# Patient Record
Sex: MALE | Race: WHITE | HISPANIC OR LATINO | Employment: OTHER | URBAN - METROPOLITAN AREA
[De-identification: names, ages, dates, MRNs, and addresses within clinical notes are randomized per-mention and may not be internally consistent; named-entity substitution may affect disease eponyms.]

---

## 2017-01-04 ENCOUNTER — ALLSCRIPTS OFFICE VISIT (OUTPATIENT)
Dept: OTHER | Facility: OTHER | Age: 37
End: 2017-01-04

## 2017-01-19 ENCOUNTER — ALLSCRIPTS OFFICE VISIT (OUTPATIENT)
Dept: OTHER | Facility: OTHER | Age: 37
End: 2017-01-19

## 2017-01-25 ENCOUNTER — ALLSCRIPTS OFFICE VISIT (OUTPATIENT)
Dept: OTHER | Facility: OTHER | Age: 37
End: 2017-01-25

## 2017-01-26 ENCOUNTER — ALLSCRIPTS OFFICE VISIT (OUTPATIENT)
Dept: OTHER | Facility: OTHER | Age: 37
End: 2017-01-26

## 2017-01-30 ENCOUNTER — GENERIC CONVERSION - ENCOUNTER (OUTPATIENT)
Dept: OTHER | Facility: OTHER | Age: 37
End: 2017-01-30

## 2017-02-03 ENCOUNTER — GENERIC CONVERSION - ENCOUNTER (OUTPATIENT)
Dept: OTHER | Facility: OTHER | Age: 37
End: 2017-02-03

## 2017-02-03 ENCOUNTER — ALLSCRIPTS OFFICE VISIT (OUTPATIENT)
Dept: PSYCHOLOGY | Facility: CLINIC | Age: 37
End: 2017-02-03
Payer: COMMERCIAL

## 2017-02-03 PROCEDURE — 90791 PSYCH DIAGNOSTIC EVALUATION: CPT | Performed by: PSYCHIATRY & NEUROLOGY

## 2017-02-03 PROCEDURE — G0177 OPPS/PHP; TRAIN & EDUC SERV: HCPCS | Performed by: PSYCHIATRY & NEUROLOGY

## 2017-02-03 PROCEDURE — G0410 GRP PSYCH PARTIAL HOSP 45-50: HCPCS | Performed by: PSYCHIATRY & NEUROLOGY

## 2017-02-06 ENCOUNTER — GENERIC CONVERSION - ENCOUNTER (OUTPATIENT)
Dept: OTHER | Facility: OTHER | Age: 37
End: 2017-02-06

## 2017-02-07 ENCOUNTER — GENERIC CONVERSION - ENCOUNTER (OUTPATIENT)
Dept: OTHER | Facility: OTHER | Age: 37
End: 2017-02-07

## 2017-02-07 ENCOUNTER — APPOINTMENT (OUTPATIENT)
Dept: PSYCHOLOGY | Facility: CLINIC | Age: 37
End: 2017-02-07
Payer: COMMERCIAL

## 2017-02-07 PROCEDURE — G0177 OPPS/PHP; TRAIN & EDUC SERV: HCPCS | Performed by: PSYCHIATRY & NEUROLOGY

## 2017-02-07 PROCEDURE — G0410 GRP PSYCH PARTIAL HOSP 45-50: HCPCS | Performed by: PSYCHIATRY & NEUROLOGY

## 2017-02-07 PROCEDURE — G0176 OPPS/PHP;ACTIVITY THERAPY: HCPCS | Performed by: PSYCHIATRY & NEUROLOGY

## 2017-02-08 ENCOUNTER — GENERIC CONVERSION - ENCOUNTER (OUTPATIENT)
Dept: OTHER | Facility: OTHER | Age: 37
End: 2017-02-08

## 2017-02-08 ENCOUNTER — APPOINTMENT (OUTPATIENT)
Dept: PSYCHOLOGY | Facility: CLINIC | Age: 37
End: 2017-02-08
Payer: COMMERCIAL

## 2017-02-08 PROCEDURE — G0410 GRP PSYCH PARTIAL HOSP 45-50: HCPCS | Performed by: PSYCHIATRY & NEUROLOGY

## 2017-02-08 PROCEDURE — G0176 OPPS/PHP;ACTIVITY THERAPY: HCPCS | Performed by: PSYCHIATRY & NEUROLOGY

## 2017-02-08 PROCEDURE — G0177 OPPS/PHP; TRAIN & EDUC SERV: HCPCS | Performed by: PSYCHIATRY & NEUROLOGY

## 2017-02-09 ENCOUNTER — GENERIC CONVERSION - ENCOUNTER (OUTPATIENT)
Dept: OTHER | Facility: OTHER | Age: 37
End: 2017-02-09

## 2017-02-10 ENCOUNTER — GENERIC CONVERSION - ENCOUNTER (OUTPATIENT)
Dept: OTHER | Facility: OTHER | Age: 37
End: 2017-02-10

## 2017-02-10 ENCOUNTER — APPOINTMENT (OUTPATIENT)
Dept: PSYCHOLOGY | Facility: CLINIC | Age: 37
End: 2017-02-10
Payer: COMMERCIAL

## 2017-02-10 PROCEDURE — G0176 OPPS/PHP;ACTIVITY THERAPY: HCPCS | Performed by: PSYCHIATRY & NEUROLOGY

## 2017-02-10 PROCEDURE — G0410 GRP PSYCH PARTIAL HOSP 45-50: HCPCS | Performed by: PSYCHIATRY & NEUROLOGY

## 2017-02-10 PROCEDURE — G0177 OPPS/PHP; TRAIN & EDUC SERV: HCPCS | Performed by: PSYCHIATRY & NEUROLOGY

## 2017-02-13 ENCOUNTER — GENERIC CONVERSION - ENCOUNTER (OUTPATIENT)
Dept: OTHER | Facility: OTHER | Age: 37
End: 2017-02-13

## 2017-02-13 ENCOUNTER — APPOINTMENT (OUTPATIENT)
Dept: PSYCHOLOGY | Facility: CLINIC | Age: 37
End: 2017-02-13
Payer: COMMERCIAL

## 2017-02-13 PROCEDURE — G0410 GRP PSYCH PARTIAL HOSP 45-50: HCPCS | Performed by: PSYCHIATRY & NEUROLOGY

## 2017-02-13 PROCEDURE — G0177 OPPS/PHP; TRAIN & EDUC SERV: HCPCS | Performed by: PSYCHIATRY & NEUROLOGY

## 2017-02-14 ENCOUNTER — APPOINTMENT (OUTPATIENT)
Dept: PSYCHOLOGY | Facility: CLINIC | Age: 37
End: 2017-02-14
Payer: COMMERCIAL

## 2017-02-14 ENCOUNTER — GENERIC CONVERSION - ENCOUNTER (OUTPATIENT)
Dept: OTHER | Facility: OTHER | Age: 37
End: 2017-02-14

## 2017-02-14 PROCEDURE — G0177 OPPS/PHP; TRAIN & EDUC SERV: HCPCS | Performed by: PSYCHIATRY & NEUROLOGY

## 2017-02-14 PROCEDURE — G0410 GRP PSYCH PARTIAL HOSP 45-50: HCPCS | Performed by: PSYCHIATRY & NEUROLOGY

## 2017-02-14 PROCEDURE — G0176 OPPS/PHP;ACTIVITY THERAPY: HCPCS | Performed by: PSYCHIATRY & NEUROLOGY

## 2017-02-15 ENCOUNTER — GENERIC CONVERSION - ENCOUNTER (OUTPATIENT)
Dept: OTHER | Facility: OTHER | Age: 37
End: 2017-02-15

## 2017-02-15 ENCOUNTER — APPOINTMENT (OUTPATIENT)
Dept: PSYCHOLOGY | Facility: CLINIC | Age: 37
End: 2017-02-15
Payer: COMMERCIAL

## 2017-02-15 PROCEDURE — G0176 OPPS/PHP;ACTIVITY THERAPY: HCPCS | Performed by: PSYCHIATRY & NEUROLOGY

## 2017-02-15 PROCEDURE — G0410 GRP PSYCH PARTIAL HOSP 45-50: HCPCS | Performed by: PSYCHIATRY & NEUROLOGY

## 2017-02-15 PROCEDURE — G0177 OPPS/PHP; TRAIN & EDUC SERV: HCPCS | Performed by: PSYCHIATRY & NEUROLOGY

## 2017-02-16 ENCOUNTER — GENERIC CONVERSION - ENCOUNTER (OUTPATIENT)
Dept: OTHER | Facility: OTHER | Age: 37
End: 2017-02-16

## 2017-02-16 ENCOUNTER — APPOINTMENT (OUTPATIENT)
Dept: PSYCHOLOGY | Facility: CLINIC | Age: 37
End: 2017-02-16
Payer: COMMERCIAL

## 2017-02-16 PROCEDURE — G0176 OPPS/PHP;ACTIVITY THERAPY: HCPCS | Performed by: PSYCHIATRY & NEUROLOGY

## 2017-02-16 PROCEDURE — G0410 GRP PSYCH PARTIAL HOSP 45-50: HCPCS | Performed by: PSYCHIATRY & NEUROLOGY

## 2017-02-16 PROCEDURE — G0177 OPPS/PHP; TRAIN & EDUC SERV: HCPCS | Performed by: PSYCHIATRY & NEUROLOGY

## 2017-02-27 ENCOUNTER — ALLSCRIPTS OFFICE VISIT (OUTPATIENT)
Dept: OTHER | Facility: OTHER | Age: 37
End: 2017-02-27

## 2017-03-07 ENCOUNTER — GENERIC CONVERSION - ENCOUNTER (OUTPATIENT)
Dept: OTHER | Facility: OTHER | Age: 37
End: 2017-03-07

## 2017-04-05 ENCOUNTER — ALLSCRIPTS OFFICE VISIT (OUTPATIENT)
Dept: OTHER | Facility: OTHER | Age: 37
End: 2017-04-05

## 2017-04-19 ENCOUNTER — ALLSCRIPTS OFFICE VISIT (OUTPATIENT)
Dept: OTHER | Facility: OTHER | Age: 37
End: 2017-04-19

## 2017-06-12 ENCOUNTER — ALLSCRIPTS OFFICE VISIT (OUTPATIENT)
Dept: OTHER | Facility: OTHER | Age: 37
End: 2017-06-12

## 2017-07-12 ENCOUNTER — ALLSCRIPTS OFFICE VISIT (OUTPATIENT)
Dept: OTHER | Facility: OTHER | Age: 37
End: 2017-07-12

## 2017-08-15 ENCOUNTER — ALLSCRIPTS OFFICE VISIT (OUTPATIENT)
Dept: OTHER | Facility: OTHER | Age: 37
End: 2017-08-15

## 2017-08-15 DIAGNOSIS — F40.01 AGORAPHOBIA WITH PANIC DISORDER: ICD-10-CM

## 2017-08-15 DIAGNOSIS — F43.10 POST-TRAUMATIC STRESS DISORDER: ICD-10-CM

## 2017-08-15 DIAGNOSIS — F31.63 BIPOLAR DISORDER, CURRENT EPISODE MIXED, SEVERE, WITHOUT PSYCHOTIC FEATURES (HCC): ICD-10-CM

## 2017-09-27 ENCOUNTER — ALLSCRIPTS OFFICE VISIT (OUTPATIENT)
Dept: OTHER | Facility: OTHER | Age: 37
End: 2017-09-27

## 2018-01-09 NOTE — PSYCH
Psych Med Mgmt    Appearance: was calm and cooperative, adequate hygiene and grooming and good eye contact  Observed mood: mood appropriate  Observed mood: affect appropriate  Speech: a normal rate and fluent  Thought processes: coherent/organized  Hallucinations: no hallucinations present  Thought Content: no delusions  Abnormal Thoughts: The patient has no suicidal thoughts and no homicidal thoughts  Orientation: The patient is oriented to person, place and time, oriented to person, oriented to place and oriented to time  Recent and Remote Memory: short term memory intact and long term memory intact  Attention Span And Concentration: concentration impaired  Insight: Limited insight  Judgment: His judgment was limited  Muscle Strength And Tone  Muscle strength and tone were normal    The patient is experiencing no localized pain  Goals addressed in session: medication management       Treatment Recommendations: D/C Depakote Start Tegretol  mg bid     Risks, Benefits And Possible Side Effects Of Medications: Risks, benefits, and possible side effects of medications explained to patient and patient verbalizes understanding  He reports normal appetite, normal energy level, no weight change and normal number of sleep hours  Mood is less depressed but c/o severe dizziness from Depakote and stated he will like to switch medications  He agrees to try Carbamazepine 200 mg bid f/u in 4 to 6 weeks  He is anxious about losing his SSI benefits and he is appealing the decision,  Patient is not currently stable to work full time  Patient was recently d/c from [employer] and he was started on Paxil 20 mg       Vitals  Signs   Recorded: 19BZO4009 10:06AM   Height: 5 ft 4 in  Weight: 180 lb   BMI Calculated: 30 90  BSA Calculated: 1 87    Assessment    1  Anxiety (300 00) (F41 9)   2   Bipolar I disorder, most recent episode mixed, severe without psychotic features   (296 63) (F31 63)   3  Intermittent explosive disorder (312 34) (F63 81)   4  Panic disorder with agoraphobia (300 21) (F40 01)   5  Post traumatic stress disorder (309 81) (F43 10)    Plan    1  ClonazePAM 1 MG Oral Tablet (KlonoPIN); TAKE 1 TABLET 3 TIMES DAILY AS   NEEDED    2  CarBAMazepine  MG Oral Tablet Extended Release 12 Hour; TAKE 1   TABLET TWICE DAILY   3  PARoxetine HCl - 20 MG Oral Tablet (Paxil); TAKE 1 TABLET DAILY    4  Divalproex Sodium  MG Oral Tablet Extended Release 24 Hour (Depakote   ER)   5  OLANZapine 7 5 MG Oral Tablet; TAKE 1 TABLET Once At Bedtime    6  TraZODone HCl - 100 MG Oral Tablet; TAKE 1 TABLET AT BEDTIME    Review of Systems    Constitutional: as noted in HPI  Substance Abuse Hx    Substance Abuse History: Denies  Active Problems    1  Anxiety (300 00) (F41 9)   2  Bipolar I disorder, most recent episode mixed, severe without psychotic features   (296 63) (F31 63)   3  Intermittent explosive disorder (312 34) (F63 81)   4  Panic disorder with agoraphobia (300 21) (F40 01)   5  Post traumatic stress disorder (309 81) (F43 10)    Past Medical History    1  History of alcohol abuse (305 03) (Z87 898)   2  History of Crohn's disease (V12 70) (Z87 19)    The active problems and past medical history were reviewed and updated today  Allergies    1  Penicillins   2  Remicade SOLR    Current Meds   1  ClonazePAM 1 MG Oral Tablet; TAKE 1 TABLET 3 TIMES DAILY AS NEEDED; Therapy: 09WAK8687 to (Evaluate:09Oct2016); Last Rx:15Kvb9666 Ordered   2  Divalproex Sodium  MG Oral Tablet Extended Release 24 Hour; TAKE 1 TABLET   AT BEDTIME; Therapy: 23XPN4477 to (Evaluate:17Nov2016)  Requested for: 13TKZ2043; Last   Rx:23Ahf7247 Ordered   3  OLANZapine 5 MG Oral Tablet; TAKE 1 TABLET AT BEDTIME; Therapy: 49JMP1738 to (Evaluate:17Nov2016)  Requested for: 84LBS9310; Last   Rx:09Bhw9648 Ordered    The medication list was reviewed and updated today         Family Psych History  Mother    1  No pertinent family history  Father    2  No pertinent family history    The family history was reviewed and updated today  Social History    · Current Some Day Smoker (305 1)   · Home   · Occupational  The social history was reviewed and updated today  He moved out of his brother's house and now is staying with his parents  He also stopped working      End of Encounter Meds    1  ClonazePAM 1 MG Oral Tablet (KlonoPIN); TAKE 1 TABLET 3 TIMES DAILY AS NEEDED; Therapy: 57YKT1945 to (Evaluate:23Jan2017); Last Rx:73Xzz9554 Ordered    2  CarBAMazepine  MG Oral Tablet Extended Release 12 Hour; TAKE 1 TABLET   TWICE DAILY; Therapy: 70HCH9507 to (PLQLMTHV:25HBT1549)  Requested for: 25Oct2016; Last   Rx:25Oct2016 Ordered   3  PARoxetine HCl - 20 MG Oral Tablet (Paxil); TAKE 1 TABLET DAILY; Therapy: 07AUV1045 to (Evaluate:23Jan2017); Last Rx:44Qnc9847 Ordered    4  OLANZapine 7 5 MG Oral Tablet; TAKE 1 TABLET Once At Bedtime; Therapy: 72MWY0825 to (Evaluate:23Jan2017)  Requested for: 25Oct2016; Last   Rx:96Nok3122 Ordered    5  TraZODone HCl - 100 MG Oral Tablet; TAKE 1 TABLET AT BEDTIME; Therapy: 67IGJ3794 to (HIUWLAEN:37YOW4170)  Requested for: 40ZYR6616; Last   UR:99HNZ0655 Ordered    Future Appointments    Date/Time Provider Specialty Site   11/10/2016 11:00 AM Carlene Kaba [facility]   11/23/2016 09:00 AM Carlene Kaba [facility]   12/01/2016 10:20 AM OLGA Hardy   Psychiatry [place] [id number]     Signatures   Electronically signed by : OLGA Mccrary ; Oct 25 2016 10:08AM EST                       (Author)

## 2018-01-09 NOTE — PSYCH
History of Present Illness  [facility] Clinical Progress Note [facility]:   Specialized Services Documentation - Therapist must complete separate progress note for each specific clinical activity in which the client participated during the day  ([phone]) Group Psychotherapy: (9:30-10:30) Chandni Mckeon participated in psychotherapy group focused on making healthy changes as well as mental health stigma  Chandni Mckeon identified his car being inspected tdoay as a stressor, and stated that he constantly worries about everything, even if he knows they are not worth worrying about  Several peers related to him regarding worry  He actively participated in group discussion about making healthy changes, such as communicating differently with supports or incorporating exercising and diet changes into one's daily routine  He provided much feedback and support to peers on these topics  He also discussed difficulties of mental health stigma and how it is something he faces every day, whether with friends or his family members, particularly his sister  Some moderate progress noted toward goals  Continue psychotherapy group to encourage Chandni Mckeon to explore stressors and coping  Treatment Plan Problem(s): 1 1, 1 2  Macy Woodall MSW, LSW     (775) Group Psychotherapy: [id number] Chandni Mckeon participated in wellness group focused on drawing effective personal boundaries in key areas such as your time, emotions, energy and personal values  Chadnni Mckeon shared that he "probably has bad boundaries" when it comes to just stopping over friends or family's homes when he wants or needs to talk  Chandni Mckeon shared this after hearing a peer's experience with others doing this to her and he stated that he never saw it as unhealthy because "I am always there for others when they need me " Group gave encouragement to Chandni Mckeon to see certain situations from other's point of view and he was able to acknowledge how this behavior can violate other's boundaries   Chandni Mckeon stated that he does not have problems setting boundaries with others  Wilson Corrigan made good progress toward goals  Continue group to educate on what a healthy boundary looks like and how it works to support personal wellness as well as ways to create healthier personal boundaries  Treatment Plan Problem(s): 1 1,1 2,1 4  Don Garcia, RN     (404) Group Psychotherapy: ([id number]) Wilson Corrigan participated in wellness group focused on identifying and coping with positive experiences of life through the use of worksheet  He described how he realized and took courage to seek treatment for his mental illness  Also verbalized by 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 changed his attitude to be kind, selfless, sacrifice and he became more thankful towards his parents and family  His determination is to set goals and work through to achieve the goals  Peers offered him lot of praise and encouraged to stay motivated  Significant progress toward goals noted  Continue wellness group to educate James Swanson about his diagnosis and coping strategies to deal with depression  Mona Mojica RN  Treatment Plan Problem(s): 1 1,1 2,1 4      (070) Education Therapy Goals set - meditate before bed    Treatment Plan Problem(s): 1 1, 1 2  Education Therapy Time - 0900 - 0930 Previous goal was met  Readiness to Learning:  He is receptive to learning  There are  no barriers to learning  Learning Assessment Time - 1330 - 1400   Education completed on  illness and wellness tools  The teaching method was  verbal and written  Shared area of learning: Yes  Laura Vallejo MSW, LSW         Case Management Note:   12:15-12:2- This CM met with Wilson Corrigan to discuss weekend and progress  He stated that he is doing ok, but is anxious about several things, such as his car, Social Security claim, getting a job and his ex-girlfriend  Discussed his worries, what he can control and what he cannot, etc  He stated that he is trying to put coping skills into place, and just talking about them has been helping   He stated that he feels he is getting a lot from the program  He wants to call [facility] regarding financing options for finishing his degree, and asked if he could do so with this CM present  Advised that would be possible, and planned for tomorrow at 11:45  TREATMENT SESSION NUMBER: 5   Current suicide risk is low  Medications not changed/added/denied  Conception Nan ARREOLAW, HILDAW      Active Problems    1  Anxiety (300 00) (F41 9)   2  Bipolar I disorder, most recent episode mixed, severe without psychotic features   (296 63) (F31 63)   3  RADHA (generalized anxiety disorder) (300 02) (F41 1)   4  Intermittent explosive disorder (312 34) (F63 81)   5  Panic disorder with agoraphobia (300 21) (F40 01)   6  Panic disorder without agoraphobia (300 01) (F41 0)   7  Post traumatic stress disorder (309 81) (F43 10)    Past Medical History    1  Denied: History of Head trauma   2  History of alcohol abuse (305 03) (Z87 898)   3  History of Crohn's disease (V12 70) (Z87 19)   4  Denied: History of Seizure    Allergies    1  Penicillins   2  Remicade SOLR    Current Meds   1  ClonazePAM 1 MG Oral Tablet; TAKE 1 TABLET 3 TIMES DAILY AS NEEDED; Therapy: 55IVQ6556 to (Evaluate:25Apr2017); Last Rx:68Eel9839 Ordered   2  FLUoxetine HCl - 20 MG Oral Capsule; TAKE 1 CAPSULE Once In The Morning; Therapy: 87CNM6792 to (Evaluate:26Apr2017)  Requested for: 23RFA1229; Last   Rx:26Jan2017 Ordered   3  HydrOXYzine HCl - 25 MG Oral Tablet; TAKE 1 TABLET 3 TIMES DAILY AS NEEDED; Therapy: 21GDP4581 to (Evaluate:21Szr5512)  Requested for: 12NFO2054; Last   Rx:26Jan2017 Ordered   4  OLANZapine 20 MG Oral Tablet; TAKE 1 TABLET AT BEDTIME; Therapy: 50YCI3480 to (Evaluate:30May2017)  Requested for: 26Jan2017; Last   Rx:70Hsx39 Ordered    Family Psych History  Mother    1  No pertinent family history  Father    2  Family history of alcohol abuse (V61 41) (Z81 1)   3   Family history of paranoid schizophrenia (V17 0) (Z81 8)    Social History    · Current Some Day Smoker (305 1)   · Disabled   · Education Level: Some college   · Home   · Occasional caffeine consumption   · Occupational   · Single    Future Appointments    Date/Time Provider Specialty Site   02/27/2017 11:00 AM Alison Ellis, [facility] Psychiatry [facility]   03[facility]07[facility]2017 10[facility]   03/20/2017 11:00 [facility]   04/05/2017 11:00 AM [facility] [place]   04/19/2017 11:00 AM Alison Ellis, [facility] Psychiatry [facility]   02/14/2017 11:30 AM OLGA Tyson  Psychiatry [facility]   02/15/2017 12:15 PM Daniel Lea M D  Psychiatry [facility]   02/16/2017 11:15 AM OLGA Tyson  Psychiatry [facility]   02/17/2017 12:15 PM Daniel Lea M D   Psychiatry [facility]   03/02/2017 02:15 PM Jack Barrios, MS, APRN, PMHCNS_BS  [facility]   03/16/2017 02:15 PM Doylene Rounds, MS, APRN, PMHCNS_BS  [facility]   04/06/2017 02:15 PM Doylene Rounds, MS, APRN, PMHCNS_BS  [facility]   04/20/2017 02:15 PM Doylene Rounds, MS, APRN, PMHCNS_BS  [facility]   04/20/2017 02:15 PM Doylene Rounds, MS, APRN, [facility]     Signatures   Electronically signed by : ELSY Oropeza; Feb 13 2017  1:02PM EST                       (Author)    Electronically signed by : Marshall Welch RN; Feb 13 2017  1:26PM EST                       (Author)    Electronically signed by : ELSY Oropeza; Feb 13 2017  2:12PM EST                       (Author)    Electronically signed by : Mariama Barone RN; Feb 13 2017  2:36PM EST                       (Author)

## 2018-01-09 NOTE — PSYCH
History of Present Illness  [facility] Clinical Progress Note [facility]:   Specialized Services Documentation - Therapist must complete separate progress note for each specific clinical activity in which the client participated during the day  ([phone]) Group Psychotherapy: (9:30-10:30) Jerad Turner participated in psychotherapy group focused on coping with lack of structure over the weekend, as well as poor motivation and isolation  Jerad Turner identified seeing his brother for the first time in three years this weekend as a stressor  He was an active participant in group discussion about isolating from family and friends, and states that it is one of his biggest problems right now  He also talked about his plans for the weekend and how he plans to cope with stressors  Good beginning progress noted toward goals  Continue psychotherapy group to encourage Jerad Turner to explore stressors and coping  Treatment Plan Problem(s): 1 1, 1 2  Huyen Singh MSW, LSW     (418) Group Psychotherapy: 0822-9020 Jerad Turner participated in weekly wellness group to discuss what particular area of wellness that has been worked on up to today in Program and choice of area to continue working on for recovery as targeted in treatment plan, by choice or in WRAP  Jerad Turner shared that he has been isolating and will focus on exercising to isolate less and feel better physically  Jerad Turner stated that he will do this by going to the gym he has a membership at beginning this evening with his brother  Jerad Turner stated that he has not been to the gym in over one month  Peers encouraged Jerad Turner to make a goal and break into objectives on how he will achieve it such as scheduling time to go to the gym on his calendar and marking off when he actually does go  Jerad Turner made good initial progress toward goals  Continue to offer group for Jerad Turner to focus on setting and executing goals as he identifies and prioritizes them                           toward goal  Continue to offer weekly wellness as an strategy to offer opportunity to focus on goals and identify areas of goal achievement and need  Treatment Plan Problem(s): 1 1, 1 2  Aicha Asif RN       (672) Education Therapy Goals set - spend time with brothers    Treatment Plan Problem(s): 1 4, 1 1  Education Therapy Time - 0900 - 0930, Time first treatment day   Readiness to Learning:  He is receptive to learning  There are  no barriers to learning  Learning Assessment Time - 1330 - 1400   Education completed on  illness, medication and wellness tools  The teaching method was  verbal  Shared area of learning: Yes  BALDEMAR Carter     (594) [facility] [id number] Judith Wyatt excused due to case management evaluation  BALDEMAR Carter     ( ) Other 14:25-spoke with Otto Do at [facility]  She will email precert form and once completed, this CM should fax back to them for review  BONNY Villegas     Case Management Note:   10:45-11:20 This CM met with Judith Wyatt for initial intake  He was cooperative with intake process and shared openly  Signed ROIs for PCP, emergency contact and outpatient psych providers  Program basics explained, and on-call/crisis numbers provided  Denies SI, HI or psychosis currently  TREATMENT SESSION NUMBER: 1   Current suicide risk is low  Medications not changed/added/denied  Darek FOURNIER, BONNY      Active Problems     1  Anxiety (300 00) (F41 9)   2  Intermittent explosive disorder (312 34) (F63 81)   3  Panic disorder with agoraphobia (300 21) (F40 01)    Bipolar I disorder, most recent episode mixed, severe without psychotic features (296 63) (F31 63)       Post traumatic stress disorder (309 81) (F43 10)       RADHA (generalized anxiety disorder) (300 02) (F41 1)       Panic disorder without agoraphobia (300 01) (F41 0)          Past Medical History    1  History of alcohol abuse (305 03) (Z87 898)   2  History of Crohn's disease (V12 70) (Z87 19)    Allergies    1  Penicillins   2   Remicade SOLR    Current Meds 1  ClonazePAM 1 MG Oral Tablet; TAKE 1 TABLET 3 TIMES DAILY AS NEEDED; Therapy: 43EEN5832 to (Evaluate:25Apr2017); Last Rx:50Ikj8327 Ordered   2  FLUoxetine HCl - 20 MG Oral Capsule; TAKE 1 CAPSULE Once In The Morning; Therapy: 49SMC6556 to (Evaluate:26Apr2017)  Requested for: 78KXS5292; Last   Rx:26Jan2017 Ordered   3  HydrOXYzine HCl - 25 MG Oral Tablet; TAKE 1 TABLET 3 TIMES DAILY AS NEEDED; Therapy: 59AVY4268 to (Evaluate:02Eht6359)  Requested for: 09XNT1141; Last   Rx:26Jan2017 Ordered   4  OLANZapine 20 MG Oral Tablet; TAKE 1 TABLET AT BEDTIME; Therapy: 83UFJ5641 to (Evaluate:30May2017)  Requested for: 26Jan2017; Last   Rx:26Jan2017 Ordered    Family Psych History   Family history of No pertinent family history             Social History    · Current Some Day Smoker (305 1)   · Home   · Occupational    Future Appointments    Date/Time Provider Specialty Site   02/06/2017 10:00 AM Daniel Kaba LCSW Psychiatry [facility]   02/27/2017 11:00 AM Daniel Kaba LCSW Psychiatry [facility]   03/07/2017 10:00 AM Daniel Kaba LCSW Psychiatry [facility]   03/20/2017 11:00 AM Daniel Kaba LCSW Psychiatry [facility]   04/05/2017 11:00 AM Daniel Kaba LCSW Psychiatry [facility]   04/19/2017 11:00 AM Daniel Kaba LCSW Psychiatry [facility]   02/06/2017 10:00 AM OLGA Recinos  Psychiatry [facility]   02/07/2017 10:00 AM OLGA Recinos  Psychiatry [facility]   02/08/2017 10:00 AM OLGA Recinos  Psychiatry [facility]   02/09/2017 10:00 AM OLGA Recinos  Psychiatry [facility]   02/10/2017 10:00 AM OLGA Recinos   Psychiatry [facility]   03/02/2017 02:15 PM Olaf Barrios, MS, APRN, PMHCNS_BS  [facility] 2017 02:15 PM Sandie Barrios, MS, APRN, PMHCNS_BS  [facility]   2017 02:15 PM Severa Saxon, MS, APRN, PMHCNS_BS  [facility]   2017 02:15 PM Severa Saxon, MS, APRN, PMHCNS_BS  [facility]   2017 02:15 PM Severa Saxon, MS, APRN, [facility]     Signatures   Electronically signed by : ELSY Jc; Feb  3 2017 11:52AM EST                       (Author)    Electronically signed by : BALDEMAR Long; Feb  3 2017  2:18PM EST                       (Author)    Electronically signed by : BALDEMAR Long; Feb  3 2017  2:24PM EST                       (Author)    Electronically signed by : ELSY Jc; Feb  3 2017  2:28PM EST                       (Author)    Electronically signed by : Malena Goyal RN; Feb  3 2017  3:54PM EST                       (Author)

## 2018-01-09 NOTE — PSYCH
1  Bipolar I disorder, most recent episode mixed, severe without psychotic features   (296 63) (F31 63)   2  Anxiety (300 00) (F41 9)   3  Intermittent explosive disorder (312 34) (F63 81)   4  Panic disorder with agoraphobia (300 21) (F40 01)   5  Post traumatic stress disorder (309 81) (F43 10)      Date of Initial Treatment Plan: 12/3/16  Date of Current Treatment Plan: 11/23/16  Treatment Plan 2  Strengths/Personal Resources for Self Care: caring, up front with stuff  Area of Needs: I got off track  Long Term Goals:   I want to get back on track  Target Date: 3/17              Short Term Objectives:   Goal 1:   I will take my meds  I will follow up with    I will attend therapy regularly again  I will attend the bipolar group  I will volunteer again  I will look into the DBS[employer] group  I will go back to [facility]  I will complete the partial program    Target Date: 12/16              GOAL 1: Modality: Individual 2 x per month   GOAL 1: Modality: Group 2 x per month                          The first scheduled review date is 3/17  The expected length of service is 6 mons  Patient Signature: _________________________________ Date/Time: ______________        1  Anxiety (300 00) (F41 9)   2  Bipolar I disorder, most recent episode mixed, severe without psychotic features   (296 63) (F31 63)   3  Intermittent explosive disorder (312 34) (F63 81)   4  Panic disorder with agoraphobia (300 21) (F40 01)   5   Post traumatic stress disorder (309 81) (F43 10)     Electronically signed by : Ata Alvarado LCSW; Nov 23 2016  9:49AM EST                       (Author)

## 2018-01-10 NOTE — PSYCH
Psych Med Mgmt    Appearance: was calm and cooperative, adequate hygiene and grooming and good eye contact  Observed mood: mood appropriate  Observed mood: affect appropriate  Speech: a normal rate  Thought processes: coherent/organized  Hallucinations: no hallucinations present  Thought Content: no delusions  Abnormal Thoughts: The patient has no suicidal thoughts and no homicidal thoughts  Orientation: The patient is oriented to person, place and time, oriented to person, oriented to place and oriented to time  Recent and Remote Memory: short term memory intact and long term memory intact  Attention Span And Concentration: concentration impaired  Insight: Limited insight  Judgment: His judgment was limited  Muscle Strength And Tone  Muscle strength and tone were normal    The patient is experiencing no localized pain  Goals addressed in session: Medication Management     Treatment Recommendations: Increase Lithium to previous dose  Risks, Benefits And Possible Side Effects Of Medications: Risks, benefits, and possible side effects of medications explained to patient and patient verbalizes understanding  He reports normal appetite, normal energy level, no weight change and normal number of sleep hours  Patient stated that since the last change in Lithium dose he has been irritable, he is angry more often, paranoid, defensive, mood lability and poor sleep  C/o feeling fatigued and feels he has poor memory and concentration  He agrees to go back to 900 mg Lithium and re take a blood level  Also will increase Zyprexa 7 5 mg qhs  He also agrees that if he is not feeling better in the next two weeks it would be best to be hospitalized  Assessment    1  Bipolar I disorder, most recent episode mixed, severe without psychotic features   (296 63) (F31 63)   2  Anxiety (300 00) (F41 9)   3  Panic disorder with agoraphobia (300 21) (F40 01)   4   Post traumatic stress disorder (309 81) (F43 10)   5  Intermittent explosive disorder (312 34) (F63 81)    Plan    1  ClonazePAM 1 MG Oral Tablet (KlonoPIN); TAKE 1 TABLET 3 TIMES DAILY AS   NEEDED    2  PARoxetine HCl - 20 MG Oral Tablet    3  Lithium Carbonate 300 MG Oral Capsule; TAKE 3 CAPSULE Once At Bedtime   4  OLANZapine 7 5 MG Oral Tablet; TAKE 1 TABLET Once At Bedtime    5  (1) LITHIUM; Status:Active; Requested for:58Wgn4036;     Review of Systems    Constitutional: as noted in HPI  Substance Abuse Hx    Substance Abuse History: Denies  Active Problems    1  Anxiety (300 00) (F41 9)   2  Bipolar I disorder, most recent episode mixed, severe without psychotic features   (296 63) (F31 63)   3  Intermittent explosive disorder (312 34) (F63 81)   4  Panic disorder with agoraphobia (300 21) (F40 01)   5  Post traumatic stress disorder (309 81) (F43 10)    Past Medical History    1  History of alcohol abuse (305 03) (Z87 898)   2  History of Crohn's disease (V12 70) (Z87 19)    The active problems and past medical history were reviewed and updated today  Allergies    1  Penicillins   2  Remicade SOLR    Current Meds   1  ClonazePAM 1 MG Oral Tablet; TAKE 1 TABLET 3 TIMES DAILY AS NEEDED; Therapy: 04TZY6408 to (Evaluate:17Jun2016); Last Rx:[id number] Ordered   2  Lithium Carbonate 300 MG Oral Capsule; 2 caps po q morning; Therapy: 69ZAE4160 to (Evaluate:17Jun2016)  Requested for: 83OQJ0202; Last   Rx:65Qyq2513 Ordered   3  OLANZapine 2 5 MG Oral Tablet; TAKE 1 TABLET DAILY; Therapy: 45JLU3504 to (Evaluate:18May2016)  Requested for: 69WQN6414; Last   Rx:61Erg2957 Ordered   4  PARoxetine HCl - 20 MG Oral Tablet; Take 1 tablet daily; Therapy: 04QCW7425 to (Evaluate:18May2016)  Requested for: 04BIN9655; Last   Rx:87Qxv9581 Ordered    The medication list was reviewed and updated today  Family Psych History  Mother    1  No pertinent family history  Father    2   No pertinent family history    The family history was reviewed and updated today  Social History    · Current Some Day Smoker (305 1)   · Home   · Occupational  The social history was reviewed and updated today  He stated he has been dating for 6 weeks and he has been having problems with his GF all because he has been irritable and manic after decreasing dose of Lithium and Zyprexa  End of Encounter Meds    1  ClonazePAM 1 MG Oral Tablet (KlonoPIN); TAKE 1 TABLET 3 TIMES DAILY AS NEEDED; Therapy: 49HYX1563 to (Evaluate:09Oct2016); Last Rx:75Qvu9716 Ordered    2  Lithium Carbonate 300 MG Oral Capsule; TAKE 3 CAPSULE Once At Bedtime; Therapy: 94HMK5769 to (Evaluate:09Oct2016)  Requested for: 32EDP5445; Last   Rx:24Etx0512 Ordered   3  OLANZapine 7 5 MG Oral Tablet; TAKE 1 TABLET Once At Bedtime;    Therapy: 66TIA5172 to ([place])  Requested for: 39ADG7820; Last   Rx:[id number] Ordered    Future Appointments    Date/Time Provider Specialty Site   08/16/2016 10:00 AM Anuj Jeronimo, [facility] Psychiatry [facility]   08/30/2016 02:00 PM Anuj Jeronimo [facility] Psychiatry [facility]   09/16/2016 11:00 AM Anuj Jeronimo, [facility] Psychiatry [facility]   09/30/2016 11:00 AM Anuj Green, [facility]iatry [facility]   10/14/2016 11:00 AM Anuj Jeronimo, [facility] Psychiatry [facility]   10/28/2016 11:00 AM Anuj Jeronimo, [facility] Psychiatry [facility]     Signatures   Electronically signed by : OLGA Denton ; Jul 11 2016  1:49PM EST                       (Author)
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
## 2018-01-10 NOTE — PSYCH
Progress Note  Psychotherapy Provided [facility]: Individual Psychotherapy 45 minutes provided today  Goals addressed in session:   goals 1  He completed the [employer] program  His ex-girlfriend is no longer his   He is working on hire a new   He is dating a "new girl " "He took the Atarax and it made him very dizzy and he did not sleep,  He is not taking that again " Developed tx plan  A: Slow progress on goal 1  P; "To not take the Atarax again "      Assessment    1  Bipolar I disorder, most recent episode mixed, severe without psychotic features   (296 63) (F31 63)   2  Anxiety (300 00) (F41 9)   3  Intermittent explosive disorder (312 34) (F63 81)   4  Panic disorder without agoraphobia (300 01) (F41 0)   5   Post traumatic stress disorder (309 81) (F43 10)    Signatures   Electronically signed by : Beata Salas LCSW; Apr 5 2017  3:32PM EST                       (Author)

## 2018-01-10 NOTE — PSYCH
History of Present Illness  [facility] Clinical Progress Note  [facility]:   Specialized Services Documentation - Therapist must complete separate progress note for each specific clinical activity in which the client participated during the day  Case Management Note: Individual Case Management visit not provided today  Giancarlo Mendes was excused from program today due to inclement weather  Current suicide risk is low  Medications not changed/added/denied  Joycemeka De Pazon MSW, LSW      Active Problems    1  Anxiety (300 00) (F41 9)   2  Bipolar I disorder, most recent episode mixed, severe without psychotic features   (296 63) (F31 63)   3  RADHA (generalized anxiety disorder) (300 02) (F41 1)   4  Intermittent explosive disorder (312 34) (F63 81)   5  Panic disorder with agoraphobia (300 21) (F40 01)   6  Panic disorder without agoraphobia (300 01) (F41 0)   7  Post traumatic stress disorder (309 81) (F43 10)    Past Medical History    1  Denied: History of Head trauma   2  History of alcohol abuse (305 03) (Z87 898)   3  History of Crohn's disease (V12 70) (Z87 19)   4  Denied: History of Seizure    Allergies    1  Penicillins   2  Remicade SOLR    Current Meds   1  ClonazePAM 1 MG Oral Tablet (KlonoPIN); TAKE 1 TABLET 3 TIMES DAILY AS NEEDED; Therapy: 48FGW2850 to (Evaluate:25Apr2017); Last Rx:33Ndb0915 Ordered   2  FLUoxetine HCl - 20 MG Oral Capsule; TAKE 1 CAPSULE Once In The Morning; Therapy: 55HIY9215 to (Evaluate:26Apr2017)  Requested for: 27OKY3011; Last   Rx:26Jan2017 Ordered   3  HydrOXYzine HCl - 25 MG Oral Tablet; TAKE 1 TABLET 3 TIMES DAILY AS NEEDED; Therapy: 67CCI7997 to (Evaluate:96Zjc3607)  Requested for: 12COG8577; Last   Rx:26Jan2017 Ordered   4  OLANZapine 20 MG Oral Tablet; TAKE 1 TABLET AT BEDTIME; Therapy: 81TMF5620 to (Evaluate:66Lls4837)  Requested for: 26Jan2017; Last   Whitney Apo Ordered    Family Psych History  Mother    1  No pertinent family history  Father    2   Family history of alcohol abuse (V61 41) (Z81 1)   3  Family history of paranoid schizophrenia (V17 0) (Z81 8)    Social History    · Current Some Day Smoker (305 1)   · Disabled   · Education Level: Some college   · Home   · Occasional caffeine consumption   · Occupational   · Single    Future Appointments    Date/Time Provider Specialty Site   02/27/2017 11:00 AM Toludin Jack, [facility] Psychiatry [facility]   03/07/2017 10:00 AM Toludin Jack, [facility] Psychiatry [facility]   03/20/2017 11:00 AM Tod [place], [facility] Psychiatry [facility]   04/05/2017 11:00 AM Toludin Jack, [facility] Psychiatry [facility]   04/19/2017 11:00 AM Toludin Jack, [facility] Psychiatry [facility]   02/09/2017 10:00 AM OLGA Schumacher  Psychiatry [facility]   02/10/2017 10:00 AM OLGA Schumacher   Psychiatry [facility]   03/02/2017 02:15 PM Anoop Barrios, MS, APRN, PMHCNS_BS  [facility]   03/16/2017 02:15 PM Alessandra Rivera, MS, APRN, PMHCNS_BS  [facility]   04/06/2017 02:15 PM Alessandra Rivera, MS, APRN, PMHCNS_BS  [facility]   04/20/2017 02:15 PM Alessandra Rivera, MS, APRN, PMHCNS_BS  [facility]   04/20/2017 02:15 PM Alessandra Rivera, MS, APRN, PMHCNS_BS  [facility]     Signatures   Electronically signed by : ELSY Granado; Feb 9 2017  9:40AM EST                       (Author)
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
## 2018-01-10 NOTE — PSYCH
History of Present Illness  [facility] Clinical Progress Note [facility]:   Specialized Services Documentation - Therapist must complete separate progress note for each specific clinical activity in which the client participated during the day  ([phone]) Group Psychotherapy: (9:30-10:30) Domitila Domínguez participated in psychotherapy group focused on dealing with work stressors and prioritizing self-care  Domitila Domínguez identified today being his last day in program as a stressor  He actively participated in group discussion, talking about how he has felt financial stress due to student loans and having to quit his job because he got sick  He related to peer struggling with significant financial problems and provided validation and support to him  Domitila Domínguez discussed prioritizing self-care as a way to maintain emotional wellness  Moderate progress noted toward goals today  Discharge at the end of program day today  Treatment Plan Problem(s): 1 1, 1 2  Hayley Georges MSW, LSW     (849) Group Psychotherapy: (10:45-11:45) Domitila Domínguez attended group  The daily activity was watching and discussing parts of the [employer] workshop (2001)  Domitila Domínguez was attentive and did engage in the group discussion  Moderate progress noted towards goals  Continue group to encourage Domitila Domínguez to explore stressors and coping skills  Matt Sagastume, MSW Candidate)  Treatment Plan Problem(s): 1 1, 1 2        (442) Education Therapy Goals set - Domitila Domínguez shared feeling "better" and shared gratitude and encouragement with peers    Treatment Plan Problem(s): 1 0    Education Therapy Time - 0900 - 0930 Previous goal was met  Readiness to Learning:  He is receptive to learning  There are  no barriers to learning  Learning Assessment Time - 1330 - 1400   Education completed on  illness, medication, aftercare and wellness tools  The teaching method was  verbal, written, audio/visual and demonstration  Shared area of learning: Yes   BALDEMAR Acevedo     (316) Allied Therapy [id number] Jerad Turner actively shared in [facility] group focused on relaxation techniques and concept of mindfulness  He was observed to be engaged in therapist led relaxation exercises  He shared use of exercises outside of program and discussed feedback  Group discussed specific ways to practice refocusing thoughts to the present and offered encouragement to use these things regularly to manage stressors consistently  Positive effort noted toward tx goal  Discharge at the end of treatment day  Treatment Plan Problem(s): 1 1  Stevenson Capellan, MT-BC     ( ) Other 15:41-phone call to [facility] 56  to provide discharge information  Spoke with Ekta Johnson to advise of d/c today  Huyen FOURNIER, BONNY     Case Management Note:   12:20-12:29 This CM met with Jerad Turner to review discharge instructions, relapse prevention plan and med reconciliation list  He signed and received copies of all forms  He noted that he feels more motivated to pursue his degree, he is reaching out to supports more and feels that his mood has improved as signs of improvement  He denies SI, HI or psychosis today  TREATMENT SESSION NUMBER: 8   Current suicide risk is low  Medications not changed/added/denied  Huyen FOURNIER, BONNY   [facility] Follow-up Physician's Orders:   2/16/2017  8:17 AM Discharge Today   MD Malena Machado, RN      Active Problems    1  Anxiety (300 00) (F41 9)   2  Bipolar I disorder, most recent episode mixed, severe without psychotic features   (296 63) (F31 63)   3  RADHA (generalized anxiety disorder) (300 02) (F41 1)   4  Intermittent explosive disorder (312 34) (F63 81)   5  Panic disorder with agoraphobia (300 21) (F40 01)   6  Panic disorder without agoraphobia (300 01) (F41 0)   7  Post traumatic stress disorder (309 81) (F43 10)    Past Medical History    1  Denied: History of Head trauma   2  History of alcohol abuse (305 03) (Z87 898)   3  History of Crohn's disease (V12 70) (Z87 19)   4   Denied: History of Seizure    Allergies    1  Penicillins   2  Remicade SOLR    Current Meds   1  ClonazePAM 1 MG Oral Tablet; TAKE 1 TABLET 3 TIMES DAILY AS NEEDED; Therapy: 29VDZ9433 to (Evaluate:25Apr2017); Last Rx:79Wjn5922 Ordered   2  FLUoxetine HCl - 20 MG Oral Capsule; TAKE 1 CAPSULE Once In The Morning; Therapy: 40YAP8214 to (Evaluate:26Apr2017)  Requested for: 14QXR7566; Last   Rx:26Jan2017 Ordered   3  HydrOXYzine HCl - 25 MG Oral Tablet; TAKE 1 TABLET 3 TIMES DAILY AS NEEDED; Therapy: 65ZCI1446 to (Evaluate:30May2017)  Requested for: 22VUU7515; Last   Rx:26Jan2017 Ordered   4  OLANZapine 20 MG Oral Tablet; TAKE 1 TABLET AT BEDTIME; Therapy: 27GLO1643 to (Evaluate:30May2017)  Requested for: 26Jan2017; Last   Floyd Do Ordered    Family Psych History  Mother    1  No pertinent family history  Father    2  Family history of alcohol abuse (V61 41) (Z81 1)   3  Family history of paranoid schizophrenia (V17 0) (Z81 8)    Social History    · Current Some Day Smoker (305 1)   · Disabled   · Education Level: Some college   · Home   · Occasional caffeine consumption   · Occupational   · Single    Future Appointments    Date/Time Provider Specialty Site   02/27/2017 11:00 AM Laine Clement[facility] Psychiatry [facility]   03/07/2017 10:00 AM [facility] Psychiatry [facility]   03/20/2017 11:00 AM [facility] Psychiatry [facility]   04/05/2017 11:00 AM [facility] Psychiatry [facility]   04/19/2017 11:00 AM [facility] Psychiatry [facility]   02/16/2017 11:15 AM OLGA Reece  Psychiatry [facility] PARTIAL HOSPITALIZATION   03/22/2017 10:20 AM OLGA Everett   Psychiatry [address] PSYCHIATRIC [facility]OC   03/02/2017 02:15 PM Trinidad Barrios Speaker, MS, APRN, PMHCNS_BS  [facility]   03/16/2017 02:15 PM Mary Alice Matute, MS, APRN, PMHCNS_BS   [facility] THERAPISTS   04/06/2017 02:15 PM TatuAsad, MS, APRN, PMHCNS_BS  [facility] PSYCH [facility] TH[facility]AP[facility]   04/20/2017 02:15 PM Tessa Guillen, MS, APRN, PMHCNS_BS  [facility] PSYCH [facility]   04/20/2017 02:15 PM TatuAsad, MS, APRN, PMHCNS_BS  [facility] TH[facility]ISTS     Signatures   Electronically signed by : OLGA Garcia ; Feb 16 2017  8:19AM EST                       (Author)    Electronically signed by : GEMA Fine; Feb 16 2017 12:29PM EST                       (Author)    Electronically signed by : Claudetta Clarke, MSWLSW; Feb 16 2017  2:15PM EST                       (Author)    Electronically signed by : BALDEMAR Huff; Feb 16 2017  3:00PM EST                       (Author)    Electronically signed by : Claudetta Clarke, MSWLSW; Feb 16 2017  3:42PM EST                       (Author)    Electronically signed by : Denny Geiger RN; Mar  7 2017  4:14PM EST                       (Author)

## 2018-01-11 NOTE — PSYCH
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  He actively participated in group discussion about the symptoms of depression, especially sleep disturbances and low motivation, and noted that he actually slept well last night but yet had a very difficult time getting up and motivating himself to come to program today  He also discussed ways to utilize positive affirmations to retrain thoughts and to cope in healthier ways  Good progress noted toward goals  Continue psychotherapy group to encourage Jeovany Hoff to explore stressors and coping  Treatment Plan Problem(s): 1 1, 1 2  Joann James MSW, LSW     (244) Group Psychotherapy: [id number] Jeovany Hoff participated in using healthy assertiveness skills to take care of oneself, including the use of deciding on and enacting healthy boundaries for everyday wellness, in work and in personal life  Jeovany Hoff shared that he sets a personal boundary not to share private information about his "love life" with his male friends like many of his friends do  He stated that he does not approve of sharing this type of personal information with others and that he thinks it is disrespectful to his girlfriend, and other women, when men do this  Peers shared in discussion of healthy and unhealthy boundaries and information sharing both in social and work environments  Comeli Hoff made good progress toward goal  Continue to offer group to offer both education and practice in developing wellness strategies that are neither passive nor aggressive but assertive for self-care and recovery      Treatment Plan Problem(s): 1 1,1 2,1 4  Aicha Asif, RN       (399) Education Therapy Goals set - ask  about moving his shelf    Treatment Plan Problem(s): 1 1, 1 2  Education Therapy Time - 0900 - 0930 Previous goal was met  Readiness to Learning:  He is receptive to learning  There are  no barriers to learning  Learning Assessment Time - 1330 - 1400   Education completed on  illness and wellness tools  The teaching method was  verbal  Shared area of learning: Yes  Darek Simons MSW, LSW     (209) George King 82 actively shared in [place] group focused on boundary setting  He engaged in improvisation and discussion exploring value of and ways to set healthy limits with self and others  He participated within the context of group, but did not share any personal connection  Group discussed reality of feeling guilty at times, yet the anxiety and chaos left if one chooses not to set limits  Inconsistent work toward goal noted  Continue AT to encourage skill development and use  Treatment Plan Problem(s): 1 1  EUGENIA CarterBC       Case Management Note:   11:50-12:05 This CM met with Judith Wyatt while he called [employer] regarding financial aid options for finishing final semester  He was told to reapply for admission online, and then financial aid department can help him figure out how to finance remainder of his education  Judith Wyatt expressed hopefulness and encouragement knowing that he made the first step toward finishing his degree and that he will have options for his future  He also discussed frustration with Social Security claim and having to go for psychological evaluation, as well as his  telling him that it could take years to get a final decision  Discussed ways to manage anxiety over these issues and keeping expectations realistic  This CM advised Judith Wyatt that his last covered day is Thursday, and that discharge will be discussed more tomorrow     TREATMENT SESSION NUMBER: 6   Current suicide risk is low  Medications not changed/added/denied  Versa Rocío MSW, LSW      Active Problems    1  Anxiety (300 00) (F41 9)   2  Bipolar I disorder, most recent episode mixed, severe without psychotic features   (296 63) (F31 63)   3  RADHA (generalized anxiety disorder) (300 02) (F41 1)   4  Intermittent explosive disorder (312 34) (F63 81)   5  Panic disorder with agoraphobia (300 21) (F40 01)   6  Panic disorder without agoraphobia (300 01) (F41 0)   7  Post traumatic stress disorder (309 81) (F43 10)    Past Medical History    1  Denied: History of Head trauma   2  History of alcohol abuse (305 03) (Z87 898)   3  History of Crohn's disease (V12 70) (Z87 19)   4  Denied: History of Seizure    Allergies    1  Penicillins   2  Remicade SOLR    Current Meds   1  ClonazePAM 1 MG Oral Tablet; TAKE 1 TABLET 3 TIMES DAILY AS NEEDED; Therapy: 09VCH3848 to (Evaluate:25Apr2017); Last Rx:06Kso2945 Ordered   2  FLUoxetine HCl - 20 MG Oral Capsule; TAKE 1 CAPSULE Once In The Morning; Therapy: 39ACV8451 to (Evaluate:26Apr2017)  Requested for: 08EFZ9096; Last   Rx:26Jan2017 Ordered   3  HydrOXYzine HCl - 25 MG Oral Tablet; TAKE 1 TABLET 3 TIMES DAILY AS NEEDED; Therapy: 14HDA2553 to (Evaluate:80Unp3457)  Requested for: 57ADC1076; Last   Rx:26Jan2017 Ordered   4  OLANZapine 20 MG Oral Tablet; TAKE 1 TABLET AT BEDTIME; Therapy: 01AMH7344 to (Evaluate:28Sne7387)  Requested for: 26Jan2017; Last   Rx:57Whm20 Ordered    Family Psych History  Mother    1  No pertinent family history  Father    2  Family history of alcohol abuse (V61 41) (Z81 1)   3   Family history of paranoid schizophrenia (V17 0) (Z81 8)    Social History    · Current Some Day Smoker (305 1)   · Disabled   · Education Level: Some college   · Home   · Occasional caffeine consumption   · Occupational   · Single    Future Appointments    Date/Time Provider Specialty Site   02/27/2017 11:00 AM Lisa Gonzalez [facility] Psychiatry [facility] 03/07/2017 10:00 AM Normansada Rodriguez, [facility] Psychiatry [facility]   03/20/2017 11:00 AM Norman Michael, [facility] Psychiatry [facility]   04/05/2017 11:00 AM Norman Michael, [facility] Psychiatry [facility]   04/19/2017 11:00 AM Norman Michael, [facility] Psychiatry [facility]   02/15/2017 12:15 PM OLGA Enriquez  Psychiatry [facility] PARTIAL HOSPITALIZATION   02/16/2017 11:15 AM OLGA Enriquez  Psychiatry [facility] PARTIAL HOSPITALIZATION   02/17/2017 12:15 PM Yamileth Lea M D   Psychiatry [facility] PARTIAL HOSPITALIZATION   03/02/2017 02:15 PM Jacqueline Barrios, MS, APRN, PMHCNS_BS  [facility]   03/16/2017 02:15 PM Avila Hoit, MS, APRN, PMHCNS_BS  [facility]   04/06/2017 02:15 PM Avila Hoit, MS, APRN, PMHCNS_BS  [facility]   04/20/2017 02:15 PM Avila Hoit, MS, APRN, PMHCNS_BS  [facility]   04/20/2017 02:15 PM Avila Hoit, MS, APRN, PMHCNS_BS  [facility] [address] THERAPISTS     Signatures   Electronically signed by : ELSY Lyons; Feb 14 2017 12:42PM EST                       (Author)    Electronically signed by : ELSY Lyons; Feb 14 2017  2:20PM EST                       (Author)    Electronically signed by : BALDEMAR Black; Feb 14 2017  2:24PM EST                       (Author)    Electronically signed by : Leobardo Calvert RN; Feb 14 2017  3:16PM EST                       (Author)

## 2018-01-11 NOTE — PSYCH
Message   Recorded as Task   Date: 11/07/2016 04:59 PM, Created By: Sukhdev Claudio   Task Name: Care Coordination   Assigned To: Angelika Lopez   Regarding Patient: Shahzad Ash, Status: Active   Comment:    JakMonet - 07 Nov 2016 4:59 PM     TASK CREATED  I spoke with pt  He relates no side effects and doing well with med changes by Ms Fara PA-C  He didn't feel he needed the appointment scheduled for tomor with you  He wanted to keep his apt he had scheduled for 11/17/16  Thanks   Message Free Text Note Form: Task noted in relation to frequently scheduled pending appointments with Dr Symone Monk  Active Problems    1  Anxiety (300 00) (F41 9)   2  Bipolar I disorder, most recent episode mixed, severe without psychotic features   (296 63) (F31 63)   3  Intermittent explosive disorder (312 34) (F63 81)   4  Panic disorder with agoraphobia (300 21) (F40 01)   5  Post traumatic stress disorder (309 81) (F43 10)    Current Meds   1  ClonazePAM 1 MG Oral Tablet (KlonoPIN); TAKE 1 TABLET 3 TIMES DAILY AS NEEDED; Therapy: 85JWN9721 to (Evaluate:23Jan2017); Last Rx:25Oct2016 Ordered   2  Latuda 40 MG Oral Tablet; 1/2 tab po qd with a meal x 1 week, then 1 full tab po   qd with meal;   Therapy: 05QNJ6425 to (Evaluate:98Now1113)  Requested for: 92QNN3246; Last   Rx:02Nov2016 Ordered   3  PARoxetine HCl - 20 MG Oral Tablet (Paxil); TAKE 1 TABLET DAILY; Therapy: 06DFB1709 to (OJVYBTUT:06ZMP8953)  Requested for: 25Oct2016; Last   Rx:25Oct2016 Ordered   4  TraZODone HCl - 100 MG Oral Tablet; TAKE 1 TABLET AT BEDTIME; Therapy: 57SXG7400 to ()  Requested for: 25Oct2016; Last   Rx:25Oct2016 Ordered    Allergies    1  Penicillins   2   Remicade SOLR    Signatures   Electronically signed by : Allie Jasso RN; Nov 7 2016  5:02PM EST                       (Author)

## 2018-01-11 NOTE — PSYCH
Psych Med Mgmt    Appearance: was calm and cooperative, adequate hygiene and grooming and good eye contact  Observed mood: depressed and anxious  Observed mood: affect was constricted  Speech: a normal rate and fluent  Thought processes: coherent/organized  Hallucinations: no hallucinations present  Thought Content: no delusions  Abnormal Thoughts: The patient has no suicidal thoughts and no homicidal thoughts  Orientation: The patient is oriented to person, place and time, oriented to person, oriented to place and oriented to time  Recent and Remote Memory: short term memory intact and long term memory intact  Attention Span And Concentration: concentration impaired  Insight: Limited insight  Judgment: His judgment was limited  Muscle Strength And Tone  Muscle strength and tone were normal    The patient is experiencing no localized pain  Goals addressed in session: Medication Management       Treatment Recommendations: Continue current treatment  Risks, Benefits And Possible Side Effects Of Medications: Risks, benefits, and possible side effects of medications explained to patient and patient verbalizes understanding  He reports normal appetite, normal energy level, no weight change and normal number of sleep hours  c/o mood swings and raising thoughts that trigger anxiety and this changes in mood cycle within hours throughout a single day  Vitals  Signs [Data Includes: Current Encounter]   Recorded: [id number] 11:37AM   Height: 5 ft 4 in  Weight: 179 lb   BMI Calculated: 30 73  BSA Calculated: 1 87    Assessment    1  Bipolar I disorder, most recent episode mixed, severe without psychotic features   (296 63) (F31 63)   2  Panic disorder with agoraphobia (300 21) (F40 01)   3  Post traumatic stress disorder (309 81) (F43 10)    Plan    1  ClonazePAM 1 MG Oral Tablet (KlonoPIN); TAKE 1 TABLET 3 TIMES DAILY AS   NEEDED    2  PARoxetine HCl - 20 MG Oral Tablet;  Take 1 tablet daily    3  Benztropine Mesylate 1 MG Oral Tablet   4  From  Lithium Carbonate 300 MG Oral Capsule Take 1 capsule po in the   morning and 2 capsules po at bedtme To Lithium Carbonate 300 MG Oral Capsule TAKE   1 CAPSULE TWICE DAILY   5  OLANZapine 2 5 MG Oral Tablet; TAKE 1 TABLET DAILY    Review of Systems    Constitutional: No fever, no chills, feels well, no tiredness, no recent weight gain or loss  Cardiovascular: no complaints of slow or fast heart rate, no chest pain, no palpitations  Respiratory: no complaints of shortness of breath, no wheezing, no dyspnea on exertion  Gastrointestinal: no complaints of abdominal pain, no constipation, no nausea, no diarrhea, no vomiting  Genitourinary: no complaints of dysuria, no incontinence, no pelvic pain, no urinary frequency  Musculoskeletal: no complaints of arthralgia, no myalgias, no limb pain, no joint stiffness  Integumentary: no complaints of skin rash, no itching, no dry skin  Neurological: no complaints of headache, no confusion, no numbness, no dizziness  Substance Abuse Hx    Substance Abuse History: Denies  Active Problems    1  Anxiety (300 00) (F41 9)   2  Bipolar I disorder, most recent episode mixed, severe without psychotic features   (296 63) (F31 63)   3  Panic disorder with agoraphobia (300 21) (F40 01)   4  Post traumatic stress disorder (309 81) (F43 10)    Past Medical History    1  History of Crohn's disease (V12 70) (Z87 19)    The active problems and past medical history were reviewed and updated today  Allergies    1  Penicillins   2  Remicade SOLR    Current Meds   1  Benztropine Mesylate 1 MG Oral Tablet; TAKE 1 TABLET TWICE DAILY; Therapy: 03OPC2726 to (Evaluate:16Mar2016)  Requested for: 91UDP2820; Last   Rx:94Brx9571 Ordered   2  ClonazePAM 1 MG Oral Tablet; TAKE 1 TABLET 3 TIMES DAILY AS NEEDED; Therapy: 96AYY9321 to (Evaluate:05Mar2016); Last Rx:73Sov1916 Ordered   3   Lithium Carbonate 300 MG Oral Capsule; Take 1 capsule po in the morning and 2   capsules po at bedtme; Therapy: 44OXU8662 to (Evaluate:03Zjl6747)  Requested for: 39USR3300; Last   Rx:95Wfb1101 Ordered   4  OLANZapine 2 5 MG Oral Tablet; TAKE 1 TABLET DAILY; Therapy: 02DEN8173 to (Evaluate:63Jhb7947)  Requested for: 24NKD9874; Last   Rx:82Fpw1751 Ordered   5  PARoxetine HCl - 20 MG Oral Tablet; Take 1 tablet daily; Therapy: 41MLB9806 to (Evaluate:27Yxd0736)  Requested for: 45JEA5016; Last   Rx:61Fjk8056 Ordered    The medication list was reviewed and updated today  Family Psych History    1  No pertinent family history    2  No pertinent family history    The family history was reviewed and updated today  Social History    · Current Some Day Smoker (305 1)  The social history was reviewed and updated today  The social history was reviewed and is unchanged  End of Encounter Meds    1  ClonazePAM 1 MG Oral Tablet (KlonoPIN); TAKE 1 TABLET 3 TIMES DAILY AS NEEDED; Therapy: 05YZX4938 to (Evaluate:19Mar2016); Last Rx:48Ken7696 Ordered    2  PARoxetine HCl - 20 MG Oral Tablet; Take 1 tablet daily; Therapy: 22SHP7429 to (Evaluate:18May2016)  Requested for: 35GGS5494; Last   Rx:26Nvp7616 Ordered    3  Lithium Carbonate 300 MG Oral Capsule; TAKE 1 CAPSULE TWICE DAILY; Therapy: 12UCQ1360 to (Evaluate:18Apr2016)  Requested for: 89PUY0754; Last   Rx:88Npw0097 Ordered   4  OLANZapine 2 5 MG Oral Tablet; TAKE 1 TABLET DAILY;    Therapy: 14XBB5209 to (Evaluate:18May2016)  Requested for: 76UGY9122; Last   Rx:86Clr5974 Ordered    Future Appointments    Date/Time Provider Specialty Site   02/22/2016 11:00 AM Mirella Bravo [facility] Psychiatry [facility]   03/08/2016 01:00 PM Mirella Bravo [facility] Psychiatry [facility]   03/22/2016 01:00 PM Mirella Bravo [facility] Psychiatry [facility]   04/05/2016 01:00 PM Mirella Bravo, [facility] Psychiatry [facility] 04/19/2016 01:00 PM Hari Hernandes LCSW Psychiatry [facility]     Signatures   Electronically signed by : OLGA Sullivan ; Feb 18 2016 11:44AM EST                       (Author)

## 2018-01-11 NOTE — PSYCH
Progress Note  Psychotherapy Provided  [facility]: Individual Psychotherapy 45 minutes provided today  Goals addressed in session:   goal 2  D: MSW met with pt for session  His moods "have been all over the place " He is currently talking to 4 women daily, he talks to his brothers every day plus friends  He can talk to up to a total of 15 people in a day  He called his ex-girlfriend to "get something off of his chest/to make amends " It did not go well  He expected him to say what he had to say, her say ok and hang up  It did not happen that way  Reviewed with pt his tx plan goal and that is a lot of people to talk to in a day  "Pt is sensitive and takes on their problems " He many be getting a part time job  A: No progress on goal 2  Ct appears to be creating chaos by talking to so many females, calling his ex, and talking to a many people in a day  P: To decrease the number of "girls" and people that he talks to in a day  Follow up and focus on job  Pain Scale and Suicide Risk [facility]: Current Pain Assessment: no pain   Current suicide risk is low   Behavioral Health Treatment Plan [facility]: Diagnosis and Treatment Plan explained to patient, patient relates understanding diagnosis and is agreeable to Treatment Plan  Assessment    1  Bipolar I disorder, most recent episode mixed, severe without psychotic features   (296 63) (F31 63)   2  Post traumatic stress disorder (309 81) (F43 10)   3  Anxiety (300 00) (F41 9)   4   Panic disorder with agoraphobia (300 21) (F40 01)    Signatures   Electronically signed by : Linwood Do LCSW; Feb 9 2016 12:20PM EST                       (Author)

## 2018-01-11 NOTE — PSYCH
Provider Comments  Provider Comments:   LM regarding no show        Signatures   Electronically signed by : Rosa Moreland LCSW; Mar  7 2017 10:18AM EST                       (Author)

## 2018-01-11 NOTE — PSYCH
Psych Med Mgmt    Appearance: was calm and cooperative, adequate hygiene and grooming and good eye contact  Observed mood: anxious  Observed mood:  labile  Speech: a normal rate and fluent  Thought processes: coherent/organized  Hallucinations: no hallucinations present  Thought Content: no delusions  Abnormal Thoughts: The patient has no suicidal thoughts and no homicidal thoughts  Orientation: The patient is oriented to person, place and time, oriented to person, oriented to place and oriented to time  Recent and Remote Memory: short term memory intact and long term memory intact  Attention Span And Concentration: concentration intact  Insight: Limited insight  Judgment: His judgment was limited  Muscle Strength And Tone  Muscle strength and tone were normal    The patient is experiencing no localized pain  Goals addressed in session: Medication Management       Treatment Recommendations: Patient went to [facility] [place] [address] for 9 days and medications were adjusted  Risks, Benefits And Possible Side Effects Of Medications: Risks, benefits, and possible side effects of medications explained to patient and patient verbalizes understanding  He reports normal appetite, normal energy level, no weight change and normal number of sleep hours  Patient stated that he was admitted to [facility] for 9 days for mood swings, irritability and anxiety  He was taken off [address] and he was r/s on Zyprexa and increased to 20 mg and continued on Clonazepam as needed and Atarax for anxiety  Patient has not been able to work part time and has had at least 2 admissions to [facility] in the past 3 months  Assessment    1  Post traumatic stress disorder (309 81) (F43 10)   2  Panic disorder with agoraphobia (300 21) (F40 01)   3  Intermittent explosive disorder (312 34) (F63 81)   4  Bipolar I disorder, most recent episode mixed, severe without psychotic features   (296 63) (F31 63)   5   Anxiety (300 00) (F41 9)    Plan    1  HydrOXYzine HCl - 25 MG Oral Tablet; TAKE 1 TABLET 3 TIMES DAILY AS NEEDED   2  ClonazePAM 1 MG Oral Tablet (KlonoPIN); TAKE 1 TABLET 3 TIMES DAILY AS   NEEDED; Do Not Fill Before: [id number]    3  OLANZapine 20 MG Oral Tablet; TAKE 1 TABLET AT BEDTIME    Review of Systems    Constitutional: as noted in HPI  Substance Abuse Hx    Substance Abuse History: Denies  Active Problems    1  Anxiety (300 00) (F41 9)   2  Bipolar I disorder, most recent episode mixed, severe without psychotic features   (296 63) (F31 63)   3  Intermittent explosive disorder (312 34) (F63 81)   4  Panic disorder with agoraphobia (300 21) (F40 01)   5  Post traumatic stress disorder (309 81) (F43 10)    Past Medical History    1  History of alcohol abuse (305 03) (Z87 898)   2  History of Crohn's disease (V12 70) (Z87 19)    The active problems and past medical history were reviewed and updated today  Allergies    1  Penicillins   2  Remicade SOLR    Current Meds   1  ClonazePAM 1 MG Oral Tablet; TAKE 1 TABLET 3 TIMES DAILY AS NEEDED; Therapy: 05PUD4622 to (Evaluate:23Jan2017); Last Rx:25Oct2016 Ordered    The medication list was reviewed and updated today  Family Psych History  Mother    1  No pertinent family history  Father    2  No pertinent family history    The family history was reviewed and updated today  Social History    · Current Some Day Smoker (305 1)   · Home   · Occupational  The social history was reviewed and updated today  appealing SSI      End of Encounter Meds    1  ClonazePAM 1 MG Oral Tablet (KlonoPIN); TAKE 1 TABLET 3 TIMES DAILY AS NEEDED; Therapy: 41EST0202 to (Evaluate:25Apr2017); Last Rx:[id number] Ordered   2  HydrOXYzine HCl - 25 MG Oral Tablet; TAKE 1 TABLET 3 TIMES DAILY AS NEEDED; Therapy: 53FDZ4562 to (Evaluate:01Mar2017); Last Rx:[id number] Ordered    3  OLANZapine 20 MG Oral Tablet; TAKE 1 TABLET AT BEDTIME;    Therapy: 37MYH7807 to (Evaluate:01Mar2017)  Requested for: 84DDU4544; Last   Rx:[id number]; Status: ACTIVE - Transmit to [facility] Verification Ordered    Future Appointments    Date/Time Provider Specialty Site   02/06/2017 10:00 AM [facility]   02/27/2017 11:00 AM Masood Power, [facility] Psychiatry [facility]   03/07/2017 10:00 AM Masood Power, [facility] Psychiatry [facility]   03/20/2017 11:00 AM Masood Power, [facility] Psychiatry [facility]   12/15/2016 02:15 PM Mahnaz Britt MS, APRN, [facility]  [facility]     Signatures   Electronically signed by : OLGA Smith ; Dec  1 2016 11:07AM EST                       (Author)

## 2018-01-11 NOTE — PSYCH
Treatment Plan Tracking    #1 Treatment Plan not completed within required time limits due to: Client presented with emotional/behavioral issues that required clinical intervention            Signatures   Electronically signed by : Raj Adkins LCSW; Apr 5 2016  2:04PM EST                       (Author)

## 2018-01-11 NOTE — PSYCH
Progress Note  Psychotherapy Provided [facility]: Individual Psychotherapy 45 minutes provided today  Goals addressed in session:   goals 1  D; MSW met with PT for session  He discussed his relationship or lack thereof with his sister whom also lives in the same house he lives in with their parents  She would not give him the password for the Internet she pays for even with him offering to pay for 1/2 of the Internet  He is going to have to get his own router " He has not heard back yet from social security and knows it could be awhile  Work is going good  "He is saving his money now " A: Slow progress with goal  P: To obtain his own router  Pain Scale and Suicide Risk [facility]: Current Pain Assessment: no pain   Current suicide risk is low   Behavioral Health Treatment Plan [facility]: Diagnosis and Treatment Plan explained to patient, patient relates understanding diagnosis and is agreeable to Treatment Plan  Assessment    1  Bipolar I disorder, most recent episode mixed, severe without psychotic features   (296 63) (F31 63)   2  RADHA (generalized anxiety disorder) (300 02) (F41 1)   3  Intermittent explosive disorder (312 34) (F63 81)   4  Post traumatic stress disorder (309 81) (F43 10)   5   Panic disorder without agoraphobia (300 01) (F41 0)    Signatures   Electronically signed by : Laurel Rossi LCSW; Jun 12 2017  2:11PM EST                       (Author)

## 2018-01-12 NOTE — MISCELLANEOUS
Message   Recorded as Task   Date: 05/09/2016 02:51 PM, Created By: Racquel Jones   Task Name: Med Renewal Request   Assigned To: Lizzy Escobar   Regarding Patient: Adi Diaz, Status: Active   CommentSudie Barley - 09 May 2016 2:51 PM     TASK CREATED  Caller: Self; Renew Medication; [phone] (Home)  refill   Olanzapine  2 5 mg   [facility] [address]  next appt 05/18/16   Reedley Boning left msg requesting renewal of Olanzapine 2 5mg tab  Rx log shows he should have enough and I confirmed this with [facility] pharmacist Alisa [address]  I left msg on Pt's home phone # of record informing him the Rx is being filled and his f/u appt is 5/18/2016        Signatures   Electronically signed by : SERGIO Scanlon; May  9 2016  4:04PM EST                       (Author)

## 2018-01-12 NOTE — PSYCH
Progress Note  Psychotherapy Provided [facility]: Individual Psychotherapy 45 minutes provided today  Goals addressed in session:   goals 2,3  D: MSW met with pt for session  He has a girlfriend of 1 and 1-2 mons  "She is different  She is a  and was in a severe car accident which left her with very poor vision, a TBI and seizures " Discussed how she is different and how she has "changed him for the better " She helped him deal with his scare  He took his shirt off and went swimming  A: Mod progress on goals 2,3  P: Continue to go swimming shirtless  Pain Scale and Suicide Risk [facility]: Current Pain Assessment: no pain   Current suicide risk is low   Behavioral Health Treatment Plan [facility]: Diagnosis and Treatment Plan explained to patient, patient relates understanding diagnosis and is agreeable to Treatment Plan  Assessment    1  Bipolar I disorder, most recent episode mixed, severe without psychotic features   (296 63) (F31 63)   2  Anxiety (300 00) (F41 9)   3  Intermittent explosive disorder (312 34) (F63 81)   4  Panic disorder with agoraphobia (300 21) (F40 01)   5   Post traumatic stress disorder (309 81) (F43 10)    Signatures   Electronically signed by : Makenzie Jay LCSW; Jun 30 2016 11:20AM EST                       (Author)
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
## 2018-01-12 NOTE — MISCELLANEOUS
Message  Return to work or school:   Shannon Medina is under my professional care  He was seen in my office on 08/19/2016       Patient receives treatment for Bipolar I disorder most recent manic episode          Signatures   Electronically signed by : OLGA Medina ; Sep  7 2016  2:36PM EST                       (Author)
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
## 2018-01-13 NOTE — PSYCH
Progress Note  Psychotherapy Provided [facility]: Individual Psychotherapy 45 minutes provided today  Goals addressed in session:   goals 1  D: MSW met with PT for session  He completed the [facility] program  He was denied by [facility] for SS  He is appealing their decision  Financially he is OK due to he opted to continue to receive a monthly check and his parents due not charge him to live at home  He met a "girl " It is going good  they are taking it show  He continues to volunteer  He is going to [employer]  Reviewed [place] plan goals  A: Mod progress on goals  PT appears lethargic  P: For him to talk to the DR about his meds  Pain Scale and Suicide Risk [facility]: Current Pain Assessment: no pain   Current suicide risk is low   Behavioral Health Treatment Plan [employer]: Diagnosis and Treatment Plan explained to patient, patient relates understanding diagnosis and is agreeable to Treatment Plan  Assessment    1  Bipolar I disorder, most recent episode mixed, severe without psychotic features   (296 63) (F31 63)   2  RADHA (generalized anxiety disorder) (300 02) (F41 1)   3  Panic disorder without agoraphobia (300 01) (F41 0)   4   Post traumatic stress disorder (309 81) (F43 10)    Signatures   Electronically signed by : Teagan Tirado LCSW; Feb 27 2017 12:08PM EST                       (Author)

## 2018-01-13 NOTE — PSYCH
Progress Note  Psychotherapy Provided [facility]: Individual Psychotherapy 45 minutes provided today  Goals addressed in session:   goal 2  D: MSW met with pt for session  "Mother's day was a disaster " Discussed the dysfunction in his family  "He has tried talking to both of his brother but both are stubborn " His roommate and him spoke to his friend in [place] [address] who is drinking  "He denied it " Discussed helping others, helping himself, and how far he has come and how  A: Mod progress on goal 2  P; To cont to work on self  Pain Scale and Suicide Risk [facility]: Current Pain Assessment: no pain   Current suicide risk is low   Behavioral Health Treatment Plan Maggie Marte: Diagnosis and Treatment Plan explained to patient, patient relates understanding diagnosis and is agreeable to Treatment Plan  Assessment    1  Anxiety (300 00) (F41 9)   2  Bipolar I disorder, most recent episode mixed, severe without psychotic features   (296 63) (F31 63)   3  Intermittent explosive disorder (312 34) (F63 81)   4  Panic disorder with agoraphobia (300 21) (F40 01)   5   Post traumatic stress disorder (309 81) (F43 10)    Signatures   Electronically signed by : Rosio Avila LCSW; May  9 2016  4:18PM EST                       (Author)

## 2018-01-13 NOTE — PSYCH
Treatment Plan Tracking    #1 Treatment Plan not completed within required time limits due to: Client presented with emotional/behavioral issues that required clinical intervention            Signatures   Electronically signed by : Shae Guardado LCSW; Aug 30 2016  3:11PM EST                       (Author)
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
## 2018-01-14 VITALS
HEART RATE: 60 BPM | HEIGHT: 64 IN | WEIGHT: 193 LBS | TEMPERATURE: 98.2 F | DIASTOLIC BLOOD PRESSURE: 94 MMHG | RESPIRATION RATE: 20 BRPM | SYSTOLIC BLOOD PRESSURE: 146 MMHG | BODY MASS INDEX: 32.95 KG/M2

## 2018-01-14 NOTE — PSYCH
History of Present Illness  [facility] Clinical Progress Note [facility]:   Specialized Services Documentation - Therapist must complete separate progress note for each specific clinical activity in which the client participated during the day  Case Management Note: Individual Case Management visit not provided today  Lima Miller called out of program today due to car problems and needing to take car to   Current suicide risk is low  Medications not changed/added/denied  [employer] MSW, LSW      Active Problems    1  Anxiety (300 00) (F41 9)   2  Bipolar I disorder, most recent episode mixed, severe without psychotic features   (296 63) (F31 63)   3  RADHA (generalized anxiety disorder) (300 02) (F41 1)   4  Intermittent explosive disorder (312 34) (F63 81)   5  Panic disorder with agoraphobia (300 21) (F40 01)   6  Panic disorder without agoraphobia (300 01) (F41 0)   7  Post traumatic stress disorder (309 81) (F43 10)    Past Medical History    1  Denied: History of Head trauma   2  History of alcohol abuse (305 03) (Z87 898)   3  History of Crohn's disease (V12 70) (Z87 19)   4  Denied: History of Seizure    Allergies    1  Penicillins   2  Remicade SOLR    Current Meds   1  ClonazePAM 1 MG Oral Tablet; TAKE 1 TABLET 3 TIMES DAILY AS NEEDED; Therapy: 70KRZ6434 to (Evaluate:25Apr2017); Last Rx:49Peo0254 Ordered   2  FLUoxetine HCl - 20 MG Oral Capsule; TAKE 1 CAPSULE Once In The Morning; Therapy: 00RFW4403 to (Evaluate:26Apr2017)  Requested for: 84WRY1505; Last   Rx:26Jan2017 Ordered   3  HydrOXYzine HCl - 25 MG Oral Tablet; TAKE 1 TABLET 3 TIMES DAILY AS NEEDED; Therapy: 78XRH0152 to (Evaluate:30May2017)  Requested for: 79PJP7748; Last   Rx:26Jan2017 Ordered   4  OLANZapine 20 MG Oral Tablet; TAKE 1 TABLET AT BEDTIME; Therapy: 30OKM7612 to (Evaluate:30May2017)  Requested for: 26Jan2017; Last   Yvan Martinez Ordered    Family Psych History  Mother    1   No pertinent family history  Father    2  Family history of alcohol abuse (V61 41) (Z81 1)   3  Family history of paranoid schizophrenia (V17 0) (Z81 8)    Social History    · Current Some Day Smoker (305 1)   · Disabled   · Education Level: Some college   · Home   · Occasional caffeine consumption   · Occupational   · Single    Future Appointments    Date/Time Provider Specialty Site   02/27/2017 11:00 AM [place] Plaster, LCSW Psychiatry [facility]   03/07/2017 10:00 AM [place] Plaster, LCSW Psychiatry [facility]   03/20/2017 11:00 AM Raúl Plaster, LCSW Psychiatry [facility]   04/05/2017 11:00 AM [place] Plaster, LCSW Psychiatry [facility]   04/19/2017 11:00 AM Raúl Plaster, LCSW Psychiatry [facility]   02/07/2017 10:00 AM OLGA Resendez  [facility]   02/08/2017 10:00 AM OLGA Resendez  [facility]   02/09/2017 10:00 AM OLGA Resendez  [facility]   02/10/2017 10:00 AM OLGA Resendez   [facility]   03/02/2017 02:15 PM Cherry Barrios, MS, APRN, PMHCNS_BS  [facility]   03/16/2017 02:15 PM Yinka Kim, MS, APRN, PMHCNS_BS  [facility]   04/06/2017 02:15 PM Yinka Tierneyo, MS, APRN, PMHCNS_BS  [facility]   04/20/2017 02:15 PM Yinka Tierneyo, MS, APRN, PMHCNS_BS  [facility]   04/20/2017 02:15 PM Yinka Tierneyo, MS, APRN, PMHCNS_BS  [facility]     Signatures   Electronically signed by : ELSY Valente; Feb 6 2017  1:24PM EST                       (Author)
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
## 2018-01-15 NOTE — PSYCH
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, volunteering  Discussed his relationship problems and two goals he has this summer, which are to visit his friend in [place] and to see one of his favorite /inspirational speakers speak in person in [address]  He has struggled with anxiety since last seen  "He had a panic attack in the mall due to the crowds and had to leave " A Mild progress on goal  Pt appears to still have some anxiety  P: To continue with goal and apply coping skills discussed in session regarding girlfriend  Pain Scale and Suicide Risk [facility]: Current Pain Assessment: no pain   Current suicide risk is low   Behavioral Health Treatment Plan [address] [place] [address] [address]: Diagnosis and Treatment Plan explained to patient, patient relates understanding diagnosis and is agreeable to Treatment Plan  Assessment    1  Bipolar I disorder, most recent episode mixed, severe without psychotic features   (296 63) (F31 63)   2  RADHA (generalized anxiety disorder) (300 02) (F41 1)   3  Intermittent explosive disorder (312 34) (F63 81)   4  Post traumatic stress disorder (309 81) (F43 10)   5   Panic disorder with agoraphobia (300 21) (F40 01)    Signatures   Electronically signed by : Maia Paz LCSW; Dec 21 2016  4:12PM EST                       (Author)

## 2018-01-15 NOTE — PSYCH
Progress Note  Psychotherapy Provided [facility]: Individual Psychotherapy 45 minutes provided today  Goals addressed in session:   Goals 1  D: MSW met with pt for session  MSW received a message that pt wanted to attend [facility]  We had discussed a partial in the past  The office called and offered him an appt today  Discussed symptoms  A: Pt appears to meet criteria for [facility]  No progress on goals  P: Pt has an appt for Dr Elizabeth Whiteside tomorrow  MSW will make the referral to [facility]  Pain Scale and Suicide Risk [facility]: Current Pain Assessment: no pain   Current suicide risk is low   Behavioral Health Treatment Plan [facility]: Diagnosis and Treatment Plan explained to patient, patient relates understanding diagnosis and is agreeable to Treatment Plan  Assessment    1  Bipolar I disorder, most recent episode mixed, severe without psychotic features   (296 63) (F31 63)   2  RADHA (generalized anxiety disorder) (300 02) (F41 1)   3  Intermittent explosive disorder (312 34) (F63 81)   4  Panic disorder without agoraphobia (300 01) (F41 0)   5   Post traumatic stress disorder (309 81) (F43 10)    Signatures   Electronically signed by : Teagan Tirado LCSW; Jan 25 2017  7:06PM EST                       (Author)

## 2018-01-15 NOTE — PSYCH
Psych Med Mgmt    Appearance: was calm and cooperative, adequate hygiene and grooming and good eye contact  Observed mood: depressed, irritable and anxious  Observed mood:  labile  Speech: a normal rate and fluent  Thought processes: coherent/organized  Hallucinations: no hallucinations present  Thought Content: no delusions  Abnormal Thoughts: The patient has no suicidal thoughts and no homicidal thoughts  Orientation: The patient is oriented to person, place and time, oriented to person, oriented to place and oriented to time  Recent and Remote Memory: short term memory intact and long term memory intact  Attention Span And Concentration: concentration impaired  Insight: Limited insight  Judgment: His judgment was limited  Muscle Strength And Tone  Muscle strength and tone were normal    The patient is experiencing no localized pain  Goals addressed in session: Medication Management       Treatment Recommendations: Medication Management  Risks, Benefits And Possible Side Effects Of Medications: Risks, benefits, and possible side effects of medications explained to patient and patient verbalizes understanding  He reports normal appetite, normal energy level, no weight change and decrease in number of sleep hours   Patient c/o irritability and poor sleep, frequent mood things  Assessment    1  Bipolar I disorder, most recent episode mixed, severe without psychotic features   (296 63) (F31 63)   2  Intermittent explosive disorder (312 34) (F63 81)   3  Post traumatic stress disorder (309 81) (F43 10)   4  Anxiety (300 00) (F41 9)   5  Encounter for preventive health examination (V70 0) (Z00 00)   6  Panic disorder with agoraphobia (300 21) (F40 01)    Plan    1  ClonazePAM 1 MG Oral Tablet (KlonoPIN); TAKE 1 TABLET 3 TIMES DAILY AS   NEEDED    2  Divalproex Sodium  MG Oral Tablet Extended Release 24 Hour (Depakote   ER); TAKE 1 TABLET Bedtime    3   Lithium Carbonate 300 MG Oral Capsule   4  Divalproex Sodium  MG Oral Tablet Extended Release 24 Hour (Depakote   ER); TAKE 1 TABLET AT BEDTIME   5  OLANZapine 5 MG Oral Tablet; TAKE 1 TABLET AT BEDTIME    Review of Systems    Constitutional: No fever, no chills, feels well, no tiredness, no recent weight gain or loss  Cardiovascular: no complaints of slow or fast heart rate, no chest pain, no palpitations  Respiratory: no complaints of shortness of breath, no wheezing, no dyspnea on exertion  Gastrointestinal: no complaints of abdominal pain, no constipation, no nausea, no diarrhea, no vomiting  Genitourinary: no complaints of dysuria, no incontinence, no pelvic pain, no urinary frequency  Musculoskeletal: no complaints of arthralgia, no myalgias, no limb pain, no joint stiffness  Integumentary: no complaints of skin rash, no itching, no dry skin  Neurological: no complaints of headache, no confusion, no numbness, no dizziness  Active Problems    1  Anxiety (300 00) (F41 9)   2  Bipolar I disorder, most recent episode mixed, severe without psychotic features   (296 63) (F31 63)   3  Intermittent explosive disorder (312 34) (F63 81)   4  Panic disorder with agoraphobia (300 21) (F40 01)   5  Post traumatic stress disorder (309 81) (F43 10)    Past Medical History    1  History of alcohol abuse (305 03) (Z87 898)   2  History of Crohn's disease (V12 70) (Z87 19)    The active problems and past medical history were reviewed and updated today  Allergies    1  Penicillins   2  Remicade SOLR    Current Meds   1  ClonazePAM 1 MG Oral Tablet; TAKE 1 TABLET 3 TIMES DAILY AS NEEDED; Therapy: 46BCF8606 to (Evaluate:09Oct2016); Last Rx:[id number] Ordered   2  Lithium Carbonate 300 MG Oral Capsule; TAKE 3 CAPSULE Once At Bedtime; Therapy: 44PES5671 to (Evaluate:09Oct2016)  Requested for: 14LWQ7921; Last   Rx:[id number] Ordered   3  OLANZapine 5 MG Oral Tablet; TAKE 1 TABLET AT BEDTIME;    Therapy: 34BKW3992 to (Evaluate:26Sep2016)  Requested for: 77Amp0464; Last   Rx:[id number] Ordered    The medication list was reviewed and updated today  Family Psych History  Mother    1  No pertinent family history  Father    2  No pertinent family history    The family history was reviewed and updated today  Social History    · Current Some Day Smoker (305 1)   · Home   · Occupational  The social history was reviewed and updated today  The social history was reviewed and is unchanged  End of Encounter Meds    1  ClonazePAM 1 MG Oral Tablet (KlonoPIN); TAKE 1 TABLET 3 TIMES DAILY AS NEEDED; Therapy: 85XGV7588 to (Evaluate:09Oct2016); Last Rx:[id number] Ordered    2  Divalproex Sodium  MG Oral Tablet Extended Release 24 Hour (Depakote ER);   TAKE 1 TABLET Bedtime; Therapy: 33KQG2255 to (Evaluate:17Nov2016); Last Rx:[id number] Ordered    3  Divalproex Sodium  MG Oral Tablet Extended Release 24 Hour (Depakote ER);   TAKE 1 TABLET AT BEDTIME; Therapy: 31CGX4374 to (Evaluate:17Nov2016)  Requested for: 57Pza7299; Last   Rx:[id number]; Status: ACTIVE - Transmit to Pharmacy - Awaiting Verification Ordered   4  OLANZapine 5 MG Oral Tablet; TAKE 1 TABLET AT BEDTIME; Therapy: 85STE9735 to (Evaluate:17Nov2016)  Requested for: 34Qzx6589; Last   Rx:[id number] Ordered    Future Appointments    Date/Time Provider Specialty Site   08/30/2016 02:00 PM Allie Solid [facility]   09/16/2016 11:00 AM Amenaie Solid, [facility]   09/30/2016 11:00 [facility]   10/14/2016 11:00 AM Amenaie Solid, [facility]   10/28/2016 11:00 AM Amenaie Solid, [facility]   09/22/2016 09:20 AM OLGA Patino   [facility] 81     Signatures   Electronically signed by : OLGA Orosco ; Aug 19 2016  3:33PM EST                       (Author)

## 2018-01-15 NOTE — PSYCH
Progress Note  Psychotherapy Provided [facility]: Individual Psychotherapy 45 minutes provided today  Goals addressed in session:   goal 2    D: MSW met with pt for session  Discussed last session  "He thought about what MSW said " He took some time for self reflection and said off of social media, internet, calling friends everyday, etc  "He realized he was focusing too much on everyone else which took time from focusing on himself " Discussed an incident where before last week the ct would have escalated the situation and it possible would not have ended well  Instead he choose to not escalate the situation and did a good deed  He also quit smoking  Ct discussed a situation at [place] [address] where a friend of his smells like alcohol and has the past 5 months  Ct questioned "Why does he come to the meetings smelling of alcohol and doesn't say anything about drinking?" MSW questioned, Why does he come to the meetings smelling of alcohol and no one says anything to him about his drinking?" He thanked MSW for seeing him and helping him  A: Ct appears grateful and made good progress since last session  Mod progress on goal  P: Continue his hard work  Pain Scale and Suicide Risk [facility]: Current Pain Assessment: no pain   Current suicide risk is low   Behavioral Health Treatment Plan [facility]: Diagnosis and Treatment Plan explained to patient, patient relates understanding diagnosis and is agreeable to Treatment Plan  Assessment    1  Bipolar I disorder, most recent episode mixed, severe without psychotic features   (296 63) (F31 63)   2  Panic disorder with agoraphobia (300 21) (F40 01)   3  Post traumatic stress disorder (309 81) (F43 10)   4   Anxiety (300 00) (F41 9)    Signatures   Electronically signed by : Antwan Reveles LCSW; Apr 19 2016  2:23PM EST                       (Author)

## 2018-01-15 NOTE — PSYCH
Psych Med Mgmt    Appearance: was calm and cooperative, adequate hygiene and grooming and good eye contact  Observed mood: anxious  Observed mood: affect was constricted  Speech: a normal rate and fluent  Thought processes: coherent/organized  Hallucinations: no hallucinations present  Thought Content: no delusions  Abnormal Thoughts: The patient has no suicidal thoughts and no homicidal thoughts  Orientation: The patient is oriented to person, place and time, oriented to person, oriented to place and oriented to time  Recent and Remote Memory: short term memory intact and long term memory intact  Attention Span And Concentration: concentration intact  Insight: Limited insight  Judgment: His judgment was limited  Muscle Strength And Tone  Muscle strength and tone were normal         Goals addressed in session: Medication Management       Treatment Recommendations: Continue Carbamazepine  Decrease Olanzapine to 10 mg qhs   Increase Clonazepam to 1 mg tid prn  Risks, Benefits And Possible Side Effects Of Medications: Risks, benefits, and possible side effects of medications explained to patient and patient verbalizes understanding  The patient has been filling controlled prescriptions on time as prescribed to [facility] program       He reports normal appetite, normal energy level, no weight change and normal number of sleep hours  Patient stated that couple weeks ago he started to feel manic, irritable and was having conflicts with his family and decided to go to the ED  He was referred to [facility] and medications were adjusted  Fluoxetine was D/C due to pete and added Carbamazepine  mg at bedtime  Patient reported feeling better after medication changes and denies medication side effects  c/o daytime sedation and will reduce Olanzapine to 10 mg and increasing Clonazepam to 1 mg tid prn due to anxiety  Assessment    1   Bipolar I disorder, most recent episode mixed, severe without psychotic features   (296 63) (F31 63)   2  RADHA (generalized anxiety disorder) (300 02) (F41 1)   3  Intermittent explosive disorder (312 34) (F63 81)   4  Panic disorder without agoraphobia (300 01) (F41 0)   5  Post traumatic stress disorder (309 81) (F43 10)    Plan    1  ClonazePAM 1 MG Oral Tablet (KlonoPIN); TAKE 1 TABLET 3 TIMES DAILY AS   NEEDED    2  OLANZapine 10 MG Oral Tablet; TAKE 1 TABLET AT BEDTIME    3  CarBAMazepine  MG Oral Tablet Extended Release 12 Hour (TEGretol-XR);   take 1 tablet daily at bedtime    Review of Systems    Constitutional: No fever, no chills, feels well, no tiredness, no recent weight gain or loss  Cardiovascular: no complaints of slow or fast heart rate, no chest pain, no palpitations  Respiratory: no complaints of shortness of breath, no wheezing, no dyspnea on exertion  Gastrointestinal: no complaints of abdominal pain, no constipation, no nausea, no diarrhea, no vomiting  Genitourinary: no complaints of dysuria, no incontinence, no pelvic pain, no urinary frequency  Integumentary: no complaints of skin rash, no itching, no dry skin  Neurological: no complaints of headache, no confusion, no numbness, no dizziness  Substance Abuse Hx    Substance Abuse History: Denies  Active Problems    1  Anxiety (300 00) (F41 9)   2  Bipolar I disorder, most recent episode mixed, severe without psychotic features   (296 63) (F31 63)   3  RADHA (generalized anxiety disorder) (300 02) (F41 1)   4  Intermittent explosive disorder (312 34) (F63 81)   5  Panic disorder with agoraphobia (300 21) (F40 01)   6  Panic disorder without agoraphobia (300 01) (F41 0)   7  Post traumatic stress disorder (309 81) (F43 10)    Past Medical History    1  Denied: History of Head trauma   2  History of alcohol abuse (305 03) (Z87 898)   3  History of Crohn's disease (V12 70) (Z87 19)   4   Denied: History of Seizure    The active problems and past medical history were reviewed and updated today  Allergies    1  Penicillins   2  Remicade SOLR    Current Meds   1  ClonazePAM 1 MG Oral Tablet; TAKE 1 TABLET 3 TIMES DAILY AS NEEDED; Therapy: 45CKK1045 to (Evaluate:20Aug2017); Last Rx:[id number] Ordered   2  OLANZapine 20 MG Oral Tablet; TAKE 1 TABLET AT BEDTIME; Therapy: 75FHO6164 to (Evaluate:30May2017)  Requested for: 50RSN1265; Last   Rx:[id number] Ordered    The medication list was reviewed and updated today  Family Psych History  Mother    1  No pertinent family history  Father    2  Family history of alcohol abuse (V61 41) (Z81 1)   3  Family history of paranoid schizophrenia (V17 0) (Z81 8)    The family history was reviewed and updated today  Social History    · Current Some Day Smoker (305 1)   · Disabled   · Education Level: Some college   · Home   · Occasional caffeine consumption   · Occupational   · Single  The social history was reviewed and updated today  He stated he lost his job due to frequent hospitalizations and PHP treatments      End of Encounter Meds    1  ClonazePAM 1 MG Oral Tablet (KlonoPIN); TAKE 1 TABLET 3 TIMES DAILY AS NEEDED; Therapy: 20DJD8395 to (Evaluate:13Nov2017); Last Rx:[id number] Ordered    2  OLANZapine 10 MG Oral Tablet; TAKE 1 TABLET AT BEDTIME; Therapy: 91GFF1199 to (Evaluate:13Nov2017)  Requested for: 68Vey3961; Last   Rx:[id number] Ordered    3  CarBAMazepine  MG Oral Tablet Extended Release 12 Hour (TEGretol-XR); take 1   tablet daily at bedtime;    Therapy: 65QTQ5729 to (Evaluate:13Nov2017)  Requested for: 23Jnf3634; Last   Rx:[id number] Ordered    Future Appointments    Date/Time Provider Specialty Site   09[facility]2017 [facility]     Signatures   Electronically signed by : OLGA Merchant ; Aug 15 2017  2:38PM EST                       (Author)

## 2018-01-16 NOTE — PSYCH
Progress Note  Psychotherapy Provided [facility]: Individual Psychotherapy 45 minutes provided today  Goals addressed in session:   goals 1,2  D: MSW met with pt for session  He began working part time a month ago  "It is the perfect job " He work cleaning offices in the evening  "There is no one around  Grace Zaidi He feels better with working " He took his gages out of his ears  "This made his parents happy " Discussed "girls " He meets the many women he talks to on dating web sites  "He likes talking to women " Discussed how it is unhealthy to "need" to talk to many women  When MSW suggested he go a month without going on the web site he began to bargain  A: Mild progress on goals  Pt appears to have an unhealthy need to speak with women  P: To think about not going on the web site for a week  Pain Scale and Suicide Risk [facility]: Current Pain Assessment: no pain   Current suicide risk is low   Behavioral Health Treatment Plan [address] [place] [address] [address]: Diagnosis and Treatment Plan explained to patient, patient relates understanding diagnosis and is agreeable to Treatment Plan  Assessment    1  Anxiety (300 00) (F41 9)   2  Bipolar I disorder, most recent episode mixed, severe without psychotic features   (296 63) (F31 63)   3  Panic disorder with agoraphobia (300 21) (F40 01)   4   Post traumatic stress disorder (309 81) (F43 10)    Signatures   Electronically signed by : Jodie Wise LCSW; Mar 22 2016  2:10PM EST                       (Author)

## 2018-01-16 NOTE — PSYCH
Progress Note  Psychotherapy Provided [facility]: Individual Psychotherapy 45 minutes provided today  Goals addressed in session:   goals 1,2  D: MSW met with ct for session  His brother passed away suddenly 2 weeks ago  He was 52 yrs old with a history of diabetes and drug addiction  He lived in [place] for years and had moved in the states 5 months ago  "His brother had not been feeling well, which he has of not feeling well " CT had stayed overnight at his house and left in the morning when his brother was sleeping  Later that day another brother of him went to his brother's house and he found him dead  "The  found a baggie with drugs? inside it " They are awaiting the autopsy report  Discussed CT's feelings and his family  A: No progress on goal 1  Mod progress on goal 2  P: To apply coping skills discussed in session  Pain Scale and Suicide Risk [facility]: Current Pain Assessment: no pain   Current suicide risk is low   Behavioral Health Treatment Plan [address] [place] [address] [address]: Diagnosis and Treatment Plan explained to patient, patient relates understanding diagnosis and is agreeable to Treatment Plan  Assessment    1  Panic disorder without agoraphobia (300 01) (F41 0)   2  RADHA (generalized anxiety disorder) (300 02) (F41 1)   3   Bipolar I disorder, most recent episode mixed, severe without psychotic features   (296 63) (F31 63)    Signatures   Electronically signed by : Mica Jones LCSW; Jul 12 2017  2:12PM EST                       (Author)

## 2018-01-16 NOTE — PSYCH
Progress Note  Psychotherapy Provided [facility]: Individual Psychotherapy 45 minutes provided today  Goals addressed in session:   D: MSW met with pt for session  "He was manic " Discussed his pete and his change in meds  "He feels better with the Depakote " Last Mon he had another disagreement with his roommate and moved out on the spot  He is now staying with his brother  His plan is to "not be there for too long " For the past month and a half has been arguing with his girlfriend  He had broken up with her but this morning he spoke to her and decided to "give her a second chance " A: Ct's mood appears to be stabilizing out  P: To continue to work with his Dr Alex Fields and Suicide Risk [facility]: Current Pain Assessment: no pain   Current suicide risk is low   Behavioral Health Treatment Plan [facility]: Diagnosis and Treatment Plan explained to patient, patient relates understanding diagnosis and is agreeable to Treatment Plan  Assessment    1  Bipolar I disorder, most recent episode mixed, severe without psychotic features   (296 63) (F31 63)   2  Post traumatic stress disorder (309 81) (F43 10)   3  Panic disorder with agoraphobia (300 21) (F40 01)   4  Intermittent explosive disorder (312 34) (F63 81)   5   Anxiety (300 00) (F41 9)    Signatures   Electronically signed by : Fred Guerra LCSW; Aug 30 2016  4:18PM EST                       (Author)

## 2018-01-16 NOTE — PSYCH
History of Present Illness  [facility] Clinical Progress Note [facility]:   Specialized Services Documentation - Therapist must complete separate progress note for each specific clinical activity in which the client participated during the day  (212) Group Psychotherapy: (8539-3049)  Rickie Munson was an active participant in the psychotherapy group focused on shifting the negative thoughts, promoting the positive, and how to improve communication in personal relationships  He was open about personal struggles, and also offered advice to his peers  Rickie Munson was able to relate the struggles of others to his own, and seemed to gather insight for change  There was moderate progress made towards his goal    Latanya [facility]  Treatment Plan Problem(s): 1 1      (640) Group Psychotherapy: 3716-1819 Rickie Munson participated in wellness group focused on the relationship between sleep disturbances, mood disturbances and also immunity  Rickie Munson shared that he has had sleep disturbances and also abuses sleeping by oversleeping when he is feeling depressed  Rickie Munson received feedback from peers on ways to curtain using "staying in bed as a dysfunctional coping mechanism " Rickie Munson made mild progress toward goal  Continue group to provide individual education on how to improve mood through development of healthier sleeping habits  Treatment Plan Problem(s): 1 1,1 2  Wilmer Rocah RN       (399) Education Therapy Goals set - breathing exercises    Treatment Plan Problem(s): 1 1  Education Therapy Time - 0900 - 0930 Previous goal was met  Readiness to Learning:  He is receptive to learning  There are  no barriers to learning  Learning Assessment Time - 1330 - 1400   Education completed on  illness and wellness tools  The teaching method was  verbal  Shared area of learning: Yes  BALDEMAR Juares     (667) Allied Therapy 0596-6070 Rickie Munson actively shared in [facility] group focused on relaxation techniques and anxiety reduction   He was observed to be engaged in therapist led relaxation exercises  He reported benefit from the progressive muscle relaxation and was encouraged to explore how he could implement at home  Group discussed specific items that could help self soothe if in an âanxiety crisisâ with encouragement to use these things regularly to manage stressors consistently  Some effort noted toward tx goal  Continue AT to encourage development of wellness skills and consistent practice  Treatment Plan Problem(s): 1 1  Trevor Barnett [facility]       Case Management Note:   12:05-12:07 This CM checked in with Pranav Seen briefly to see if he had any concerns to discuss today  He stated he did not, and he is doing ok  He did note that if tomorrow is snowing, he will not come to program because his car does not drive well in the snow  TREATMENT SESSION NUMBER: 3   Current suicide risk is low  Medications not changed/added/denied  Beni Valdes MSW, LSW      Active Problems    1  Anxiety (300 00) (F41 9)   2  Bipolar I disorder, most recent episode mixed, severe without psychotic features   (296 63) (F31 63)   3  RADHA (generalized anxiety disorder) (300 02) (F41 1)   4  Intermittent explosive disorder (312 34) (F63 81)   5  Panic disorder with agoraphobia (300 21) (F40 01)   6  Panic disorder without agoraphobia (300 01) (F41 0)   7  Post traumatic stress disorder (309 81) (F43 10)    Past Medical History    1  Denied: History of Head trauma   2  History of alcohol abuse (305 03) (Z87 898)   3  History of Crohn's disease (V12 70) (Z87 19)   4  Denied: History of Seizure    Allergies    1  Penicillins   2  Remicade SOLR    Current Meds   1  ClonazePAM 1 MG Oral Tablet; TAKE 1 TABLET 3 TIMES DAILY AS NEEDED; Therapy: 46EHA6262 to (Evaluate:61Gez1912); Last Rx:66Uit6553 Ordered   2  FLUoxetine HCl - 20 MG Oral Capsule; TAKE 1 CAPSULE Once In The Morning;    Therapy: 27LBC9955 to [phone])  Requested for: 19MVJ8635; Last   ZQ:08QFB5620 Ordered   3  HydrOXYzine HCl - 25 MG Oral Tablet; TAKE 1 TABLET 3 TIMES DAILY AS NEEDED; Therapy: 94ILZ7495 to (Evaluate:70Ekh3067)  Requested for: 47CIT6959; Last   Rx:26Jan2017 Ordered   4  OLANZapine 20 MG Oral Tablet; TAKE 1 TABLET AT BEDTIME; Therapy: 06WGE5700 to (Evaluate:24Uus4623)  Requested for: 26Jan2017; Last   Alycia Vijay Ordered    Family Psych History  Mother    1  No pertinent family history  Father    2  Family history of alcohol abuse (V61 41) (Z81 1)   3  Family history of paranoid schizophrenia (V17 0) (Z81 8)    Social History    · Current Some Day Smoker (305 1)   · Disabled   · Education Level: Some college   · Home   · Occasional caffeine consumption   · Occupational   · Single    Future Appointments    Date/Time Provider Specialty Site   02/27/2017 11:00 AM Deion Sera, [facility] Psychiatry [facility]   03/07/2017 10:00 AM Deion Sera, [facility] Psychiatry [facility]   03/20/2017 11:00 AM Deion Sera, [facility] Psychiatry [facility]   04/05/2017 11:00 AM Bunny Sera, [facility] Psychiatry [facility]   04/19/2017 11:00 AM Deion Sera, [facility] Psychiatry [facility]   02/09/2017 10:00 AM OLGA Araujo  Psychiatry [facility]   02/10/2017 10:00 AM OLGA Araujo   Psychiatry [facility]   03/02/2017 02:15 PM Tam Barrios MS, APRN, PMHCNS_BS  [facility]   03/16/2017 02:15 PM Silke Sanders MS, APRN, PMHCNS_BS  [facility]   04/06/2017 02:15 PM Silke Sanders MS, APRN, PMHCNS_BS  [facility]   04/20/2017 02:15 PM Silke Sanders MS, APRN, PMHCNS_BS  [facility]   04/20/2017 02:15 PM Silke Sanders, MS, APRN, PMHCNS_BS  [facility]     Signatures   Electronically signed by : Vasyl Clement LCSW; Feb 8 2017 12:41PM EST (Author)    Electronically signed by : Claudetta Clarke, MSWLSW; Feb 8 2017 12:47PM EST                       (Author)    Electronically signed by : BALDEMAR Huff; Feb 8 2017  3:14PM EST                       (Author)    Electronically signed by : Denny Geiger RN; Feb 8 2017  4:28PM EST                       (Author)

## 2018-01-16 NOTE — PSYCH
Treatment Plan Tracking    #1 Treatment Plan not completed within required time limits due to: Client presented with emotional/behavioral issues that required clinical intervention            Signatures   Electronically signed by : Lindsey Hines LCSW; May  9 2016  4:19PM EST                       (Author)

## 2018-01-16 NOTE — PSYCH
Progress Note  Psychotherapy Provided  [facility]: Individual Psychotherapy 30 minutes provided today  Goals addressed in session:   Goal #1  D: Pt was one of 2 pts who arrived for Bipolar Wellness Group, so each pt had 30" of Psychotherapy today  Topics explored were: Introduction; Confidentiality; Pt's descriptions of their History of Bipolar Symptoms; Current stressors and symptoms; Verbalization of their Positive Coping Skills; Medications which are helpful; Psychoeducation, Cognitive-reframing,and Therapeutic Support provided by this Therapist; and the final segment of today's session was comprised of Deep-Breathing/ Relaxation/ Guided Imagery/ Mindfulness, accompanied by [employer] via CD  Pt participated actively in this first Bipolar Wellness Group session  He explained that he has had mostly Manic Bipolar Symptoms since childhood,and he has had past angry rages, past impulsive behaviors, and then he would get suicidal ideation with his Bipolar depression  Pt had no evidence of any current SI /HI Mariela La / VH, today  He stated he has had MANY different medication trials, but his current regimen (Zyprexa and other meds), has been helpful in keeping him "more steady " Pt shared that he is in the midst of a Social Security Disability eligibility case, so he does not work any job right now---but he hopes, after his case is adjudicated, 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, (his past trade), to earn a few extra dollars and to help increase his self-esteem--->pt likes to keep productive and busy, he says  Pt said his past Bipolar symptoms have made it difficult for him to hold a steady job, as he has had poor motivation and energy, poor sleep, poor stamina, poor focus, in the past, and he has low frustration-tolerance with people  He described Anger-management techniques he uses, as he is living at home with his parent(s)   Pt appeared more Relaxed/ less anxious at the end of today's Deep-Breathing/ Relaxation/ Guided-Imagery segment of today's session,and he thanked this Therapist for having the Group (even though some regular members of group were out sick today, or had Jobs or School ) Pt also stated that he has battled Crohn's disease and Pain, in the past, but he denied any current Pain  A: Bipolar Disorder, mixed, severe, F31 63; Generalized Anxiety Disorder, F41 10,and hx of Panic disorder with agoraphobia, F40 01  Pt appeared to benefit from session  P: Continue Treatment Plan, Meds, Individual Psychotherapy,and Group Psychotherapy  Pain Scale and Suicide Risk [facility]: Current Pain Assessment: no pain   On a scale of 0 to 10, the patient rates current pain at 0   Current suicide risk is low   Behavioral Health Treatment Plan [facility] Ask: Diagnosis and Treatment Plan explained to patient, patient relates understanding diagnosis and is agreeable to Treatment Plan  Assessment    1  Bipolar I disorder, most recent episode mixed, severe without psychotic features   (296 63) (F31 63)   2  Panic disorder with agoraphobia (300 21) (F40 01)   3   RADHA (generalized anxiety disorder) (300 02) (F41 1)    Signatures   Electronically signed by : Dimas Harvey MSAPRNPMHCNS-BC; Dec 15 2016  4:28PM EST                       (Author)

## 2018-01-17 NOTE — PSYCH
Provider Comments  Provider Comments:   MSW phoned pt   "His number is not taking incoming calls"      Signatures   Electronically signed by : Laurel Rossi LCSW; Sep 27 2017 11:17AM EST                       (Author)

## 2018-01-17 NOTE — PSYCH
History of Present Illness  [facility] Clinical Progress Note [facility]:   Specialized Services Documentation - Therapist must complete separate progress note for each specific clinical activity in which the client participated during the day  (881) Group Psychotherapy: 0124-1932 Rickie Munson participated in weekly wellness group to discuss what particular area of wellness that has been worked on up to today in Program and choice of area to continue working on for recovery as targeted in treatment plan, by choice or in WRAP  Rickie Munson shared that he is going to work on emotional goals as he finds he becomes anxious, "overanxious" by daily events that happen "and I overreact to them " Rickie Munson gave several examples of becoming highly anxious and triggering himself with imaginary situations that he knows are not true but that he "builds up in my mind " Peers offered support to Rickie Munson for actively sharing and he accepted this feedback well  Rickie Munson made progress toward goal  Continue to offer weekly wellness as an strategy to offer opportunity to focus on goals and identify areas of goal achievement and need  Treatment Plan Problem(s): 1 1,1 2  Wilmer Roach RN     (224) Group Psychotherapy: (10:45-11:45) Rickie Munson participated in psychotherapy group focused on managing worry, dealing with labels/mental health stigma, and self-care  Rickie Munson identified worrying about things he cannot control a lot as a stressor  He was an active participant in group discussion, relating to peers on worry and prioritizing self-care  He discussed having difficulty stopping worrisome thoughts, and peers provided some discussion and feedback about ways to rethink situations to reduce worry  He also expressed concern about how much to share with friends, family and someone he is dating about his mental illness  Peers also provided him with good feedback about appropriate boundaries and sharing his difficulties with those that can be supportive   Moderate progress noted toward goals  Continue psychotherapy group to encourage Mia Connors to explore stressors and coping  Treatment Plan Problem(s): 1 1, 1 2  HILDA ClarkW       (141) Education Therapy Goals set - help clean and prep for bathroom remodel    Treatment Plan Problem(s): 1 1, 1 2  Education Therapy Time - 0900 - 0930 Previous goal was met  Readiness to Learning:  He is receptive to learning  There are  no barriers to learning  Learning Assessment Time - 1330 - 1400   Education completed on  illness and wellness tools  The teaching method was  verbal  Shared area of learning: Yes  HILDA ClarkW     [phone] [facility] [address] actively shared in [facility] group exploring emotions  Engaged in task sharing triggers and responses to given emotions  He identified both productive and unproductive choices with several âupsettingâ emotions  The risks of inactivity over the weekend because âI don't feel like itâ discussed  Group reinforced productive weekend choices and supports  Some slow progress noted toward goal  Continue AT to explore healthy emotional regulation and role in practicing wellness tools  Treatment Plan Problem(s): 1 1  BALDEMAR Britton       Case Management Note:   12:10-12:20 This CM met with Shareerashaun Connors to discuss weekend plans, progress in program  Mia Connors stated that he has plans for each day of the weekend, including meeting a good friend tonight to watch a movie, spending time with other friends on Saturday and cleaning on Sunday to keep busy  Mia Connors stated that he feels that the program has been very helpful for him and he feels very supported, which he appreciates  He noted that he is concerned about his Social Security benefits because they told him that they feel he is fit to work since his Crohns Disease is in remission right now  However, he feels he is not mentally able to work due to his PTSD symptoms and panic attacks when he is out around other people   He has a  that is working on the appeal with him, but he worries about having to appear in court and his income being cut off  Discussed ways to try and reduce anxiety about this topic  Doc Ata also advised that he plans to call the financial aid office at [facility] this afternoon to discuss options for financing his last semester needed to finish his bachelor's degree  TREATMENT SESSION NUMBER: 4   Current suicide risk is low  Medications not changed/added/denied  Kerry  MSW, LSW      Active Problems    1  Anxiety (300 00) (F41 9)   2  Bipolar I disorder, most recent episode mixed, severe without psychotic features   (296 63) (F31 63)   3  RADHA (generalized anxiety disorder) (300 02) (F41 1)   4  Intermittent explosive disorder (312 34) (F63 81)   5  Panic disorder with agoraphobia (300 21) (F40 01)   6  Panic disorder without agoraphobia (300 01) (F41 0)   7  Post traumatic stress disorder (309 81) (F43 10)    Past Medical History    1  Denied: History of Head trauma   2  History of alcohol abuse (305 03) (Z87 898)   3  History of Crohn's disease (V12 70) (Z87 19)   4  Denied: History of Seizure    Allergies    1  Penicillins   2  Remicade SOLR    Current Meds   1  ClonazePAM 1 MG Oral Tablet; TAKE 1 TABLET 3 TIMES DAILY AS NEEDED; Therapy: 91XWH9971 to (Evaluate:25Apr2017); Last Rx:89Dxq7312 Ordered   2  FLUoxetine HCl - 20 MG Oral Capsule; TAKE 1 CAPSULE Once In The Morning; Therapy: 96ROC5039 to (Evaluate:26Apr2017)  Requested for: 37IGP9548; Last   Rx:26Jan2017 Ordered   3  HydrOXYzine HCl - 25 MG Oral Tablet; TAKE 1 TABLET 3 TIMES DAILY AS NEEDED; Therapy: 87LQN3469 to (Evaluate:58Bot1237)  Requested for: 42TYZ4167; Last   Rx:26Jan2017 Ordered   4  OLANZapine 20 MG Oral Tablet; TAKE 1 TABLET AT BEDTIME; Therapy: 25XPJ8768 to (Evaluate:30May2017)  Requested for: 26Jan2017; Last   Marjorie Barajas Ordered    Family Psych History  Mother    1  No pertinent family history  Father    2   Family history of alcohol abuse (V61 41) (Z81 1)   3  Family history of paranoid schizophrenia (V17 0) (Z81 8)    Social History    · Current Some Day Smoker (305 1)   · Disabled   · Education Level: Some college   · Home   · Occasional caffeine consumption   · Occupational   · Single    Future Appointments    Date/Time Provider Specialty Site   02/27/2017 11:00 AM Bunny Sera, [facility] Psychiatry [facility]   03/07/2017 10:00 AM Bunny Sera, [facility] Psychiat[facility]   03/20/2017 11:00 AM Bunny Sera, [facility] Psychiat[facility]   04/05/2017 11:00 AM Bunny Sera, [facility] Psychiat[facility]   04/19/2017 11:00 AM Bunny Sera, [facility] Psychiat[facility]   02/13/2017 12:15 PM OLGA Araujo  Psychiatry [facility] HOSPITALIZATION   02/14/2017 11:30 AM OLGA Araujo  Psychiatry [facility] HOS[facility]ITAL[facility]   02/15/2017 12:15 PM Tj Lea M D  Psychiatry [facility] HOSPITALIZ[facility]   02/16/2017 11:15 AM OLGA Araujo  Psychiatry [facility] HOS[facility]ITAL[facility]   02/17/2017 12:15 PM Tj Lea M D   Psychiatry [facility] HOSPITAL[facility]   03/02/2017 02:15 PM Tam Barrios MS, APRN, PMHCNS_BS  [facility]   03/16/2017 02:15 PM Silke Sanders MS, APRN, PMHCNS_BS  [facility]   04/06/2017 02:15 PM Silke Sanders MS, APRN, PMHCNS_BS  [facility]   04/20/2017 02:15 PM Silke Sanders MS, APRN, PMHCNS_BS  [facility]   04/20/2017 02:15 PM Silke Sanders MS, APRN, [facility]     Signatures   Electronically signed by : ELSY Martinez; Feb 10 2017 12:54PM EST                       (Author)    Electronically signed by : ELSY Martinez; Feb 10 2017  2:06PM EST                       (Author)    Electronically signed by : BALDEMAR Raymond; Feb 10 2017 2:26PM EST                       (Author)    Electronically signed by : Winston Naranjo RN; Feb 10 2017  3:05PM EST                       (Author)

## 2018-01-17 NOTE — PSYCH
1  Bipolar I disorder, most recent episode mixed, severe without psychotic features   (296 63) (F31 63)   2  Anxiety (300 00) (F41 9)   3  Intermittent explosive disorder (312 34) (F63 81)   4  Panic disorder without agoraphobia (300 01) (F41 0)   5  Post traumatic stress disorder (309 81) (F43 10)      Date of Initial Treatment Plan: 12/3/15  Date of Current Treatment Plan: 4/5/17  Treatment Plan 4  Strengths/Personal Resources for Self Care: caring, up front with stuff  Area of Needs: I was denied for social security  I have been having PTSD symptoms  Long Term Goals:   I want to be awarded social security  Target Date: 8/18      I want to manage the PTSD  Target Date: 8/18         Short Term Objectives:   Goal 1:   I will obtain the paperwork from my old   I will obtain a new   I will follow with  and do the things he asks me to do  I will attend the bipolar support group  Target Date: 5/17      Goal 2:   I will apply coping skills that I learned in [facility]  I will think about the positives when I look at my scars  I will play the tape the whole way through when I have a flashback  I will touch my stomach/scars when I have a flashback to see that I am ok now  Target Date: 6/17          GOAL 1: Modality: Individual 2 x per month   GOAL 1: Modality: Group 2 x per month        GOAL 2: Modality: Individual 2 x per month                    The first scheduled review date is 8/17  The expected length of service is 6 months  Patient Signature: _________________________________ Date/Time: ______________        1  Anxiety (300 00) (F41 9)   2  Bipolar I disorder, most recent episode mixed, severe without psychotic features   (296 63) (F31 63)   3  RADHA (generalized anxiety disorder) (300 02) (F41 1)   4  Intermittent explosive disorder (312 34) (F63 81)   5  Panic disorder with agoraphobia (300 21) (F40 01)   6   Panic disorder without agoraphobia (300 01) (F41 0)   7   Post traumatic stress disorder (309 81) (F43 10)     Electronically signed by : Beata Salas LCSW; Apr 5 2017 11:59AM EST                       (Author)

## 2018-01-17 NOTE — PSYCH
Progress Note  Psychotherapy Provided [facility]: Individual Psychotherapy 45 minutes provided today  Goals addressed in session:   D: MSW met with pt for session  Last seen almost 3 months ago  Since May 09, 2016 have only seen him for two sessions prior today  The end of Sept he went inpt psych due to his was manic  Since then he has been in and out of [facility]'s partial program  He is currently in their program  Since he went to inpt they have been trying to stabilize him with his meds  He moved again 1 month ago and is now living with his parents  "It is going good " Since 5/09 he stopped attending op appts regularly, [employer], attending [employer], volunteering and working  Developed tx plan  A: Pt appeared to have little insight in regards to he stopped doing all the things that was helplng him to be stable so he became manic  Assessment    1  Bipolar I disorder, most recent episode mixed, severe without psychotic features   (296 63) (F31 63)   2  Anxiety (300 00) (F41 9)   3  Intermittent explosive disorder (312 34) (F63 81)   4  Panic disorder with agoraphobia (300 21) (F40 01)   5   Post traumatic stress disorder (309 81) (F43 10)    Signatures   Electronically signed by : Opal Cisse LCSW; Nov 23 2016 10:08AM EST                       (Author)

## 2018-01-17 NOTE — PSYCH
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, Mon - Fri  His dad works there everyday so he will see his dad every day  His girlfriend of 2 month brOKe up with him  His average relationship is about 3 months and he does not know why  He is friends with some of his ex's  He was depressed for 2 days but prayed and now is doing OK  Everything else is "OK " A: Mod progress on goals 1,2  P: To talk to his friends who are his ex's and talk to them regarding his relationship issue  Pain Scale and Suicide Risk  [facility]: Current Pain Assessment: no pain   Current suicide risk is low   Behavioral Health Treatment Plan [facility]: Diagnosis and Treatment Plan explained to patient, patient relates understanding diagnosis and is agreeable to Treatment Plan  Assessment    1  Bipolar I disorder, most recent episode mixed, severe without psychotic features   (296 63) (F31 63)   2  Panic disorder with agoraphobia (300 21) (F40 01)   3  Post traumatic stress disorder (309 81) (F43 10)   4   Anxiety (300 00) (F41 9)    Signatures   Electronically signed by : Sebastien Alvarez LCSW; Jan 14 2016  2:11PM EST                       (Author)    Electronically signed by : Sebastien Alvarez LCSW; Jan 14 2016  2:15PM EST                       (Author)

## 2018-01-17 NOTE — PSYCH
Psych Med Mgmt    Appearance: was calm and cooperative, adequate hygiene and grooming and good eye contact  Observed mood: mood appropriate  Observed mood: affect appropriate  Speech: a normal rate and fluent  Thought processes: coherent/organized  Hallucinations: no hallucinations present  Thought Content: no delusions  Abnormal Thoughts: The patient has no suicidal thoughts and no homicidal thoughts  Orientation: The patient is oriented to person, place and time, oriented to person, oriented to place and oriented to time  Recent and Remote Memory: short term memory intact and long term memory intact  Attention Span And Concentration: concentration impaired  Insight: Limited insight  Judgment: His judgment was limited  Muscle Strength And Tone  Muscle strength and tone were normal    The patient is experiencing no localized pain  Goals addressed in session: Medication Management     Treatment Recommendations: Continue current medications  Risks, Benefits And Possible Side Effects Of Medications: Risks, benefits, and possible side effects of medications explained to patient and patient verbalizes understanding  He reports normal appetite, normal energy level, no weight change and normal number of sleep hours  Mood has improved  he reported less irritability and decreased mood swings   He stated the medication adjustment was helpful, but he feels sedated the next morning  Vitals  Signs   Recorded: 77Mgm7083 11:19AM   Height: 5 ft 4 in  Weight: 179 lb   BMI Calculated: 30 73  BSA Calculated: 1 87    Assessment    1  Bipolar I disorder, most recent episode mixed, severe without psychotic features   (296 63) (F31 63)   2  Intermittent explosive disorder (312 34) (F63 81)   3  Anxiety (300 00) (F41 9)   4  Post traumatic stress disorder (309 81) (F43 10)   5  Panic disorder with agoraphobia (300 21) (F40 01)    Plan    1   OLANZapine 5 MG Oral Tablet; TAKE 1 TABLET AT BEDTIME    Review of Systems    Constitutional: as noted in HPI  Active Problems    1  Anxiety (300 00) (F41 9)   2  Bipolar I disorder, most recent episode mixed, severe without psychotic features   (296 63) (F31 63)   3  Intermittent explosive disorder (312 34) (F63 81)   4  Panic disorder with agoraphobia (300 21) (F40 01)   5  Post traumatic stress disorder (309 81) (F43 10)    Past Medical History    1  History of alcohol abuse (305 03) (Z87 898)   2  History of Crohn's disease (V12 70) (Z87 19)    The active problems and past medical history were reviewed and updated today  Allergies    1  Penicillins   2  Remicade SOLR    Current Meds   1  ClonazePAM 1 MG Oral Tablet; TAKE 1 TABLET 3 TIMES DAILY AS NEEDED; Therapy: 73FHK8349 to (Evaluate:09Oct2016); Last Rx:89Fdb2233 Ordered   2  Lithium Carbonate 300 MG Oral Capsule; TAKE 3 CAPSULE Once At Bedtime; Therapy: 60OVN7534 to (Evaluate:09Oct2016)  Requested for: 78EDZ3583; Last   Rx:54Cnf0943 Ordered   3  OLANZapine 7 5 MG Oral Tablet; TAKE 1 TABLET Once At Bedtime; Therapy: 82LOV7507 to (Evaluate:09Oct2016)  Requested for: 55RPF8039; Last   Rx:[id number] Ordered    The medication list was reviewed and updated today  Family Psych History  Mother    1  No pertinent family history  Father    2  No pertinent family history    The family history was reviewed and updated today  Social History    · Current Some Day Smoker (305 1)   · Home   · Occupational  The social history was reviewed and updated today  The social history was reviewed and is unchanged  End of Encounter Meds    1  ClonazePAM 1 MG Oral Tablet (KlonoPIN); TAKE 1 TABLET 3 TIMES DAILY AS NEEDED; Therapy: 78JJU2238 to (Evaluate:09Oct2016); Last Rx:60Bjx2550 Ordered    2  Lithium Carbonate 300 MG Oral Capsule; TAKE 3 CAPSULE Once At Bedtime; Therapy: 50NRU9123 to (Evaluate:09Oct2016)  Requested for: 81UTA5475; Last   Rx:27Eji9501 Ordered   3   OLANZapine 5 MG Oral Tablet; TAKE 1 TABLET AT BEDTIME;    Therapy: 53XJS1759 to (Evaluate:59Ujg7316)  Requested for: 39Xnn9483; Last   Rx:[id number] Ordered    Future Appointments    Date/Time Provider Specialty Site   08/16/2016 10:00 AM Fuad De Anda LCSW Psychiatry [facility]   08/30/2016 02:00 PM Fuad De Anda [place] Psychiatry [facility]   09/16/2016 11:00 [facility]   09/30/2016 11:00 AM Fuad De Anda [facility] Psychiatry [facility]   10/14/2016 11:00 AM Fuad De Anda [facility] Psychiatry [facility]   10/28/2016 11:00 AM Fuad De Anda [facility] Psychiatry [facility]     Signatures   Electronically signed by : OLGA Wallace ; Jul 28 2016 11:23AM EST                       (Author)

## 2018-01-17 NOTE — PSYCH
Progress Note  Psychotherapy Provided [facility]: Individual Psychotherapy 45 minutes provided today  Goals addressed in session:   goals 2  D; MSW met with pt for session  Discussed the holiday and his siblings  Things at home are going good  He continues to work part time  He likes his co-worker and bosses  His relationship is going good  Discussed though issues he is having regarding intercourse and his over thinking  A: His over thinking appears to be a symptom of his really his liking her and fear of the relationship ending  Mod progress on goals  P: To talk to his girlfriend  Pain Scale and Suicide Risk [facility]: Current Pain Assessment: no pain   Current suicide risk is low   Behavioral Health Treatment Plan [facility]: Diagnosis and Treatment Plan explained to patient, patient relates understanding diagnosis and is agreeable to Treatment Plan  Assessment    1  Bipolar I disorder, most recent episode mixed, severe without psychotic features   (296 63) (F31 63)   2  RADHA (generalized anxiety disorder) (300 02) (F41 1)   3  Intermittent explosive disorder (312 34) (F63 81)   4  Post traumatic stress disorder (309 81) (F43 10)   5   Panic disorder with agoraphobia (300 21) (F40 01)    Signatures   Electronically signed by : Thomas Lacy LCSW; Apr 19 2017 12:09PM EST                       (Author)

## 2018-01-17 NOTE — PSYCH
Progress Note  Psychotherapy Provided [facility]: Individual Psychotherapy 45 minutes provided today  Goals addressed in session:   goals 1  D: MSW met with pt for session  "He is doing ok " He house sat last week for a friend  Discussed his ex girlfriend and what does he want in a girlfriend  He continues to live at his parent's house  "He likes it because it is not chaotic " He is having difficulty with spending money  Discussed coping skills  He is drinking soda everyday  Discussed how unhealthy soda is for people/him  A Mild progress on goal  P: To apply coping skills  Pain Scale and Suicide Risk [facility]: Current Pain Assessment: no pain   Current suicide risk is low   Behavioral Health Treatment Plan [facility]: Diagnosis and Treatment Plan explained to patient, patient relates understanding diagnosis and is agreeable to Treatment Plan  Assessment    1  Bipolar I disorder, most recent episode mixed, severe without psychotic features   (296 63) (F31 63)   2  RADHA (generalized anxiety disorder) (300 02) (F41 1)   3  Intermittent explosive disorder (312 34) (F63 81)   4  Panic disorder with agoraphobia (300 21) (F40 01)   5   Post traumatic stress disorder (309 81) (F43 10)    Signatures   Electronically signed by : Robina Gorman LCSW; Jan 4 2017  4:08PM EST                       (Author)

## 2018-01-17 NOTE — PSYCH
History of Present Illness  [facility] Clinical Progress Note [facility]:   Specialized Services Documentation - Therapist must complete separate progress note for each specific clinical activity in which the client participated during the day  ([phone]) Group Psychotherapy: (9:30-10:30) Judith Wyatt participated in psychotherapy group focused on emotional balance, supports and mental health stigma  Judith Wyatt identified driving into program today as a stressor, as traffic was very heavy and he thought he would be late  He actively participated in group discussion on supports and finding emotional balance, and shared that he has found a lot of comfort and support moving back home with his parents  He provided support to peers that talked about struggling to find the right medications and feeling frustrated waiting for meds to work  He seemed to listen attentively as peers encouraged each other to find purpose and positives in life experiences  Good progress noted toward goals  Continue psychotherapy group to encourage Judith Wyatt to explore stressors and coping  Treatment Plan Problem(s): 1 1, 1 2, 1 4  Darek Simons MSW, LSW     (167) Group Psychotherapy: [id number] Judith Wyatt participated in wellness group focused on learning and practicing positive affirmations to boost individual self-esteem  Judith Wyatt shared that he is learning to positively affirm himself by considering things he is grateful for and feeling that this reassures him that he will "be OK " Judith Wyatt discussed learning and practicing self-affirmations as something that feels "weird and unfamiliar" at the beginning but with practice becomes more natural and helpful as it becomes "more automatic" taking the place of automatic negative thoughts  Judith Wyatt made good progress toward goal  Continue group to educate, and provide practice with peers, to learn how to practice using positive affirmations as a daily strategy to build self-esteem  Treatment Plan Problem(s): 1 1,1 2   Aicha Asif RN [phone]) Education Therapy Goals set - straighten room and do meditation    Treatment Plan Problem(s): 1 1  Education Therapy Time - 0900 - 0930 Previous goal was met  Readiness to Learning:  He is receptive to learning  There are  no barriers to learning  Learning Assessment Time - 1330 - 1400   Education completed on  illness, aftercare and wellness tools  The teaching method was  verbal  Shared area of learning: Yes  BALDEMAR Abbott     (218) Allied Therapy [id number] Wilson Michaelguille actively shared in [facility] group focused on relaxation and self- care  Group explored variety of therapist led relaxation strategies and concept of self -care versus selfishness  Wilson Corrigan was able to identify focusing on others as a barrier to self -care and ways to increase self -care  Group reinforced the necessity of self -care in wellness versus seeing this as a luxury  Some effort voiced toward goal  Continue AT to engage in the development and practice of wellness tools  Treatment Plan Problem(s): 1 1,1 5  BALDEMAR Abbott       Case Management Note:   12:20-12:28 This CM met with Wilson Corrigan to review treatment plan update and to provide relapse prevention plan for tomorrow's discharge  Wilson Corrigan stated that he feels prepared for discharge and that he benefitted from the program a lot  He will follow up with Bisi Meyers and Dr Wen Jones outpatient  TREATMENT SESSION NUMBER: 7   Current suicide risk is low  Medications not changed/added/denied  Laura Vallejo MSW, LSW      Active Problems    1  Anxiety (300 00) (F41 9)   2  Bipolar I disorder, most recent episode mixed, severe without psychotic features   (296 63) (F31 63)   3  RADHA (generalized anxiety disorder) (300 02) (F41 1)   4  Intermittent explosive disorder (312 34) (F63 81)   5  Panic disorder with agoraphobia (300 21) (F40 01)   6  Panic disorder without agoraphobia (300 01) (F41 0)   7  Post traumatic stress disorder (309 81) (F43 10)    Past Medical History    1   Denied: History of Head trauma   2  History of alcohol abuse (305 03) (Z87 898)   3  History of Crohn's disease (V12 70) (Z87 19)   4  Denied: History of Seizure    Allergies    1  Penicillins   2  Remicade SOLR    Current Meds   1  ClonazePAM 1 MG Oral Tablet; TAKE 1 TABLET 3 TIMES DAILY AS NEEDED; Therapy: 42TLZ2964 to (Evaluate:25Apr2017); Last Rx:42Ehb6695 Ordered   2  FLUoxetine HCl - 20 MG Oral Capsule; TAKE 1 CAPSULE Once In The Morning; Therapy: 25GBG1826 to (Evaluate:26Apr2017)  Requested for: 03FFW1103; Last   Rx:26Jan2017 Ordered   3  HydrOXYzine HCl - 25 MG Oral Tablet; TAKE 1 TABLET 3 TIMES DAILY AS NEEDED; Therapy: 77YNB4530 to (Evaluate:30May2017)  Requested for: 92SFU6795; Last   Rx:26Jan2017 Ordered   4  OLANZapine 20 MG Oral Tablet; TAKE 1 TABLET AT BEDTIME; Therapy: 90EOZ2326 to (Evaluate:30May2017)  Requested for: 26Jan2017; Last   Rx:93Kdb08 Ordered    Family Psych History  Mother    1  No pertinent family history  Father    2  Family history of alcohol abuse (V61 41) (Z81 1)   3  Family history of paranoid schizophrenia (V17 0) (Z81 8)    Social History    · Current Some Day Smoker (305 1)   · Disabled   · Education Level: Some college   · Home   · Occasional caffeine consumption   · Occupational   · Single    Future Appointments    Date/Time Provider Specialty Site   02/27/2017 11:00 AM Yesenia Cedillo LCSW Psychiatry [facility]   03/07/2017 10:00 AM [facility]   03/20/2017 11:00 AM Yesenia Cedillo [facility] Psychiatry [facility]   04/05/2017 11:00 AM Yesenia Cedillo [facility] Psychiatry [facility]   04/19/2017 11:00 AM Yesenia Cedillo [facility] Psychiatry [facility]   02/16/2017 11:15 AM OLGA Lamb  [facility]ITALIZ[facility]   02/17/2017 12:15 PM Herminia Lea M D   Psychiatry [facility]   03/02/2017 02:15 PM Tatu, Lisette Ramirez, Anselmo Rick 87, APRN, PMHCNS_BS  [facility]   03/16/2017 02:15 PM Renpoppy s, MS, APRN, PMHCNS_BS  [facility]   04/06/2017 02:15 PM Renpoppy Saenzs, MS, APRN, PMHCNS_BS  [facility]   04/20/2017 02:15 PM Renpoppy Saenzs, MS, APRN, PMHCNS_BS  [facility]   04/20/2017 02:15 PM Leidy Winter, MS, APRN, [facility]     Signatures   Electronically signed by : Madonna Nageotte, MSWLSW; Feb 15 2017  1:20PM EST                       (Author)    Electronically signed by : BALDEMAR Kidd; Feb 15 2017  2:48PM EST                       (Author)    Electronically signed by : Chino Whitman RN; Feb 15 2017  4:01PM EST                       (Author)

## 2018-01-18 NOTE — PSYCH
History of Present Illness  [facility] Clinical Progress Note [facility]:   Specialized Services Documentation - Therapist must complete separate progress note for each specific clinical activity in which the client participated during the day  ([phone]) Group Psychotherapy: (9:30-10:30) Murray Muniz participated in psychotherapy group focused on self-care and creating a regular schedule to help maintain wellness  Murray Muniz identified feeling very overwhelmed as a stressor, and noted that he is very happy to be back at program today as he knows he will feel better afterward  He was mostly quiet during group discussion but did relate to a peer regarding family members not connecting with each other unless they are face to face, which is hurtful to him  Minimal progress noted toward goals  Continue psychotherapy group to encourage Murray Muniz to explore stressors and coping  Treatment Plan Problem(s): 1 1, 1 2  Baljeet Clifton MSW, LSW     (514) Group Psychotherapy: 9818-1659 Murray Muniz participated in [address] [facility] group focused on identifying how healthy routines can contribute to recovery from mental illness and how unhealthy routines can be changed or eliminated for better health  Murray Muniz shared that he has many hobbies and interests that he does not pursue when he is very depressed and also that he does not socialize with family or friends when he is very depressed  Murray Muniz discussed developing a routine to include at least one activity he enjoys daily  Murray Muniz identified that he enjoys painting with acrylics and will schedule to do this once weekly as it is a stress reliever for him  Murray Muniz made moderate progress toward goals  Continue group to educate on how following healthy routines for medication, self-care, diet and activity can benefit recovery  Treatment Plan Problem(s): 1 1,1 2  Marah Spangler RN       (661) Education Therapy Goals set - mail package    Treatment Plan Problem(s): 1 1  Education Therapy Time - 0900 - 0930 Previous goal was met  Readiness to Learning:  He is receptive to learning  There are  no barriers to learning  Learning Assessment Time - 1330 - 1400   Education completed on  illness and wellness tools  The teaching method was  verbal  Shared area of learning: Yes  BALDEMAR Hollis     (793) Allied Therapy 0584-1137 Jorge Luis Bentley actively shared in [facility] group focused on motivation  He engaged in [place] discussion and tasks exploring definition of, challenges to and ways to improve motivation  Group explored CBT and BA techniques to counteract limiting beliefs and set self up for success  He shared how the discussion on choice positively impacted him and that he tends to get âstuckâ with saying âI can't before he even triesâ  Beginning effort toward goal noted  Continue AT to explore wellness tools and encourage active practice  Treatment Plan Problem(s): 1 1,1 2  BALDEMAR Hollis       Case Management Note:   12:20-12:30-This CM met with Jorge Luis Bentley to review treatment plan, to which he was agreeable and signed  Copy provided  He had concerns about ex girlfriend and wanting to cut off communication with her  Discussed ways to set boundaries and utilizing group for support  TREATMENT SESSION NUMBER: 2   Current suicide risk is low  Medications not changed/added/denied  Sandeep Vásquez MSW, LSW      Active Problems    1  Anxiety (300 00) (F41 9)   2  Bipolar I disorder, most recent episode mixed, severe without psychotic features   (296 63) (F31 63)   3  RADHA (generalized anxiety disorder) (300 02) (F41 1)   4  Intermittent explosive disorder (312 34) (F63 81)   5  Panic disorder with agoraphobia (300 21) (F40 01)   6  Panic disorder without agoraphobia (300 01) (F41 0)   7  Post traumatic stress disorder (309 81) (F43 10)    Past Medical History    1  Denied: History of Head trauma   2  History of alcohol abuse (305 03) (Z87 898)   3  History of Crohn's disease (V12 70) (Z87 19)   4  Denied: History of Seizure    Allergies    1  Penicillins   2  Remicade SOLR    Current Meds   1  ClonazePAM 1 MG Oral Tablet; TAKE 1 TABLET 3 TIMES DAILY AS NEEDED; Therapy: 50KXA3098 to (Evaluate:25Apr2017); Last Rx:69Xfo2478 Ordered   2  FLUoxetine HCl - 20 MG Oral Capsule; TAKE 1 CAPSULE Once In The Morning; Therapy: 03YJA5170 to (Evaluate:26Apr2017)  Requested for: 86XFW4331; Last   Rx:26Jan2017 Ordered   3  HydrOXYzine HCl - 25 MG Oral Tablet; TAKE 1 TABLET 3 TIMES DAILY AS NEEDED; Therapy: 62XRY6567 to (Evaluate:30May2017)  Requested for: 37AQA5755; Last   Rx:26Jan2017 Ordered   4  OLANZapine 20 MG Oral Tablet; TAKE 1 TABLET AT BEDTIME; Therapy: 02DZE3856 to (Evaluate:30May2017)  Requested for: 26Jan2017; Last   Rx:51Zgo54 Ordered    Family Psych History  Mother    1  No pertinent family history  Father    2  Family history of alcohol abuse (V61 41) (Z81 1)   3  Family history of paranoid schizophrenia (V17 0) (Z81 8)    Social History    · Current Some Day Smoker (305 1)   · Disabled   · Education Level: Some college   · Home   · Occasional caffeine consumption   · Occupational   · Single    Future Appointments    Date/Time Provider Specialty Site   02/27/2017 11:00 AM Jesika Joseph [facility] Psychiatry [facility]   03/07/2017 10:00 AM Jesika Joseph [facility] Psychiatry [facility]   03/20/2017 11:00 AM Jesika Joseph [facility] Psychiatry [facility]   04/05/2017 11:00 AM Jesika Joseph [facility] Psychiatry [facility]   04/19/2017 11:00 AM Jesika Joseph [facility] Psychiatry [facility]   02/08/2017 10:00 AM OLGA Agarwal  Psychiatry [facility] HOSPITALIZATION   02/09/2017 10:00 AM OLGA Agarwal  Psychiatry [facility] HOSPITALIZATION   02/10/2017 10:00 AM OLGA Agarwal   Psychiatry [facility]IAL HOSPITALIZATION   03/02/2017 02:15 PM Isamar Barrios, MS, APRN, PMHCNS_BS  [facility]   03/16/2017 02:15 PM Silke Porterul, MS, APRN, PMHCNS_BS  [facility]   04/06/2017 02:15 PM Silke Sanders, MS, APRN, PMHCNS_BS  [facility]   04/20/2017 02:15 PM Silke Sanders, MS, APRN, PMHCNS_BS  [facility]   04/20/2017 02:15 PM Silke Sanders, MS, APRN, PMHCNS_BS  [facility]     Signatures   Electronically signed by : BALDEMAR Raymond; Feb 7 2017  2:39PM EST                       (Author)    Electronically signed by : Dmitriy Coleman RN; Feb 7 2017  4:25PM EST                       (Author)    Electronically signed by : ELSY Martinez; Feb 8 2017  8:34AM EST                       (Author)

## 2018-01-18 NOTE — PSYCH
Treatment Plan Tracking    #1 Treatment Plan not completed within required time limits due to: Other: Not seen for almost 2 mons, reviewed past 2 mons            Signatures   Electronically signed by : Rosio Avila LCSW; Jun 30 2016 11:08AM EST                       (Author)

## 2018-01-18 NOTE — PSYCH
Innovations Treatment Plan    Innovations Treatment Plan   AREAS OF NEEDS: Depression and anxiety as evidenced by agitation, panic attacks, oversleeping, decline in functioning and completing ADLs, appetite disturbances, isolation, feelings of hopelessness and helplessness, irritability and extreme mood swings  Date Initiated: 2/3/2017     LONG TERM GOAL: 1 0 I will identify three signs that my mood has stabilized and that I feel more interested in participating in my previously enjoyable activities  Date Initiated: 2/3/2017   Target Date: 3/3/2017   Completion Date: 2/16/2017     Date Initiated: 2/3/2017   Revision Date: 2/14/2017   1 1 I will identify thoughts or triggers that increase my symptoms of anxiety and depression, and will identify at least two new coping skills that help to reduce these feelings, practicing at least one daily  Target Date: 2/14/2017; 2/23/2017   Completion Date: 2/16/2017   Date Initiated: 2/3/2017   Revision Date: 2/14/2017   1 2 I will begin to look into options for financing the completion of my bachelors degree, in order to help me feel more motivated and hopeful for the future  Target Date: 2/14/2017   Completion Date: 2/14/2017   Date Initiated: 2/3/2017   Revision Date: 2/14/2017   1 3 I will take my medication as prescribed, and will raise any questions or concerns should any arise  Target Date: 2/14/2017; 2/23/2017   Completion Date: 2/16/2017   Date Initiated: 2/3/2017   Revision Date: 2/14/2017   1 4 I will identify two ways that my supports can help with my recovery, and will reach out to my supports or program staff should I need help     Target Date: 2/14/2017; 2/23/2017   Completion Date: 2/16/2017          7 DAY REVISION: Continue objectives 1 1, 1 3 and 1 4, as they are still relevant and achievable, but as yet unmet , 1 5 I will work on my WRAP for 10-15 minutes each day to identify ways that I can continue to support my wellness upon discharge from program  Date Initiated: 2017    Target Date: 2017   Completion Date: 2017   Date Initiated: 2017    Target Date: 2017   Completion Date: 2017             PSYCHIATRY: Medication management and education  Continue medication management and education   Date Initiated: 2/3/2017   Revision Date: 2017   The person(s) responsible for carrying out the plan is Christina Deng MD    NURSIN 1,1 2,1 3,1 4 This RN will provide daily Wellness group, five times weekly, to educate Domitila Domínguez on S/S of his diagnoses and medications used in treatment  1 1,1 2,1 3,1 4 This RN will continue to provide daily wellness group to educate Domitila Domínguez on his diagnoses and his medications used in treatment  Date Initiated: 2/3/17     Revision Date: 17     The person(s) responsible for carrying out the plan is Bryson Moses RN    PSYCHOLOGY: 1 1, 1 2, 1 4 Provide psychotherapy group 5 times per week to allow opportunity for Domitila Domínguez to explore stressors and ways of coping  1 1, 1 4, 1 5 Continue to provide psychotherapy group each program day to encourage Domitila Domínguez to further explore stressors and healthier ways of coping  Date Initiated: 2/3/2017   Revision Date: 2017   The person(s) responsible for carrying out the plan is IRLANDA Lee W  ALLIED THERAPY: 1 1,1 2 Engage Marco Antonio in AT group 5 times daily to encourage development and use of wellness tools to decrease symptoms and promote recovery through meaningful activity  1 1,1 2,1 5 Continue to encourage Domitila Domínguez to participate in AT group daily to practice wellness tools within program and transfer to home sharing successes and barriers through healthy task involvement  Date Initiated: 2/3/17   Revision Date: 17   The person(s) responsible for carrying out the plan is BALDEMAR Acevedo           CASE MANAGEMENT: 1 0 This  will meet with Domitila Domínguez 3-4 times weekly to assess treatment progress, discharge planning, connection to community supports and UR as indicated  1 0 Continue to meet with Willow Gaviria 3-4 times weekly to monitor progress in treatment, confirm discharge plans, connections to community supports and UR as indicated  Date Initiated: 2/3/2017   Revision Date: 2/14/2017   The person(s) responsible for carrying out the plan is IRLANDA Walsh  DISCHARGE CRITERIA: Identify three signs of improvement and complete relapse prevention plan  DISCHARGE PLAN: Return to Dr Camelia De Los Santos and Rahul Guzman for outpatient treatment  Consider support groups such as [facility] or [facility]  Estimated Length of Stay: 10 treatment days   Strengths: motivated to improve, strong family supports   Limitations: chronicity of illness   Diagnosis and Treatment Plan explained to patient, patient relates understanding diagnosis and is agreeable to Treatment Plan           CLIENT COMMENTS / Please share your thoughts, feelings, need and/or experiences regarding your treatment plan: _____________________________________________________________________________________________________________________________________________________________________________________________________________________________________________________________________________________________________________________ Date/Time: ______________         Patient Signature: _________________________________ Date/Time: ______________          Signatures   Electronically signed by : OLGA Elam ; Feb  3 2017  9:57AM EST                       (Author)    Electronically signed by : Becky Saalzar RN; Feb  3 2017 10:12AM EST                       (Author)    Electronically signed by : ELSY Walsh; Feb  3 2017 12:51PM EST                       (Author)    Electronically signed by : BALDEMAR Hodges; Feb  3 2017  2:17PM EST                       (Author)    Electronically signed by : OLGA Elam ; Feb 14 2017  8:55AM EST                       (Author) Electronically signed by : BALDEMAR Hodges; Feb 14 2017 10:02AM EST                       (Author)    Electronically signed by : ELSY Walsh; Feb 14 2017  1:27PM EST                       (Author)    Electronically signed by : Becky Salazar RN; Feb 14 2017  5:02PM EST                       (Author)    Electronically signed by : ELSY Walsh; Feb 16 2017  3:29PM EST                       (Author)
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
## 2018-01-18 NOTE — PSYCH
Psych Med Mgmt    Appearance: was calm and cooperative, adequate hygiene and grooming and good eye contact  Observed mood: depressed and anxious  Observed mood: affect was constricted  Speech: a normal rate and fluent  Thought processes: coherent/organized  Hallucinations: no hallucinations present  Thought Content: no delusions  Abnormal Thoughts: The patient has no suicidal thoughts and no homicidal thoughts  Orientation: The patient is oriented to person, place and time, oriented to person, oriented to place and oriented to time  Recent and Remote Memory: short term memory intact and long term memory intact  Attention Span And Concentration: concentration impaired  Insight: Limited insight  Judgment: His judgment was limited  Muscle Strength And Tone  Muscle strength and tone were normal       Goals addressed in session: Medication Management     Treatment Recommendations: Add Fluoxetine 20 mg   Continue 1:1 with Rosa Isela HORTON  Risks, Benefits And Possible Side Effects Of Medications: Risks, benefits, and possible side effects of medications explained to patient and patient verbalizes understanding  He reports normal appetite, decreased energy, no weight change and normal number of sleep hours  Mood is depressed, c/o difficulties with concentration, fatigue,sadness, isolation, anhedonia  No medication side effects  No recent health changes  He had used Paxil in the past  Will add Prozac since he already takes onlazapine  Patient stated Emaline Dubin suggested PHP, and he is waiting for appointment to start  Assessment    1  Anxiety (300 00) (F41 9)   2  Bipolar I disorder, most recent episode mixed, severe without psychotic features   (296 63) (F31 63)   3  RADHA (generalized anxiety disorder) (300 02) (F41 1)   4  Intermittent explosive disorder (312 34) (F63 81)   5  Panic disorder with agoraphobia (300 21) (F40 01)   6  Post traumatic stress disorder (309 81) (F43 10)    Plan    1  HydrOXYzine HCl - 25 MG Oral Tablet; TAKE 1 TABLET 3 TIMES DAILY AS   NEEDED   2  ClonazePAM 1 MG Oral Tablet (KlonoPIN); TAKE 1 TABLET 3 TIMES DAILY AS   NEEDED; Do Not Fill Before: [id number]    3  OLANZapine 20 MG Oral Tablet; TAKE 1 TABLET AT BEDTIME    4  FLUoxetine HCl - 20 MG Oral Capsule; TAKE 1 CAPSULE Once In The Morning    Review of Systems    Constitutional: as noted in HPI  Active Problems    1  Anxiety (300 00) (F41 9)   2  Bipolar I disorder, most recent episode mixed, severe without psychotic features   (296 63) (F31 63)   3  RADHA (generalized anxiety disorder) (300 02) (F41 1)   4  Intermittent explosive disorder (312 34) (F63 81)   5  Panic disorder with agoraphobia (300 21) (F40 01)   6  Panic disorder without agoraphobia (300 01) (F41 0)   7  Post traumatic stress disorder (309 81) (F43 10)    Past Medical History    1  History of alcohol abuse (305 03) (Z87 898)   2  History of Crohn's disease (V12 70) (Z87 19)    The active problems and past medical history were reviewed and updated today  Allergies    1  Penicillins   2  Remicade SOLR    Current Meds   1  ClonazePAM 1 MG Oral Tablet; TAKE 1 TABLET 3 TIMES DAILY AS NEEDED; Therapy: 24HXP7186 to (Evaluate:25Apr2017); Last Rx:[id number] Ordered   2  HydrOXYzine HCl - 25 MG Oral Tablet; TAKE 1 TABLET 3 TIMES DAILY AS NEEDED; Therapy: 23VVE1534 to (Evaluate:01Mar2017)  Requested for: 73LMO6335; Last   Rx:41Kta1518 Ordered   3  OLANZapine 20 MG Oral Tablet; TAKE 1 TABLET AT BEDTIME; Therapy: 13HWS9558 to (Evaluate:01Mar2017)  Requested for: 80WGM7139; Last   Rx:[id number] Ordered    The medication list was reviewed and updated today  Family Psych History  Mother    1  No pertinent family history  Father    2  No pertinent family history    The family history was reviewed and updated today  Social History    · Current Some Day Smoker (305 1)   · Home   · Occupational  The social history was reviewed and updated today        End of Encounter Meds    1  ClonazePAM 1 MG Oral Tablet (KlonoPIN); TAKE 1 TABLET 3 TIMES DAILY AS NEEDED; Therapy: 92SXB0128 to (Evaluate:25Apr2017); Last Rx:87Srs9571 Ordered   2  HydrOXYzine HCl - 25 MG Oral Tablet; TAKE 1 TABLET 3 TIMES DAILY AS NEEDED; Therapy: 53SRO3365 to (Evaluate:16Qzx9830)  Requested for: 49XWI6066; Last   Rx:26Jan2017; Status: ACTIVE - Transmit to Pharmacy - Awaiting Verification Ordered    3  OLANZapine 20 MG Oral Tablet; TAKE 1 TABLET AT BEDTIME; Therapy: 83LFS6509 to (Evaluate:73Hvi1116)  Requested for: 80ANQ1858; Last   Rx:26Jan2017 Ordered    4  FLUoxetine HCl - 20 MG Oral Capsule; TAKE 1 CAPSULE Once In The Morning; Therapy: 24KMH2300 to (Evaluate:26Apr2017);  Last PB:03PXG3987 Ordered    Future Appointments    Date/Time Provider Specialty Site   02/06/2017 10:00 AM Radha Young [facility] [place] [place] [facility]   02/27/2017 11:00 AM Radha Young [facility] Psychiatry [facility]   03/07/2017 10:00 AM Radha Young [facility] [place] [place] [facility]   03/20/2017 11:00 AM Radha Young [facility] [place] [place] [facility]   04/05/2017 11:00 AM Radha Young [facility]   04/19/2017 11:00 AM Radha Young [facility] [place] [place] [facility]   02/02/2017 02:15 PM Piedad Pizano MS, APRN, PMHCNS_BS  [facility]   03/02/2017 02:15 PM Piedad Pizano MS, APRN, PMHCNS_BS  [facility]   03/16/2017 02:15 PM Piedad Pizano MS, APRN, PMHCNS_BS  [facility]   04/06/2017 02:15 PM Piedad Pizano MS, APRN, PMHCNS_BS  [facility]   04/20/2017 02:15 PM Piedad Pizano, MS, ALEXANDER, PMHCNS_BS  [facility]   04/20/2017 02:15 PM Piedad Pizano, MS, ALEXANDER, PMHCNS_BS  [facility]     Signatures   Electronically signed by : OLGA Dia ; Jan 26 2017 10:34AM EST (Author)    Electronically signed by : OLGA Shay ; Jan 26 2017 10:35AM EST                       (Author)

## 2018-01-19 ENCOUNTER — ALLSCRIPTS OFFICE VISIT (OUTPATIENT)
Dept: OTHER | Facility: OTHER | Age: 38
End: 2018-01-19

## 2018-01-19 DIAGNOSIS — Z79.899 OTHER LONG TERM (CURRENT) DRUG THERAPY: ICD-10-CM

## 2018-01-23 NOTE — PSYCH
Psych Med Mgmt    Appearance: was calm and cooperative, adequate hygiene and grooming and good eye contact  Observed mood: irritable and anxious  Observed mood: affect was constricted  Speech: a normal rate and fluent  Thought processes: coherent/organized  Hallucinations: no hallucinations present  Thought Content: no delusions  Abnormal Thoughts: The patient has no suicidal thoughts and no homicidal thoughts  Orientation: The patient is oriented to person, place and time, oriented to person, oriented to place and oriented to time  Recent and Remote Memory: short term memory intact and long term memory intact  Attention Span And Concentration: concentration intact  Insight: Limited insight  Judgment: His judgment was limited  Muscle Strength And Tone  Muscle strength and tone were normal    The patient is experiencing no localized pain  Goals addressed in session: Medication Management       Treatment Recommendations: Review current medications  Risks, Benefits And Possible Side Effects Of Medications: Risks, benefits, and possible side effects of medications explained to patient and patient verbalizes understanding  The patient has been filling controlled prescriptions on time as prescribed to [facility] program       He reports normal appetite, normal energy level, no weight change and decrease in number of sleep hours   Patient stated he has been feeling irritable and he noticed that he has explosive anger that sometimes is triggered by memories from past   He sometimes he gets intrusive thoughts sometimes when angry and he violent ideas crossed his mind but so far he has been able to manage this  He stated that he last saw his counselor last August  his last appointment got reschedule and he will be seeing his counselor next month     He stated he identified that he has problems with authority, he becomes very anxious around crowds and in places like the mall  He feels it is very disabling because often he doesn't get to do the things that he needs to do  He is willing to work in counseling to better manage his anger  Assessment    1  Bipolar I disorder, most recent episode mixed, severe without psychotic features   (296 63) (F31 63)   2  Anxiety (300 00) (F41 9)   3  Encounter for preventive health examination (V70 0) (Z00 00)   4  RADHA (generalized anxiety disorder) (300 02) (F41 1)   5  Intermittent explosive disorder (312 34) (F63 81)   6  Post traumatic stress disorder (309 81) (F43 10)   7  Panic disorder with agoraphobia (300 21) (F40 01)    Plan    1  ClonazePAM 1 MG Oral Tablet (KlonoPIN); TAKE 1 TABLET 3 TIMES DAILY AS   NEEDED    2  OLANZapine 10 MG Oral Tablet; TAKE 1 TABLET AT BEDTIME    3  CarBAMazepine  MG Oral Tablet Extended Release 12 Hour   (TEGretol-XR); take 1 tablet daily at bedtime    4  (1) CBC/PLT/DIFF; Status:Active; Requested TJB:26ZOT7823;    5  (1) COMPREHENSIVE METABOLIC PANEL; Status:Active; Requested OCN:94PLM1786;    6  (1) LIPID PANEL, FASTING; Status:Active; Requested RBR:76JBY4694;    7  (1) T4, FREE; Status:Active; Requested SJV:72DCT9944;    8  (1) TEGRETOL(CARBAMAZEPINE); Status:Active; Requested HYX:29YOX2381;    9  (1) TSH; Status:Active; Requested OAK:98HNU0195; Review of Systems    Constitutional: No fever, no chills, feels well, no tiredness, no recent weight gain or loss  Cardiovascular: no complaints of slow or fast heart rate, no chest pain, no palpitations  Respiratory: no complaints of shortness of breath, no wheezing, no dyspnea on exertion  Gastrointestinal: no complaints of abdominal pain, no constipation, no nausea, no diarrhea, no vomiting  Genitourinary: no complaints of dysuria, no incontinence, no pelvic pain, no urinary frequency  Musculoskeletal: no complaints of arthralgia, no myalgias, no limb pain, no joint stiffness     Integumentary: no complaints of skin rash, no itching, no dry skin  Neurological: no complaints of headache, no confusion, no numbness, no dizziness  Substance Abuse Hx    Substance Abuse History: Denies  Active Problems    1  Anxiety (300 00) (F41 9)   2  Bipolar I disorder, most recent episode mixed, severe without psychotic features   (296 63) (F31 63)   3  RADHA (generalized anxiety disorder) (300 02) (F41 1)   4  Intermittent explosive disorder (312 34) (F63 81)   5  Panic disorder with agoraphobia (300 21) (F40 01)   6  Post traumatic stress disorder (309 81) (F43 10)    Past Medical History    1  Denied: History of Head trauma   2  History of alcohol abuse (305 03) (Z87 898)   3  History of Crohn's disease (V12 70) (Z87 19)   4  History of Panic disorder without agoraphobia (300 01) (F41 0)   5  Denied: History of Seizure    The active problems and past medical history were reviewed and updated today  Allergies    1  Penicillins   2  Remicade SOLR    Current Meds   1  CarBAMazepine  MG Oral Tablet Extended Release 12 Hour; take 1 tablet daily at   bedtime; Therapy: 75SSV2434 to (Evaluate:88Ciq7705)  Requested for: 42Bit2891; Last   Rx:65Zux6784 Ordered   2  ClonazePAM 1 MG Oral Tablet; TAKE 1 TABLET 3 TIMES DAILY AS NEEDED; Therapy: 49JCZ3149 to (Evaluate:19Mar2018); Last Rx:[id number] Ordered   3  OLANZapine 10 MG Oral Tablet; TAKE 1 TABLET AT BEDTIME; Therapy: 47PFV1647 to (Evaluate:82Kob4441)  Requested for: 59EXS3509; Last   Rx:93Xfc7276 Ordered    The medication list was reviewed and updated today  Family Psych History  Mother    1  No pertinent family history  Father    2  Family history of alcohol abuse (V61 41) (Z81 1)   3  Family history of paranoid schizophrenia (V17 0) (Z81 8)    The family history was reviewed and updated today         Social History    · Current Some Day Smoker (305 1)   · Disabled   · Education Level: Some college   · Home   · Occasional caffeine consumption   · Occupational   · Single  The social history was reviewed and updated today  The social history was reviewed and is unchanged  End of Encounter Meds    1  ClonazePAM 1 MG Oral Tablet (KlonoPIN); TAKE 1 TABLET 3 TIMES DAILY AS NEEDED; Therapy: 86IHA3782 to (Evaluate:19Mar2018); Last Rx:70Hua3917 Ordered    2  OLANZapine 10 MG Oral Tablet; TAKE 1 TABLET AT BEDTIME; Therapy: 15EVR7578 to (Susan Enriquez)  Requested for: 05JHM8953; Last   Rx:19Jan2018 Ordered    3  CarBAMazepine  MG Oral Tablet Extended Release 12 Hour (TEGretol-XR); take 1   tablet daily at bedtime; Therapy: 90LHN2736 to (Evaluate:19Apr2018)  Requested for: 97KPZ8283;  Last   Rx:19Jan2018 Ordered    Future Appointments    Date/Time Provider Specialty Site   02/20/2018 11:00 AM Bernell Felty, [facility] Psychiatry [facility]     Signatures   Electronically signed by : OLGA Shay ; Jan 19 2018 10:52AM EST                       (Author)

## 2018-02-07 ENCOUNTER — HOSPITAL ENCOUNTER (EMERGENCY)
Facility: HOSPITAL | Age: 38
Discharge: HOME | End: 2018-02-07
Attending: EMERGENCY MEDICINE
Payer: MEDICARE

## 2018-02-07 ENCOUNTER — APPOINTMENT (EMERGENCY)
Dept: RADIOLOGY | Facility: HOSPITAL | Age: 38
End: 2018-02-07
Payer: MEDICARE

## 2018-02-07 VITALS
BODY MASS INDEX: 30.73 KG/M2 | TEMPERATURE: 99 F | HEIGHT: 64 IN | RESPIRATION RATE: 18 BRPM | SYSTOLIC BLOOD PRESSURE: 166 MMHG | HEART RATE: 100 BPM | DIASTOLIC BLOOD PRESSURE: 90 MMHG | OXYGEN SATURATION: 97 % | WEIGHT: 180 LBS

## 2018-02-07 DIAGNOSIS — J06.9 UPPER RESPIRATORY INFECTION: Primary | ICD-10-CM

## 2018-02-07 PROCEDURE — 71046 X-RAY EXAM CHEST 2 VIEWS: CPT

## 2018-02-07 PROCEDURE — 99283 EMERGENCY DEPT VISIT LOW MDM: CPT

## 2018-02-07 NOTE — ED PROVIDER NOTES
History  Chief Complaint   Patient presents with    Cough     pt with cough and URI symptoms for 24hr  37 2 day hx productive cough  Otherwise well  No ha  Does have congestion  Non smoker  No wheezing on exam     Imp: uri, cxr r/o pna  Cough   Cough characteristics:  Productive  Sputum characteristics:  Nondescript  Severity:  Mild  Onset quality:  Sudden  Timing:  Constant  Progression:  Waxing and waning  Chronicity:  New  Context: upper respiratory infection    Context: not animal exposure, not exposure to allergens and not fumes    Relieved by:  Nothing  Worsened by:  Nothing  Ineffective treatments:  Decongestant and cough suppressants  Associated symptoms: sinus congestion    Associated symptoms: no chest pain, no chills, no diaphoresis, no ear fullness, no ear pain, no eye discharge, no fever, no headaches, no myalgias, no rash, no rhinorrhea, no shortness of breath, no sore throat, no weight loss and no wheezing        Prior to Admission Medications   Prescriptions Last Dose Informant Patient Reported? Taking? OLANZapine (ZyPREXA) 10 mg tablet   Yes Yes   Sig: Take by mouth   carBAMazepine (TEGretol XR) 400 mg 12 hr tablet   Yes Yes   Sig: Take by mouth   clonazePAM (KlonoPIN) 1 mg tablet   Yes Yes   Sig: Take by mouth      Facility-Administered Medications: None       Past Medical History:   Diagnosis Date    Crohn's colitis ([facility] [address] )     Psychiatric disorder        History reviewed  No pertinent surgical history  History reviewed  No pertinent family history  I have reviewed and agree with the history as documented  Social History   Substance Use Topics    Smoking status: Unknown If Ever Smoked    Smokeless tobacco: Not on file    Alcohol use Not on file        Review of Systems   Constitutional: Negative for chills, diaphoresis, fever and weight loss  HENT: Negative for ear pain, rhinorrhea and sore throat  Eyes: Negative for discharge  Respiratory: Positive for cough  Negative for shortness of breath and wheezing  Cardiovascular: Negative for chest pain  Musculoskeletal: Negative for myalgias  Skin: Negative for rash  Neurological: Negative for headaches  Physical Exam  ED Triage Vitals [02/07/18 0722]   Temperature Pulse Respirations Blood Pressure SpO2   99 °F (37 2 °C) 100 18 166/90 97 %      Temp Source Heart Rate Source Patient Position - Orthostatic VS BP Location FiO2 (%)   Oral -- -- -- --      Pain Score       5           Orthostatic Vital Signs  Vitals:    02/07/18 0722   BP: 166/90   Pulse: 100       Physical Exam   Constitutional: He is oriented to person, place, and time  He appears well-developed  HENT:   Head: Normocephalic and atraumatic  Right Ear: External ear normal    Left Ear: External ear normal    Mouth/Throat: Oropharynx is clear and moist    Eyes: Conjunctivae and EOM are normal  Pupils are equal, round, and reactive to light  Neck: Normal range of motion  Neck supple  No JVD present  No thyromegaly present  Cardiovascular: Normal rate, regular rhythm and normal heart sounds  Exam reveals no gallop and no friction rub  No murmur heard  Pulmonary/Chest: Effort normal and breath sounds normal  No respiratory distress  He has no wheezes  He has no rales  Abdominal: Soft  Bowel sounds are normal  He exhibits no distension  There is no rebound and no guarding  Musculoskeletal: Normal range of motion  He exhibits no edema  Lymphadenopathy:     He has no cervical adenopathy  Neurological: He is alert and oriented to person, place, and time  No cranial nerve deficit  Skin: Skin is warm  Psychiatric: He has a normal mood and affect  His behavior is normal    Nursing note and vitals reviewed  ED Medications  Medications - No data to display    Diagnostic Studies  Results Reviewed     None                 XR chest 2 views   Final Result by ESHA Maria MD ([id number])   IMPRESSION :      No active pulmonary disease   [ ]      Workstation performed: [id number]               Procedures  Procedures      Phone Consults  ED Phone Contact    ED Course  ED Course                                MDM  Number of Diagnoses or Management Options  Upper respiratory infection: new and requires workup     Amount and/or Complexity of Data Reviewed  Tests in the radiology section of CPT®: reviewed and ordered  Independent visualization of images, tracings, or specimens: yes      CritCare Time    Disposition  Final diagnoses:   None     ED Disposition     None      Follow-up Information    None       Patient's Medications   Discharge Prescriptions    No medications on file     No discharge procedures on file  ED Provider  Attending physically available and evaluated Mackenzie Greene I managed the patient along with the ED Attending      Electronically Signed by         Yan Terrazas DO  02/07/18 7949

## 2018-02-07 NOTE — ED ATTENDING ATTESTATION
Emmy Baez MD, saw and evaluated the patient  I have discussed the patient with the resident/non-physician practitioner and agree with the resident's/non-physician practitioner's findings, Plan of Care, and MDM as documented in the resident's/non-physician practitioner's note, except where noted  All available labs and Radiology studies were reviewed  At this point I agree with the current assessment done in the Emergency Department    I have conducted an independent evaluation of this patient a history and physical is as follows:  Patient presents with cough congestion runny nose and sore throat he has no fever chills no body aches no headache no chest pain no abdominal pain and no vomiting or diarrhea  Exam:  The patient is in no acute distress HEENT nasal congestion pharyngeal erythema without exudates uvula midline neck supple lungs few expiratory wheezing no rales heart regular no murmurs abdomen soft nontender extremities nontender  Impression:  Upper respiratory infection with mild bronchospasm  Plan:  Symptomatic treatment beta 2 agonists chest x-ray    Critical Care Time  CritCare Time    Procedures

## 2018-02-07 NOTE — DISCHARGE INSTRUCTIONS
Acute Bronchitis   WHAT YOU NEED TO KNOW:   Acute bronchitis is swelling and irritation in the air passages of your lungs  This irritation may cause you to cough or have other breathing problems  Acute bronchitis often starts because of another illness, such as a cold or the flu  The illness spreads from your nose and throat to your windpipe and airways  Bronchitis is often called a chest cold  Acute bronchitis lasts about 3 to 6 weeks and is usually not a serious illness  Your cough can last for several weeks  DISCHARGE INSTRUCTIONS:   Return to the emergency department if:   · You cough up blood  · Your lips or fingernails turn blue  · You feel like you are not getting enough air when you breathe  Contact your healthcare provider if:   · You have a fever  · Your breathing problems do not go away or get worse  · Your cough does not get better within 4 weeks  · You have questions or concerns about your condition or care  Self-care:   · Get more rest   Rest helps your body to heal  Slowly start to do more each day  Rest when you feel it is needed  · Avoid irritants in the air  Avoid chemicals, fumes, and dust  Wear a face mask if you must work around dust or fumes  Stay inside on days when air pollution levels are high  If you have allergies, stay inside when pollen counts are high  Do not use aerosol products, such as spray-on deodorant, bug spray, and hair spray  · Do not smoke or be around others who smoke  Nicotine and other chemicals in cigarettes and cigars damages the cilia that move mucus out of your lungs  Ask your healthcare provider for information if you currently smoke and need help to quit  E-cigarettes or smokeless tobacco still contain nicotine  Talk to your healthcare provider before you use these products  · Drink liquids as directed  Liquids help keep your air passages moist and help you cough up mucus   You may need to drink more liquids when you have acute bronchitis  Ask how much liquid to drink each day and which liquids are best for you  · Use a humidifier or vaporizer  Use a cool mist humidifier or a vaporizer to increase air moisture in your home  This may make it easier for you to breathe and help decrease your cough  Decrease risk for acute bronchitis:   · Get the vaccinations you need  Ask your healthcare provider if you should get vaccinated against the flu or pneumonia  · Prevent the spread of germs  You can decrease your risk of acute bronchitis and other illnesses by doing the following:     [facility] [place] [facility] your hands often with soap and water  Carry germ-killing hand lotion or gel with you  You can use the lotion or gel to clean your hands when soap and water are not available  ¨ Do not touch your eyes, nose, or mouth unless you have washed your hands first     ¨ Always cover your mouth when you cough to prevent the spread of germs  It is best to cough into a tissue or your shirt sleeve instead of into your hand  Ask those around you cover their mouths when they cough  ¨ Try to avoid people who have a cold or the flu  If you are sick, stay away from others as much as possible  Medicines: Your healthcare provider may  give you any of the following:  · Ibuprofen or acetaminophen  are medicines that help lower your fever  They are available without a doctor's order  Ask your healthcare provider which medicine is right for you  Ask how much to take and how often to take it  Follow directions  These medicines can cause stomach bleeding if not taken correctly  Ibuprofen can cause kidney damage  Do not take ibuprofen if you have kidney disease, an ulcer, or allergies to aspirin  Acetaminophen can cause liver damage  Do not take more than 4,000 milligrams in 24 hours  · Decongestants  help loosen mucus in your lungs and make it easier to cough up  This can help you breathe easier  · Cough suppressants  decrease your urge to cough   If your cough produces mucus, do not take a cough suppressant unless your healthcare provider tells you to  Your healthcare provider may suggest that you take a cough suppressant at night so you can rest     · Inhalers  may be given  Your healthcare provider may give you one or more inhalers to help you breathe easier and cough less  An inhaler gives your medicine to open your airways  Ask your healthcare provider to show you how to use your inhaler correctly  · Take your medicine as directed  Contact your healthcare provider if you think your medicine is not helping or if you have side effects  Tell him of her if you are allergic to any medicine  Keep a list of the medicines, vitamins, and herbs you take  Include the amounts, and when and why you take them  Bring the list or the pill bottles to follow-up visits  Carry your medicine list with you in case of an emergency  Follow up with your healthcare provider as directed:  Write down questions you have so you will remember to ask them during your follow-up visits  © 2017 [address] Information is for End User's use only and may not be sold, redistributed or otherwise used for commercial purposes  All illustrations and images included in CareNotes® are the copyrighted property of [employer]  or Arsalan Jacobsen  The above information is an  only  It is not intended as medical advice for individual conditions or treatments  Talk to your doctor, nurse or pharmacist before following any medical regimen to see if it is safe and effective for you

## 2018-02-20 ENCOUNTER — OFFICE VISIT (OUTPATIENT)
Dept: BEHAVIORAL/MENTAL HEALTH CLINIC | Facility: CLINIC | Age: 38
End: 2018-02-20
Payer: MEDICARE

## 2018-02-20 DIAGNOSIS — F31.63 BIPOLAR I DISORDER, MOST RECENT EPISODE MIXED, SEVERE WITHOUT PSYCHOTIC FEATURES (HCC): Primary | ICD-10-CM

## 2018-02-20 DIAGNOSIS — F63.81 INTERMITTENT EXPLOSIVE DISORDER: ICD-10-CM

## 2018-02-20 DIAGNOSIS — F40.01 PANIC DISORDER WITH AGORAPHOBIA: ICD-10-CM

## 2018-02-20 DIAGNOSIS — F43.10 POST TRAUMATIC STRESS DISORDER: ICD-10-CM

## 2018-02-20 DIAGNOSIS — F41.9 ANXIETY: ICD-10-CM

## 2018-02-20 PROCEDURE — 90834 PSYTX W PT 45 MINUTES: CPT | Performed by: SOCIAL WORKER

## 2018-02-20 NOTE — PSYCH
Psychotherapy Provided: Individual Psychotherapy 45 minutes     Length of time in session: 45 minutes, follow up in 2 week    Goals addressed in session: N/A    Pain:      none    0    Current suicide risk : Low     D:  MSW met with Yaritza Callahan for session  He has not been seen for a few months  Reviewed history  Two themes came out of session today to address on [address] plan anger issues and anxiety/panic attacks  He is going to start keeping a journal and we will review it next session  He continues to wait for his hearing for social security and he continues to reside at his parents house  He had been sick with a virus  He took over the counter meds and stopped taking the rest of his meds because of fear with mixing them  Discussed do not do this  If he is unsure of what to take regarding his meds then, speak to a pharmacist   Discussed a relationship he where she wanted to return to using drugs   "he called it off " A:  He appears to be putting his recovery first   P:  To develop his tx plan next session  Behavioral Health Treatment Plan [facility]: Diagnosis and Treatment Plan explained to Forrest Horton relates understanding diagnosis and is agreeable to Treatment Plan   Yes

## 2018-03-07 NOTE — PSYCH
History of Present Illness    Presenting Problems: Stressors: PATIENT HAS DEPRESSION AND HAVING HARD TIME DOING DAILY ROUTINE THINGS PATIENT HAS BEEN ISOLATION FEELS HOPELESS AND WISHES HE COULD BE SOME WHERE ELES   Referral Source: Danilo Zaidi  He is not employed  He is not a smoker  Symptoms: suicidal ideation, plan: NO PLAN, no self abusive behaviors, no homicidal thoughts, no history of violence toward others, depressed mood, anxiety, no psychosis, no medication noncompliance, sleep disturbances, change in appetite, agitation, hypomania and MOOD SWINGS POOR CONCENTRATION AND CONFUSUION  Provisional Diagnosis: Axis I: BIPOLAR I RADHA PTSD PANIC DISORDER  Substance Abuse: No substance abuse , alcohol, THC, STOPPED 2012  Psychiatric Treatment History: 11/02/2015 WITH Danilo Zaidi, Current Psychiatrist: DR Jasson Claire and Therapist: Danilo Zaidi   The patient does not require ambulatory assistance  Legal Issues: The patient does not have legal issues  Action: Record received and Laboratory work received  ACCEPTED  Appointment Date: 02/03/2017        Signatures   Electronically signed by : Kassie Simms, ; Jan 30 2017 10:19AM EST                       (Author)

## 2018-03-07 NOTE — PSYCH
Message  Patient No Show Letter - Behavioral Health:     Date: 10/04/2016     Dear Janes Hidalgo,     We missed seeing you for a scheduled appointment at [facility] on 10-3-16 at 1:20pm with Dr Debby Salazar  Our goal is to offer the best possible care to our patients, so we are concerned when you are unable to keep a scheduled appointment  Please call us at [phone] so that we can reschedule the appointment for a date and time that will work for you  We understand that circumstances may arise which make it impossible for you to keep a scheduled appointment  Should this happen in the future, please call us as soon as you know the appointment will be missed  The earlier you let us know, the more likely we can offer your scheduled appointment time to another patient  We hope to hear from you soon       Sincerely,   Dr Flavia Butler   Electronically signed by : OLGA Dennis ; Oct  4 2016  9:30AM EST                       (Author)

## 2018-03-07 NOTE — PSYCH
Message  Patient No Show Letter - Behavioral Health:     Date: 10/10/2016     Dear Harman Mahmood,     We missed seeing you for a scheduled appointment at [facility] on 10-6-16 at 4pm with Jacquelyn Foote  Our goal is to offer the best possible care to our patients, so we are concerned when you are unable to keep a scheduled appointment  Please call us at [phone] so that we can reschedule the appointment for a date and time that will work for you  We understand that circumstances may arise which make it impossible for you to keep a scheduled appointment  Should this happen in the future, please call us as soon as you know the appointment will be missed  The earlier you let us know, the more likely we can offer your scheduled appointment time to another patient  We hope to hear from you soon       Sincerely,   Jacquelyn Foote      Signatures   Electronically signed by : Conner Parra LCSW; Oct 10 2016  2:37PM EST                       (Author)

## 2018-04-15 DIAGNOSIS — F31.9 BIPOLAR 1 DISORDER (HCC): Primary | ICD-10-CM

## 2018-04-16 RX ORDER — OLANZAPINE 10 MG/1
TABLET ORAL
Qty: 30 TABLET | Refills: 2 | Status: SHIPPED | OUTPATIENT
Start: 2018-04-16 | End: 2018-04-30 | Stop reason: SDUPTHER

## 2018-04-30 ENCOUNTER — OFFICE VISIT (OUTPATIENT)
Dept: PSYCHIATRY | Facility: CLINIC | Age: 38
End: 2018-04-30
Payer: MEDICARE

## 2018-04-30 DIAGNOSIS — F43.10 POSTTRAUMATIC STRESS DISORDER: ICD-10-CM

## 2018-04-30 DIAGNOSIS — F40.01 PANIC DISORDER WITH AGORAPHOBIA: ICD-10-CM

## 2018-04-30 DIAGNOSIS — F63.81 INTERMITTENT EXPLOSIVE DISORDER: ICD-10-CM

## 2018-04-30 DIAGNOSIS — F31.64 BIPOLAR DISORDER, CURR EPISODE MIXED, SEVERE, WITH PSYCHOTIC FEATURES (HCC): Primary | ICD-10-CM

## 2018-04-30 DIAGNOSIS — F41.1 GAD (GENERALIZED ANXIETY DISORDER): ICD-10-CM

## 2018-04-30 DIAGNOSIS — F31.9 BIPOLAR 1 DISORDER (HCC): ICD-10-CM

## 2018-04-30 PROBLEM — F41.0 PANIC DISORDER WITHOUT AGORAPHOBIA: Status: ACTIVE | Noted: 2017-01-19

## 2018-04-30 PROBLEM — F43.21 COMPLICATED GRIEF: Status: ACTIVE | Noted: 2017-07-18

## 2018-04-30 PROCEDURE — 99213 OFFICE O/P EST LOW 20 MIN: CPT | Performed by: PSYCHIATRY & NEUROLOGY

## 2018-04-30 RX ORDER — CLONAZEPAM 1 MG/1
1 TABLET ORAL 3 TIMES DAILY PRN
Qty: 90 TABLET | Refills: 2 | Status: SHIPPED | OUTPATIENT
Start: 2018-04-30 | End: 2018-04-30 | Stop reason: ALTCHOICE

## 2018-04-30 RX ORDER — CLONAZEPAM 1 MG/1
1 TABLET ORAL 3 TIMES DAILY PRN
COMMUNITY
Start: 2013-05-02 | End: 2018-04-30 | Stop reason: SDUPTHER

## 2018-04-30 RX ORDER — CARBAMAZEPINE 400 MG/1
1 TABLET, EXTENDED RELEASE ORAL 2 TIMES DAILY
COMMUNITY
Start: 2017-08-15 | End: 2018-04-30 | Stop reason: SDUPTHER

## 2018-04-30 RX ORDER — CLONAZEPAM 1 MG/1
TABLET ORAL
Qty: 60 TABLET | Refills: 0 | Status: CANCELLED | OUTPATIENT
Start: 2018-04-30

## 2018-04-30 RX ORDER — CARBAMAZEPINE 400 MG/1
TABLET, EXTENDED RELEASE ORAL
Refills: 0 | Status: CANCELLED | OUTPATIENT
Start: 2018-04-30

## 2018-04-30 RX ORDER — OLANZAPINE 10 MG/1
10 TABLET ORAL
Qty: 30 TABLET | Refills: 2 | Status: SHIPPED | OUTPATIENT
Start: 2018-04-30 | End: 2018-08-01 | Stop reason: SDUPTHER

## 2018-04-30 RX ORDER — ALPRAZOLAM 1 MG/1
1 TABLET ORAL 3 TIMES DAILY PRN
Qty: 90 TABLET | Refills: 2 | Status: SHIPPED | OUTPATIENT
Start: 2018-04-30 | End: 2018-07-24 | Stop reason: ALTCHOICE

## 2018-04-30 RX ORDER — CARBAMAZEPINE 400 MG/1
400 TABLET, EXTENDED RELEASE ORAL 2 TIMES DAILY
Qty: 60 TABLET | Refills: 2 | Status: SHIPPED | OUTPATIENT
Start: 2018-04-30 | End: 2018-05-23 | Stop reason: SDUPTHER

## 2018-04-30 NOTE — PSYCH
Subjective: Medication Management      Patient ID: Clara Mayo is a 40 y o  male  HPI ROS Appetite Changes and Sleep: normal appetite, decreased energy, no weight change and normal number of sleep hours   Patient stated stated he had panic panic attack while out at a restaurant and twice at a [employer] and his fear of going out has become overwhelming that he will not leave the house to go get groceries or go out to eat  He stated that he can tell that he is going to get a panic attack because he starts breathing fast and feels chest pressure and sweats and he can feels the anxiety building up  Review Of Systems:     Mood Anxiety, Depression and Emotional Lability   Behavior Impulsive Behavior   Thought Content Disturbing Thoughts, Feelings and Unreasonalbe or Irrational Fears   General Relationship Problems, Emotional Problems, Sleep Disturbances and Decreased Functioning   Personality Normal   Other Psych Symptoms Normal   Constitutional Negative   ENT Negative   Cardiovascular Negative   Respiratory Negative   Gastrointestinal Negative   Genitourinary Negative   Musculoskeletal Negative   Integumentary Negative   Neurological Negative   Endocrine Normal    Other Symptoms Normal              Laboratory Results: No results found for this or any previous visit  Substance Abuse History:  History   Drug use: Unknown       Family Psychiatric History: No family history on file  The following portions of the patient's history were reviewed and updated as appropriate: allergies, current medications, past family history, past medical history, past social history, past surgical history and problem list     Social History     Social History    Marital status: Single     Spouse name: N/A    Number of children: N/A    Years of education: N/A     Occupational History    Not on file       Social History Main Topics    Smoking status: Unknown If Ever Smoked    Smokeless tobacco: Not on file    Alcohol use Not on file    Drug use: Unknown    Sexual activity: Not on file     Other Topics Concern    Not on file     Social History Narrative    No narrative on file     Social History     Social History Narrative    No narrative on file       Objective:       Mental status:  Appearance calm and cooperative , adequate hygiene and grooming and good eye contact    Mood depressed and anxious   Affect affect was constricted   Speech a normal rate   Thought Processes coherent/organized and normal thought processes   Hallucinations no hallucinations present    Thought Content no delusions   Abnormal Thoughts no suicidal thoughts  and no homicidal thoughts    Orientation  oriented to person and place and time   Remote Memory short term memory intact and long term memory intact   Attention Span concentration impaired   Intellect Appears to be of Average Intelligence   Insight Limited insight   Judgement judgment was limited   Muscle Strength Muscle strength and tone were normal and Normal gait    Language no difficulty naming common objects, no difficulty repeating a phrase  and no difficulty writing a sentence    Fund of Knowledge displays adequate knowledge of current events, adequate fund of knowledge regarding past history and adequate fund of knowledge regarding vocabulary    Pain none   Pain Scale 0       Assessment/Plan:       Diagnoses and all orders for this visit:    Bipolar disorder, curr episode mixed, severe, with psychotic features ([facility] [address] )    Bipolar 1 disorder (HCC)  -     carBAMazepine (TEGretol XR) 400 mg 12 hr tablet; Take 1 tablet (400 mg total) by mouth 2 (two) times a day  -     clonazePAM (KlonoPIN) 1 mg tablet; Take 1 tablet (1 mg total) by mouth 3 (three) times a day as needed for anxiety  -     OLANZapine (ZyPREXA) 10 mg tablet;  Take 1 tablet (10 mg total) by mouth daily at bedtime    Posttraumatic stress disorder    Intermittent explosive disorder    Panic disorder with agoraphobia    RADHA (generalized anxiety disorder)    Other orders  -        Treatment Recommendations- Risks Benefits      Immediate Medical/Psychiatric/Psychotherapy Treatments and Any Precautions: continue current medications    Risks, Benefits And Possible Side Effects Of Medications:  {PSYCH RISK, BENEFITS AND POSSIBLE SIDE EFFECTS (Optional):54664    Controlled Medication Discussion: Discussed with patient Black Box warning on concurrent use of benzodiazepines and opioid medications including sedation, respiratory depression, coma and death  Patient understands the risk of treatment with benzodiazepines in addition to opioids and wants to continue taking those medications  , Discussed with patient the risks of sedation, respiratory depression, impairment of ability to drive and potential for abuse and addiction related to treatment with benzodiazepine medications  The patient understands risk of treatment with benzodiazepine medications, agrees to not drive if feels impaired and agrees to take medications as prescribed   and The patient has been filling controlled prescriptions on time as prescribed to [facility]  program

## 2018-05-09 ENCOUNTER — DOCUMENTATION (OUTPATIENT)
Dept: PSYCHIATRY | Facility: CLINIC | Age: 38
End: 2018-05-09

## 2018-05-09 NOTE — PROGRESS NOTES
Nu Pro came to the office late morning wanting to talk to someone about a new medication he had started  He related switching to alprazolam at last visit with Dr Melony Priest since it worked faster and he had been having panic attacks  Nu Pro said it's not effective and gave the following information:  At times he feels dizzy, brief episodes of blurred vision when he's trying to focus, having vivid dreams and wakes up feeling anxious  He said he hadn't taken his meds last night, he felt so out of sorts  I told him he should take the other meds as prescribed and I would ask Dr Melony Priest to review information  He wanted the thank Dr Melony Priest for trying a different medication, but he's asking if he can go back to taking clonazepam as he was before  He does have clonazepam at home      Best number to call - #[phone]

## 2018-05-09 NOTE — PROGRESS NOTES
I spoke with Shu Butler and reviewed approval of Dr Conner Sy to return to taking clonazepam  He said he is glad to go back to clonazepam and will not take alprazolam

## 2018-05-23 DIAGNOSIS — F31.9 BIPOLAR 1 DISORDER (HCC): ICD-10-CM

## 2018-05-23 RX ORDER — CARBAMAZEPINE 400 MG/1
TABLET, EXTENDED RELEASE ORAL
Qty: 30 TABLET | Refills: 2 | Status: SHIPPED | OUTPATIENT
Start: 2018-05-23 | End: 2018-08-01 | Stop reason: SDUPTHER

## 2018-05-31 ENCOUNTER — OFFICE VISIT (OUTPATIENT)
Dept: BEHAVIORAL/MENTAL HEALTH CLINIC | Facility: CLINIC | Age: 38
End: 2018-05-31
Payer: MEDICARE

## 2018-05-31 DIAGNOSIS — F63.81 INTERMITTENT EXPLOSIVE DISORDER: ICD-10-CM

## 2018-05-31 DIAGNOSIS — F43.10 POSTTRAUMATIC STRESS DISORDER: ICD-10-CM

## 2018-05-31 DIAGNOSIS — F40.01 PANIC DISORDER WITH AGORAPHOBIA: ICD-10-CM

## 2018-05-31 DIAGNOSIS — F31.64 BIPOLAR DISORDER, CURR EPISODE MIXED, SEVERE, WITH PSYCHOTIC FEATURES (HCC): Primary | ICD-10-CM

## 2018-05-31 PROCEDURE — 90834 PSYTX W PT 45 MINUTES: CPT | Performed by: SOCIAL WORKER

## 2018-05-31 NOTE — PSYCH
Psychotherapy Provided: Individual Psychotherapy 45 minutes     Length of time in session: 45 minutes, follow up in 2 week    Goals addressed in session: N/A    Pain:      none    0    Current suicide risk : Low     D:  MSW met with Cam Brown for session  Reviewed history  He found a new  to represent him for his appeal for SS but his old /his ex girlfriend will not "sign off of the rights" therefore no  will take his case  He has/is leaving her messages  Discussed his options  He moved out of his parents house a month ago and back into his friend's house  His plans are to get his own place once he gets disability  He has been having panic attacks when he goes out so he has been staying home  Developed tx plan  A:  He appeared to look healthier than he has in the past few sessions  P:  To begin addressing new tx plan goals  Behavioral Health Treatment Plan [facility]: Diagnosis and Treatment Plan explained to Valentin Martin relates understanding diagnosis and is agreeable to Treatment Plan   Yes

## 2018-05-31 NOTE — PSYCH
Sandy Mane  1980       Date of Initial Treatment Plan:   Date of Current Treatment Plan: 05/31/18    Treatment Plan Number     Strengths/Personal Resources for Self Care:     Diagnosis:   No diagnosis found  Area of Needs:  I stopped going to places due to fear of having a panic attack  Long Term Goal 1: I want to be able to get out and to go to places  Target Date:  9/18  Completion Date:NA          Short Term Objectives for Goal 1: I will take my meds  I will go with someone out to a place  I will go places that are less anxiety provoking, like going to the park for a walk  I will go into a small public like gas station on hours that it would not be busy  I will apply coping skills, such as deep breathing techs, mediation, putting cold water on your face, listening to music  GOAL 1: Modality: Individual 2x per month   Completion Date NA        Behavioral Health Treatment Plan [facility]: Diagnosis and Treatment Plan explained to David Cam relates understanding diagnosis and is agreeable to Treatment Plan         Client Comments : Please share your thoughts, feelings, need and/or experiences regarding your treatment plan:       __________________________________________________________________    __________________________________________________________________    __________________________________________________________________    __________________________________________________________________    _______________________________________                Patient signature, Date Time: __________________________________________             Physician cosigner signature, Date, Time: ________________________________

## 2018-06-12 ENCOUNTER — OFFICE VISIT (OUTPATIENT)
Dept: BEHAVIORAL/MENTAL HEALTH CLINIC | Facility: CLINIC | Age: 38
End: 2018-06-12
Payer: MEDICARE

## 2018-06-12 DIAGNOSIS — F40.01 PANIC DISORDER WITH AGORAPHOBIA: ICD-10-CM

## 2018-06-12 DIAGNOSIS — F31.64 BIPOLAR DISORDER, CURR EPISODE MIXED, SEVERE, WITH PSYCHOTIC FEATURES (HCC): Primary | ICD-10-CM

## 2018-06-12 DIAGNOSIS — F41.9 ANXIETY: ICD-10-CM

## 2018-06-12 DIAGNOSIS — F43.10 POSTTRAUMATIC STRESS DISORDER: ICD-10-CM

## 2018-06-12 DIAGNOSIS — F63.81 INTERMITTENT EXPLOSIVE DISORDER: ICD-10-CM

## 2018-06-12 PROCEDURE — 90834 PSYTX W PT 45 MINUTES: CPT | Performed by: SOCIAL WORKER

## 2018-06-12 NOTE — PSYCH
Psychotherapy Provided: Individual Psychotherapy 45 minutes     Length of time in session: 45 minutes, follow up in 2 week    Goals addressed in session: N/A    Pain:      none    0    Current suicide risk : Low     D:  MSW met with Joel Merritt for session  He continues to have issues with her ex-girlfriend   She will not at his new 's or his request to respond to his   He is getting frustrated  Discussed his anger  Described how he has been feeling "manic "  It does not appear he is manic  What he described was anxious  He has not been getting out and going places  Discussed groups at [place] [id number]  He wants to attend the bipolar support group and the anger management group  A:  No progress on goals  P:  To continue to address tx plan goals  Behavioral Health Treatment Plan [facility]: Diagnosis and Treatment Plan explained to Nick Browne relates understanding diagnosis and is agreeable to Treatment Plan   Yes

## 2018-06-21 ENCOUNTER — OFFICE VISIT (OUTPATIENT)
Dept: BEHAVIORAL/MENTAL HEALTH CLINIC | Facility: CLINIC | Age: 38
End: 2018-06-21
Payer: MEDICARE

## 2018-06-21 DIAGNOSIS — F40.01 PANIC DISORDER WITH AGORAPHOBIA: ICD-10-CM

## 2018-06-21 DIAGNOSIS — F43.10 POSTTRAUMATIC STRESS DISORDER: ICD-10-CM

## 2018-06-21 DIAGNOSIS — F31.64 BIPOLAR DISORDER, CURR EPISODE MIXED, SEVERE, WITH PSYCHOTIC FEATURES (HCC): Primary | ICD-10-CM

## 2018-06-21 DIAGNOSIS — F41.1 GAD (GENERALIZED ANXIETY DISORDER): ICD-10-CM

## 2018-06-21 DIAGNOSIS — F41.9 ANXIETY: ICD-10-CM

## 2018-06-21 PROCEDURE — 90853 GROUP PSYCHOTHERAPY: CPT | Performed by: REGISTERED NURSE

## 2018-06-21 NOTE — Clinical Note
Thank you for referral, Lion Banda Zhang- Pt explained his paranoid ideation,and his panic and agoraphobia more---he may discuss the possibility of a prn anti-psychotic or hydroxyzine or other med with you  He says he had adverse effects on past Xanax  He did not have a panic attack during Bipolar Group, however  -MT

## 2018-06-21 NOTE — PSYCH
Bipolar Wellness Group    D: Pt  Grace Victor) was one of 3 pts  who participated in today's Bipolar Wellness Group  Topics explored today included: Introductions; Confidentiality; Each Group member's sharing of information re: their current stressors and their current Mood patterns; Tiona Sharing and Support among Peers; Some Psychoeducation and Support provided by this Therapist regarding their Medications and Desired Effects; Positive Coping Strategies and Support persons in their lives,which are helping Group Members to [place]; and the final segment of today's Group was comprised of Calm Deep-Breathing/ Relaxation/ Guided Imagery accompanied by mantras/music via CD  [place] participated actively in [address]  He described that he has been in more of a depressed Mood lately, instead of hypomania or pete  He also described severe Panic Attacks he experiences--such as when he recently decided to try to go to a "[employer]" store, it was "not crowded at all,", but he suddenly felt very hot, he had a "tight chest", palpitations,and he felt the need to "just get out of there!", so he left abruptly,without even picking out the music CD that he had wanted  He describes isolating himself, more and more---he is afraid that a panic attack will occur again in public  He had to talk himself into coming here today--->Peers gave him Welcome and Support,to continue coming to this Group the 1st and 3rd Thursday of each month, 2 pm  Peers gave him Validation, as each of them also has struggled with Panic Disorder  One peer gave him a suggestion re: trying small steps first,such as sitting outside on his stoop or porch each day,and deep-breathing outside/ taking- in fresh air and nature---> [place] says he will try this,instead of staying indoors all the time  He says he does talk to his roommate, an older gentleman,  and [place] also talks to his friend "Santiago Ty," and to his brothers,for Support   Psychoeducation was given to [place] re: the eventual efficacy of his meds, and that perhaps he could talk to his psychiatrist about a prn dose of an Anxiolytic or an Anti-Psychotic med  (He states that past Xanax gave him intense-anxiety side effects, so he avoids that,  and that he is already on a standing dose of Klonopin at bedtime ) He denies current Drug or alcohol abuse  He will do the slow Deep-Breathing, plus distraction-techniques , and positive self-talk, " I will get through this," to help get rid of a panic attack if it occurs  He also is going to try sitting on his stoop outside, in the sunshine,a few minutes a day, just breathing and taking in nature,as a"baby step" to getting out of his house each day  He had no SI/HI/AH/VH during Group  He does experience paranoid ideation and ideas of reference, intermittently, in public, (from his descriptions of other symptoms he has  ) He has financial stress as he waits for [address]  [facility] coverage---he has a 's Hearing in October  Georgette Ormond Georgette Ormond This is on his mind,also  Parth Contreras appeared more relaxed at the end of today's Group  He thanked Peers and Therapist for welcoming him in this Group  A: Bipolar Disorder, recently depressed, F31 32; Panic Disorder with Agoraphobia, F41 01; Hx of PTSD, F43 12;and Generalized Anxiety Disorder, F41 10;  Parth Contreras appears to benefit from Group  P:  Continue Treatment Plan, Meds, Individual Psychotherapy with [facility] Bipolar Psychotherapy Group

## 2018-06-21 NOTE — PATIENT INSTRUCTIONS
Panic Disorder   WHAT YOU NEED TO KNOW:   What is panic disorder? Panic disorder is a type of anxiety disorder that causes you to have sudden panic attacks  The panic attacks may occur anywhere at any time, even while you are asleep  You may begin to worry when the panic attacks will happen again  Your behavior may change, and you may not want to go out with family and friends  What is a panic attack? A panic attack is a period of strong fear or discomfort  You may feel as though something very bad is going to happen but do not know what it is  A panic attack occurs suddenly and usually lasts about 10 minutes  It may occur at any time and without warning  You may also have the following symptoms during a panic attack:  · Fast or pounding heartbeat    · Sweating, trembling, or shaking    · Shortness of breath or trouble breathing    · Feeling of choking or having a lump in your throat    · Chest pain or discomfort    · Nausea or abdominal pain    · Feeling dizzy, unsteady, lightheaded, or faint    · Feeling that you or the things around you are not real, or you are outside of your body    · Fear of losing control, going crazy, or dying    · Numbness or a tingling feeling    · Chills or hot flushes  What causes or increases my risk of panic disorder? No one knows for sure what causes panic disorder  You may have an increased risk of panic disorder if other family members have it  The following may also increase your risk:  · Stressful events such as severe illness or injury, death of a loved one, or childhood trauma such as physical or sexual abuse    · Chronic medical conditions such as asthma, lung disease, irritable bowel syndrome, heart problems, or thyroid disease    · Other mental health conditions such as depression or another type of anxiety disorder     · Overprotective parents or parents who worry too much about their own health    · Drug or alcohol abuse  How is panic disorder diagnosed and treated? Your healthcare provider will ask you how bad, how often, and in what situations your panic attacks occur  He may also want to know if other family members have panic disorder or other mental health conditions  He may also ask how well you are doing in school or work, or with your daily activities  You may be treated with any of the following:  · Medicines , such as antianxiety and antidepressants, may be given to treat panic disorder  You may need to take antidepressants for several weeks before you begin to feel better  Tell your healthcare provider about any side effects or problems you have with your medicine  Sometimes the type or amount of medicine may need to be changed  · Therapy  may be used to treat panic disorder  A therapist will help you learn to cope with your thoughts and feelings  This can be done alone or in a group  It may also be done with family members or a significant other  How can I manage panic disorder? · Keep a diary of your panic attacks  Write down how often you have panic attacks, how long they last, and the symptoms you had  Write down whether or not there was anything that happened right before the panic attack  Write down whether there were things that helped to ease or stop your panic attack  Bring your diary with you every time you see your healthcare provider  · Avoid foods and drinks that have caffeine  These may include coffee, tea, soda, energy drinks, and chocolate  · Avoid or limit alcohol  Ask your healthcare provider how much alcohol is safe for you to drink  A drink of alcohol is 12 ounces of beer, 5 ounces of wine, or 1½ ounces of liquor  · Get regular physical activity  Exercise can help decrease stress and anxiety  Talk to your healthcare provider before you start to exercise  Together you can plan the best exercise program for you  · Manage your stress  Learn ways to control stress, such as relaxation or deep breathing   Talk to someone about things that upset you  When should I contact my healthcare provider? · You have new symptoms since you last saw your healthcare provider  · Your worry keeps you from doing daily tasks such as work or caring for yourself or your family  · Your symptoms are getting worse  · You have questions or concerns about your condition or care  When should I seek immediate care or call 911? · You feel lightheaded, too dizzy to stand up, or you faint  · You feel like harming yourself  · You have chest pain, tightness, or heaviness that spreads to your shoulders, arms, jaw, neck, or back  CARE AGREEMENT:   You have the right to help plan your care  Learn about your health condition and how it may be treated  Discuss treatment options with your caregivers to decide what care you want to receive  You always have the right to refuse treatment  The above information is an  only  It is not intended as medical advice for individual conditions or treatments  Talk to your doctor, nurse or pharmacist before following any medical regimen to see if it is safe and effective for you  © 2017 [address]  Information is for End User's use only and may not be sold, redistributed or otherwise used for commercial purposes  All illustrations and images included in CareNotes® are the copyrighted property of [employer]  or Arsalan Jacobsen  Bipolar Disorder   AMBULATORY CARE:   Bipolar disorder  is a long-term chemical imbalance that causes rapid changes in mood and behavior  High moods are called pete  Low moods are called depression  Sometimes you will feel manic and sometimes you will feel depressed  You can have alternating episodes of pete and depression  This is called a mixed bipolar state  Call 911 if:   · You think about hurting yourself or someone else  Contact your healthcare provider or psychiatrist if:   · You are having trouble managing your bipolar disorder  · You cannot sleep, or are sleeping all the time  · You cannot eat, or are eating more than usual     · You feel dizzy or your stomach is upset  · You cannot make it to your next meeting  · You have questions or concerns about your condition or care  Common signs and symptoms of pete:   · Being easily distracted or agitated, or focusing all your attention on a goal    · Insomnia (trouble sleeping) or not needing as much sleep as usual    · Inflated self-esteem or belief in abilities    · Racing thoughts that may not make sense or be understood by others    · Speech that is faster than usual, or you talk more than usual    · Increased thoughts about sex    · Happy and care free, with a sudden change to anger or irritability    · Hallucinations that cause you to see and hear things that are not really there  Common signs and symptoms of depression:   · Anger, worry, anxiousness, or irritability    · Lack of energy    · Sadness or emptiness    · Crying for long periods    · Low self-esteem or sense of worthlessness    · Negative thoughts or not caring about anything    · Too much or too little sleep  Treatment  for bipolar disorder may include medicines to control your mood swings  You may need to see a therapist or psychiatrist regularly for counseling  You may need to go into the hospital for tests and treatment  Follow up with your healthcare provider or psychiatrist as directed:  Write down your questions so you remember to ask them during your visits  Manage bipolar disorder:  Watch for triggers of bipolar disorder symptoms, such as stress  Learn new ways to relax, such as deep breathing, to manage your stress  Tell someone if you feel a manic or depressive period might be coming on  Ask a friend or family member to help watch you for bipolar symptoms  Work to develop skills that will help you manage bipolar disorder  You may need to make lifestyle changes   Ask your healthcare provider or psychiatrist for resources  © 2017 [address] [place] Information is for End User's use only and may not be sold, redistributed or otherwise used for commercial purposes  All illustrations and images included in CareNotes® are the copyrighted property of A D A M , Inc  or Arsalan Jacobsen  The above information is an  only  It is not intended as medical advice for individual conditions or treatments  Talk to your doctor, nurse or pharmacist before following any medical regimen to see if it is safe and effective for you

## 2018-06-26 ENCOUNTER — OFFICE VISIT (OUTPATIENT)
Dept: BEHAVIORAL/MENTAL HEALTH CLINIC | Facility: CLINIC | Age: 38
End: 2018-06-26
Payer: MEDICARE

## 2018-06-26 DIAGNOSIS — F63.81 INTERMITTENT EXPLOSIVE DISORDER: ICD-10-CM

## 2018-06-26 DIAGNOSIS — F31.64 BIPOLAR DISORDER, CURR EPISODE MIXED, SEVERE, WITH PSYCHOTIC FEATURES (HCC): Primary | ICD-10-CM

## 2018-06-26 DIAGNOSIS — F40.01 PANIC DISORDER WITH AGORAPHOBIA: ICD-10-CM

## 2018-06-26 DIAGNOSIS — F43.10 POSTTRAUMATIC STRESS DISORDER: ICD-10-CM

## 2018-06-26 DIAGNOSIS — F41.9 ANXIETY: ICD-10-CM

## 2018-06-26 PROCEDURE — 90834 PSYTX W PT 45 MINUTES: CPT | Performed by: SOCIAL WORKER

## 2018-06-26 NOTE — PSYCH
Psychotherapy Provided: Individual Psychotherapy 45 minutes     Length of time in session: 45 minutes, follow up in 2 week    Goals addressed in session: 1    Pain:      none    0    Current suicide risk : Low     D:  MSW met with Willow Gaviria for session  He wrote a letter to the  he wanted to represent him in his SS case  She has decided to take his case and scheduled him a hearing in Oct   He has not been going to the stores etc  Due to fear of a panic attack  He has gone to [employer]  His ex girlfriend/ex  who hadn't returned any of his calls called him to "See how he was doing  She told him, She loved him "  This "shocked him  He did not say it back because he does not love her "  They broke up a year and a half ago  Her saying that did not pull him back into a relationship  A:  No progress on goals  P:  To continue to address tx plan goals  Behavioral Health Treatment Plan [facility]: Diagnosis and Treatment Plan explained to Iliana Mcgee relates understanding diagnosis and is agreeable to Treatment Plan   Yes

## 2018-07-13 ENCOUNTER — OFFICE VISIT (OUTPATIENT)
Dept: BEHAVIORAL/MENTAL HEALTH CLINIC | Facility: CLINIC | Age: 38
End: 2018-07-13
Payer: MEDICARE

## 2018-07-13 ENCOUNTER — TELEPHONE (OUTPATIENT)
Dept: PSYCHIATRY | Facility: CLINIC | Age: 38
End: 2018-07-13

## 2018-07-13 DIAGNOSIS — F40.01 PANIC DISORDER WITH AGORAPHOBIA: ICD-10-CM

## 2018-07-13 DIAGNOSIS — F43.10 POSTTRAUMATIC STRESS DISORDER: ICD-10-CM

## 2018-07-13 DIAGNOSIS — F63.81 INTERMITTENT EXPLOSIVE DISORDER: ICD-10-CM

## 2018-07-13 DIAGNOSIS — F41.9 ANXIETY: ICD-10-CM

## 2018-07-13 DIAGNOSIS — F31.64 BIPOLAR DISORDER, CURR EPISODE MIXED, SEVERE, WITH PSYCHOTIC FEATURES (HCC): Primary | ICD-10-CM

## 2018-07-13 PROCEDURE — 90834 PSYTX W PT 45 MINUTES: CPT | Performed by: SOCIAL WORKER

## 2018-07-13 NOTE — PSYCH
Psychotherapy Provided: Individual Psychotherapy 45 minutes     Length of time in session: 45 minutes, follow up in 2 week    Goals addressed in session: 1    Pain:      none    0    Current suicide risk : Low     D:  MSW met with Tiffanie Kingston for session  He asked for a referral to the partial program   He has attended the partial program in the past   He has not been leaving the house often  He is "afraid of having another panic attack like the one he had in April  He is no longer going to [employer]  He goes to the grocery store but only buys the same food because he knows where it is at and he is in and out in 10 min's  Friends and family members are noticing he is going out and commenting to him about it  As his world gets smaller his depression increases  He is experiencing some passive SI thoughts  No intent or plan  A:  No progress on goals  P:  To continue to address [place] plan goals  Behavioral Health Treatment Plan [facility]: Diagnosis and Treatment Plan explained to David Cam relates understanding diagnosis and is agreeable to Treatment Plan   Yes

## 2018-07-24 ENCOUNTER — OFFICE VISIT (OUTPATIENT)
Dept: PSYCHOLOGY | Facility: CLINIC | Age: 38
End: 2018-07-24
Payer: MEDICARE

## 2018-07-24 ENCOUNTER — OFFICE VISIT (OUTPATIENT)
Dept: PSYCHIATRY | Facility: CLINIC | Age: 38
End: 2018-07-24
Payer: MEDICARE

## 2018-07-24 VITALS
DIASTOLIC BLOOD PRESSURE: 108 MMHG | TEMPERATURE: 98.7 F | RESPIRATION RATE: 20 BRPM | BODY MASS INDEX: 32.27 KG/M2 | HEIGHT: 64 IN | SYSTOLIC BLOOD PRESSURE: 180 MMHG | HEART RATE: 80 BPM | WEIGHT: 189 LBS

## 2018-07-24 DIAGNOSIS — F41.1 GAD (GENERALIZED ANXIETY DISORDER): ICD-10-CM

## 2018-07-24 DIAGNOSIS — F40.01 PANIC DISORDER WITH AGORAPHOBIA: Primary | ICD-10-CM

## 2018-07-24 DIAGNOSIS — F40.01 AGORAPHOBIA WITH PANIC DISORDER: Primary | ICD-10-CM

## 2018-07-24 DIAGNOSIS — Z79.899 HIGH RISK MEDICATION USE: ICD-10-CM

## 2018-07-24 DIAGNOSIS — F43.10 POSTTRAUMATIC STRESS DISORDER: ICD-10-CM

## 2018-07-24 DIAGNOSIS — F31.63 BIPOLAR DISORDER, CURRENT EPISODE MIXED, SEVERE, WITHOUT PSYCHOTIC FEATURES (HCC): ICD-10-CM

## 2018-07-24 PROBLEM — F31.9 AFFECTIVE PSYCHOSIS, BIPOLAR (HCC): Status: ACTIVE | Noted: 2018-07-24

## 2018-07-24 PROCEDURE — 90791 PSYCH DIAGNOSTIC EVALUATION: CPT

## 2018-07-24 PROCEDURE — G0410 GRP PSYCH PARTIAL HOSP 45-50: HCPCS

## 2018-07-24 PROCEDURE — 90792 PSYCH DIAG EVAL W/MED SRVCS: CPT | Performed by: PSYCHIATRY & NEUROLOGY

## 2018-07-24 PROCEDURE — G0177 OPPS/PHP; TRAIN & EDUC SERV: HCPCS

## 2018-07-24 RX ORDER — CLONAZEPAM 1 MG/1
1 TABLET ORAL 3 TIMES DAILY
Qty: 90 TABLET | Refills: 1 | Status: SHIPPED | OUTPATIENT
Start: 2018-07-24 | End: 2018-08-01 | Stop reason: SDUPTHER

## 2018-07-24 RX ORDER — CLONAZEPAM 1 MG/1
1 TABLET ORAL 3 TIMES DAILY
COMMUNITY
End: 2018-07-24 | Stop reason: SDUPTHER

## 2018-07-24 NOTE — PSYCH
Subjective:     Patient ID: Fabian Verdugo is a 40 y o  male  [facility] Clinical Progress Notes      Specialized Services Documentation  Therapist must complete separate progress note for each specific clinical activity in which the individual participated during the day  Group Psychotherapy  (9495-6049) Fam Berumen participated in psychotherapy group focused on being patient with progress toward wellness  Fam Berumen identified fearing that he will never feel better as a stressor today  He stated that he feels frustrated by his constant anxiety and his avoidance of activities due to fear of having another panic attack, and peers related to his fear of never feeling better  Group discussed common feelings of being "stuck in a hole," and their fears of not improving, and then shared steps that they can take now to help themselves feel better in the moment as well as work toward recovery  Some beginning progress noted toward goals  Continue psychotherapy group to encourage Fam Berumen to explore stressors and coping    Tx Plan Objective: 1 1, 1 2, Therapist:  Kerry FOURNIER    Education Therapy   Time:  6240-5711  Previous goal met: First treatment day  Readiness to Learning: Receptive  Barriers to Learning: None  Learning Assessment  Time: 2908-7231  Education Completed: Illness and Wellness Tools  Teaching Method: Verbal and Written  Shared Area of Learning: Yes   Goal Set: do laundry and clean to keep busy  Tx Plan Objective: 1 1, 1 2, Therapist:  Kerry FOURNIER

## 2018-07-24 NOTE — PSYCH
Diagnoses and all orders for this visit:      Subjective:     Patient ID: Radha Burn      [facility] Treatment Plan   AREAS OF NEED:    Date Initiated: 18     Strengths: Takes medication regularly and as ordered, motivated to decrease symptoms of anxiety and avoidant behaviors     LONG TERM GOAL:   Date Initiated: 18  1 0 I will identify three healthy coping strategies I can use to decrease my anxiety and increase my productivity and I will practice using at least two of these strategies daily  Target Date: 18  Revision Date:  Completion Date:      SHORT TERM OBJECTIVES:    Date Initiated: 18  1 1 I will identify and practice daily three healthy ways to refocus myself when I am triggered by thoughts of fear and anxiety so that I can accomplish my activities of daily living without panic attacks or avoidance  Revision Date: 18  Target Date:   Completion Date:      Date Initiated: 18  1 2 I will write out three healthy coping skills learned in program to reduce my symptoms of anger, depression and anxiety and I will practice at least one of them daily  Revision Date:   Target Date: 18  Completion Date:     Date Initiated:  18  1 3 I will take medications as prescribed and share questions or concerns when, or if, they arise  I will have labs done as ordered by Program Psychiatrist as soon as I can complete  Revision Date:   Target Date: 18  Completion Date:      Date Initiated: 18    1 4 I will name three supports and I will identify specific ways my supports can assist me in my recovery from mental illness     Revision Date:   Target Date: 18  Completion Date:      PSYCHIATRY:  Medication Management and education  Date Initiated: 18  Revision Date:   The person(s) responsible for carrying out the plan is Loraine Wright MD     NURSIN 1,1 2,1 3,1 4 This RN will provide daily wellness group five days weekly to educate Kalee on individual diagnoses and medications used in treatment  Date Initiated: 7/24/18  Revision Date:  The person(s) responsible for carrying out the plan is Kp Davis RN     PSYCHOLOGY:   1 1,1 2,1 3,1 4 Provide psychotherapy group five times weekly to allow opportunity for Blanca Lui to explore stressors and ways of coping  Date Initiated: 7/24/18  Revision Date:   The person(s) responsible for carrying out the plan is IRLANDA Clay,LSW     ALLIED THERAPY:   1 1,1 2  Giselle Morgan 1159 in AT group five times weekly to encourage development and use of wellness tools to decrease symptoms and promote recovery through meaningful activity  Date Initiated: 7/24/18  Revision Date:  The person(s) responsible for carrying out the plan is BALDEMAR Page      CASE MANAGEMENT:  Date Initiated: 7/24/18  Revision Date:  1 0 This CM will meet with Blanca Lui three to four times weekly to assess treatment progress, D/C planning and connection to community supports and UR as indicated  The person(s) responsible for carrying out the plan is Kp Davis RN     DISCHARGE CRITERIA: Identify 3 signs of progress and complete relapse prevention plan  DISCHARGE PLAN: Blanca Lui will return to prior outpatient providers at [address] [place] [address] E    Estimated Length of Stay: 10 days    DSM Diagnoses    Panic disorder with agoraphobia      RADHA (generalized anxiety disorder)      Bipolar disorder, current episode mixed, severe, without psychotic features ([facility] [address] )      Posttraumatic stress disorder      High risk medication use         Diagnosis and Treatment Plan explained to relates understanding diagnosis and is agreeable to Treatment Plan  Diagnosis and Treatment Plan explained to John Coe relates understanding diagnosis and is agreeable to Treatment Plan             CLIENT COMMENTS / Please share your thoughts, feelings, need and/or experiences regarding your treatment plan: _____________________________________________________________________________________________________________________________________________________________________________________________________________________________________________________________________________________________________________________ Date/Time: ______________     Patient Signature: _________________________________     Date/Time: ______________      Signature: _________________________________     Date/Time: ______________

## 2018-07-24 NOTE — PSYCH
Subjective:     Patient ID: Reymundo Burkitt is a 40 y o  male  [facility] Clinical Progress Notes      Specialized Services Documentation  Therapist must complete separate progress note for each specific clinical activity in which the individual participated during the day  Group Psychotherapy [id number]  Everette Brooks participated in wellness group focused on understanding the difference between loneliness and solitude and how solitude can be used productively in daily life to regenerate physically, mentally and  Emotionally  Everette Brooks shared that he listens to music to calm himself down  He discussed hearing a song yesterday and became interested in listening to the lyrics and then listened to the entire album and this calmed his anxiety  He made good beginning progress toward goals  Continue to offer this group to educate on developing the skill of utilizing solitude to reflect, care for self and promote healthier coping with life stressors  Tx Plan Objective: 1 1,1 2  Therapist:                  Other: Medicare no review required     Case Management Note:  [id number]  ESHAbashir Todd met with this CM/RN and ROIs were signed  Crisis/contact card was given and explained  OP providers notified of admission and health care coordination form completed  Everette Brooks stated he has no PCP at present  List of [facility] PCP given to Everette Todd  Moris Dallin was given list of [facility] labs to have labs completed, per Program Psychiatrist, tomm  morning before Prograq[facility]  Initial psychosocial evaluation completed and initial treatment plan goals discussion  Samira Birmingham RN    Current suicide risk : Low     Medications changes/added/denied? No    Treatment session number: 1    Individual Case Management Visit provided today? Yes     [facility] follow up physician's orders: Admit to PHP  Labs were given to be done and Dr Qian Granados ordered medication refills electronically
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
## 2018-07-24 NOTE — PSYCH
Assessment/Plan:      There are no diagnoses linked to this encounter  Subjective:     Patient ID: Kim Patient is a 40 y o  male  HPI:     Pre-morbid level of function and History of Present Illness: As Per Dr Yaakov Jesus Psychiatric Evaluation 7/24/18  Kim Patient is a 40 y o  male with  Bipolar disorder, PTSD, panic disorder and anxiety, referred by Juliet Oscar, MSW, LCSW  his therapist  because he feels more depressed, anxious, isolating himself,  has not been leaving the house often for the last 3 months because he is  "afraid of having another panic attack like the one he had in April when he went to [employer] and despite the store was empty he started to have a panic attack and almost passed out  He feels restlessness, does not interact with his family and has fleeting suicidal thoughts without plan or intent  He has limited contact with his friend  He also has not be able to hold a job due to his anxiety and panic episodes  He has 1-2 panic daily despite he takes his medications as prescribe  Onset of symptoms was 3 months ago with rapidly worsening course since that time  Psychosocial Stressors: health and social   Delroy Love feels depressed,anxious, he denies suicidal thoughts, denies any plan or intent, denies any homicidal ideas, plan or intent, denies any psychotic symptoms  He states that he isolated and has severe agoraphobia but he know he need treatment and wants to participate in the program         Reason for evaluation and partial hospitalization as an alternative to inpatient hospitalization   PHP is medically necessary to prevent hospitalization as outpatient care has been unable to stabilize Kalee and a greater intensity of treatment is indicated  [facility]ieu therapy, monitor for medication needs, provide wellness tools education and offer opportunity to share and connect to others   Group therapy, case management, psychiatric medication management, family contact and UR as indicated  ELOS 10 treatment day  Previous Psychiatric/psychological treatment/year: Dr Arin Solis, current OP psychiatrist and Rahul Guzman LCSW, current OP therapist - Will return to OP providers after [facility] D/C    Current Psychiatrist/Therapist: (See Above)    Outpatient and/or Partial and Other Community Resources Used (CTT, ICM, VNA): Anxiety Group        Problem Assessment:     SOCIAL/VOCATION:  Family Constellation (include parents, relationship with each and pertinent Psych/Medical History): Mother:   Emergency Contact - Theavernell Holcomb  Spouse: Unmarried   Father:   Strained relationship with him - he doesn't listen, never admits he is wrong  Children: Denied   Sibling:   Sister works and he doesn't have a good relationship with her b/c she doesn't want one so he is supported by other people   Other:     Mary Gooden who is supportive    Who is the person you relate to best: a friend of mine - he lives with mother, father and sister  he does not live alone     Domestic Violence: Denied    Additional Comments related to family/relationships/peer support:  All family members are in [place] and he has not flown in 15 years     School or Work History (strengths/limitations/needs): Currently unemployed - [address] [facility] [place] date 10/18 for disability appeal     His highest grade level achieved was: [employer] diploma     history includes Denied     Financial status includes: Difficult at this time, no income at this time dependent on parents    LEISURE ASSESSMENT (Include past and present hobbies/interests and level of involvement (Ex: Group/Club Affiliations): [employer] sports, reading, drawing but has not done so for a while, does chores in the house, watches TV and videos    His primary language is [place] but fluent in [place]      Preferred language is:  English     Ethnic considerations are: [address] affiliations and level of involvement: [facility] STATUS: There has been a recent change in the patient's ability to do the following: difficult to go out of house and shop but is doing so and mother helps    Level of Assistance Needed/By Whom?: Food shopping    Fam Berumen learns best by  Reading and doing    SUBSTANCE ABUSE ASSESSMENT: no substance abuse at this time  Fam Berumen states he does not drink alcohol or use any illegal substances  No longer uses marijuana  Do you currently smoke? Yes one daily but goes three to four days without smoking   Offered smoking cessation? Yes Refused    Substance/Route/Age/Amount/Frequency/Last Use: Last used "a long time ago "  DUI in 2009    DETOX HISTORY: Denied    Previous detox/rehab treatment: Denied    HEALTH ASSESSMENT: referred to PCP and PCP not notified     LEGAL:Denied     Risk Assessment:   The following ratings are based on my interview(s) with initial evaluation    Risk of Harm to Self:   Demographic risk factors include lowest socioeconomic class and male  Historical Risk Factors include chronic psychiatric problems, history of suicidal behaviors/attempts, substance abuse or dependence and history of impulsive behaviors  Recent Specific Risk Factors include passive death wishes, unable to visualize a realistic positive future, feelings of guilt or self blame, worries about finances or work and chronic pain or health problems    Risk of Harm to Others:   Demographic Risk Factors include male and unemployed  Historical Risk Factors include none  Recent Specific Risk Factors include concomitant mood or thought disorder and multiple stressors    Access to Weapons:   Fam Berumen has access to the following weapons: Fam Berumen denied any access to guns or other weapons   The following steps have been taken to ensure weapons are properly secured: N/A    Based on the above information, the client presents the following risk of harm to self or others:  low    The following interventions are recommended:   no intervention changes    Notes regarding this Risk Assessment: Joel Merritt was given a crisis card that was also explained       Review Of Systems:     Mood Anxiety and Depression   Behavior Normal    Thought Content Normal   General Decreased Functioning   Personality Normal   Other Psych Symptoms Normal   Constitutional Negative   ENT Negative   Cardiovascular Negative   Respiratory Negative   Gastrointestinal Negative   Genitourinary Negative   Musculoskeletal Negative   Integumentary Negative   Neurological Negative   Endocrine Normal    Other Symptoms Normal        Mental status:  Appearance calm and cooperative , adequate hygiene and grooming and good eye contact    Mood depressed and anxious   Affect affect appropriate    Speech a normal rate   Thought Processes coherent/organized and normal thought processes   Hallucinations no hallucinations present    Thought Content no delusions   Abnormal Thoughts passive/fleeting thoughts of suicide and agorophia   Orientation  oriented to person, oriented to place and oriented to time   Remote Memory short term memory intact and long term memory intact   Attention Span concentration intact   Intellect Appears to be of Average Intelligence   Fund of Knowledge displays adequate knowledge of current events, adequate fund of knowledge regarding past history and adequate fund of knowledge regarding vocabulary    Insight Insight intact   Judgement judgment was intact   Muscle Strength Muscle strength and tone were normal and Normal gait    Language no difficulty naming common objects, no difficulty repeating a phrase  and no difficulty writing a sentence    Pain none   Pain Scale 0       DSM:      Panic disorder with agoraphobia      RADHA (generalized anxiety disorder)      Bipolar disorder, current episode mixed, severe, without psychotic features (HCC)      Posttraumatic stress disorder      High risk medication use       Plan: Admit to PHP, LOS 10 days, group therapy, medication management, UR, family contact, case management, refer to outpatient services and supports  Anticipated aftercare plan:  Will return to previous outpatient providers at [address] [place] [address]

## 2018-07-24 NOTE — PSYCH
Subjective:     Patient ID: Celeste Garcia is a 40 y o  male  [facility] Clinical Progress Notes      Specialized Services Documentation  Therapist must complete separate progress note for each specific clinical activity in which the individual participated during the day  Group Psychotherapy9:30 to 10:30  Harman Bautista was meeting with the Doctor and the Lata Cota  Therefore he did not attend the group   Tx Plan Objective: 1 1, 1 2, Therapist: Jamari Hernandez

## 2018-07-25 ENCOUNTER — APPOINTMENT (OUTPATIENT)
Dept: LAB | Facility: CLINIC | Age: 38
End: 2018-07-25
Payer: MEDICARE

## 2018-07-25 ENCOUNTER — OFFICE VISIT (OUTPATIENT)
Dept: PSYCHOLOGY | Facility: CLINIC | Age: 38
End: 2018-07-25
Payer: MEDICARE

## 2018-07-25 DIAGNOSIS — F43.10 POST-TRAUMATIC STRESS DISORDER: ICD-10-CM

## 2018-07-25 DIAGNOSIS — F31.63 BIPOLAR DISORDER, CURRENT EPISODE MIXED, SEVERE, WITHOUT PSYCHOTIC FEATURES (HCC): ICD-10-CM

## 2018-07-25 DIAGNOSIS — F40.01 AGORAPHOBIA WITH PANIC DISORDER: ICD-10-CM

## 2018-07-25 DIAGNOSIS — Z79.899 OTHER LONG TERM (CURRENT) DRUG THERAPY: ICD-10-CM

## 2018-07-25 DIAGNOSIS — Z79.899 HIGH RISK MEDICATION USE: ICD-10-CM

## 2018-07-25 DIAGNOSIS — F40.01 AGORAPHOBIA WITH PANIC DISORDER: Primary | ICD-10-CM

## 2018-07-25 LAB
ALBUMIN SERPL BCP-MCNC: 3.6 G/DL (ref 3.5–5)
ALP SERPL-CCNC: 106 U/L (ref 46–116)
ALT SERPL W P-5'-P-CCNC: 35 U/L (ref 12–78)
ANION GAP SERPL CALCULATED.3IONS-SCNC: 7 MMOL/L (ref 4–13)
AST SERPL W P-5'-P-CCNC: 18 U/L (ref 5–45)
BASOPHILS # BLD AUTO: 0.03 THOUSANDS/ΜL (ref 0–0.1)
BASOPHILS NFR BLD AUTO: 1 % (ref 0–1)
BILIRUB SERPL-MCNC: 0.47 MG/DL (ref 0.2–1)
BUN SERPL-MCNC: 6 MG/DL (ref 5–25)
CALCIUM SERPL-MCNC: 8.9 MG/DL (ref 8.3–10.1)
CARBAMAZEPINE SERPL-MCNC: 5.9 UG/ML (ref 4–12)
CHLORIDE SERPL-SCNC: 107 MMOL/L (ref 100–108)
CHOLEST SERPL-MCNC: 152 MG/DL (ref 50–200)
CO2 SERPL-SCNC: 24 MMOL/L (ref 21–32)
CREAT SERPL-MCNC: 1.37 MG/DL (ref 0.6–1.3)
EOSINOPHIL # BLD AUTO: 0.26 THOUSAND/ΜL (ref 0–0.61)
EOSINOPHIL NFR BLD AUTO: 5 % (ref 0–6)
ERYTHROCYTE [DISTWIDTH] IN BLOOD BY AUTOMATED COUNT: 14 % (ref 11.6–15.1)
GFR SERPL CREATININE-BSD FRML MDRD: 65 ML/MIN/1.73SQ M
GLUCOSE P FAST SERPL-MCNC: 129 MG/DL (ref 65–99)
HCT VFR BLD AUTO: 42.6 % (ref 36.5–49.3)
HDLC SERPL-MCNC: 40 MG/DL (ref 40–60)
HGB BLD-MCNC: 14 G/DL (ref 12–17)
IMM GRANULOCYTES # BLD AUTO: 0.02 THOUSAND/UL (ref 0–0.2)
IMM GRANULOCYTES NFR BLD AUTO: 0 % (ref 0–2)
LDLC SERPL CALC-MCNC: 49 MG/DL (ref 0–100)
LYMPHOCYTES # BLD AUTO: 1.66 THOUSANDS/ΜL (ref 0.6–4.47)
LYMPHOCYTES NFR BLD AUTO: 29 % (ref 14–44)
MCH RBC QN AUTO: 29 PG (ref 26.8–34.3)
MCHC RBC AUTO-ENTMCNC: 32.9 G/DL (ref 31.4–37.4)
MCV RBC AUTO: 88 FL (ref 82–98)
MONOCYTES # BLD AUTO: 0.4 THOUSAND/ΜL (ref 0.17–1.22)
MONOCYTES NFR BLD AUTO: 7 % (ref 4–12)
NEUTROPHILS # BLD AUTO: 3.42 THOUSANDS/ΜL (ref 1.85–7.62)
NEUTS SEG NFR BLD AUTO: 58 % (ref 43–75)
NONHDLC SERPL-MCNC: 112 MG/DL
NRBC BLD AUTO-RTO: 0 /100 WBCS
PLATELET # BLD AUTO: 226 THOUSANDS/UL (ref 149–390)
PMV BLD AUTO: 9.2 FL (ref 8.9–12.7)
POTASSIUM SERPL-SCNC: 3.6 MMOL/L (ref 3.5–5.3)
PROT SERPL-MCNC: 6.9 G/DL (ref 6.4–8.2)
RBC # BLD AUTO: 4.82 MILLION/UL (ref 3.88–5.62)
SODIUM SERPL-SCNC: 138 MMOL/L (ref 136–145)
T4 FREE SERPL-MCNC: 1.04 NG/DL (ref 0.76–1.46)
TRIGL SERPL-MCNC: 316 MG/DL
TSH SERPL DL<=0.05 MIU/L-ACNC: 3.8 UIU/ML (ref 0.36–3.74)
WBC # BLD AUTO: 5.79 THOUSAND/UL (ref 4.31–10.16)

## 2018-07-25 PROCEDURE — 80053 COMPREHEN METABOLIC PANEL: CPT | Performed by: PSYCHIATRY & NEUROLOGY

## 2018-07-25 PROCEDURE — G0177 OPPS/PHP; TRAIN & EDUC SERV: HCPCS

## 2018-07-25 PROCEDURE — 85025 COMPLETE CBC W/AUTO DIFF WBC: CPT | Performed by: PSYCHIATRY & NEUROLOGY

## 2018-07-25 PROCEDURE — 36415 COLL VENOUS BLD VENIPUNCTURE: CPT | Performed by: PSYCHIATRY & NEUROLOGY

## 2018-07-25 PROCEDURE — 84443 ASSAY THYROID STIM HORMONE: CPT

## 2018-07-25 PROCEDURE — G0410 GRP PSYCH PARTIAL HOSP 45-50: HCPCS

## 2018-07-25 PROCEDURE — 80156 ASSAY CARBAMAZEPINE TOTAL: CPT

## 2018-07-25 PROCEDURE — 80061 LIPID PANEL: CPT | Performed by: PSYCHIATRY & NEUROLOGY

## 2018-07-25 PROCEDURE — G0176 OPPS/PHP;ACTIVITY THERAPY: HCPCS

## 2018-07-25 PROCEDURE — 84439 ASSAY OF FREE THYROXINE: CPT

## 2018-07-25 NOTE — PSYCH
Subjective:     Patient ID: Iris Mcleod is a 40 y o  male  [facility] Clinical Progress Notes      Specialized Services Documentation  Therapist must complete separate progress note for each specific clinical activity in which the individual participated during the day  Allied Therapy   [id number] Nu Pro actively shared in [facility] group focused on decreasing self- stigma and developing a personal recovery lifestyle  He listened to [facility] share themes he has learned in recovery as well as strategies that have assisted him in wellness  Nu Pro shared his own struggle with shame and "hiding" his illness from others which limits his meaningful relationships  He asked appropriate questions and offered meaningful feedback  Group encouraged power of learning about self, accepting illness and personal responsibility in recovery through acting intentionally and being aware  Community resources reviewed in addition to personal resources like the [employer]  Positive initial progress toward goal noted  Continue AT to encourage self -awareness and personal role in wellness       Tx Plan Objective: 1 1, Therapist:  Tima MCDERMOTT    Education Therapy   Time:  3210-3280  Previous goal met: Yes   Readiness to Learning: Receptive  Barriers to Learning: None  Learning Assessment  Time: 1044-1324  Education Completed: Illness and Wellness Tools  Teaching Method: Verbal  Shared Area of Learning: Yes   Goal Set: be consistent with medication and small walk outside  Tx Plan Objective: 1 3,1 1, Therapist:  Tima MCDERMOTT

## 2018-07-25 NOTE — PSYCH
Subjective:     Patient ID: Lety Ayers is a 40 y o  male  [facility] Clinical Progress Notes      Specialized Services Documentation  Therapist must complete separate progress note for each specific clinical activity in which the individual participated during the day  Group Psychotherapy [phone]  Stefanoeduar Miller actively participated in wellness group focused on characteristics of healthy relationships  Lima Miller actively shared that he had spent many hours "being angry at my brothers and my family for not helping when I felt they should but now I have moved beyond that and I am more realistic and don't expect them to be there for me " Lima Miller discussed with group that he no longer asks his family members for certain types of support so is not angry with them any longer  Stefanoeduar Miller discussed how he knows who his supports are for emotional support, a ride when he needs it, etc  Opal Robe Agarwaleduar Miller stated that it is a healthier way to life to be realistic and not expect what is not going to happen  He discussed with peers the concept of "It takes a village   " and also enlarging his support network to include other individuals aside from family  Stefanoeduar Miller made good progress toward goal  Continue group to educate on characteristics that should be practiced and expected in healthier relationships to help to build healthier supports in recovery and in daily life  Tx Plan Objective: 1 1,1 2,1 4   Therapist:  Erin Holt RN                  Case Management Note [id number]  Lima Miller met with this CM to review and sign initial treatment plan  He received a copy  Lima Miller stated that he had labs done this morning, as ordered by Program Psychiatrist and that he informed her of same this morning when he saw her in the wren  Lima Miller stated that he is finding the group helpful and he is making progress getting up and out of the house and facing his fears of leaving the house   Lima Miller stated that he practiced anxiety lowering CBT strategies this AM when he had to go to this new place (lab) and also he is very anxious and has had panic attacks in the past "with needles " He stated he used self-talk and calming strategies not to get himself anxious and did not have a panic attack this morning  He was given encouragement and discussed treatment goal of identifying and addressing anxiety and triggers for avoidance he has begun to address  Doc Pastel denied SI and HI as well as any side effects from his medications  Dustin Keen RN    Current suicide risk : Low     Medications changes/added/denied? No    Treatment session number: 2    Individual Case Management Visit provided today?  Yes     [facility] follow up physician's orders: N/A

## 2018-07-25 NOTE — PSYCH
Subjective:     Patient ID: Fabian Verdugo is a 40 y o  male  [facility] Clinical Progress Notes      Specialized Services Documentation  Therapist must complete separate progress note for each specific clinical activity in which the individual participated during the day  Group Psychotherapy  ([id number]) Fam Berumen participated in psychotherapy group focused on prioritizing one's needs and healthy boundaries  Fam Berumen identified public places as a stressor, but noted that he was able to go to [facility] yesterday and walk around the store for a few minutes without panicking  He actively engaged in group discussion, relating to peers that discussed struggling to put their own needs before those that they feel compelled to help  Group discussed how focusing on others' needs can deplete their own resources (financial, emotional, etc), and the necessity of making sure their own needs are met before helping someone else  Fam Berumen provided good input into discussion, with some insight into how he needs to work on boundaries and self-care  Good progress noted toward goals  Continue psychotherapy group to encourage Fam Berumen to explore stressors and coping    Tx Plan Objective: 1 1, 1 2, Therapist:  Kerry FOURNIER

## 2018-07-26 ENCOUNTER — OFFICE VISIT (OUTPATIENT)
Dept: PSYCHOLOGY | Facility: CLINIC | Age: 38
End: 2018-07-26
Payer: MEDICARE

## 2018-07-26 DIAGNOSIS — F40.01 AGORAPHOBIA WITH PANIC DISORDER: Primary | ICD-10-CM

## 2018-07-26 PROCEDURE — G0177 OPPS/PHP; TRAIN & EDUC SERV: HCPCS

## 2018-07-26 PROCEDURE — G0176 OPPS/PHP;ACTIVITY THERAPY: HCPCS

## 2018-07-26 PROCEDURE — G0410 GRP PSYCH PARTIAL HOSP 45-50: HCPCS

## 2018-07-26 NOTE — PSYCH
Subjective:     Patient ID: Dalton Mauro is a 40 y o  male  [facility] Clinical Progress Notes      Specialized Services Documentation  Therapist must complete separate progress note for each specific clinical activity in which the individual participated during the day  Group Psychotherapy [id number]   Lynn Abel participated in wellness group focused on identifying what a healthy boundary is and how to begin to practice setting healthy boundaries with family, friends, acquaintances and co-workers  Lynn Abel shared that he has learned to set healthy boundaries but that he gets angry when individuals violate his boundaries such as opening his mail  Peers discussed with Lynn Abel how they also feel when their boundaries are violated and how to communicate to others who are violating their boundaries that their behaviors are unacceptable  Discussed educations others, as well as oneself, about boundaries as many individuals grow up in environments that have unhealthy boundaries  Lynn Abel made good  progress toward goals  Continue to offer group to educate,  and provide peer  practice, support and feedback in building wellness skills of setting and maintain healthy boundaries now and in recovery  Tx Plan Objective: 1 1,1 2,1 4   Therapist:  Chely Liao RN                  Case Management Note [id number]  Lynn Abel met with this CM/RN to review results from labs he had drawn yesterday  Lynn Abel stated that he was anxious "that they were OK " He was worried about not being well  Discussed Program Psychiatrist's suggestions that Lynn Abel drink more water and decrease amount of juice that he drinks  Lynn Abel stated that he only drinks two glasses of juice in the morning early but was observed by this writer drinking two containers of juice at break also   Education given on improving diet and lowering triglyceride levels by eliminating sugar such as the candy bars that Lynn Abel shared he will eat in the middle of the night when he wakes up then goes back to sleep  Discussed healthier eating choices and habits  Teofilo Anaya has list of PCP [facility] providers and will make an appointment to follow up with his PCP to discuss his lab results as per Dr Neftali Wyatt suggestion that he do so  Teofilo Anaya denied SI and HI as well as any manic or psychotic episodes  Jp Tate RN    Current suicide risk : Low     Medications changes/added/denied? No    Treatment session number: 3    Individual Case Management Visit provided today?  Yes     [facility] follow up physician's orders: N/A

## 2018-07-26 NOTE — PSYCH
Subjective:     Patient ID: Janet Chavis is a 40 y o  male  [facility] Clinical Progress Notes      Specialized Services Documentation  Therapist must complete separate progress note for each specific clinical activity in which the individual participated during the day  Allied Therapy   [phone] Joel Merritt actively shared in [facility] group focused on worry control  Group explored normalcy of worry and difference strategies to refocus and add perspective to concerns  He was active in discussion and felt it was helpful to learn to not stop worrying but to learn to [employer] or manage them  He shared identifying that he frequently revs himself up and makes his worries worse  Relaxation technique reviewed  He felt it was helpful to learn to put worries into perspective  Some positive progress toward tx goal   Continue AT to encourage identification of stressors and wellness tools to manage anxiety and stress     Tx Plan Objective: 1 1, Therapist:  Alessio MCDERMOTT    Education Therapy   Time:  1836-5685  Previous goal met: Yes   Readiness to Learning: Receptive  Barriers to Learning: None  Learning Assessment  Time: 8019-6138  Education Completed: Illness and Wellness Tools  Teaching Method: Verbal  Shared Area of Learning: Yes   Goal Set: work on organizing his basement and stay consistent with walk around [facility] and medication routine  Tx Plan Objective: 1 1,1 2,1 3 Therapist:  Alessio MCDERMOTT

## 2018-07-26 NOTE — PSYCH
Subjective:     Patient ID: Leidy Hill is a 40 y o  male  [facility] Clinical Progress Notes      Specialized Services Documentation  Therapist must complete separate progress note for each specific clinical activity in which the individual participated during the day  Group Psychotherapy   ([id number]) Jerad Turner participated in psychotherapy group focused on personal strengths  Jerad Turner identified getting his blood work results as a stressor today  He actively engaged in group discussion, talking about his frustration with his family not understanding his mental health condition and not providing him with the support he needs  He talked about setting boundaries with his family so that he can focus on his treatment and getting well, and noted that he feels this is a personal strength of his (prioritizing his needs first and being able to say no when he needs to)  Group discussed their personal strengths that can carry them through difficult times, and the benefits  of focusing on positive aspects versus negatives/perceived weaknesses  Moderate progress noted toward goals  Continue psychotherapy group to encourage Jerad Turner to explore stressors and coping    Tx Plan Objective: 1 1, 1 2, Therapist:  Huyen FOURNIER

## 2018-07-27 ENCOUNTER — OFFICE VISIT (OUTPATIENT)
Dept: PSYCHOLOGY | Facility: CLINIC | Age: 38
End: 2018-07-27
Payer: MEDICARE

## 2018-07-27 DIAGNOSIS — F40.10 SOCIAL PHOBIA: Primary | ICD-10-CM

## 2018-07-27 PROCEDURE — G0177 OPPS/PHP; TRAIN & EDUC SERV: HCPCS

## 2018-07-27 PROCEDURE — G0410 GRP PSYCH PARTIAL HOSP 45-50: HCPCS

## 2018-07-27 PROCEDURE — G0176 OPPS/PHP;ACTIVITY THERAPY: HCPCS

## 2018-07-27 NOTE — PSYCH
Subjective:     Patient ID: Sandy Mane is a 40 y o  male  [facility] Clinical Progress Notes      Specialized Services Documentation  Therapist must complete separate progress note for each specific clinical activity in which the individual participated during the day  Group Psychotherapy   [id number]  Tiffanie Kingston participated in weekly wellness group to discuss what particular area of wellness  has been worked on up to today in Program and choice of area to continue working on for recovery as targeted in treatment plan, by choice, or in [employer]  Tiffanie Kingston shared that he is working on controlling his anxiety and avoidance  He described how he is doing so by taking "baby steps" to get out of the house slowly  Tiffanie Kingston described taking a walk around the block now every day to get out and distract himself and also to prove to himself that he does not have to stay in the house to feel safe  Tiffanie Kingston stated that he will continue to seek opportunities to decrease avoidance and to handle anxiety and panic by slowly exposing himself to more socialization  He made good progress toward goals  Continue to offer weekly wellness group to focus on goals and identify areas of goal achievement and need  Tx Plan Objective: 1 1,1 2,1 4  Therapist:  Madison Mcdaniels RN                  Case Management Note    Madison Mcdaniels RN    Current suicide risk : Low     Medications changes/added/denied? No    Treatment session number: 4    Individual Case Management Visit provided today?  No    [facility] follow up physician's orders: N/A

## 2018-07-27 NOTE — PSYCH
Subjective:     Patient ID: Lety Ayers is a 40 y o  male  [facility] Clinical Progress Notes      Specialized Services Documentation  Therapist must complete separate progress note for each specific clinical activity in which the individual participated during the day  Allied Therapy   [id number] Lima Miller actively shared in [facility] group focused on emotions and perception  Group explored development of thoughts based on perception and ways to make choices related to view we take of situations and self  He was open and able to apply concepts to himself  He identified he is very hard on himself and judges how he feels  He felt the reminder that he can "choose how he allows situations to affect him" helpful  Group discussed strategies to explore ways to reframe perception to helpful versus hurtful using reality testing strategies  Some positive work toward goal noted  Continue AT to explore wellness strategies and personal role in reframing thoughts       Tx Plan Objective: 1 2, Therapist:  Ross MCDERMOTT    Education Therapy   Time:  0082-0257  Previous goal met: Yes   Readiness to Learning: Receptive  Barriers to Learning: None  Learning Assessment  Time: 2322-0015  Education Completed: Illness and Wellness Tools  Teaching Method: Verbal  Shared Area of Learning: Yes   Goal Set: be consistent with meds/walk and ask neighbor for help  Tx Plan Objective: 1 4, Therapist:  Ross MCDERMOTT

## 2018-07-27 NOTE — PSYCH
Subjective:     Patient ID: Celeste Garcia is a 40 y o  male  [facility] Clinical Progress Notes      Specialized Services Documentation  Therapist must complete separate progress note for each specific clinical activity in which the individual participated during the day  Group Psychotherapy   ([id number]) Harman Bautista participated in psychotherapy group focused on mental health stigma, and personal responsibility for making changes in one's life  Harman Bautista identified believing he is having side effects from his meds as a stressor today  He actively engaged in group discussion, relating to peers that talked about losing friends and supports because of a lack of understanding of their symptoms/behaviors related to their mental illness  He said that he has some family support, but a lot of his family and friends cannot give him the support he needs because they do not understand  He said that when he feels bad, he isolates at home because he does not want people to give him unhelpful advice  Group also discussed the importance of taking responsibility for one's behaviors/choices and for taking action to make healthy changes  Harman Bautista noted that he has chosen to avoid friends and family that continue their heavy drinking, as he does not want to drink himself and feels better just avoiding the situation  Moderate progress noted toward goals  Continue psychotherapy group to encourage Harman Bautista to explore stressors and healthier ways of coping    Tx Plan Objective: 1 1, 1 2, Therapist:  Terry FOURNIER

## 2018-07-30 ENCOUNTER — OFFICE VISIT (OUTPATIENT)
Dept: PSYCHOLOGY | Facility: CLINIC | Age: 38
End: 2018-07-30
Payer: MEDICARE

## 2018-07-30 DIAGNOSIS — F40.10 SOCIAL PHOBIA: Primary | ICD-10-CM

## 2018-07-30 PROCEDURE — G0176 OPPS/PHP;ACTIVITY THERAPY: HCPCS

## 2018-07-30 PROCEDURE — G0410 GRP PSYCH PARTIAL HOSP 45-50: HCPCS

## 2018-07-30 PROCEDURE — G0177 OPPS/PHP; TRAIN & EDUC SERV: HCPCS

## 2018-07-30 NOTE — PSYCH
Subjective:     Patient ID: Iain Palma is a 40 y o  male  [facility] Clinical Progress Notes      Specialized Services Documentation  Therapist must complete separate progress note for each specific clinical activity in which the individual participated during the day  Group Psychotherapy  [id number]  Kalee participated in wellness group focused on mind and body connection  Education included the effects of emotional and mental health issues on physical health and overall wellness  Kalee shared that he has a problem with anxiety, physically and mentally, and this has created behaviors to cope that have not helped him but "made things worse " Kalee described avoiding people, places and things that trigger anxiety and panic attacks  Kalee described feeling high levels of anxiety and coping primarily with isolation and avoidance to the point that he was not going out of the house  Kalee has been learning and practicing CBT skills and is now reporting he is regularly walking around the block and gradually densensitizing against his fears and avoidance  Kalee discussed using positive self-talk rather than "negative scare talk" and has seen that this is helping him stay in the moment and not continue to "run away fast like a bank robber when I am anxious and having panic " Kalee made good progress toward goals  Handouts shared included research and actual CBT strategies that can be implemented to improve coping and creating improved overall wellness  Continue group to educate patient on healthy ways to cope with emotional and mental health stress in an effort to optimize overall wellness  Tx Plan Objective: 1 1,1 2   Therapist:  Maru Waterman RN                  Case Management Note [id number]  Kalee met with this CM/RN to review progress in Program  He requested to discuss side effects of his medications   Kalee stated that he is still feeling "dizzy" or somewhat "spacey" when taking Tegretol at 5PM then Zyprexa at   He said that he is a "worrier" and that his mother, who is a nurse, had reassured him that it is "too early" to tell whether these are side effects that will stay with him since he just started the medications one week ago  Kate Graves stated: "I am a hypochondriac and a worrier" so I am always worrying  Kate Ely denied that he has had any side effects that have caused him any harm such as losing his balance and falling  Kate Graves stated that he will relax and "take it easy" after taking medications at home  He stated that he has increased appetite taking Zyprexa and discussed with this CM ways to address increased appetite such as drinking more water or planning healthy snacks when hungry rather than get up during the night and eat candy bars as he has done  Kate Graves denied SI and HI  Sandi Trejo RN    Current suicide risk : Low     Medications changes/added/denied? No    Treatment session number: 5    Individual Case Management Visit provided today?  Yes     [facility] follow up physician's orders: N/A

## 2018-07-30 NOTE — PSYCH
Subjective:     Patient ID: Janet Chavis is a 40 y o  male  [facility] Clinical Progress Notes      Specialized Services Documentation  Therapist must complete separate progress note for each specific clinical activity in which the individual participated during the day  Allied Therapy   [id number] Joel Merritt actively shared in [facility] group focused on DBT skill wise mind  He engaged in tasks exploring differences between reasonable and emotion mind and ways to get to wise mind  Group explored the benefits of mindfulness and practiced ways to slow down to begin to develop arenas mind  Joel Merritt shared that he tends to get stuck in emotion mind and that his sister calls him a "drama thapa"  Benefits of being both reasonable and emotional discussed with the value of being able to balance in order to make healthy choices  Group reinforced role of participating with wise mind in order to prevent getting stuck in the past or fears of the future  Some positive effort toward treatment goal noted  Continue AT to encourage healthy skill development and practice of explored strategies       Tx Plan Objective: 1 1,1 2, Therapist:  Alessio Diaz [facility]    Education Therapy   Time:  1831-8737  Previous goal met: Yes   Readiness to Learning: Receptive  Barriers to Learning: None  Learning Assessment  Time: 2394-5509  Education Completed: Illness and Wellness Tools  Teaching Method: Verbal and Demonstration  Shared Area of Learning: Yes   Goal Set: look for a comic book he is missing and if it is "ruined" not react  Tx Plan Objective: 1 1,1 2, Therapist:  Alessio Diaz [facility]
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
## 2018-07-31 ENCOUNTER — OFFICE VISIT (OUTPATIENT)
Dept: PSYCHOLOGY | Facility: CLINIC | Age: 38
End: 2018-07-31
Payer: MEDICARE

## 2018-07-31 DIAGNOSIS — F43.10 POSTTRAUMATIC STRESS DISORDER: ICD-10-CM

## 2018-07-31 DIAGNOSIS — F31.63 BIPOLAR DISORDER, CURRENT EPISODE MIXED, SEVERE, WITHOUT PSYCHOTIC FEATURES (HCC): ICD-10-CM

## 2018-07-31 DIAGNOSIS — F40.01 PANIC DISORDER WITH AGORAPHOBIA: ICD-10-CM

## 2018-07-31 DIAGNOSIS — F41.1 GAD (GENERALIZED ANXIETY DISORDER): Primary | ICD-10-CM

## 2018-07-31 PROCEDURE — G0177 OPPS/PHP; TRAIN & EDUC SERV: HCPCS

## 2018-07-31 PROCEDURE — G0176 OPPS/PHP;ACTIVITY THERAPY: HCPCS

## 2018-07-31 PROCEDURE — G0410 GRP PSYCH PARTIAL HOSP 45-50: HCPCS

## 2018-07-31 NOTE — PSYCH
Subjective:     Patient ID: Leidy Hill is a 40 y o  male  [facility] Clinical Progress Notes      Specialized Services Documentation  Therapist must complete separate progress note for each specific clinical activity in which the individual participated during the day  Allied Therapy   [id number] Jeradadelaida Turner actively shared in [facility] group focused on motivation  He engaged in [place] discussion and tasks exploring definition of, challenges to and ways to improve motivation  Group explored CBT, DBT, and BA techniques to counteract limiting beliefs and set self up for success  He identified how his anxiety himself can frequently hold him back  Ways to challenge limiting beliefs practiced and opposite action explored  Some positive effort toward goals noted  Continue AT to explore wellness tools and encourage active practice       Tx Plan Objective: 1 1,1 2, Therapist:  Stevenson MCDERMOTT    Education Therapy   Time:  1558-1122  Previous goal met: Yes   Readiness to Learning: Receptive  Barriers to Learning: None  Learning Assessment  Time: 6414-7361  Education Completed: Illness and Wellness Tools  Teaching Method: Verbal  Shared Area of Learning: Yes   Goal Set: continue with afternoon routine and listen to music  Tx Plan Objective: 1 1,1 2, Therapist:  Stevenson MCDERMOTT

## 2018-07-31 NOTE — PSYCH
Subjective:     Patient ID: Wander Manzano is a 40 y o  male  [facility] Clinical Progress Notes      Specialized Services Documentation  Therapist must complete separate progress note for each specific clinical activity in which the individual participated during the day  Group Psychotherapy [id number]  Janusz Mills participated in wellness group exploring each individuals style of negative self-talk and how to counter negative self-talk  Janusz Mills identified that his negative thinking and self-talk has changed from "fearful thinking constantly including worrying all the time" to learning and practicing ways to think in more positive ways such as "I can do this" rather than "I can't do this " Janusz Mills stated that he can identify with handout's observation that a person can have thousands of negative thoughts daily  Janusz Mills stated that he understands the connection physically between thinking negative thoughts and having physical symptoms like high anxiety or a panic attack  Janusz Mills made good progress toward goal  Continue to offer group to educate on the role of self-talk in either reinforcing anxiety or reversing anxiety by practicing more positive self-talk  Tx Plan Objective: 1 1,1 2   Therapist:  Sandie Murrell RN                  Case Management Note  [id number]  Janusz Mills met briefly with this CM/RN to discuss how much water he should be drinking in a day  Janusz Mills had been encouraged to increase water consumption by [employer] but is anxious that he may   be drinking "too much water " Discussed recommended water consumption daily with Janusz Mills who denied SI and HI as well as any SE from his medications  Sandie Murrell RN    Current suicide risk : Low     Medications changes/added/denied? No    Treatment session number: 6    Individual Case Management Visit provided today?  Yes     [facility] follow up physician's orders: N/A
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
## 2018-08-01 ENCOUNTER — OFFICE VISIT (OUTPATIENT)
Dept: PSYCHIATRY | Facility: CLINIC | Age: 38
End: 2018-08-01
Payer: MEDICARE

## 2018-08-01 DIAGNOSIS — F43.10 POSTTRAUMATIC STRESS DISORDER: ICD-10-CM

## 2018-08-01 DIAGNOSIS — Z93.2 STATUS POST ILEOSTOMY (HCC): ICD-10-CM

## 2018-08-01 DIAGNOSIS — F40.01 PANIC DISORDER WITH AGORAPHOBIA: ICD-10-CM

## 2018-08-01 DIAGNOSIS — F31.9 BIPOLAR 1 DISORDER (HCC): ICD-10-CM

## 2018-08-01 DIAGNOSIS — F41.1 GAD (GENERALIZED ANXIETY DISORDER): ICD-10-CM

## 2018-08-01 DIAGNOSIS — F63.81 INTERMITTENT EXPLOSIVE DISORDER: Primary | ICD-10-CM

## 2018-08-01 PROCEDURE — 99213 OFFICE O/P EST LOW 20 MIN: CPT | Performed by: PSYCHIATRY & NEUROLOGY

## 2018-08-01 RX ORDER — OLANZAPINE 10 MG/1
10 TABLET ORAL
Qty: 30 TABLET | Refills: 2 | Status: SHIPPED | OUTPATIENT
Start: 2018-08-01 | End: 2018-10-05 | Stop reason: SDUPTHER

## 2018-08-01 RX ORDER — CARBAMAZEPINE 400 MG/1
400 TABLET, EXTENDED RELEASE ORAL
Qty: 30 TABLET | Refills: 2 | Status: SHIPPED | OUTPATIENT
Start: 2018-08-01 | End: 2018-10-05 | Stop reason: SDUPTHER

## 2018-08-01 RX ORDER — CLONAZEPAM 1 MG/1
1 TABLET ORAL 3 TIMES DAILY
Qty: 90 TABLET | Refills: 2 | Status: SHIPPED | OUTPATIENT
Start: 2018-08-01 | End: 2018-11-08 | Stop reason: SDUPTHER

## 2018-08-01 NOTE — PSYCH
Subjective: Medication Management      Patient ID: Janet Chavis is a 40 y o  male  HPI ROS Appetite Changes and Sleep: normal appetite, decreased energy, no weight change and normal number of sleep hours   Patient is currently participating at [facility] and stated it has helped him to be in a group setting and he is learning tools to manage his anxiety  He is appealing SSI denial       Review Of Systems:     Mood Emotional Lability   Behavior Normal    Thought Content Disturbing Thoughts, Feelings and Unreasonalbe or Irrational Fears   General Relationship Problems, Emotional Problems and Decreased Functioning   Personality Normal   Other Psych Symptoms Normal   Constitutional Negative   ENT Negative   Cardiovascular Negative   Respiratory Negative   Gastrointestinal Negative   Genitourinary Negative   Musculoskeletal Negative   Integumentary Negative   Neurological Negative   Endocrine Normal    Other Symptoms Normal              Laboratory Results: No results found for this or any previous visit  Substance Abuse History:  History   Drug Use No       Family Psychiatric History:   Family History   Problem Relation Age of Onset    Schizophrenia Father     Alcohol abuse Father     Drug abuse Brother        The following portions of the patient's history were reviewed and updated as appropriate: allergies, current medications, past family history, past medical history, past social history, past surgical history and problem list     Social History     Social History    Marital status: Single     Spouse name: N/A    Number of children: N/A    Years of education: N/A     Occupational History    Not on file       Social History Main Topics    Smoking status: Current Some Day Smoker    Smokeless tobacco: Never Used      Comment: he smoke only 1 cigarette a day     Alcohol use No      Comment: last use 2012    Drug use: No    Sexual activity: Not Currently     Other Topics Concern    Not on file Social History Narrative    No narrative on file     Social History     Social History Narrative    No narrative on file       Objective:       Mental status:  Appearance calm and cooperative , adequate hygiene and grooming and good eye contact    Mood depressed and anxious   Affect affect was constricted   Speech a normal rate   Thought Processes coherent/organized and normal thought processes   Hallucinations no hallucinations present    Thought Content no delusions   Abnormal Thoughts no suicidal thoughts  and no homicidal thoughts    Orientation  oriented to person and place and time   Remote Memory short term memory intact and long term memory intact   Attention Span concentration impaired   Intellect Appears to be of Average Intelligence   Insight Limited insight   Judgement judgment was limited   Muscle Strength Muscle strength and tone were normal and Normal gait    Language no difficulty naming common objects, no difficulty repeating a phrase  and no difficulty writing a sentence    Fund of Knowledge displays adequate knowledge of current events, adequate fund of knowledge regarding past history and adequate fund of knowledge regarding vocabulary    Pain none   Pain Scale 0       Assessment/Plan:       Diagnoses and all orders for this visit:    Intermittent explosive disorder    Bipolar 1 disorder (HCC)  -     carBAMazepine (TEGretol XR) 400 mg 12 hr tablet; Take 1 tablet (400 mg total) by mouth daily at bedtime  -     OLANZapine (ZyPREXA) 10 mg tablet; Take 1 tablet (10 mg total) by mouth daily at bedtime    Panic disorder with agoraphobia  -     clonazePAM (KlonoPIN) 1 mg tablet; Take 1 tablet (1 mg total) by mouth 3 (three) times a day    RADHA (generalized anxiety disorder)  -     clonazePAM (KlonoPIN) 1 mg tablet;  Take 1 tablet (1 mg total) by mouth 3 (three) times a day    Posttraumatic stress disorder    Status post ileostomy [facility])            Treatment Recommendations- Risks Benefits Immediate Medical/Psychiatric/Psychotherapy Treatments and Any Precautions: continue current medications     Risks, Benefits And Possible Side Effects Of Medications:  {PSYCH RISK, BENEFITS AND POSSIBLE SIDE EFFECTS (Optional):01840    Controlled Medication Discussion: Discussed with patient Black Box warning on concurrent use of benzodiazepines and opioid medications including sedation, respiratory depression, coma and death  Patient understands the risk of treatment with benzodiazepines in addition to opioids and wants to continue taking those medications  , Discussed with patient the risks of sedation, respiratory depression, impairment of ability to drive and potential for abuse and addiction related to treatment with benzodiazepine medications  The patient understands risk of treatment with benzodiazepine medications, agrees to not drive if feels impaired and agrees to take medications as prescribed   and The patient has been filling controlled prescriptions on time as prescribed to [facility]  program

## 2018-08-01 NOTE — PSYCH
Marah Bolden RN Registered Nurse Addendum   Psych Encounter Date: 8/2/2018   Psych          Diagnoses and all orders for this visit:   Panic disorder with agoraphobia   RADHA (generalized anxiety disorder)   Bipolar disorder, current episode mixed, severe, without psychotic features ([facility] [address] )   Posttraumatic stress disorder        Subjective:     Patient ID: Cortez Bello      [facility] Treatment Plan   AREAS OF NEED:    Date Initiated: 7/24/18     Strengths: Takes medication regularly and as ordered, motivated to decrease symptoms of anxiety and avoidant behaviors     LONG TERM GOAL:   Date Initiated: 7/24/18  1 0 I will identify three healthy coping strategies I can use to decrease my anxiety and increase my productivity and I will practice using at least two of these strategies daily  Target Date: 9/4/18  Revision Date:  Completion Date:      SHORT TERM OBJECTIVES:    Date Initiated: 7/24/18  1 1 I will identify and practice daily three healthy ways to refocus myself when I am triggered by thoughts of fear and anxiety so that I can accomplish my activities of daily living without panic attacks or avoidance  Revision Date: 8/2/18  Target Date: 8/2/18  Completion Date:      Date Initiated: 7/24/18  1 2 I will write out three healthy coping skills learned in program to reduce my symptoms of anger, depression and anxiety and I will practice at least one of them daily  Revision Date: 8/2/18  Target Date: 8/2/18  Completion Date:     Date Initiated:  7/24/18  1 3 I will take medications as prescribed and share questions or concerns when, or if, they arise  I will have labs done as ordered by Program Psychiatrist as soon as I can complete  Revision Date: 8/2/18  Target Date: 8/2/18  Completion Date:      Date Initiated: 7/24/18    1 4 I will name three supports and I will identify specific ways my supports can assist me in my recovery from mental illness     Revision Date: 8/2/18  Target Date: 18  Completion Date:    Date Initiated:  18  1 1,1 2,1 3,1 4  Continue to work on treatment goals as they remain relevant  Revision Date:  Target Date:  18  Completion Date:    Date Initiated:  18  1 5  I will identify one activity I can do on a regular basis, every week, to help add personal satisfaction and productive structure to my life and to decrease my isolation  Revision Date:  Target Date: 18  Completion Date:       PSYCHIATRY:  Medication Management and education  Date Initiated: 18  Revision Date: 18  The person(s) responsible for carrying out the plan is Rudi Aguilar MD     NURSIN 1,1 2,1 3,1 4 This RN will provide daily wellness group five days weekly to educate Kalee on individual diagnoses and medications used in treatment  Date Initiated: 18  Revision Date: 18  The person(s) responsible for carrying out the plan is Jolie Vázquez RN     PSYCHOLOGY:   1 1,1 2,1 3,1 4 Provide psychotherapy group five times weekly to allow opportunity for Kalee to explore stressors and ways of coping  Date Initiated: 18  Revision Date: 18  The person(s) responsible for carrying out the plan is IRLANDA Fong,HILDAW     ALLIED THERAPY:   1 1,1 2  Engage Marco Antonio in AT group five times weekly to encourage development and use of wellness tools to decrease symptoms and promote recovery through meaningful activity  Date Initiated: 18  Revision Date: 18  The person(s) responsible for carrying out the plan is BALDEMAR Cartagena      CASE MANAGEMENT:  Date Initiated: 18  Revision Date: 18  1 0 This  will meet with Kalee three to four times weekly to assess treatment progress, D/C planning and connection to community supports and UR as indicated     The person(s) responsible for carrying out the plan is Jolie Vázquez RN     DISCHARGE CRITERIA: Identify 3 signs of progress and complete relapse prevention plan      DISCHARGE PLAN: Kalee will return to prior outpatient providers at [facility] [address]     Estimated Length of Stay: 10 days     DSM Diagnoses    Panic disorder with agoraphobia       RADHA (generalized anxiety disorder)       Bipolar disorder, current episode mixed, severe, without psychotic features ([address] )       Posttraumatic stress disorder       High risk medication use            Diagnosis and Treatment Plan explained to relates understanding diagnosis and is agreeable to Treatment Plan  Diagnosis and Treatment Plan explained to David Cam relates understanding diagnosis and is agreeable to Treatment Plan  Diagnosis and Treatment Plan explained to David Cam relates understanding diagnosis and is agreeable to Treatment Plan             CLIENT COMMENTS / Please share your thoughts, feelings, need and/or experiences regarding your treatment plan: _____________________________________________________________________________________________________________________________________________________________________________________________________________________________________________________________________________________________________________________ Date/Time: ______________     Patient Signature: _________________________________     Date/Time: ______________      Signature: _________________________________     Date/Time: ______________

## 2018-08-02 ENCOUNTER — OFFICE VISIT (OUTPATIENT)
Dept: PSYCHOLOGY | Facility: CLINIC | Age: 38
End: 2018-08-02
Payer: MEDICARE

## 2018-08-02 VITALS — SYSTOLIC BLOOD PRESSURE: 147 MMHG | TEMPERATURE: 98 F | DIASTOLIC BLOOD PRESSURE: 101 MMHG | HEART RATE: 66 BPM

## 2018-08-02 DIAGNOSIS — F40.01 AGORAPHOBIA WITH PANIC DISORDER: Primary | ICD-10-CM

## 2018-08-02 PROCEDURE — G0410 GRP PSYCH PARTIAL HOSP 45-50: HCPCS

## 2018-08-02 PROCEDURE — G0177 OPPS/PHP; TRAIN & EDUC SERV: HCPCS

## 2018-08-02 PROCEDURE — G0176 OPPS/PHP;ACTIVITY THERAPY: HCPCS

## 2018-08-02 NOTE — PSYCH
Subjective:     Patient ID: Amirah Christopher is a 40 y o  male  [facility] Clinical Progress Notes      Specialized Services Documentation  Therapist must complete separate progress note for each specific clinical activity in which the individual participated during the day  Allied Therapy   [id number] Jeovany Hoff actively shared in [facility] group focused DBT Module Interpersonal Effectiveness and [facility] skill  Engaged in tasks exploring what makes it difficult to share and strategies to improve with supports  Comeli Hoff participated in [facility] discussion and communication roadblocks  He shared that he frequently jumps to conclusions when interacting with others which negatively impacts his reactions offering examples  He was an active participant as group worked together to practice writing out a meaningful interaction using [facility]  He felt the exercise helped him think before responding  Positive progress toward goal noted  Continue AT to encourage sharing, personal advocacy and practice of wellness tools         Tx Plan Objective: 1 2,1 5, Therapist:  Alexandria MCDERMOTT    Education Therapy   Time:  6441-2693  Previous goal met: Yes   Readiness to Learning: Receptive  Barriers to Learning: None  Learning Assessment  Time: 4439-1489  Education Completed: Illness and Wellness Tools  Teaching Method: Verbal and Demonstration  Shared Area of Learning: Yes   Goal Set: listen to music, draw, and fold laundry  Tx Plan Objective: 1 2,1 1, Therapist:  Alexandria MCDERMOTT

## 2018-08-02 NOTE — PSYCH
Subjective:     Patient ID: Celeste Garcia is a 40 y o  male  [facility] Clinical Progress Notes      Specialized Services Documentation  Therapist must complete separate progress note for each specific clinical activity in which the individual participated during the day  Group Psychotherapy [id number]  Haramn Bautista participated in weekly wellness group to discuss what particular area of wellness  has been worked on up to today in Program and choice of area to continue working on for recovery as targeted in treatment plan, by choice, or in [place] [facility] [employer]  Harman Bautista shared that he continues to work on decreasing isolation and "hiding in the house " He stated that he is still spending a great deal in the home cleaning and doing chores but that his goal for this weekend will be to go out both Sat  And Sun  And instead of walking around the block he will go to a park  Harman Bautista has already researched a park he can go to that he feels is "not too big and not too crowded " He stated that his goal for the weekend and next week is to stay out of the house one hour daily, an increase of 1/2 hour walk daily  Harman Bautista made good progress toward goals  Continue to offer weekly wellness group to focus on goals and identify areas of goal achievement and need  Tx Plan Objective: 1 1,1 2   Therapist:  Ishmael Landau RN              Case Management Note    Ishmael Landau RN    Current suicide risk : Low     Medications changes/added/denied? No    Treatment session number: 8    Individual Case Management Visit provided today?  No    [facility] follow up physician's orders: N/A

## 2018-08-02 NOTE — PSYCH
Subjective:     Patient ID: Amirah Christopher is a 40 y o  male  [facility] Clinical Progress Notes      Specialized Services Documentation  Therapist must complete separate progress note for each specific clinical activity in which the individual participated during the day  Group Psychotherapy  ([phone]) Jeovany Hoff participated in psychotherapy group focused on using coping skills outside of program, and the importance of rest and routine  Jeovany Hoff identified going to see his mother at her job after program as a stressor today, as he does not get to see her often  He actively engaged in group discussion, noting that he likes routine because it helps him feel like he is productive and less anxious  He shared that he missed having the routine of the program yesterday on his day off, and peers gave him suggestions for how to incorporate structure into his "off days " Group discussed the importance of practicing coping skills learned in program in their day to day life, as well as resting and incorporating "fun" activities into their routine to balance their lives  Some moderate progress noted toward goals today  Continue psychotherapy group to encourage Jeovany Hoff to explore stressors and coping    Tx Plan Objective: 1 1, 1 2, Therapist:  Joann FOURNIER

## 2018-08-02 NOTE — PSYCH
Subjective:     Patient ID: Wander Manzano is a 40 y o  male  [facility] Clinical Progress Notes      Specialized Services Documentation  Therapist must complete separate progress note for each specific clinical activity in which the individual participated during the day  Group Psychotherapy [id number]  Janusz Mills participated in wellness group focused on learning how to, and practicing, speaking to oneself with a nurturing voice to self-sooth and decrease anxiety and negative thinking  Janusz Mills was attentive to education and was observed highlighting several passages in handout  He actively participated in sharing that he is very self-critical and does not positively affirm himself but is critical of himself most of the time  Janusz Mills stated that his family is also critical of him so he feels even more negative when he is home  Janusz Mills discussed with peers how he can become more understanding and more kind to himself and positively reinforce the good I do and the good he has going for him  Janusz Mills discussed having a birthday coming up but no one celebrating his birthday in the past and how hurtful this is for him  Janusz Mills stated that this year he will make a BBQ with his favorite foods and be proactive and celebrate his own birthday rather than waiting for others to celebrate his birthday and be disappointed again  Janusz Mills identified that this is being healthier taking action in positive ways  He made good progress toward goals  Continue group to offer education and practice in using self-compassion as a CBT strategy to decrease negative self-talk and increase healthy coping and self-esteem  Tx Plan Objective: 1 1,1 21 4    Therapist:  Sandie Murrell RN              Case Management Note [id number]  Janusz Mills met with this CM to discuss his meeting with OP psychiatrist yesterday  Janusz Mills also reviewed and signed treatment plan update and received a copy    He will work on identifying a healthy routine or schedule that he will follow after [address] to decrease isolation and to help him to set boundaries on "my constant worrying " Janusz Mills denied SI and HI as well as any side effects from medications  Janusz Mills stated that he met with OP psychiatrist yesterday and no change of medication was ordered  Sandie Murrell RN    Current suicide risk : Low     Medications changes/added/denied? No    Treatment session number: 7    Individual Case Management Visit provided today?  Yes     [facility] follow up physician's orders: N/A

## 2018-08-03 ENCOUNTER — OFFICE VISIT (OUTPATIENT)
Dept: PSYCHOLOGY | Facility: CLINIC | Age: 38
End: 2018-08-03
Payer: MEDICARE

## 2018-08-03 DIAGNOSIS — F40.01 AGORAPHOBIA WITH PANIC DISORDER: Primary | ICD-10-CM

## 2018-08-03 PROCEDURE — G0410 GRP PSYCH PARTIAL HOSP 45-50: HCPCS

## 2018-08-03 PROCEDURE — G0177 OPPS/PHP; TRAIN & EDUC SERV: HCPCS

## 2018-08-03 PROCEDURE — G0176 OPPS/PHP;ACTIVITY THERAPY: HCPCS

## 2018-08-03 NOTE — PSYCH
Subjective:     Patient ID: Iain Palma is a 40 y o  male  [facility] Clinical Progress Notes      Specialized Services Documentation  Therapist must complete separate progress note for each specific clinical activity in which the individual participated during the day  Group Psychotherapy  ([id number]) Cesar Thomas participated in psychotherapy group focused on ways to increase motivation, as well as recognizing the temporary nature of emotions  Cesar Thomas identified wanting to have a conversation with his brother tonight, but being anxious about his temper as a stressor today  Peers encouraged him to consider talking to his brother when he is in a better mental place than when he is angry or drinking  Group discussed ways to increase motivation, such as focusing on the "want to" that one finds in an activity/task versus the "have to" or "shoulds "  Group also discussed the fleeting nature of feelings, encouraging each other to recognize that anxiety, sadness and such will pass and that they will eventually feel better  Cesar Thomas provided good feedback and support to peers today, although he did not share how he would apply the concepts to his own life  Mild progress noted toward goals  Continue psychotherapy group to encourage Cesar Thomas to explore stressors and coping    Tx Plan Objective: 1 1, 1 2, Therapist:  Yimi FOURNIER

## 2018-08-03 NOTE — PSYCH
Subjective:     Patient ID: Fabian Verdugo is a 40 y o  male  [facility] Clinical Progress Notes      Specialized Services Documentation  Therapist must complete separate progress note for each specific clinical activity in which the individual participated during the day  Allied Therapy   [id number] Fam Berumen actively shared in [facility] group exploring DBT skill changing emotional responses  He engaged easily in task sharing triggers and responses to given emotions  Group explored differences between justified and unjustified emotions to prompting events and effective versus ineffective responses  With weekend approach, group explored risk of feeling lazy and Fam Berumen was able to given an example why laziness may be a risk for him  Group reviewed skill Opposite Action  ie  feel lazy  get active and productive weekend choices offered  Some effort and progress noted toward goals  Continue AT to explore healthy emotional regulation and role in practicing wellness tools     Tx Plan Objective: 1 5, Therapist:  Diana MCDERMOTT    Education Therapy   Time:  1275-3065  Previous goal met: Yes   Readiness to Learning: Receptive  Barriers to Learning: None  Learning Assessment  Time: 6994-1603  Education Completed: Illness and Wellness Tools  Teaching Method: Verbal  Shared Area of Learning: Yes   Goal Set: take a 1 hour walk   Tx Plan Objective: 1 5, Therapist:  Diana MCDERMOTT

## 2018-08-06 ENCOUNTER — OFFICE VISIT (OUTPATIENT)
Dept: PSYCHOLOGY | Facility: CLINIC | Age: 38
End: 2018-08-06
Payer: MEDICARE

## 2018-08-06 DIAGNOSIS — F41.1 GENERALIZED ANXIETY DISORDER: Primary | ICD-10-CM

## 2018-08-06 PROCEDURE — G0177 OPPS/PHP; TRAIN & EDUC SERV: HCPCS

## 2018-08-06 PROCEDURE — G0176 OPPS/PHP;ACTIVITY THERAPY: HCPCS

## 2018-08-06 PROCEDURE — G0410 GRP PSYCH PARTIAL HOSP 45-50: HCPCS

## 2018-08-06 NOTE — PSYCH
Subjective:     Patient ID: Lety Ayers is a 40 y o  male  [facility] Clinical Progress Notes      Specialized Services Documentation  Therapist must complete separate progress note for each specific clinical activity in which the individual participated during the day  Allied Therapy   [id number] Lima Paul actively engaged in [facility] group focused on mindfulness strategies with focus on the "what skills"  He appeared to engage in progressive muscle relaxation technique  Group explored practice of what mindfulness practice is through observing, describing and participating in a it[place] listening, sharing, and then engaging in song  Lima Miller completed task and offered appropriate feedback  He shared gaining from the concept of "attachment to thoughts versus acknowledging and not holding on"  He continues to put effort into discussions and activities  Group explore how practicing these strategies helps make "wise" choices  Positive progress observed toward goal  Continue AT to encourage the development and practice of mindfulness strategies to alleviate symptoms and support wellness   Tx Plan Objective: 1 1,1 2, Therapist:  Ross MCDERMOTT    Education Therapy   Time:  9410-5298  Previous goal met: Yes   Readiness to Learning: Receptive  Barriers to Learning: None  Learning Assessment  Time: 3589-6132  Education Completed: Illness and Wellness Tools  Teaching Method: Verbal and Demonstration  Shared Area of Learning: Yes   Goal Set: walk to the pharmacy to  prescriptions (versus procrastination)  Tx Plan Objective: 1 3,1 5, Therapist:  Ross MCDERMOTT

## 2018-08-06 NOTE — PSYCH
Subjective:     Patient ID: Kristi Smoker is a 40 y o  male  [facility] Clinical Progress Notes      Specialized Services Documentation  Therapist must complete separate progress note for each specific clinical activity in which the individual participated during the day  Group Psychotherapy [id number]  Enedina Corral participated in [facility] group focused on things that are being put off such as working on treatment goals, WRAP or other designated ADLs  Enedina Corral shared that he procrastinates going places when he feels anxious  He will want to do something then will put it off due to agoraphobia  Enedina Corral stated that he is working on desensitizing himself from avoidance of things that make him anxious such as going to supermarket shopping as he had a history of having panic attacks there  Enedina Corral stated that he has learned not to procrastinate doing things, such as insurance, that needs to be done in a timely manner because there are "bad consequences" if I don't do it  Enedina Corral made good progress toward goals  Continue group to provide both education and practice in skills that foster wellness and decrease self-defeating behaviors such as procrastination  Tx Plan Objective: 1 1,1 2   Therapist:  Dmitriy Coleman RN                  Case Management Note [id number]  Enedina Corral met with this CM/RN to discuss discharge planning as his outpatient therapist appointment was cancelled with Lauren Patel LCSW this week as Enedina Corral continues to attend [facility]  Enedina Corral stated that he is learning and growing from what he is learning and practicing here at [address] [place] [address]  He stated that he did walk to the public park, as he had planned, over the weekend but that due to allergies he had to return home quickly and could not stay any length of time in the park   Enedina Corrla will begin to go to the supermarket daily and buy one thing this week and increase the amount of time he is in the supermarket until his anxiety has decreased to have a manageable level of anxiety so he can buy all his groceries in one trip as his goal  [facility] denied SI and HI  He stated that he is taking medication as ordered  Adriel Alfonso RN    Current suicide risk : Low     Medications changes/added/denied? No    Treatment session number: 9    Individual Case Management Visit provided today?  Yes     [facility] follow up physician's orders: N/A

## 2018-08-07 ENCOUNTER — OFFICE VISIT (OUTPATIENT)
Dept: PSYCHOLOGY | Facility: CLINIC | Age: 38
End: 2018-08-07
Payer: MEDICARE

## 2018-08-07 DIAGNOSIS — F31.63 BIPOLAR DISORDER, CURRENT EPISODE MIXED, SEVERE, WITHOUT PSYCHOTIC FEATURES (HCC): ICD-10-CM

## 2018-08-07 DIAGNOSIS — F41.1 GAD (GENERALIZED ANXIETY DISORDER): ICD-10-CM

## 2018-08-07 DIAGNOSIS — F43.10 POSTTRAUMATIC STRESS DISORDER: Primary | ICD-10-CM

## 2018-08-07 DIAGNOSIS — F63.81 INTERMITTENT EXPLOSIVE DISORDER: ICD-10-CM

## 2018-08-07 DIAGNOSIS — F40.01 PANIC DISORDER WITH AGORAPHOBIA: ICD-10-CM

## 2018-08-07 PROCEDURE — G0177 OPPS/PHP; TRAIN & EDUC SERV: HCPCS

## 2018-08-07 PROCEDURE — G0410 GRP PSYCH PARTIAL HOSP 45-50: HCPCS

## 2018-08-07 PROCEDURE — G0176 OPPS/PHP;ACTIVITY THERAPY: HCPCS

## 2018-08-07 NOTE — PSYCH
Subjective:     Patient ID: Obed Bustos is a 40 y o  male  [facility] Clinical Progress Notes      Specialized Services Documentation  Therapist must complete separate progress note for each specific clinical activity in which the individual participated during the day  Group Psychotherapy [id number]  Long Branchfidel Pacheco participated in [facility] group focused on research that has shown a number of mental health conditions can be improved with a healthier diet  Handouts were shared to complete regarding individual knowledge of nutrition and to identify where changes can be made to improve daily diet and eating habits  Long Branchfidel Pacheco shared that his appetite increased with Zyprexa and that he had been getting up at night and snacking on sugar snacks such as candy bars  He stated that now he is getting up and drinking water instead and does not eat until morning and is able to go back to sleep without eating at all  [place] Tara stated that he is also trying to drink more water during the day when he has an urge to snack or to take fruit rather than sugar snacks  Francisco Mcgovernjohn made good progress toward goals  Continue to offer group to provide education and another strategy that can be utilized to increase mental health, as well as physical, wellness  Tx Plan Objective: 1 1,1 2   Therapist:  Keny Holcomb RN                  Case Management Note    Keny Holcomb RN    Current suicide risk : Low     Medications changes/added/denied? No    Treatment session number: 10    Individual Case Management Visit provided today?  No    [facility] follow up physician's orders: N/A

## 2018-08-07 NOTE — PSYCH
Subjective:     Patient ID: Fabian Verdugo is a 40 y o  male  [facility] Clinical Progress Notes      Specialized Services Documentation  Therapist must complete separate progress note for each specific clinical activity in which the individual participated during the day  Allied Therapy   0414-2269 Doc Pastel actively shared in [facility] group exploring DBT distress tolerance crisis survival strategies  Discussion and review of crisis survival strategies ACCEPTS, SELF-SOOTHING, IMPROVE and PROS & CONS) reinforced concepts and actions one could take to learn to tolerate stressful experiences, thoughts and urges  He reported benefitting from review of IMPROVE  the skills focused on improving the moment  He shared that personal encouragement has always been a challenge for him, but he is starting to work on this  He also reported gaining from the Imagery exercise  Some positive progress toward goal noted  Continue AT to encourage learning, practice and home practice of skills to manage distress      Tx Plan Objective: 1 2,1 1, Therapist:  Diana MCDERMOTT    Education Therapy   Time:  9324-7710  Previous goal met: Yes   Readiness to Learning: Receptive  Barriers to Learning: None  Learning Assessment  Time: 7229-4406  Education Completed: Illness and Wellness Tools  Teaching Method: Verbal and Demonstration  Shared Area of Learning: Yes   Goal Set: cook dinner and be consistent with a walk  Tx Plan Objective: 1 5, Therapist:  Diana MCDERMOTT

## 2018-08-07 NOTE — PSYCH
Subjective:     Patient ID: Livier Franz is a 40 y o  male  [facility] Clinical Progress Notes      Specialized Services Documentation  Therapist must complete separate progress note for each specific clinical activity in which the individual participated during the day  Group Psychotherapy   ([phone]) Teofilo Anaya participated in psychotherapy group focused on managing constant worry and anxiety  Teofilo Anaya identified trying to talk to his dad last night and his dad telling him to "suck it up and be a man" as a stressor today, but noted that he was able to walk away and not let it bother him  He actively engaged in group discussion, talking about ways that he has tried to manage his anxiety, such as going for walks and spending quiet time alone  Group discussed various techniques for managing anxiety and worry, such as dedicated "worry time," journaling, watching inspirational videos, and thought reframing  Moderate progress noted toward goals today  Continue psychotherapy group to encourage Teofilo Anaya to explore stressors and coping     Tx Plan Objective: 1 1, 1 2, Therapist:  Jeanna FOURNIER

## 2018-08-08 ENCOUNTER — OFFICE VISIT (OUTPATIENT)
Dept: PSYCHOLOGY | Facility: CLINIC | Age: 38
End: 2018-08-08
Payer: MEDICARE

## 2018-08-08 DIAGNOSIS — F40.01 AGORAPHOBIA WITH PANIC DISORDER: Primary | ICD-10-CM

## 2018-08-08 PROCEDURE — G0410 GRP PSYCH PARTIAL HOSP 45-50: HCPCS

## 2018-08-08 PROCEDURE — G0177 OPPS/PHP; TRAIN & EDUC SERV: HCPCS

## 2018-08-08 PROCEDURE — G0176 OPPS/PHP;ACTIVITY THERAPY: HCPCS

## 2018-08-08 NOTE — PSYCH
Subjective:     Patient ID: Lee Ann Decker is a 40 y o  male  [facility] Clinical Progress Notes      Specialized Services Documentation  Therapist must complete separate progress note for each specific clinical activity in which the individual participated during the day  Group Psychotherapy [id number]  Giancarlo Mendes participated in [facility] group focused on talking about your mental health difficulties with supports including;  friends, family, peers, or others  Giancarlo Mendes stated that he was interested in learning about support groups he could attend locally and stated that he is interested in gues speaker who will be coming to speak with [employer] group this afternoon regarding AMI support group for individual with mental illness and their family members  Giancarlo Mendes stated that he did not realize that there were "so many support groups out there on all topics " He made good progress toward goals  Continue to offer group to explore challenges on what  you feel comfortable in sharing (good boundaries) with supports, and other individuals,  and how to handle educating and sharing information on your diagnosis and treatment to build healthy, strong support for recovery  Tx Plan Objective: 1 1,1 2,1 4   Therapist:  Adriel Alfonso RN                Case Management Note    Adriel Alfonso RN    Current suicide risk : Low     Medications changes/added/denied? No    Treatment session number: 11    Individual Case Management Visit provided today?  No    [facility] follow up physician's orders: N/A

## 2018-08-08 NOTE — PSYCH
Subjective:     Patient ID: Tomasa Koo is a 40 y o  male  [facility] Clinical Progress Notes      Specialized Services Documentation  Therapist must complete separate progress note for each specific clinical activity in which the individual participated during the day  Allied Therapy   0420-0613 Rey Chand actively shared in [facility] group focused on acceptance and personal responsibility  He attentively listened and offered feedback as CPS Samuelkalyn Mercedes share his story  Rey Chand shared how he has faced and stigmatized himself for having anxiety  Group encouraged power of learning about self, accepting illness and personal responsibility in recovery  Community resources reviewed in addition to personal resources like the affirmations  Positive progress toward goal noted  Continue AT to encourage self -awareness and healthy engagement of supports       Tx Plan Objective: 1 1,1 5, Therapist:  Elina MCDERMOTT    Education Therapy   Time:  9687-4328  Previous goal met: Yes   Readiness to Learning: Receptive  Barriers to Learning: None  Learning Assessment  Time: 4314-8323  Education Completed: Illness and Wellness Tools  Teaching Method: Verbal  Shared Area of Learning: Yes   Goal Set: plan something to celebrate his birthday tomorrow  Tx Plan Objective: 1 5, Therapist:  Elina MCDERMOTT

## 2018-08-08 NOTE — PSYCH
Subjective:     Patient ID: Reba Kraft is a 40 y o  male  [facility] Clinical Progress Notes      Specialized Services Documentation  Therapist must complete separate progress note for each specific clinical activity in which the individual participated during the day  Group Psychotherapy   ([phone]) Augusta Wilson participated in psychotherapy group focused on healthy relationships  Augusta Wilson identified his sister as a stressor today  He actively engaged in group discussion, sharing his experience with unhealthy relationships with women in the past and how he was able to recognize when his needs were not met and that they were not going to change  He encouraged a peer to consider that other people are not likely to change, and that if she can express her needs and they are still not met, to consider what priorities are most important to her  Peers discussed what constitutes a "healthy" relationship, such as compromise, mutual respect and willingness to try and meet the other's needs  Moderate progress noted toward goals  Continue psychotherapy group to encourage Augusta Wilson to explore stressors and coping     Tx Plan Objective: 1 1, 1 2, Therapist:  Urbano FOURNIER

## 2018-08-09 ENCOUNTER — OFFICE VISIT (OUTPATIENT)
Dept: PSYCHOLOGY | Facility: CLINIC | Age: 38
End: 2018-08-09
Payer: MEDICARE

## 2018-08-09 DIAGNOSIS — F41.1 GENERALIZED ANXIETY DISORDER: Primary | ICD-10-CM

## 2018-08-09 PROCEDURE — G0410 GRP PSYCH PARTIAL HOSP 45-50: HCPCS

## 2018-08-09 PROCEDURE — G0177 OPPS/PHP; TRAIN & EDUC SERV: HCPCS

## 2018-08-09 PROCEDURE — G0176 OPPS/PHP;ACTIVITY THERAPY: HCPCS

## 2018-08-09 NOTE — PSYCH
Subjective:     Patient ID: Cesar Guaman is a 45 y o  male  [facility] Clinical Progress Notes      Specialized Services Documentation  Therapist must complete separate progress note for each specific clinical activity in which the individual participated during the day  Group Psychotherapy 9:30 to 10:30  Kin ever participated in a group discussing 'I Like Myself A to Z"  He completed the worksheet and added to the discussion  He learned that it is okay to speak about himself  His fear of speaking lessened  He liked the honesty of the group which led to his own honesty  He continues to benefit from group participation    1 1, 1 2, Sarah Martínez LPC

## 2018-08-09 NOTE — PSYCH
Subjective:     Patient ID: Livier Franz is a 45 y o  male  [facility] Clinical Progress Notes      Specialized Services Documentation  Therapist must complete separate progress note for each specific clinical activity in which the individual participated during the day  Group Psychotherapy [id number]  Teofilo Anaya participated in wellness group focused on how social connections (and supports in recovery) affect individuals biologically  Recent research was presented to educate on how isolation is unhealthy and how social connection is healing and contributes to wellness and recovery from mental illness  Teofilo Héctor was attentive to education and shared that he has limited his social schedule to not going out in the evenings because he takes medication that is sedating and cannot drive after he does  He stated that if he needed to go out he would have to have someone drive him or call a taxi  In response to a peer who is limiting their driving due to the sedation effects of medications, Teofilo Anaya stated that he did not want to have to do this because he would enjoy going somewhere with a friend, once in a while, in the evening but through a "bad experience" realizing that he could not drive after taking his medication, Teofilo Anaya stated that it is better to plan socialization around taking medication as ordered  He made good progress toward goals  Continue to offer group to educate on positive impact of developing and maintaining supports for recovery  Tx Plan Objective: 1 1,1 2,1 4   Therapist:  Jp Tate RN       Case Management  [id number]  Teofilo Anaya met with this CM to discuss discharge planning and progress in Program  Discussed discharge next week and working on treatment goals as well as setting up a daily schedule for after [facility] D/C  Teofilo Anaya denied SI and HI as well as any side effects from medications   Teofilo Anaya stated that today is his birthday and usually he would be excited but that his family does not celebrate his birthday so he is a little sad but will celebrate himself today        Therapist:  Jamar Swanson RN    Treatment Day:  12    Risk of Suicide:  Low    Case Management Today:  Yes

## 2018-08-09 NOTE — PSYCH
Subjective:     Patient ID: Ashley White is a 45 y o  male  [facility] Clinical Progress Notes      Specialized Services Documentation  Therapist must complete separate progress note for each specific clinical activity in which the individual participated during the day  Allied Therapy   [id number] Ghada Chester actively shared in [facility] group focused on boundary setting and DBT skill GIVE  He engaged in improvisation and discussion exploring value of and ways to set healthy limits with self and others  He shared an awareness that he tends to mind read and be too quick with responses which leads to damaging relationships  Group discussed reality of feeling guilty at times, yet the anxiety and chaos left if one chooses not to set limits  Ghada Chester participated in practicing boundaries with given scenarios  Some positive effort  toward goal noted  Continue AT to encourage skill development and use       Tx Plan Objective: 1 1,1 5, Therapist:  Neftali MCDERMOTT    Education Therapy   Time:  4059-3841  Previous goal met: Yes   Readiness to Learning: Receptive  Barriers to Learning: None  Learning Assessment  Time: 4398-6676  Education Completed: Illness and Wellness Tools  Teaching Method: Verbal and Demonstration  Shared Area of Learning: Yes   Goal Set: listen to music before bed  Tx Plan Objective: 1 1, Therapist:  Neftali MCDERMOTT

## 2018-08-10 ENCOUNTER — OFFICE VISIT (OUTPATIENT)
Dept: PSYCHOLOGY | Facility: CLINIC | Age: 38
End: 2018-08-10
Payer: MEDICARE

## 2018-08-10 DIAGNOSIS — F40.10 SOCIAL PHOBIA: Primary | ICD-10-CM

## 2018-08-10 PROCEDURE — G0177 OPPS/PHP; TRAIN & EDUC SERV: HCPCS

## 2018-08-10 PROCEDURE — G0410 GRP PSYCH PARTIAL HOSP 45-50: HCPCS

## 2018-08-10 PROCEDURE — G0176 OPPS/PHP;ACTIVITY THERAPY: HCPCS

## 2018-08-10 NOTE — PSYCH
Subjective:     Patient ID: Ashley White is a 45 y o  male  [facility] Clinical Progress Notes      Specialized Services Documentation  Therapist must complete separate progress note for each specific clinical activity in which the individual participated during the day  Group Psychotherapy [id number]  Ghada Chester participated in weekly wellness group to discuss what particular area of wellness  has been worked on up to today in Program and choice of area to continue working on for recovery as targeted in treatment plan, by choice, or in [employer]  Ghada Chester shared that he will continue to practice desensitizing himself to being outside his home  He stated that it has been very hot this week and he continues to go out to walk but that he has not been able to do as much as he had hoped to do  Ghada Chester stated that his brothers will come this weekend and visit, if they are not busy with their children  Ghada Chester stated that he is interested in attending a support group to continue increasing socialization and support for recovery and decreasing isolation by getting out of the house, and being amongst peers who are coping with panic and agoraphobia  He stated that this will be challenging for him to go to a new group but he will try to do so if he has a ride as he does not drive after taking his medications in the evening  Ghada Chester made good progress toward goals  Continue to offer weekly wellness group to focus on goals and identify areas of goal achievement and need  Tx Plan Objective:  1 1,1 2,1 4    Therapist:  Augustus Monsivais RN                Case Management Note  [id number]  Ghada Chester met with this CM to discuss progress in Program and D/C planning  Urrutia Natlauren stated that he feels he is making progress every day although he also knows that he continues to be challenged by most social situations due to panic and agoraphobia   Ghada Chester related that he is using CBT strategies to decrease avoidance but that it continues to be difficult for him to go most places and to do most things that involve interacting with individuals, as well as being alone in his home  He stated that when he is home alone he will hear voices or think he hears things, as he feels unsafe although he is in a "safe environment" with his pets  Tiffanie Kingston stated that he will be hypervigilant looking constantly for signs that someone will hurt him  Tiffanie Kingston stated that he is taking medication as prescribed and denied SI or HI as well as any manic or psychotic episodes  Discussed attending AMI support group with his mother who could attend the family/friends support group at the same time  Discussed possible discharge next week  Madison Mcdaniels RN    Current suicide risk : Low     Medications changes/added/denied? No    Treatment session number: 13    Individual Case Management Visit provided today?  Yes     [facility] follow up physician's orders: N/A

## 2018-08-10 NOTE — PSYCH
Subjective:     Patient ID: Daltno Mauro is a 45 y o  male  [facility] Clinical Progress Notes      Specialized Services Documentation  Therapist must complete separate progress note for each specific clinical activity in which the individual participated during the day  Group Psychotherapy   ([phone]) Lynn Abel participated in psychotherapy group focused on managing anxiety, as well as building healthy support system  Lynn Abel identified his stressor as being very anxious again last night and not being able to go out for his birthday like he wanted to  He actively engaged in group discussion, relating to peers that talked about having difficulty managing anxiety symptoms and finding ways to build motivation to accomplish things even though they may be anxiety-provoking or they lack motivation  Lynn Abel shared that he uses visualization of how he will feel after he does something like dishes or going for a walk, and that usually helps motivate him to take action  Group also talked about building a healthy support system, and expressing to supports specific ways that they can be helpful in different circumstances  Lynn Abel provided good feedback and support to peers today  Good progress noted toward goals  Continue psychotherapy group to encourage Lynn Abel to explore stressors and coping    Tx Plan Objective: 1 1, 1 2, 1 4, Therapist:  Jayjay FOURNIER

## 2018-08-10 NOTE — PSYCH
Subjective:     Patient ID: Wander Manzano is a 45 y o  male  [facility] Clinical Progress Notes      Specialized Services Documentation  Therapist must complete separate progress note for each specific clinical activity in which the individual participated during the day  Allied Therapy   [id number] Janusz Mills actively shared in [facility] group exploring DBT emotional regulation  understanding, describing and managing emotions with a focus on anger  He engaged in task exploring effective versus ineffective ways to manage intense emotions in addition to skills to refocus in the moment  He shared observations from [place] discussion related to intensity  Janusz Mills shared that he does not believe he handles anger effectively and is "reactive"  Skills to decrease reactivity reviewed  Group explored the benefits of emotions that can lead to learning about ourselves and productive changes we can make in our lives  Some positive work toward goal noted  Continue AT to increase self awareness and increase awareness/practice of skills to regulate emotion     Tx Plan Objective: 1 2, Therapist:  Author Nico MCDERMOTT    Education Therapy   Time:  0117-8393  Previous goal met: Yes   Readiness to Learning: Receptive  Barriers to Learning: None  Learning Assessment  Time: 3252-1320  Education Completed: Illness and Wellness Tools  Teaching Method: Verbal  Shared Area of Learning: Yes   Goal Set: enjoy time with his family (during cook out for his birthday)  Tx Plan Objective: 1 5, Therapist:  Author Nico MCDERMOTT

## 2018-08-13 ENCOUNTER — OFFICE VISIT (OUTPATIENT)
Dept: PSYCHOLOGY | Facility: CLINIC | Age: 38
End: 2018-08-13
Payer: MEDICARE

## 2018-08-13 DIAGNOSIS — F40.01 AGORAPHOBIA WITH PANIC DISORDER: Primary | ICD-10-CM

## 2018-08-13 PROCEDURE — G0177 OPPS/PHP; TRAIN & EDUC SERV: HCPCS

## 2018-08-13 PROCEDURE — G0410 GRP PSYCH PARTIAL HOSP 45-50: HCPCS

## 2018-08-13 PROCEDURE — G0176 OPPS/PHP;ACTIVITY THERAPY: HCPCS

## 2018-08-13 NOTE — PSYCH
Subjective:     Patient ID: Clara Mayo is a 45 y o  male  [facility] Clinical Progress Notes      Specialized Services Documentation  Therapist must complete separate progress note for each specific clinical activity in which the individual participated during the day  Group Psychotherapy  ([phone]) Ellen Tong participated in psychotherapy group focused on coping with anxiety about the future  Ellen Tong identified trying to go spend time at the [employer] to "test" his anxiety and coping as a stressor today  He actively engaged in group discussion, talking about coping with his anxiety and feeling like he is missing out on his life  Peers discussed ways to cope with worry, such as focusing on one task at a time and using mindfulness to focus on the present  Ellen Tong provided good feedback and support to peers today  Good progress noted toward goals  Continue psychotherapy group to encourage Ellen Tong to explore stressors and coping     Tx Plan Objective: 1 1, 1 2, Therapist:  Destinee FOURNIER

## 2018-08-13 NOTE — PSYCH
Subjective:     Patient ID: Edgar Tellez is a 45 y o  male  [facility] Clinical Progress Notes      Specialized Services Documentation  Therapist must complete separate progress note for each specific clinical activity in which the individual participated during the day  Allied Therapy   7213-6542 Si Mc actively shared in [facility] group focused on mindfulness and the [facility] skills  He offered feedback during musical tasks exploring one-mindfully, non-judgmentally, and effectively when prompted  He engaged in progressive muscle relaxation and felt the exercise "improved his mood"  He identified that he was "down today because of the weather"  Inconsistent progress noted  Continue AT to encourage the development and consistent practice of skills to address and reduce symptoms      Tx Plan Objective: 1 1,1 2, Therapist:  Christina MCDERMOTT    Education Therapy   Time:  1962-7686  Previous goal met: No  Readiness to Learning: Receptive  Barriers to Learning: None  Learning Assessment  Time: 1306-2328  Education Completed: Illness and Wellness Tools  Teaching Method: Verbal and Demonstration  Shared Area of Learning: Yes   Goal Set: go to the grocery store and walk around to increase comfort with this task  Tx Plan Objective: 1 5, Therapist:  Christina MCDERMOTT

## 2018-08-13 NOTE — PSYCH
Subjective:     Patient ID: Lety Ayers is a 45 y o  male  [facility] Clinical Progress Notes      Specialized Services Documentation  Therapist must complete separate progress note for each specific clinical activity in which the individual participated during the day  Group Psychotherapy [id number]  Lima Paul participated in wellness group focusing on recovery from anxiety  [address] [facility] educational handout and session included sharing coping with typical, generalized and severe anxiety  Also, handout was shared and discussed on eight anxiety relief techniques  Stefanoeduar Miller shared that he looked over the entire handout and highlighted each sign/symptom of anxiety that related to him  He shared how he has avoided leaving the house for several months before attending [facility] and how he has been working on exposing himself to his biggest fear now that is leaving the house and also being amongst people  Stefanoeduar Miller stated that he has been learning strategies to decrease panic attacks and his high level of anxiety that has increased due to avoidance  His short term goal is to go to a supermarket and stay there for at least 10 minutes and his longer term goal is to go to a [employer] book store that he enjoyed visiting in the past but has not been able to do for many months due to having severe anxiety when he "even thinks about it " Lima Miller made good progress toward goals  Continue group to educate on different forms of anxiety medications used in treatment  and cognitive and behavioral skills for recovery  Tx Plan Objective: 1 1,1 2   Therapist:  Erin Holt RN                    Case Management Note  [id number]  Stefanoeduar Miller met with this CM to discuss progress in Program and discharge planning  Treatment plan update was reviewed and signed  Stefanoeduar Miller stated that he feels he has made progress toward his goals of decreasing avoidance and not leaving his home   Lima Miller stated that, over the weekend, he reviewed handouts from groups and he believes that he is ready to "tackle harder steps such as going to the grocery store and staying there a longer time and then to go to a comic book store that he likes and be able to stay in the store and not run out due to having a panic attack " Cony Pryor denied SI and HI  He stated that not having constant panic attacks or being in his home alone represents significant progress for him  Cony Pryor stated that he is angry with himself at times as he feels "I wasted my summer because of my anxiety" but then was able to say that learning and practicing these strategies will help him to "not waste any more summers hiding in my house "     Marilynn Abdi RN    Current suicide risk : Low     Medications changes/added/denied? No    Treatment session number: 14    Individual Case Management Visit provided today?  Yes     [facility] follow up physician's orders: N/A

## 2018-08-14 ENCOUNTER — OFFICE VISIT (OUTPATIENT)
Dept: PSYCHOLOGY | Facility: CLINIC | Age: 38
End: 2018-08-14
Payer: MEDICARE

## 2018-08-14 DIAGNOSIS — F40.01 AGORAPHOBIA WITH PANIC DISORDER: Primary | ICD-10-CM

## 2018-08-14 PROCEDURE — G0410 GRP PSYCH PARTIAL HOSP 45-50: HCPCS

## 2018-08-14 PROCEDURE — G0176 OPPS/PHP;ACTIVITY THERAPY: HCPCS

## 2018-08-14 PROCEDURE — G0177 OPPS/PHP; TRAIN & EDUC SERV: HCPCS

## 2018-08-14 NOTE — PSYCH
Psych          Phoenix Lime, RN Registered Nurse Addendum     Psych Encounter Date: 8/13/2018   Psych            Diagnoses and all orders for this visit:   Panic disorder with agoraphobia   RADHA (generalized anxiety disorder)   Bipolar disorder, current episode mixed, severe, without psychotic features ([facility] [address] )   Posttraumatic stress disorder         Subjective:     Patient ID: Marco Antonio LINK  Shorty      [facility] Treatment Plan   AREAS OF NEED:    Date Initiated: 7/24/18     Strengths: Takes medication regularly and as ordered, motivated to decrease symptoms of anxiety and avoidant behaviors     LONG TERM GOAL:   Date Initiated: 7/24/18  1 0 I will identify three healthy coping strategies I can use to decrease my anxiety and increase my productivity and I will practice using at least two of these strategies daily  Target Date: 9/4/18  Revision Date:  Completion Date:      SHORT TERM OBJECTIVES:    Date Initiated: 7/24/18  1 1 I will identify and practice daily three healthy ways to refocus myself when I am triggered by thoughts of fear and anxiety so that I can accomplish my activities of daily living without panic attacks or avoidance  Revision Date: 8/2/18  Target Date: 8/2/18  Completion Date:      Date Initiated: 7/24/18  1 2 I will write out three healthy coping skills learned in program to reduce my symptoms of anger, depression and anxiety and I will practice at least one of them daily  Revision Date: 8/2/18  Target Date: 8/2/18  Completion Date:     Date Initiated:  7/24/18  1 3 I will take medications as prescribed and share questions or concerns when, or if, they arise  I will have labs done as ordered by Program Psychiatrist as soon as I can complete  Revision Date: 8/2/18  Target Date: 8/2/18  Completion Date:      Date Initiated: 7/24/18    1 4 I will name three supports and I will identify specific ways my supports can assist me in my recovery from mental illness     Revision Date: 18  Target Date: 18  Completion Date:     Date Initiated:  18  1 1,1 2,1 3,1 4  Continue to work on treatment goals as they remain relevant  Revision Date: 18  Target Date:  18  Completion Date:     Date Initiated:  18  1 5  I will identify one activity I can do on a regular basis, every week, to help add personal satisfaction and productive structure to my life and to decrease my isolation  Revision Date: 18  Target Date: 18  Completion Date:     Date Initiated:  18  1 6  I will go to the [employer] and spend 15 minutes there using my strategies to be able to decrease my anxiety and be able to stay without high anxiety and discomfort  Revision Date:    Target Date:  18       PSYCHIATRY:  Medication Management and education  Date Initiated: 18  Revision Date: 18, 18  The person(s) responsible for carrying out the plan Virginia Salinas MD     NURSIN 1,1 2,1 3,1 4 This RN will provide daily wellness group five days weekly to educate Ellen Tong on individual diagnoses and medications used in treatment  Date Initiated: 18  Revision Date: 18, 18  The person(s) responsible for carrying out the plan is Elyssa Varghese RN     PSYCHOLOGY:   1 1,1 2,1 3,1 4 Provide psychotherapy group five times weekly to allow opportunity for Marco Antonio to explore stressors and ways of coping  Date Initiated: 18  Revision Date: 18, 18  The person(s) responsible for carrying out the plan is Destinee Ceballos, MSW,LSW     ALLIED THERAPY:   1 1,1 2  Engage Marco Antonio in AT group five times weekly to encourage development and use of wellness tools to decrease symptoms and promote recovery through meaningful activity     Date Initiated: 18  Revision Date: 18, 18  The person(s) responsible for carrying out the plan is BALDEMAR Roberts      CASE MANAGEMENT:  Date Initiated: 18  Revision Date: 18, 18  1 0 This CM will meet with Marco Antonio three to four times weekly to assess treatment progress, D/C planning and connection to community supports and UR as indicated     The person(s) responsible for carrying out the plan is Adriel Alfonso RN     DISCHARGE CRITERIA: Identify 3 signs of progress and complete relapse prevention plan      DISCHARGE PLAN: Giancarlo Mendes will return to prior outpatient providers at [address] [facility] [address]     Estimated Length of Stay: 10 days     DSM Diagnoses    Panic disorder with agoraphobia       RADHA (generalized anxiety disorder)       Bipolar disorder, current episode mixed, severe, without psychotic features ([address] )       Posttraumatic stress disorder       High risk medication use      Diagnosis and Treatment Plan explained to Vanesa Del Cid understanding diagnosis and is agreeable to Treatment Plan                      CLIENT COMMENTS / Please share your thoughts, feelings, need and/or experiences regarding your treatment plan: _____________________________________________________________________________________________________________________________________________________________________________________________________________________________________________________________________________________________________________________ Date/Time: ______________     Patient Signature: _________________________________     Date/Time: ______________      Signature: _________________________________     Date/Time: ______________

## 2018-08-14 NOTE — PSYCH
Subjective:     Patient ID: Amirah Christopher is a 45 y o  male  [facility] Clinical Progress Notes      Specialized Services Documentation  Therapist must complete separate progress note for each specific clinical activity in which the individual participated during the day  Group Psychotherapy [id number]  Jeovany Hoff actively participated in wellness group focusing on recovery from anxiety  [address] educational handout and session included sharing coping with anticipatory anxiety and situational anxiety  Jeovany Hoff shared how he has been using desensitization (very small exposure steps) to decrease his anxiety going places and doing things that he has not done in over four months since he had a panic attack when he went shopping  Jeovany Hoff shared his challenges as well as his recent success of going into a food store and being able to stay there for 15 minutes with a lowered level of anxiety but still anxious and able to not "run out " Jeovany Hoff discussed using relaxation breathing as well as positive self-talk and rehearsing in advance how he will handle his anxiety so he is able to lower his anxiety level and not let it overtake him into a "panic state " Jeovany Hoff encouraged peers to work hard, in addition to taking medications, as the CBT/DBT strategies "only work if you do them and keep doing them " He made good progress toward goals  Continue group to educate on different forms of anxiety medications used in treatment  and cognitive and behavioral skills for recovery  Tx Plan Objective: 1 1,1 2,1 3,1 5,1 6  Therapist:  Marshall Welch RN                Case Management Note [facility] met with this CM to review his progress with exposure to his most fearful and anxiety provoking situation; going to the supermarket and staying there for 15 minutes yesterday   He shared that he achieved his goal  Jeovany Hoff stated that "it wasn't easy because I was experiencing all the anxiety symptoms but I did the breathing and self-talk and I was able to tolerate it  He also used distraction concentrating on items he would buy the next time he returned  Domitila Domínguez stated that his next goal is to go to a comic book store, in a [employer], and to buy a comic book  Domitila Domínguez described enjoying going there in the past but stopped going at all after he had a panic attack in a mall store  Domitila Domínguez stated that he is trying to get back into living his life and not staying in the house all the time due to high anxiety and agoraphobia  Domitila Domínguez denied SI and HI as well as any SE from his medications  Bryson Moses RN    Current suicide risk : Low     Medications changes/added/denied? No    Treatment session number: 15    Individual Case Management Visit provided today?  Yes     [facility] follow up physician's orders: N/A

## 2018-08-14 NOTE — PSYCH
Subjective:     Patient ID: Cesar Guaman is a 45 y o  male  [facility] Clinical Progress Notes      Specialized Services Documentation  Therapist must complete separate progress note for each specific clinical activity in which the individual participated during the day  Allied Therapy   [id number] Kin Campbell actively shared in [facility] group focused on identification and use of personal strengths  Through song writing task and small group work, he was able to offer and accept feedback related to positive qualities including acknowledging that he is brave and determined  He shared how difficult it is for him to say nice things to himself despite logically knowing that it would be more beneficial than constant self criticism  Group discussed balance and specific ways to apply strengths to wellness  Some positive work noted toward goal   Continue AT to encourage self-awareness and personal role in recovery        Tx Plan Objective: 1 2, Therapist:  Hannah MCDERMOTT    Education Therapy   Time:  7297-1343  Previous goal met: Yes   Readiness to Learning: Receptive  Barriers to Learning: None  Learning Assessment  Time: 1264-6549  Education Completed: Illness, Aftercare and Wellness Tools  Teaching Method: Verbal  Shared Area of Learning: Yes   Goal Set: be consistent with routine and do some household chores  Tx Plan Objective: 1 2, Therapist:  Hannah MCDERMOTT

## 2018-08-14 NOTE — PSYCH
Subjective:     Patient ID: Janet Chavis is a 45 y o  male  [facility] Clinical Progress Notes      Specialized Services Documentation  Therapist must complete separate progress note for each specific clinical activity in which the individual participated during the day  Group Psychotherapy  ([phone]) Kalee participated in psychotherapy group focused on self-esteem and forgiving oneself for past mistakes  Kalee identified panicking at the supermarket yesterday as a stressor  Kalee was otherwise quiet throughout group discussion, appearing tired, and often observed to sit slouched in his chair with his head down on his chest   Minimal outward progress noted toward goals  Continue psychotherapy group to encourage Kalee to explore stressors and healthier ways of coping    Tx Plan Objective: 1 1, 1 2, Therapist:  Shirley FOURNIER

## 2018-08-15 ENCOUNTER — OFFICE VISIT (OUTPATIENT)
Dept: PSYCHOLOGY | Facility: CLINIC | Age: 38
End: 2018-08-15
Payer: MEDICARE

## 2018-08-15 DIAGNOSIS — F40.01 AGORAPHOBIA WITH PANIC DISORDER: Primary | ICD-10-CM

## 2018-08-15 DIAGNOSIS — F63.81 INTERMITTENT EXPLOSIVE DISORDER: ICD-10-CM

## 2018-08-15 DIAGNOSIS — F31.63 BIPOLAR DISORDER, CURRENT EPISODE MIXED, SEVERE, WITHOUT PSYCHOTIC FEATURES (HCC): ICD-10-CM

## 2018-08-15 DIAGNOSIS — F43.10 POSTTRAUMATIC STRESS DISORDER: ICD-10-CM

## 2018-08-15 DIAGNOSIS — F41.1 GAD (GENERALIZED ANXIETY DISORDER): ICD-10-CM

## 2018-08-15 DIAGNOSIS — F40.01 PANIC DISORDER WITH AGORAPHOBIA: ICD-10-CM

## 2018-08-15 PROCEDURE — G0176 OPPS/PHP;ACTIVITY THERAPY: HCPCS

## 2018-08-15 PROCEDURE — G0410 GRP PSYCH PARTIAL HOSP 45-50: HCPCS

## 2018-08-15 PROCEDURE — G0177 OPPS/PHP; TRAIN & EDUC SERV: HCPCS

## 2018-08-15 NOTE — PSYCH
Subjective:     Patient ID: James Saucedo is a 45 y o  male  [facility] Clinical Progress Notes      Specialized Services Documentation  Therapist must complete separate progress note for each specific clinical activity in which the individual participated during the day  Group Psychotherapy [id number]  Shu Butler participated in wellness group focused on the cycle of fear  Educational session was conducted to teach about how heightened  fear and anxiety reactions can trigger physical stress and anxiety  symptoms such as panic attacks  Discussed how distorted interpretations  then may progress to avoidance of the feared situation or person  Shu Butler actively participated in education and peer discussion of cycle of his fears of going to places where "people will look at me " Shu Butler shared how he avoided any place, outside his home, and how difficult it has been to get out of the house and start going to places he went to before without any anxiety, panic or fear  Shu Butler continues to work on lowering his level of fear by practicing going to places that had triggered anxiety and panic over the past months  He made good progress toward goals  Continue group to educate and provide rehearsal for facing fears to increase healthy coping and decrease fearful and anxious symptoms  Tx Plan Objective: 1 1,1 2   Therapist:  Jolie Vázquez RN                  Case Management Note    Jolie Vázquez RN    Current suicide risk : Low     Medications changes/added/denied? No    Treatment session number: 16    Individual Case Management Visit provided today?  No    [facility] follow up physician's orders: N/A

## 2018-08-15 NOTE — PSYCH
Subjective:     Patient ID: Roel Engel is a 45 y o  male  [facility] Clinical Progress Notes      Specialized Services Documentation  Therapist must complete separate progress note for each specific clinical activity in which the individual participated during the day  Group Psychotherapy   ([phone]) Rickie Munson participated in psychotherapy group focused on carrying progress forward and keeping structure in daily life after discharge from [facility] Kaela identified wanting to make plans for how he will fill his time after discharge as a stressor  Group discussed their concerns about missing the structure of program, and ways that they can begin to create that structure in preparation for discharge  Rickie Munson noted that he already has some structure to his day, such as doing chores and such, and wants to create a schedule/routine to keep him active  Moderate progress noted toward goals  Continue psychotherapy group to encourage Rickie Munson to explore stressors and coping    Tx Plan Objective: 1 1, 1 2, Therapist:  Jono FOURNIER    Education Therapy   Time:  1153-8215  Previous goal met: Yes   Readiness to Learning: Receptive  Barriers to Learning: None  Learning Assessment  Time: 5497-7252  Education Completed: Illness and Wellness Tools  Teaching Method: Verbal and Written  Shared Area of Learning: Yes   Goal Set: take meds and go for a walk  Tx Plan Objective: 1 1, 1 3, Therapist:  Jono FOURNIER

## 2018-08-15 NOTE — PSYCH
Subjective:     Patient ID: Clara Mayo is a 45 y o  male  [facility] Clinical Progress Notes      Specialized Services Documentation  Therapist must complete separate progress note for each specific clinical activity in which the individual participated during the day  Allied Therapy [id number] William Nash actively shared in [facility] group focused on relaxation techniques and DBT skill of Radical Acceptance  He engaged in therapist-led relaxation exercises and italia analysis  Group explored diaphragmatic breathing and progressive muscle relaxation  Group also explored ways to radically accept reality and let go  He was given hand-out on topic  William Charity shared that he is working on accepting his brother passing, and move on  Good progress towards goal observed and shared  Continue AT to further encourage acceptance of the things that cannot be changed    Tx Plan Objective: 1 1, 1 2 Therapist:  Ashwin MCDERMOTT

## 2018-08-16 ENCOUNTER — OFFICE VISIT (OUTPATIENT)
Dept: PSYCHOLOGY | Facility: CLINIC | Age: 38
End: 2018-08-16
Payer: MEDICARE

## 2018-08-16 DIAGNOSIS — F40.01 AGORAPHOBIA WITH PANIC DISORDER: Primary | ICD-10-CM

## 2018-08-16 PROCEDURE — G0177 OPPS/PHP; TRAIN & EDUC SERV: HCPCS

## 2018-08-16 PROCEDURE — G0176 OPPS/PHP;ACTIVITY THERAPY: HCPCS

## 2018-08-16 PROCEDURE — G0410 GRP PSYCH PARTIAL HOSP 45-50: HCPCS

## 2018-08-16 NOTE — PSYCH
Subjective:     Patient ID: Fabian Verdugo is a 45 y o  male  [facility] Clinical Progress Notes      Specialized Services Documentation  Therapist must complete separate progress note for each specific clinical activity in which the individual participated during the day  Allied Therapy [phone] Fam Berumen actively shared in [facility] group focused on self-esteem and self-care  He engaged in italia analysis and discussion  Group was introduced to FAST for keeping self-respect  Group explored ways to increase self-esteem and self-care, and the way they are related  He was given hand-outs on topic  Fam Berumne shared that he needs to work on not letting isolation get in the way of self-care  Fam Berumen shared listening to music as a way he self-cares  Good progress towards goal observed and shared  Continue AT to further encourage self-care and increasing self-esteem  Tx Plan Objective: 1 5, 1 6 Therapist:  Renetta MCDERMOTT
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
## 2018-08-17 ENCOUNTER — OFFICE VISIT (OUTPATIENT)
Dept: PSYCHOLOGY | Facility: CLINIC | Age: 38
End: 2018-08-17
Payer: MEDICARE

## 2018-08-17 DIAGNOSIS — F40.01 AGORAPHOBIA WITH PANIC DISORDER: Primary | ICD-10-CM

## 2018-08-17 PROCEDURE — G0176 OPPS/PHP;ACTIVITY THERAPY: HCPCS

## 2018-08-17 PROCEDURE — G0177 OPPS/PHP; TRAIN & EDUC SERV: HCPCS

## 2018-08-17 PROCEDURE — G0410 GRP PSYCH PARTIAL HOSP 45-50: HCPCS

## 2018-08-17 NOTE — PSYCH
Subjective:     Patient ID: Governor Buckner is a 45 y o  male  [facility] Clinical Progress Notes      Specialized Services Documentation  Therapist must complete separate progress note for each specific clinical activity in which the individual participated during the day  Group Psychotherapy [phone]  Pranav Seen participated in weekly wellness group to discuss what particular area of wellness  has been worked on up to today in Program and choice of area to continue working on for recovery as targeted in treatment plan, by choice, or in [place] [employer]  Pranav Seen shared that he will continue to work on his anxiety in going out of the house and being able to go to a store or other place and stay there for a short time without developing symptoms of high anxiety  Pranav Seen stated that he was making good progress but yesterday he had a "set back" and developed panic when he tried to go back to the mall where he had a severe panic attack four months ago and had to leave  He described developing "hives," and other physical symptoms then when leaving this place, anxiety left him and physical symptoms disappeared  Pranav Seen made good progress toward goals  Continue to offer weekly wellness group to focus on goals and identify areas of goal achievement and need  Tx Plan Objective: 1 1,1 2,1 4,1 5   Therapist:  Kimberly Richter RN                    Case Management Note [id number]  Pranav Seen met with this CM to discuss incident described in Wellness Group note above  He stated he was surprised that he reacted this way and felt it was a setback in the success he has been having in working on decreasing agoraphobia  Discussed breaking down steps to desensitize himself into smaller, more manageable steps and the fact that this place was the most anxiety provoking for him to return to   Pranav Seen stated that he will continue to work on treatment goals as he wants to get better but that he must remind himself that it will not be a rapid recovery and will take time  He denied SI and HI as well as any SE from medications  Candie Bentley RN    Current suicide risk : Low     Medications changes/added/denied? No    Treatment session number: 18    Individual Case Management Visit provided today?  Yes     [facility] follow up physician's orders: N/A

## 2018-08-17 NOTE — PSYCH
Subjective:     Patient ID: Leidy Hill is a 45 y o  male  [facility] Clinical Progress Notes      Specialized Services Documentation  Therapist must complete separate progress note for each specific clinical activity in which the individual participated during the day  Group Psychotherapy   ([phone]) Jerad Turner participated in psychotherapy group focused on making healthy changes in one's life  Jerad Turner identified going to a new PCP this afternoon as a stressor today  He actively engaged in group discussion, relating to peers in discussion of how difficult it is to make changes in one's life  Group talked about changes such as quitting smoking, making healthier food choices, or trying to overcome fears, and discussed that making small, gradual changes typically leads to long-term success  Jeard Turner shared that he drove to [facility] yesterday after program, which he was scared to do, and then went through [facility] successfully  He then tried to go into [place], but became anxious and broke out in hives, so he left  Group praised him for his enormous success, and encouraged him to take his progress slowly if needed, and end each experience with success  Good progress noted toward goals  Continue psychotherapy group to encourage Jerad Turner to explore stressors and coping     Tx Plan Objective: 1 1, 1 2, Therapist:  Huyen FOURNIER    Education Therapy   Time:  9530-2573  Previous goal met: Yes   Readiness to Learning: Receptive  Barriers to Learning: None  Learning Assessment  Time: 4320-8152  Education Completed: Illness and Wellness Tools  Teaching Method: Verbal and Written  Shared Area of Learning: Yes   Goal Set: go to [employer] store tomorrow  Tx Plan Objective: 1 1, 1 2, Therapist:  Huyen FOURNIER

## 2018-08-17 NOTE — PSYCH
Subjective:     Patient ID: Tomasa Koo is a 45 y o  male  [facility] Clinical Progress Notes      Specialized Services Documentation  Therapist must complete separate progress note for each specific clinical activity in which the individual participated during the day  Allied Therapy [phone] Rey Chand actively shared in [facility] group focused on DBT skill Accumulating Positive Emotions  He engaged in egg shaker mindful task, italia analysis, and chime playing  Group explored ways to build and be mindful of positive emotions/experiences  Group was introduced to ABC PLEASE for emotional vulnerability  Group discussed doing pleasant events during weekend to avoid unproductiveness  He was given hand-outs on topic, including pleasant events list  Rey Chand shared spending time with his nephew this weekend as a pleasant event he will engage in to avoid isolation  Good progress towards goal observed and shared   Tx Plan Objective: 1 1, 1 2, 1 5 Therapist:  Linda MCDERMOTT

## 2018-08-20 ENCOUNTER — OFFICE VISIT (OUTPATIENT)
Dept: PSYCHOLOGY | Facility: CLINIC | Age: 38
End: 2018-08-20
Payer: MEDICARE

## 2018-08-20 ENCOUNTER — DOCUMENTATION (OUTPATIENT)
Dept: PSYCHOLOGY | Facility: CLINIC | Age: 38
End: 2018-08-20

## 2018-08-20 DIAGNOSIS — F40.01 AGORAPHOBIA WITH PANIC DISORDER: Primary | ICD-10-CM

## 2018-08-20 PROCEDURE — G0177 OPPS/PHP; TRAIN & EDUC SERV: HCPCS

## 2018-08-20 PROCEDURE — G0410 GRP PSYCH PARTIAL HOSP 45-50: HCPCS

## 2018-08-20 NOTE — PROGRESS NOTES
Subjective:     Patient ID: Celeste Garcia is a 45 y o  male  [facility] Clinical Progress Notes      Specialized Services Documentation  Therapist must complete separate progress note for each specific clinical activity in which the individual participated during the day  Group Psychotherapy  [id number]  Changjeannine Michele participated in [facility] group focused on identifying one thing they want to begin to work on changing while in Program and learn how to use the CBT 11 minute strategy to start work on their change goal  Harman Bautista identified that he is continuing to work on going to one store and staying there long enough to buy [employer] books that is his hobby  He stated that it was his goal for the weekend but he was not able to work on it as the store was closed  Harman Bautista stated that a friend invited him to go out and eat some food and he was able to do this sitting at an uncrowded outside table  Harman Bautista stated that his friend was a motivator for him to do this as he has not being going out socializing as he developed agoraphobia after having a severe panic attack over four months ago  Harman Bautista explained that he has been gradually self-motivating to go out, "in small, baby steps" to desensitize himself from the anxiety and fear he feels when he now goes into stores or places "where there are a lot of people  Harman Bautista made good progress toward goals  Continue to offer this group to provide education on how to use and practice this strategy to decrease avoidance or self-sabotaging behaviors and increase success in achieving identified wellness goals  1 1,1 2,1 4,1 5   Tx Plan Objective: Therapist:  Ishmael Landau RN                Case Management Note [id number]  Harman Bautista met with this CM to discuss tomorrow he will be discharged   Harman Bautista stated that he feels he has worked "hard in [employer] and I will continue to work hard because I don't want to be so anxious about going out of the house or doing anything " Harman Bautista was given relapse prevention plan to complete  He denied SI and HI as well as any SE from his medications  Augusta Wilson has follow up appointments scheduled with prior OP providers at [facility]  He will check his supply of medications to inform Program Psychiatrist tomorrow if he needs additional medication until he meets with Dr Albertina Michael in September  Altha Spine RN    Current suicide risk : Low     Medications changes/added/denied? No    Treatment session number: 19    Individual Case Management Visit provided today?  Yes     [facility] follow up physician's orders: N/A

## 2018-08-20 NOTE — PSYCH
Subjective:     Patient ID: Lion Ching is a 45 y o  male  [facility] Clinical Progress Notes      Specialized Services Documentation  Therapist must complete separate progress note for each specific clinical activity in which the individual participated during the day  Group Psychotherapy    ([id number]) Domitila Domínguez participated in psychotherapy group focused on finding ways to create structure outside of program  Domitila Doímnguez identified his discharge tomorrow as a stressor  He actively engaged in group discussion, relating to peers that talked about struggling to create structure in their daily schedule outside of program   Group discussed fears of "backsliding" if they do not have structure, and shared different methods that they have tried to incorporate more purpose and structure in their days  Domitila Domínguez shared that he plans to continue to reference his WRAP to try to keep himself moving forward  Moderate progress noted toward goals  Continue psychotherapy group to encourage Domitila Domínguez to explore stressors and coping    Tx Plan Objective: 1 1, 1 2, Therapist:  Hayley FOURNIER    Education Therapy   Time:  5079-7411  Previous goal met: Yes   Readiness to Learning: Receptive  Barriers to Learning: None  Learning Assessment  Time: 4624-8763  Education Completed: Illness and Wellness Tools  Teaching Method: Verbal and Written  Shared Area of Learning: Yes   Goal Set: take meds, do laundry  Tx Plan Objective: 1 3, 1 1, Therapist:  Hayley FOURNIER

## 2018-08-20 NOTE — PSYCH
Subjective:     Patient ID: Janet Chavis is a 45 y o  male  [facility] Clinical Progress Notes      Specialized Services Documentation  Therapist must complete separate progress note for each specific clinical activity in which the individual participated during the day  Group Psychotherapy 9:30 to 10:30 Joel Merritt participated in a group playing [employer]  The questions being asked were ' When have you felt that you over stepped a line ?' or When has someone over stepped a line with you? Joel Merritt was a willing participant and discussed the topics with the others  He stated that he learned to not let people get to him  He liked hearing how some of the others handled situations  He continues to benefit from group participation    1 1, 1 2, 1 5,  Therapist: Kelsey Nance

## 2018-08-20 NOTE — PSYCH
[facility] Discharge Instructions:     Disposition: family      Address: [address] [facility] [address] [place] [address]      Diagnoses:      Panic disorder with agoraphobia      RADHA (generalized anxiety disorder)      Bipolar disorder, current episode mixed, severe, without psychotic features ([address] )      Posttraumatic stress disorder      High risk medication use            Allergies (Drug/Food): Allergies   Allergen Reactions    Infliximab Other (See Comments)    Penicillins Other (See Comments)     rash    Penicillin V Rash     Activity: you may not return to work until released by outpatient providers     Diet:no recommendations     Smoking Cessation:not a smoker      Diagnostic/Laboratory Orders: Completed and updated while in Program    Follow-up appointments/Referrals: Domitila Domínguez will follow up with his outpatient providers at [address] [facility] [phone] Count includes the [facility] , Leny Canela U  6   Outpatient therapy appointment with Joaquín Gutierres LCSW - 8/27/18 at 12:45PM    Outpatient psychiatrist appointment with Dr Carlos Toribio MD - 10/19/18 at 1:25PM    ICM/CTT: Information and handouts on DBSA/Depression Support Groups in community shared with Domitila Domínguez as well as AMI support group    Intake/Referral/Evaluation (Non-Emergency) *NON INSURED FOR FUNDING: [facility]: [phone]    Crisis Intervention (Emergency) [address]: [place]: [phone]      [place] Crisis Intervention Hotline: 9-417.357.5097  National Suicide Crisis Hotline: 2-163.957.9649  I, the undersigned, have received and understand the above instructions          Patient/Rep Signature: __________________________________       Date/Time: ______________         Relationship: __________________________________________       Date/Time: ______________         Physician Signature: ____________________________________      Date/Time: ______________               Signature: ________________________________       Date/Time: ______________

## 2018-08-21 ENCOUNTER — DOCUMENTATION (OUTPATIENT)
Dept: PSYCHOLOGY | Facility: CLINIC | Age: 38
End: 2018-08-21

## 2018-08-21 NOTE — PROGRESS NOTES
[address] [place] [address] did not come to Program and was called by this   VM was left  Call back was received by  at [address] [facility] from [place] stating that he overslept and would not attend Program today  IZA Metcalf RN    1000 Return call was made to [place] from this CM and voice mail left requesting return call today to discuss call out   IZA Metcalf RN

## 2018-08-22 ENCOUNTER — OFFICE VISIT (OUTPATIENT)
Dept: PSYCHOLOGY | Facility: CLINIC | Age: 38
End: 2018-08-22
Payer: MEDICARE

## 2018-08-22 VITALS — SYSTOLIC BLOOD PRESSURE: 138 MMHG | TEMPERATURE: 98.8 F | DIASTOLIC BLOOD PRESSURE: 88 MMHG | HEART RATE: 88 BPM

## 2018-08-22 DIAGNOSIS — F40.01 AGORAPHOBIA WITH PANIC DISORDER: Primary | ICD-10-CM

## 2018-08-22 PROCEDURE — G0176 OPPS/PHP;ACTIVITY THERAPY: HCPCS

## 2018-08-22 PROCEDURE — G0177 OPPS/PHP; TRAIN & EDUC SERV: HCPCS

## 2018-08-22 PROCEDURE — G0410 GRP PSYCH PARTIAL HOSP 45-50: HCPCS

## 2018-08-22 NOTE — PSYCH
[facility] Clinical Progress Notes      Specialized Services Documentation  Therapist must complete separate progress note for each specific clinical activity in which the individual participated during the day         [facility] follow up physician's orders:   DATE 8/22/2018  TIME 8:46 AM   DISCHARGE TODAY  Adriane Garza MD

## 2018-08-22 NOTE — PSYCH
Diagnoses and all orders for this visit:   Panic disorder with agoraphobia   RADHA (generalized anxiety disorder)   Bipolar disorder, current episode mixed, severe, without psychotic features ([address] )   Posttraumatic stress disorder         Subjective:     Patient Sarath Carlton Hernandez Treatment Plan   AREAS OF NEED:  Anxiety and panic disorder as manifested by increased depression, fleeting suicidal thoughts without plan or intent, severe agoraphobia, isolation, not leaving home for last three months due to fears of having panic attack,   Date Initiated: 7/24/18     Strengths: Takes medication regularly and as ordered, motivated to decrease symptoms of anxiety and avoidant behaviors     LONG TERM GOAL:   Date Initiated: 7/24/18  1 0 I will identify three healthy coping strategies I can use to decrease my anxiety and increase my productivity and I will practice using at least two of these strategies daily  Target Date: 9/4/18  Revision Date:  Completion Date: 8/22/18-     SHORT TERM OBJECTIVES:    Date Initiated: 7/24/18  1 1 I will identify and practice daily three healthy ways to refocus myself when I am triggered by thoughts of fear and anxiety so that I can accomplish my activities of daily living without panic attacks or avoidance  Revision Date: 8/2/18  Target Date: 8/2/18  Completion Date:      Date Initiated: 7/24/18  1 2 I will write out three healthy coping skills learned in program to reduce my symptoms of anger, depression and anxiety and I will practice at least one of them daily  Revision Date: 8/2/18  Target Date: 8/2/18  Completion Date:     Date Initiated:  7/24/18  1 3 I will take medications as prescribed and share questions or concerns when, or if, they arise  I will have labs done as ordered by Program Psychiatrist as soon as I can complete     Revision Date: 8/2/18  Target Date: 8/2/18  Completion Date:      Date Initiated: 7/24/18    1 4 I will name three supports and I will identify specific ways my supports can assist me in my recovery from mental illness  Revision Date: 18  Target Date: 18  Completion Date:     Date Initiated:  18  1 1,1 2,1 3,1 4  Continue to work on treatment goals as they remain relevant  Revision Date: 18  Target Date:  18  Completion Date: 18     Date Initiated:  18  1 5  I will identify one activity I can do on a regular basis, every week, to help add personal satisfaction and productive structure to my life and to decrease my isolation     Revision Date: 18  Target Date: 18  Completion Date: 18     Date Initiated:  18  1 6  I will go to the [employer] and spend 15 minutes there using my strategies to be able to decrease my anxiety and be able to stay without high anxiety and discomfort  Revision Date:    Target Date:  18  Completion Date:  18         PSYCHIATRY:  Medication Management and education  Date Initiated: 18  Revision Date: 18, 18  The person(s) responsible for carrying out the plan Chas Dueñas MD     NURSIN 1,1 2,1 3,1 4 This RN will provide daily wellness group five days weekly to educate Cesar Thomas on individual diagnoses and medications used in treatment  Date Initiated: 18  Revision Date: 18, 18  The person(s) responsible for carrying out the plan is Maru Waterman RN     PSYCHOLOGY:   1 1,1 2,1 3,1 4 Provide psychotherapy group five times weekly to allow opportunity for Marco Antonio to explore stressors and ways of coping  Date Initiated: 18  Revision Date: 18, 18  The person(s) responsible for carrying out the plan is Yimi Alfonso MSW,LSW     ALLIED THERAPY:   1 1,1 2  Engage Marco Antonio in AT group five times weekly to encourage development and use of wellness tools to decrease symptoms and promote recovery through meaningful activity     Date Initiated: 18  Revision Date: 18, 18  The person(s) responsible for carrying out the plan is Author BALDEMAR Walsh      CASE MANAGEMENT:  Date Initiated: 7/24/18  Revision Date: 8/2/18, 8/13/18  1 0 This TOMER will meet with Marco Antonio three to four times weekly to assess treatment progress, D/C planning and connection to community supports and UR as indicated  The person(s) responsible for carrying out the plan is Sandie Murrell RN     DISCHARGE CRITERIA: Identify 3 signs of progress and complete relapse prevention plan      DISCHARGE PLAN: Janusz Mills will return to prior outpatient providers at [address] [place] [address] [address]     Estimated Length of Stay: 10 days     DSM Diagnoses    Panic disorder with agoraphobia       RADHA (generalized anxiety disorder)       Bipolar disorder, current episode mixed, severe, without psychotic features ([address] )       Posttraumatic stress disorder       High risk medication use      Diagnosis and Treatment Plan explained to Tri Boyer understanding diagnosis and is agreeable to Treatment Plan          Diagnosis and Treatment Plan explained to Soila soto understanding diagnosis and is agreeable to Treatment Plan         Treatment goals were met upon discharge 8/22/18    CLIENT COMMENTS / Please share your thoughts, feelings, need and/or experiences regarding your treatment plan: _____________________________________________________________________________________________________________________________________________________________________________________________________________________________________________________________________________________________________________________ Date/Time: ______________     Patient Signature: _________________________________     Date/Time: ______________      Signature: _________________________________     Date/Time: ______________

## 2018-08-22 NOTE — PSYCH
Subjective:     Patient ID: Tomasa Koo is a 45 y o  male  [facility] Discharge Summary:   Admission Date: 7/24/18  Patient was referred by OP therapist, Pratima Mitchell LCSW  Discharge Date: 8/22/18  Was this a routine discharge? Yes   Diagnosis: Axis I:   1  Agoraphobia with panic disorder        Treating Physician: Dr Mackenzie Freed MD  Treatment Complications: None  Presenting Need: As per Dr Mariann Aguilar Psychiatric Evaluation:  Tomasa Koo is a 40 y o  male with  Bipolar disorder, PTSD, panic disorder and anxiety, referred by IRLANDA Vale, ELVIAW  his therapist  because he feels more depressed, anxious, isolating himself,  has not been leaving the house often for the last 3 months because he is  "afraid of having another panic attack like the one he had in April when he went to [employer] and despite the store was empty he started to have a panic attack and almost passed out  He feels restlessness, does not interact with his family and has fleeting suicidal thoughts without plan or intent  He has limited contact with his friend  He also has not be able to hold a job due to his anxiety and panic episodes  He has 1-2 panic daily despite he takes his medications as prescribe  Onset of symptoms was 3 months ago with rapidly worsening course since that time  Psychosocial Stressors: health and social   Brittani Rankin feels depressed,anxious, he denies suicidal thoughts, denies any plan or intent, denies any homicidal ideas, plan or intent, denies any psychotic symptoms  He states that he isolated and has severe agoraphobia but he know he need treatment and wants to participate in the program       Course of treatment includes:    group counseling, medication management, individual case management, allied therapy, psychoeducation and psychiatric evaluation     Treatment Progress: Rey Chand attended 20 days of [facility]  His attendance was excellent   He worked actively on desensitizing himself against phobia of going to places outside his home  This was a struggle for Kavin Canela as his anxiety would quickly escalate and became a panic attack when he exited his home, after three months isolating after having a severe panic attack in a department store  He was not able to go out, food shop, or do any ADL's that required interfacing with people or being away from home without having a severe panic attack  Kavin Canela slowly and progressively worked on exiting his home and using CBT strategies such as relaxation breathing, diversion, cognitive strategies to stay longer and longer in these environments  He continues to work with these strategies to regain control of his anxiety  Kavin Canela denied suicidal or homicidal ideas, plan or intent  He reported taking medication as directed and denied any side effects from same  Aftercare recommendations include: Return to OP providers [facility]      Discharge Medications include:  Current Outpatient Prescriptions:     carBAMazepine (TEGretol XR) 400 mg 12 hr tablet, Take 1 tablet (400 mg total) by mouth daily at bedtime, Disp: 30 tablet, Rfl: 2    clonazePAM (KlonoPIN) 1 mg tablet, Take 1 tablet (1 mg total) by mouth 3 (three) times a day, Disp: 90 tablet, Rfl: 2    OLANZapine (ZyPREXA) 10 mg tablet, Take 1 tablet (10 mg total) by mouth daily at bedtime, Disp: 30 tablet, Rfl: 2

## 2018-08-22 NOTE — PSYCH
Subjective:     Patient ID: Jhony Foreman is a 45 y o  male  [facility] Clinical Progress Notes      Specialized Services Documentation  Therapist must complete separate progress note for each specific clinical activity in which the individual participated during the day  Allied Therapy   [id number] Parth Contreras actively shared in [facility] group focused on decreasing self- stigma and personal wellness tools  He attentively listened to [address] [facility] share his life story  Group encouraged power of learning about self, accepting illness and personal responsibility in recovery  Community resources reviewed in addition to personal resources like the [place] [employer]  Positive progress toward goal noted  Discharge at the end of the treatment day         Tx Plan Objective: 1 2,1 4, Therapist:  Yuniel MCDERMOTT    Education Therapy   Time:  3442-7600  Previous goal met: Yes   Readiness to Learning: Receptive  Barriers to Learning: None  Learning Assessment  Time: 3505-6768  Education Completed: Illness, Medication, Aftercare and Wellness Tools  Teaching Method: Verbal and Written  Shared Area of Learning: Yes   Goal Set: Shared gratitude and encouragement   Tx Plan Objective: 1 0, Therapist:  Yuniel MCDERMOTT

## 2018-08-22 NOTE — PSYCH
Subjective:     Patient ID: Amirah Christopher is a 45 y o  male  [facility] Clinical Progress Notes      Specialized Services Documentation  Therapist must complete separate progress note for each specific clinical activity in which the individual participated during the day  Group Psychotherapy   ([id number]) Jeovany Hoff participated in psychotherapy group focused on utilizing skills outside of program to promote continued recovery  Jeovany Hoff identified being discharged today as a stressor  He actively engaged in group discussion, talking about his fears about going out places and having panic attacks, but shared that he went to the [employer] store yesterday and when he felt like he was having a panic attack, he stepped out of the store, did some breathing and positive self-talk, and was able to go back into the store and was fine  He noted that taking things in small steps has been key in helping him move forward  Group discussed the importance of identifying effective skills and practicing them regularly so they are easily retrievable in anxiety-provoking environments, and encouraged each other to challenge their faulty thought patterns that can create anxiety where there might not need to be  Good progress toward goals noted  Discharge at the end of program day today    Tx Plan Objective: 1 1, 1 2, Therapist:  Joann FOURNIER

## 2018-08-22 NOTE — PSYCH
Subjective:     Patient ID: Ira Willingham is a 45 y o  male  [facility] Clinical Progress Notes      Specialized Services Documentation  Therapist must complete separate progress note for each specific clinical activity in which the individual participated during the day  Group Psychotherapy [id number]  Colettes Jeanna participated in [facility] group focused on identifying how you may be detracting from your wellness and contributing to relapse due to self-criticism  Sophia Meeks was attentive to education and handouts as well as peer discussion  He stated that he no longer engages in speaking negatively to himself as it "only makes my anxiety worse " He stated that he speaks encouraging to himself, especially when he is practicing doing very difficult things such as going some place new or staying out of the house for a period of time  He made good progress toward goals  D/C today at end of Program day  Tx Plan Objective: 1 1,1 2   Therapist:  Randa Grace RN                Case Management Note [id number]  Sophia Meeks met with this CM to review and sign discharge instructions, medications, relapse prevention plan  He received copies of all  Sophia Meeks had already been given, and reviewed with this CM, support groups in the community  Sophia Meeks has follow up appointments with his OP providers at [facility]  Amilcarelegloria Meeks stated that he has enough medication until he meets with his OP psychiatrist, Dr Mimi Collazo in October  Sophia Meeks stated that he is continuing to make good progress on going out of his home and being able to stay in stores or other public places for up to 30 minutes  He will continue with CBT/DBT strategies to decrease isolation and increase productivity  Sophia Meeks stated that he will continue to walk every day  He denied SI and HI as well as any manic or psychotic episodes  D/C at end of Program day  Randa Grace RN    Current suicide risk : Low     Medications changes/added/denied?  No    Treatment session number: 20    Individual Case Management Visit provided today? Yes     [facility] follow up physician's orders: D/C today

## 2018-08-27 ENCOUNTER — OFFICE VISIT (OUTPATIENT)
Dept: BEHAVIORAL/MENTAL HEALTH CLINIC | Facility: CLINIC | Age: 38
End: 2018-08-27
Payer: MEDICARE

## 2018-08-27 DIAGNOSIS — F43.10 POSTTRAUMATIC STRESS DISORDER: Primary | ICD-10-CM

## 2018-08-27 DIAGNOSIS — F31.63 BIPOLAR DISORDER, CURRENT EPISODE MIXED, SEVERE, WITHOUT PSYCHOTIC FEATURES (HCC): ICD-10-CM

## 2018-08-27 DIAGNOSIS — F63.81 INTERMITTENT EXPLOSIVE DISORDER: ICD-10-CM

## 2018-08-27 DIAGNOSIS — F40.01 PANIC DISORDER WITH AGORAPHOBIA: ICD-10-CM

## 2018-08-27 DIAGNOSIS — F41.1 GAD (GENERALIZED ANXIETY DISORDER): ICD-10-CM

## 2018-08-27 PROCEDURE — 90834 PSYTX W PT 45 MINUTES: CPT | Performed by: SOCIAL WORKER

## 2018-08-27 NOTE — PSYCH
Psychotherapy Provided: Individual Psychotherapy 45 minutes     Length of time in session: 45 minutes, follow up in 2 week    Goals addressed in session: 1    Pain:      none    0    Current suicide risk : Low     D:  MSW met with Cam Brown for session  He completed the partial program   His meds have not changed but the times he takes them has  He "continues to be very tired from his meds  They said it would get better but it hasn't "  MSW recommended if it continues he can percy his DR  He now has a PCP and has followed up with the PCP for blood work  He "is happy that everything is ok with him "  His hearing for disability is Oct 2  He is going to attend the anger management group beginning this Wed   "He continues to get anxious "  Discussed how his getting out has been  A:  Min progress on goals  P:  To continue to address [place] plan goals  Behavioral Health Treatment Plan [facility]: Diagnosis and Treatment Plan explained to Valentin Martin relates understanding diagnosis and is agreeable to Treatment Plan   Yes

## 2018-09-05 ENCOUNTER — OFFICE VISIT (OUTPATIENT)
Dept: BEHAVIORAL/MENTAL HEALTH CLINIC | Facility: CLINIC | Age: 38
End: 2018-09-05
Payer: MEDICARE

## 2018-09-05 DIAGNOSIS — F63.81 INTERMITTENT EXPLOSIVE DISORDER: ICD-10-CM

## 2018-09-05 DIAGNOSIS — F40.01 PANIC DISORDER WITH AGORAPHOBIA: ICD-10-CM

## 2018-09-05 DIAGNOSIS — F41.1 GAD (GENERALIZED ANXIETY DISORDER): ICD-10-CM

## 2018-09-05 DIAGNOSIS — F43.10 POSTTRAUMATIC STRESS DISORDER: Primary | ICD-10-CM

## 2018-09-05 PROCEDURE — 90853 GROUP PSYCHOTHERAPY: CPT | Performed by: SOCIAL WORKER

## 2018-09-06 ENCOUNTER — TELEPHONE (OUTPATIENT)
Dept: BEHAVIORAL/MENTAL HEALTH CLINIC | Facility: CLINIC | Age: 38
End: 2018-09-06

## 2018-09-06 ENCOUNTER — OFFICE VISIT (OUTPATIENT)
Dept: BEHAVIORAL/MENTAL HEALTH CLINIC | Facility: CLINIC | Age: 38
End: 2018-09-06
Payer: MEDICARE

## 2018-09-06 DIAGNOSIS — F40.01 PANIC DISORDER WITH AGORAPHOBIA: ICD-10-CM

## 2018-09-06 DIAGNOSIS — F43.10 POSTTRAUMATIC STRESS DISORDER: ICD-10-CM

## 2018-09-06 DIAGNOSIS — F63.81 INTERMITTENT EXPLOSIVE DISORDER: ICD-10-CM

## 2018-09-06 DIAGNOSIS — F41.1 GAD (GENERALIZED ANXIETY DISORDER): ICD-10-CM

## 2018-09-06 DIAGNOSIS — F31.64 BIPOLAR AFFECTIVE DISORDER, MIXED, SEVERE, WITH PSYCHOTIC BEHAVIOR (HCC): Primary | ICD-10-CM

## 2018-09-06 PROCEDURE — 90853 GROUP PSYCHOTHERAPY: CPT | Performed by: REGISTERED NURSE

## 2018-09-06 NOTE — PSYCH
Bipolar Wellness Group    D: Pt ( Rey Chand) was one of  4 pts  who participated in "[place]" today  Topics explored today included: Introductions; Confidentiality; Each Group Member's Sharing of their current Stressors and Mood patterns; [place] Support among Peers; Psychoeducation provided by this Therapist re: Possible Bipolar Symptoms of [place], Hypomania,and Depression, and common Meds  Prescribed for Bipolar; Group Members' Sharing of info re: what has worked,and what has not worked for them,and the acknowledgement that each Individual is unique; and the final segment of today's Group was comprised of Calm/ Deep-Breathing/ Relaxation/ Guided- imagery/ Mindfulness accompanied by [employer] via CD  Rey Chand participated actively in session  He shared that he has been "going-going-going" lately, "non-stop activity when I am up,"  "racing thoughts,'  with low sleep, high irritability---> manic to hypomanic patterns ,for that past couple of weeks or so  He also said that when he does take Zyprexa, he then sleeps too late in the morning, like today he says, and he disappointed his brother today because pt felt too groggy early this am, to go help his brother with a project at his brother's house  Pt also reports episodes of depression, interspersed , in the same weeks, sometimes  Pt was given Support and Validation by his peers,and they could idenify with what he was saying  Psychoeducation also given by this Therapist,  and pt may try taking his Zyprexa slightly earlier in the evening,so that he is not as groggy/ sedated the next day in the early am  He gave some Support to peers ,also  He had no evidence of any SI/HI/AH/VH during Group  He appeared more relaxed after the Deep-Breathing/ Guided-Imagery / Mindfulness segment of Group  He thanked Peers and this Therapist for Group interactions today    A: Bipolar Disorder, mixed severe, with more reported hypomania this week, and past reports of psychosis but not during Group today, F31 64; PTSD, F43 12; Hx of Panic disorder with agoraphobia, F41 01; and hx of Intermittent Explosive Disorder  Pt appears to benefit from Group  P: Continue Treatment Plan, Meds, Individual Psychotherapy,and Group Therapy

## 2018-09-06 NOTE — PATIENT INSTRUCTIONS
Post Traumatic Stress Disorder   WHAT YOU NEED TO KNOW:   What is post traumatic stress disorder? Post traumatic stress disorder (PTSD) is a condition that may occur after you have experienced a traumatic situation or event  This event may have caused you to feel intense fear, pain, or sorrow  You may think you are going to get hurt or die  You may also continue to feel helpless after the event  These feelings affect your daily activities and relationships  What causes PTSD? · An accident    · A crime done to you or a crime you may have seen, such as a murder, robbery, or shooting    · A serious disease, such as cancer, or the death of a loved one    · A natural disaster, such as a flood, earthquake, hurricane, or tornado    · Physical or sexual abuse    · Violence, war, or terrorism  What are the signs and symptoms of PTSD? Signs and symptoms of PTSD may be divided into 3 groups:  · Reliving or re-experiencing the event:      ¨ You have nightmares  ¨ You have flashbacks (recalling the past) or images of the event that keep coming into your mind  ¨ You have goosebumps, chills, or a pounding, fast heartbeat when you are reminded of the event  · Avoidance:      ¨ You avoid thinking or talking about the traumatic event  ¨ You avoid activities, places, or people that remind you of the traumatic event  ¨ You have trouble spending time with friends and family or lose interest in doing enjoyable things  ¨ You have little or no emotion or are unable to express your feelings  · Increased arousal (overreaction) or mood swings:     ¨ You easily get stressed or hurt emotionally  ¨ You have sudden feelings of sadness, fear, guilt, or anger  ¨ You feel nervous, panicked, or irritable  ¨ You have trouble paying attention or getting things done  ¨ You have problems sleeping  How is PTSD diagnosed?   Healthcare providers will ask you questions about your symptoms and use a guide to diagnose PTSD  You have PTSD if you have had all of the following for at least 1 month:  · You have seen, faced, or experienced an event that involved serious injury, near death, or death  · Your response to the event was great fear, helplessness, or horror  · You have at least 1 constant symptom of re-experiencing the traumatic event  · You have at least 3 symptoms of avoidance  · You have at least 2 hyperarousal symptoms  · Your symptoms cause distress and affect your daily activities, work, and relationships  How is PTSD treated? · Cognitive behavior therapy: This therapy will help you learn to face the feared object or situation slowly and carefully  You will also learn to control your mental and physical reactions of fear  ¨ Cognitive restructuring:  Healthcare providers help you learn which thoughts cause anxiety  Your therapist will help you see the event differently so you can change your thoughts and decrease your anxiety  ¨ Exposure therapy or desensitization: This therapy helps you face a feared object, person, or situation  Fantasy or real-life situations are used with this therapy  The goal of desensitization therapy is to help decrease your fear or anxiety  · Relaxation therapy:  Stress may cause pain, lead to illness, and slow healing  Relaxation therapy teaches you how to feel less physical and emotional stress  Deep breathing, muscle relaxation, and music are some forms of relaxation therapy  · Eye movement desensitization and reprocessing: This is also called EMDR and is a type of exposure therapy  Healthcare providers help you make your eyes move back and forth while you imagine the trauma  · Psychological debriefing: This is often a single meeting with a therapist to have crisis counseling  You may have this right after a traumatic event to prevent or decrease further emotional problems  · Medicine:     Danny Moses Antianxiety medicine:   This medicine may be given to decrease anxiety and help you feel calm and relaxed  ¨ Antidepressants: These medicines decrease or stop the symptoms of depression  ¨ Sedative: This medicine is given to help you stay calm and relaxed  What are the risks of PTSD? PTSD symptoms may get worse if not treated  You may have problems working or getting along with others  PTSD may affect your eating and sleeping habits, which may cause other health problems  You may hurt yourself or others  Where can I find support and more information? · [facility] for [address] [facility] Traumatic Stress Disorder  Phone: [phone]  Web Address: [email or web address]  · [address] [place] [address], Public Information & Communication Branch  [address], [address], [address]  [place] [address]   Phone: [phone]  Phone: [phone]  Web Address: hospitals  When should I contact my healthcare provider? · You cannot make it to your next appointment  · You cannot sleep or are sleeping too much  · You have questions or concerns about your condition or care  When should I seek immediate care or call 911? · You think about hurting or killing yourself or someone else  CARE AGREEMENT:   You have the right to help plan your care  Learn about your health condition and how it may be treated  Discuss treatment options with your caregivers to decide what care you want to receive  You always have the right to refuse treatment  The above information is an  only  It is not intended as medical advice for individual conditions or treatments  Talk to your doctor, nurse or pharmacist before following any medical regimen to see if it is safe and effective for you  © 2017 [address] [facility] Information is for End User's use only and may not be sold, redistributed or otherwise used for commercial purposes   All illustrations and images included in CareNotes® are the copyrighted property of A D A M , Inc  or Arsalan Delmar  Anxiety   WHAT YOU NEED TO KNOW:   What do I need to know about anxiety? Anxiety is a condition that causes you to feel extremely worried or nervous  The feelings are so strong that they can cause problems with your daily activities or sleep  Anxiety may be triggered by something you fear, or it may happen without a cause  Family or work stress, smoking, caffeine, and alcohol can increase your risk for anxiety  Certain medicines or health conditions can also increase your risk  Anxiety can become a long-term condition if it is not managed or treated  What other common signs and symptoms may occur with anxiety? · Fatigue or muscle tightness     · Shaking, restlessness, or irritability     · Problems focusing     · Trouble sleeping     · Feeling jumpy, easily startled, or dizzy     · Rapid heartbeat or shortness of breath  What do I need to tell my healthcare provider about my anxiety? Tell your healthcare provider when your symptoms began and what triggers them  Tell your provider if anxiety affects your daily activities  Your provider will also ask about your medical history and if you have family members with a similar condition  Tell your provider about your past and present alcohol, nicotine, or drug use  What can I do to manage anxiety? You may get medicines to help you feel calm and relaxed, and to decrease your symptoms  Medicines are usually given together with therapy or other treatments  The following can help you manage anxiety:  · Talk to someone about your anxiety  Your healthcare provider may suggest counseling  Cognitive behavioral therapy can help you understand and change how you react to events that trigger your symptoms  You might feel more comfortable talking with a friend or family member about your anxiety  Choose someone you know will be supportive and encouraging  · Find ways to relax    Activities such as exercise, meditation, or listening to music can help you relax  Spend time with friends, or do things you enjoy  · Practice deep breathing  Deep breathing can help you relax when you feel anxious  Focus on taking slow, deep breaths several times a day, or during an anxiety attack  Breathe in through your nose and out through your mouth  · Create a regular sleep routine  Regular sleep can help you feel calmer during the day  Go to sleep and wake up at the same times every day  Do not watch television or use the computer right before bed  Your room should be comfortable, dark, and quiet  · Eat a variety of healthy foods  Healthy foods include fruits, vegetables, low-fat dairy products, lean meats, fish, whole-grain breads, and cooked beans  Healthy foods can help you feel less anxious and have more energy  · Exercise regularly  Exercise can increase your energy level  Exercise may also lift your mood and help you sleep better  Your healthcare provider can help you create an exercise plan  · Do not smoke  Nicotine and other chemicals in cigarettes and cigars can increase anxiety  Ask your healthcare provider for information if you currently smoke and need help to quit  E-cigarettes or smokeless tobacco still contain nicotine  Talk to your healthcare provider before you use these products  · Do not have caffeine  Caffeine can make your symptoms worse  Do not have foods or drinks that are meant to increase your energy level  · Limit or do not drink alcohol  Ask your healthcare provider if alcohol is safe for you  You may not be able to drink alcohol if you take certain anxiety or depression medicines  Limit alcohol to 1 drink per day if you are a woman  Limit alcohol to 2 drinks per day if you are a man  A drink of alcohol is 12 ounces of beer, 5 ounces of wine, or 1½ ounces of liquor  · Do not use drugs  Drugs can make your anxiety worse  It can also make anxiety hard to manage   Talk to your healthcare provider if you use drugs and want help to quit  Call 911 if:   · You have chest pain, tightness, or heaviness that may spread to your shoulders, arms, jaw, neck, or back  · You feel like hurting yourself or someone else  When should I contact my healthcare provider? · Your symptoms get worse or do not get better with treatment  · Your anxiety keeps you from doing your regular daily activities  · You have new symptoms since your last visit  · You have questions or concerns about your condition or care  CARE AGREEMENT:   You have the right to help plan your care  Learn about your health condition and how it may be treated  Discuss treatment options with your caregivers to decide what care you want to receive  You always have the right to refuse treatment  The above information is an  only  It is not intended as medical advice for individual conditions or treatments  Talk to your doctor, nurse or pharmacist before following any medical regimen to see if it is safe and effective for you  © 2017 [address] Information is for End User's use only and may not be sold, redistributed or otherwise used for commercial purposes  All illustrations and images included in CareNotes® are the copyrighted property of [employer]  or Arsalan Jacobsen  Panic Disorder   WHAT YOU NEED TO KNOW:   What is panic disorder? Panic disorder is a type of anxiety disorder that causes you to have sudden panic attacks  The panic attacks may occur anywhere at any time, even while you are asleep  You may begin to worry when the panic attacks will happen again  Your behavior may change, and you may not want to go out with family and friends  What is a panic attack? A panic attack is a period of strong fear or discomfort  You may feel as though something very bad is going to happen but do not know what it is  A panic attack occurs suddenly and usually lasts about 10 minutes   It may occur at any time and without warning  You may also have the following symptoms during a panic attack:  · Fast or pounding heartbeat    · Sweating, trembling, or shaking    · Shortness of breath or trouble breathing    · Feeling of choking or having a lump in your throat    · Chest pain or discomfort    · Nausea or abdominal pain    · Feeling dizzy, unsteady, lightheaded, or faint    · Feeling that you or the things around you are not real, or you are outside of your body    · Fear of losing control, going crazy, or dying    · Numbness or a tingling feeling    · Chills or hot flushes  What causes or increases my risk of panic disorder? No one knows for sure what causes panic disorder  You may have an increased risk of panic disorder if other family members have it  The following may also increase your risk:  · Stressful events such as severe illness or injury, death of a loved one, or childhood trauma such as physical or sexual abuse    · Chronic medical conditions such as asthma, lung disease, irritable bowel syndrome, heart problems, or thyroid disease    · Other mental health conditions such as depression or another type of anxiety disorder     · Overprotective parents or parents who worry too much about their own health    · Drug or alcohol abuse  How is panic disorder diagnosed and treated? Your healthcare provider will ask you how bad, how often, and in what situations your panic attacks occur  He may also want to know if other family members have panic disorder or other mental health conditions  He may also ask how well you are doing in school or work, or with your daily activities  You may be treated with any of the following:  · Medicines , such as antianxiety and antidepressants, may be given to treat panic disorder  You may need to take antidepressants for several weeks before you begin to feel better  Tell your healthcare provider about any side effects or problems you have with your medicine  Sometimes the type or amount of medicine may need to be changed  · Therapy  may be used to treat panic disorder  A therapist will help you learn to cope with your thoughts and feelings  This can be done alone or in a group  It may also be done with family members or a significant other  How can I manage panic disorder? · Keep a diary of your panic attacks  Write down how often you have panic attacks, how long they last, and the symptoms you had  Write down whether or not there was anything that happened right before the panic attack  Write down whether there were things that helped to ease or stop your panic attack  Bring your diary with you every time you see your healthcare provider  · Avoid foods and drinks that have caffeine  These may include coffee, tea, soda, energy drinks, and chocolate  · Avoid or limit alcohol  Ask your healthcare provider how much alcohol is safe for you to drink  A drink of alcohol is 12 ounces of beer, 5 ounces of wine, or 1½ ounces of liquor  · Get regular physical activity  Exercise can help decrease stress and anxiety  Talk to your healthcare provider before you start to exercise  Together you can plan the best exercise program for you  · Manage your stress  Learn ways to control stress, such as relaxation or deep breathing  Talk to someone about things that upset you  When should I contact my healthcare provider? · You have new symptoms since you last saw your healthcare provider  · Your worry keeps you from doing daily tasks such as work or caring for yourself or your family  · Your symptoms are getting worse  · You have questions or concerns about your condition or care  When should I seek immediate care or call 911? · You feel lightheaded, too dizzy to stand up, or you faint  · You feel like harming yourself  · You have chest pain, tightness, or heaviness that spreads to your shoulders, arms, jaw, neck, or back    CARE AGREEMENT:   You have the right to help plan your care  Learn about your health condition and how it may be treated  Discuss treatment options with your caregivers to decide what care you want to receive  You always have the right to refuse treatment  The above information is an  only  It is not intended as medical advice for individual conditions or treatments  Talk to your doctor, nurse or pharmacist before following any medical regimen to see if it is safe and effective for you  © 2017 [address]  Information is for End User's use only and may not be sold, redistributed or otherwise used for commercial purposes  All illustrations and images included in CareNotes® are the copyrighted property of A D A M , Inc  or Arsalan Jacobsen  Bipolar Disorder   AMBULATORY CARE:   Bipolar disorder  is a long-term chemical imbalance that causes rapid changes in mood and behavior  High moods are called pete  Low moods are called depression  Sometimes you will feel manic and sometimes you will feel depressed  You can have alternating episodes of pete and depression  This is called a mixed bipolar state  Call 911 if:   · You think about hurting yourself or someone else  Contact your healthcare provider or psychiatrist if:   · You are having trouble managing your bipolar disorder  · You cannot sleep, or are sleeping all the time  · You cannot eat, or are eating more than usual     · You feel dizzy or your stomach is upset  · You cannot make it to your next meeting  · You have questions or concerns about your condition or care    Common signs and symptoms of pete:   · Being easily distracted or agitated, or focusing all your attention on a goal    · Insomnia (trouble sleeping) or not needing as much sleep as usual    · Inflated self-esteem or belief in abilities    · Racing thoughts that may not make sense or be understood by others    · Speech that is faster than usual, or you talk more than usual    · Increased thoughts about sex    · Happy and care free, with a sudden change to anger or irritability    · Hallucinations that cause you to see and hear things that are not really there  Common signs and symptoms of depression:   · Anger, worry, anxiousness, or irritability    · Lack of energy    · Sadness or emptiness    · Crying for long periods    · Low self-esteem or sense of worthlessness    · Negative thoughts or not caring about anything    · Too much or too little sleep  Treatment  for bipolar disorder may include medicines to control your mood swings  You may need to see a therapist or psychiatrist regularly for counseling  You may need to go into the hospital for tests and treatment  Follow up with your healthcare provider or psychiatrist as directed:  Write down your questions so you remember to ask them during your visits  Manage bipolar disorder:  Watch for triggers of bipolar disorder symptoms, such as stress  Learn new ways to relax, such as deep breathing, to manage your stress  Tell someone if you feel a manic or depressive period might be coming on  Ask a friend or family member to help watch you for bipolar symptoms  Work to develop skills that will help you manage bipolar disorder  You may need to make lifestyle changes  Ask your healthcare provider or psychiatrist for resources  © 2017 [address] Information is for End User's use only and may not be sold, redistributed or otherwise used for commercial purposes  All illustrations and images included in CareNotes® are the copyrighted property of [employer]  or Arsalan Jacobsen  The above information is an  only  It is not intended as medical advice for individual conditions or treatments  Talk to your doctor, nurse or pharmacist before following any medical regimen to see if it is safe and effective for you

## 2018-09-12 ENCOUNTER — TELEPHONE (OUTPATIENT)
Dept: PSYCHIATRY | Facility: CLINIC | Age: 38
End: 2018-09-12

## 2018-09-19 ENCOUNTER — OFFICE VISIT (OUTPATIENT)
Dept: BEHAVIORAL/MENTAL HEALTH CLINIC | Facility: CLINIC | Age: 38
End: 2018-09-19
Payer: MEDICARE

## 2018-09-19 DIAGNOSIS — F63.81 INTERMITTENT EXPLOSIVE DISORDER: ICD-10-CM

## 2018-09-19 DIAGNOSIS — F40.01 PANIC DISORDER WITH AGORAPHOBIA: ICD-10-CM

## 2018-09-19 DIAGNOSIS — F31.64 BIPOLAR AFFECTIVE DISORDER, MIXED, SEVERE, WITH PSYCHOTIC BEHAVIOR (HCC): Primary | ICD-10-CM

## 2018-09-19 DIAGNOSIS — F43.10 POSTTRAUMATIC STRESS DISORDER: ICD-10-CM

## 2018-09-19 PROCEDURE — 90853 GROUP PSYCHOTHERAPY: CPT | Performed by: SOCIAL WORKER

## 2018-09-19 NOTE — PSYCH
Goal 1, Week 4   D:  Rickie Munson attended anger management group  Topics were:  reviewed homework assignment, what is underneath my anger and ways to handle anger  The anger incident he shared this week "was a small thing   "  A:  Mild progress on goal     P:  Homework:  to apply coping skills learned in group today

## 2018-09-25 DIAGNOSIS — F31.9 BIPOLAR 1 DISORDER (HCC): ICD-10-CM

## 2018-09-25 RX ORDER — OLANZAPINE 10 MG/1
10 TABLET ORAL
Qty: 30 TABLET | Refills: 2 | Status: SHIPPED | OUTPATIENT
Start: 2018-09-25 | End: 2018-11-30 | Stop reason: SDUPTHER

## 2018-10-04 ENCOUNTER — OFFICE VISIT (OUTPATIENT)
Dept: BEHAVIORAL/MENTAL HEALTH CLINIC | Facility: CLINIC | Age: 38
End: 2018-10-04
Payer: MEDICARE

## 2018-10-04 DIAGNOSIS — F40.01 PANIC DISORDER WITH AGORAPHOBIA: ICD-10-CM

## 2018-10-04 DIAGNOSIS — F41.1 GAD (GENERALIZED ANXIETY DISORDER): ICD-10-CM

## 2018-10-04 DIAGNOSIS — F31.64 BIPOLAR AFFECTIVE DISORDER, MIXED, SEVERE, WITH PSYCHOTIC BEHAVIOR (HCC): Primary | ICD-10-CM

## 2018-10-04 PROCEDURE — 90853 GROUP PSYCHOTHERAPY: CPT | Performed by: REGISTERED NURSE

## 2018-10-04 NOTE — PSYCH
BIPOLAR  WELLNESS  GROUP    D: Pt (Lizz Flood) was one of  3 pts  who participated in today's Bipolar Wellness Group  Topics explored today included:  Introductions; Confidentiality; Each Group member's Sharing of information regarding their current Mood Patterns ( Depression, Viviana, Hypomania, or a mixture of depression plus one of the others); [place] Sharing and Support among Peers; Sharing of some their triggers/ current Stressors which contribute to  Anxiety; Some Psychoeducation provided by this Therapist regarding Bipolar Disorder+ Anxiety, and also Re: the effects of Biological +Psychosocial+ Environmental factors on the person who has Bipolar Disorder; Group members Sharing of some of the Positive Coping Activities they are doing; and the last segment of today's Group was comprised of Slow,Calm Deep-Breathing/ Relaxation/ Guided-Imagery, accompanied by [employer] via CD  Lizz Flood participated actively in [address] [employer] [address]  He has had both hypomania and depression lately  He also described to Peers how he gets severe Panic Attacks  if he is in public, or in large stores---so he isolates himself at home quite a bit  He pushed himself to come here today  Peers in Group identified with how he described his panic, as they have experienced panic attacks in the past,or currently, also  Pt also expressed the strong feelings of frustration, being upset, and feeling his voice/input  was dismissed by the Court/ the [address], recently,when he had a Disability Hearing  Pt says he did not get a final answer yet, re: approval of Social Security Disability or not--->His  told him he may have to wait up to another 3 months  Pt is trying to use his Anger-management skills, and we review these  He is given affirmation for using his every Anger-management skill that he can muster, during this anxiety-producing time of Waiting to hear the outcome of his Disability Hearing, and afterward, too       He feels a sense of low self-esteem, re: not being able to work or support himself financially,   with his cycling of severe depression , hypomania, depression again, plus his panic attacks/ alleged agoraphobia  Peers gave him Support and Validation, in Group  He gave them Support, also  He appeared more relaxed at the end of the Deep-breathing/ Guided-Imagery/Relaxation segment of Group  He had no evidence of any current SI/HI/AH/VH during today's Group

## 2018-10-05 ENCOUNTER — OFFICE VISIT (OUTPATIENT)
Dept: PSYCHIATRY | Facility: CLINIC | Age: 38
End: 2018-10-05
Payer: MEDICARE

## 2018-10-05 DIAGNOSIS — F31.9 BIPOLAR 1 DISORDER (HCC): ICD-10-CM

## 2018-10-05 PROBLEM — E78.1 HYPERTRIGLYCERIDEMIA: Status: ACTIVE | Noted: 2018-08-19

## 2018-10-05 PROCEDURE — 99213 OFFICE O/P EST LOW 20 MIN: CPT | Performed by: PSYCHIATRY & NEUROLOGY

## 2018-10-05 RX ORDER — CARBAMAZEPINE 400 MG/1
400 TABLET, EXTENDED RELEASE ORAL
Qty: 30 TABLET | Refills: 2 | Status: SHIPPED | OUTPATIENT
Start: 2018-10-05 | End: 2018-12-19 | Stop reason: SDUPTHER

## 2018-10-05 RX ORDER — CARBAMAZEPINE 200 MG/1
200 TABLET, EXTENDED RELEASE ORAL DAILY
Qty: 30 TABLET | Refills: 2 | Status: SHIPPED | OUTPATIENT
Start: 2018-10-05 | End: 2018-12-19 | Stop reason: SDUPTHER

## 2018-10-05 NOTE — PATIENT INSTRUCTIONS
Anxiety   AMBULATORY CARE:   Anxiety  is a condition that causes you to feel extremely worried or nervous  The feelings are so strong that they can cause problems with your daily activities or sleep  Anxiety may be triggered by something you fear, or it may happen without a cause  Family or work stress, smoking, caffeine, and alcohol can increase your risk for anxiety  Certain medicines or health conditions can also increase your risk  Anxiety can become a long-term condition if it is not managed or treated  Common signs and symptoms that may occur with anxiety:   · Fatigue or muscle tightness     · Shaking, restlessness, or irritability     · Problems focusing     · Trouble sleeping     · Feeling jumpy, easily startled, or dizzy     · Rapid heartbeat or shortness of breath  Call 911 if:   · You have chest pain, tightness, or heaviness that may spread to your shoulders, arms, jaw, neck, or back  · You feel like hurting yourself or someone else  Contact your healthcare provider if:   · Your symptoms get worse or do not get better with treatment  · You think your medicine may be causing side effects  · Your anxiety keeps you from doing your regular daily activities  · You have new symptoms since your last visit  · You have questions or concerns about your condition or care  Treatment for anxiety  may include medicines to help you feel calm and relaxed, and decrease your symptoms  Medicines are usually given together with therapy or other treatments  Manage anxiety:   · Talk to someone about your anxiety  Your healthcare provider may suggest counseling  Cognitive behavioral therapy can help you understand and change how you react to events that trigger your symptoms  You might feel more comfortable talking with a friend or family member about your anxiety  Choose someone you know will be supportive and encouraging  · Find ways to relax    Activities such as exercise, meditation, or listening to music can help you relax  Spend time with friends, or do things you enjoy  · Practice deep breathing  Deep breathing can help you relax when you feel anxious  Focus on taking slow, deep breaths several times a day, or during an anxiety attack  Breathe in through your nose and out through your mouth  · Create a regular sleep routine  Regular sleep can help you feel calmer during the day  Go to sleep and wake up at the same times every day  Do not watch television or use the computer right before bed  Your room should be comfortable, dark, and quiet  · Eat a variety of healthy foods  Healthy foods include fruits, vegetables, low-fat dairy products, lean meats, fish, whole-grain breads, and cooked beans  Healthy foods can help you feel less anxious and have more energy  · Exercise regularly  Exercise can increase your energy level  Exercise may also lift your mood and help you sleep better  Your healthcare provider can help you create an exercise plan  · Do not smoke  Nicotine and other chemicals in cigarettes and cigars can increase anxiety  Ask your healthcare provider for information if you currently smoke and need help to quit  E-cigarettes or smokeless tobacco still contain nicotine  Talk to your healthcare provider before you use these products  · Do not have caffeine  Caffeine can make your symptoms worse  Do not have foods or drinks that are meant to increase your energy level  · Limit or do not drink alcohol  Ask your healthcare provider if alcohol is safe for you  You may not be able to drink alcohol if you take certain anxiety or depression medicines  Limit alcohol to 1 drink per day if you are a woman  Limit alcohol to 2 drinks per day if you are a man  A drink of alcohol is 12 ounces of beer, 5 ounces of wine, or 1½ ounces of liquor  · Do not use drugs  Drugs can make your anxiety worse  It can also make anxiety hard to manage   Talk to your healthcare provider if you use drugs and want help to quit  Follow up with your healthcare provider as directed:  Write down your questions so you remember to ask them during your visits  © 2017 [address] [employer] Information is for End User's use only and may not be sold, redistributed or otherwise used for commercial purposes  All illustrations and images included in CareNotes® are the copyrighted property of [employer]  or Arsalan Jacobsen  The above information is an  only  It is not intended as medical advice for individual conditions or treatments  Talk to your doctor, nurse or pharmacist before following any medical regimen to see if it is safe and effective for you  Panic Disorder   WHAT YOU NEED TO KNOW:   Panic disorder is a type of anxiety disorder that causes you to have sudden panic attacks  The panic attacks may occur anywhere at any time, even while you are asleep  You may begin to worry when the panic attacks will happen again  Your behavior may change, and you may not want to go out with family and friends  DISCHARGE INSTRUCTIONS:   Medicines:   · Medicines , such as antianxiety and antidepressants, may be given to treat panic disorder  You may need to take antidepressants for several weeks before you begin to feel better  Tell your healthcare provider about any side effects or problems you have with your medicine  Sometimes the type or amount of medicine may need to be changed  · Take your medicine as directed  Contact your healthcare provider if you think your medicine is not helping or if you have side effects  Tell him or her if you are allergic to any medicine  Keep a list of the medicines, vitamins, and herbs you take  Include the amounts, and when and why you take them  Bring the list or the pill bottles to follow-up visits  Carry your medicine list with you in case of an emergency  Follow up with your healthcare provider or psychiatrist as directed:   Your healthcare provider will monitor your progress at follow-up visits  He will also monitor your medicine if you are taking antidepressants  He will ask if medicine is helping to reduce your symptoms  Tell him about any side effects or problems you have with your medicine  Sometimes the type or amount of medicine may need to be changed  Write down your questions so you remember to ask them during your visits  Keep a diary of your panic attacks:  Write down how often you have panic attacks, how long they last, and the symptoms you had  Write down whether or not there was anything that happened right before the panic attack  Write down whether there were things that helped to ease or stop your panic attack  Bring your diary with you every time you see your healthcare provider  Self-care:  · Avoid foods and drinks that have caffeine  These may include coffee, tea, soda, energy drinks, and chocolate  · Avoid or limit alcohol  Ask your healthcare provider how much alcohol is safe for you to drink  A drink of alcohol is 12 ounces of beer, 5 ounces of wine, or 1½ ounces of liquor  · Get regular physical activity  Exercise can help decrease stress and anxiety  Talk to your healthcare provider before you start to exercise  Together you can plan the best exercise program for you  · Manage your stress  Learn ways to control stress, such as relaxation or deep breathing  Talk to someone about things that upset you  Contact your healthcare provider or psychiatrist if:   · You have new symptoms since you last saw your healthcare provider  · Your worry keeps you from doing daily tasks such as work or caring for yourself or your family  · Your symptoms are getting worse  · You have questions or concerns about your condition or care  Return to the emergency department if:   · You feel lightheaded, too dizzy to stand up, or you faint  · You feel like harming yourself       · You have chest pain, tightness, or heaviness that spreads to your shoulders, arms, jaw, neck, or back  © 2017 [address] [employer] Information is for End User's use only and may not be sold, redistributed or otherwise used for commercial purposes  All illustrations and images included in CareNotes® are the copyrighted property of [employer]  or Arsalan Jacobsen  The above information is an  only  It is not intended as medical advice for individual conditions or treatments  Talk to your doctor, nurse or pharmacist before following any medical regimen to see if it is safe and effective for you  Bipolar Disorder   AMBULATORY CARE:   Bipolar disorder  is a long-term chemical imbalance that causes rapid changes in mood and behavior  High moods are called pete  Low moods are called depression  Sometimes you will feel manic and sometimes you will feel depressed  You can have alternating episodes of pete and depression  This is called a mixed bipolar state  Call 911 if:   · You think about hurting yourself or someone else  Contact your healthcare provider or psychiatrist if:   · You are having trouble managing your bipolar disorder  · You cannot sleep, or are sleeping all the time  · You cannot eat, or are eating more than usual     · You feel dizzy or your stomach is upset  · You cannot make it to your next meeting  · You have questions or concerns about your condition or care    Common signs and symptoms of pete:   · Being easily distracted or agitated, or focusing all your attention on a goal    · Insomnia (trouble sleeping) or not needing as much sleep as usual    · Inflated self-esteem or belief in abilities    · Racing thoughts that may not make sense or be understood by others    · Speech that is faster than usual, or you talk more than usual    · Increased thoughts about sex    · Happy and care free, with a sudden change to anger or irritability    · Hallucinations that cause you to see and hear things that are not really there  Common signs and symptoms of depression:   · Anger, worry, anxiousness, or irritability    · Lack of energy    · Sadness or emptiness    · Crying for long periods    · Low self-esteem or sense of worthlessness    · Negative thoughts or not caring about anything    · Too much or too little sleep  Treatment  for bipolar disorder may include medicines to control your mood swings  You may need to see a therapist or psychiatrist regularly for counseling  You may need to go into the hospital for tests and treatment  Follow up with your healthcare provider or psychiatrist as directed:  Write down your questions so you remember to ask them during your visits  Manage bipolar disorder:  Watch for triggers of bipolar disorder symptoms, such as stress  Learn new ways to relax, such as deep breathing, to manage your stress  Tell someone if you feel a manic or depressive period might be coming on  Ask a friend or family member to help watch you for bipolar symptoms  Work to develop skills that will help you manage bipolar disorder  You may need to make lifestyle changes  Ask your healthcare provider or psychiatrist for resources  © 2017 [address] [employer] Information is for End User's use only and may not be sold, redistributed or otherwise used for commercial purposes  All illustrations and images included in CareNotes® are the copyrighted property of [employer]  or Arsalan Jacobsen  The above information is an  only  It is not intended as medical advice for individual conditions or treatments  Talk to your doctor, nurse or pharmacist before following any medical regimen to see if it is safe and effective for you

## 2018-10-05 NOTE — PSYCH
Subjective: Medication Management      Patient ID: Coyn Son is a 45 y o  male  HPI ROS Appetite Changes and Sleep: normal appetite, recent weight gain(unknownlbs) and decrease in number of sleep hours less than 8 hours     Review Of Systems:     Mood Emotional Lability   Behavior Normal    Thought Content Disturbing Thoughts, Feelings and Unreasonalbe or Irrational Fears   General Emotional Problems, Sleep Disturbances and Decreased Functioning   Personality Normal   Other Psych Symptoms Normal   Constitutional Negative   ENT Negative   Cardiovascular Negative   Respiratory Negative   Gastrointestinal Negative   Genitourinary Negative   Musculoskeletal Negative   Integumentary Negative   Neurological Negative   Endocrine Normal    Other Symptoms Normal              Laboratory Results: No results found for this or any previous visit  Substance Abuse History:  History   Drug Use No       Family Psychiatric History:   Family History   Problem Relation Age of Onset    Schizophrenia Father     Alcohol abuse Father     Drug abuse Brother        The following portions of the patient's history were reviewed and updated as appropriate: allergies, current medications, past family history, past medical history, past social history, past surgical history and problem list     Social History     Social History    Marital status: Single     Spouse name: N/A    Number of children: N/A    Years of education: N/A     Occupational History    Not on file       Social History Main Topics    Smoking status: Current Some Day Smoker    Smokeless tobacco: Never Used      Comment: he smoke only 1 cigarette a day     Alcohol use No      Comment: last use 2012    Drug use: No    Sexual activity: Not Currently     Other Topics Concern    Not on file     Social History Narrative    No narrative on file     Social History     Social History Narrative    No narrative on file       Objective:       Mental status:  Appearance calm and cooperative , adequate hygiene and grooming and good eye contact    Mood depressed and anxious   Affect affect was constricted   Speech a normal rate and fluent   Thought Processes coherent/organized and normal thought processes   Hallucinations no hallucinations present    Thought Content no delusions   Abnormal Thoughts no suicidal thoughts  and no homicidal thoughts    Orientation  oriented to person and place and time   Remote Memory short term memory intact and long term memory intact   Attention Span concentration intact   Intellect Appears to be of Average Intelligence   Insight Limited insight   Judgement judgment was limited   Muscle Strength Muscle strength and tone were normal and Normal gait    Language no difficulty naming common objects, no difficulty repeating a phrase  and no difficulty writing a sentence    Fund of Knowledge displays adequate knowledge of current events and adequate fund of knowledge regarding past history   Pain none   Pain Scale 0       Assessment/Plan:       There are no diagnoses linked to this encounter  Treatment Recommendations- Risks Benefits      Immediate Medical/Psychiatric/Psychotherapy Treatments and Any Precautions: patient is experiencing weight gain on  zyprexa and will taper down medication and d/c, increase tegretol to 200 mg qam and 400 mg qhs     Risks, Benefits And Possible Side Effects Of Medications:  {PSYCH RISK, BENEFITS AND POSSIBLE SIDE EFFECTS (Optional):47518    Controlled Medication Discussion: Discussed with patient Black Box warning on concurrent use of benzodiazepines and opioid medications including sedation, respiratory depression, coma and death  Patient understands the risk of treatment with benzodiazepines in addition to opioids and wants to continue taking those medications   , Discussed with patient the risks of sedation, respiratory depression, impairment of ability to drive and potential for abuse and addiction related to treatment with benzodiazepine medications  The patient understands risk of treatment with benzodiazepine medications, agrees to not drive if feels impaired and agrees to take medications as prescribed   and The patient has been filling controlled prescriptions on time as prescribed to Judy duran

## 2018-10-09 ENCOUNTER — OFFICE VISIT (OUTPATIENT)
Dept: BEHAVIORAL/MENTAL HEALTH CLINIC | Facility: CLINIC | Age: 38
End: 2018-10-09
Payer: MEDICARE

## 2018-10-09 DIAGNOSIS — F40.00 AGORAPHOBIA: ICD-10-CM

## 2018-10-09 DIAGNOSIS — F43.10 POSTTRAUMATIC STRESS DISORDER: Primary | ICD-10-CM

## 2018-10-09 DIAGNOSIS — F41.1 GENERALIZED ANXIETY DISORDER: ICD-10-CM

## 2018-10-09 DIAGNOSIS — F31.63 BIPOLAR DISORDER, CURRENT EPISODE MIXED, SEVERE, WITHOUT PSYCHOTIC FEATURES (HCC): ICD-10-CM

## 2018-10-09 DIAGNOSIS — F63.81 INTERMITTENT EXPLOSIVE DISORDER: ICD-10-CM

## 2018-10-09 PROCEDURE — 90834 PSYTX W PT 45 MINUTES: CPT | Performed by: SOCIAL WORKER

## 2018-10-09 NOTE — PSYCH
Angie Barba  1980       Date of Initial Treatment Plan:   Date of Current Treatment Plan: 10/09/18    Treatment Plan Number     Strengths/Personal Resources for Self Care:     Diagnosis:   1  Posttraumatic stress disorder     2  Intermittent explosive disorder     3  Agoraphobia     4  Generalized anxiety disorder     5  Bipolar disorder, current episode mixed, severe, without psychotic features ([facility] [address] )         Area of Needs:  I gained from my meds  I still haven't been able to go to a public place  Long Term Goal 1:   I want to loose weight  Target Date:  2/19  Completion Date:NA          Short Term Objectives for Goal 1:  I will taper off the Zyprexa as per DR's recommendation  I will go for a walk at least once per day  I will eat the healthy foods my mom gets me  I will drink plenty of water  Long Term Goal 2:  I want to keep working on being able to go to a public place  Target date:  2/19  Completion date:  NA    Short Term Objectives for Goal 2:  I will taper off the Zyprexa so I am not a zombie anymore  I will take my meds as prescribed  I will go for the walks to help with the weight lose  I will go to my parents house about everyday to get out of the house  When I go into the gas station to pay for gas I will start with walking down one isle and up another then leave  I will attend Bipolar support group  GOAL 1: Modality: Individual 2x per month   Completion Date NA, person responsible for the above [place]  Goal 2:  Modality:  Individual 2 X per month  Group  Completion Date NA, person responsible for the above [place]  Behavioral Health Treatment Plan [facility]: Diagnosis and Treatment Plan explained to Alondra Ortiz relates understanding diagnosis and is agreeable to Treatment Plan         Client Comments : Please share your thoughts, feelings, need and/or experiences regarding your treatment plan:       __________________________________________________________________    __________________________________________________________________    __________________________________________________________________    __________________________________________________________________    _______________________________________                Patient signature, Date Time: __________________________________________             Physician cosigner signature, Date, Time: ________________________________

## 2018-10-10 NOTE — PSYCH
Psychotherapy Provided: Individual Psychotherapy 45 minutes     Length of time in session: 45 minutes, follow up in 2 week    Goals addressed in session: 1    Pain:      none    0    Current suicide risk : Low     D:  MSW met with Yaritza Callahan for session  He had his hearing for his disability appeal   He does not think it went well  "She kept asking him the same questions over and over "  He is upset about this  He described why he things he needs it  Reviewed tx plan goals  He did not achieve his goal   He still does not go out to public places  Will work on this on new tx plan  Developed new tx plan  A:  Min progress on goals  P:  To begin  Addressing new [place] plan goals  Behavioral Health Treatment Plan [facility]: Diagnosis and Treatment Plan explained to Forrest Horton relates understanding diagnosis and is agreeable to Treatment Plan   Yes

## 2018-10-23 ENCOUNTER — DOCUMENTATION (OUTPATIENT)
Dept: PSYCHIATRY | Facility: CLINIC | Age: 38
End: 2018-10-23

## 2018-10-23 NOTE — PROGRESS NOTES
Appointment 10/19/2018 was cancelled due to Provider being out of the office, Will RS at a later time

## 2018-10-30 ENCOUNTER — OFFICE VISIT (OUTPATIENT)
Dept: BEHAVIORAL/MENTAL HEALTH CLINIC | Facility: CLINIC | Age: 38
End: 2018-10-30
Payer: MEDICARE

## 2018-10-30 DIAGNOSIS — F43.10 POSTTRAUMATIC STRESS DISORDER: ICD-10-CM

## 2018-10-30 DIAGNOSIS — F40.00 AGORAPHOBIA: ICD-10-CM

## 2018-10-30 DIAGNOSIS — F63.81 INTERMITTENT EXPLOSIVE DISORDER: ICD-10-CM

## 2018-10-30 DIAGNOSIS — F31.64 BIPOLAR DISORDER, CURR EPISODE MIXED, SEVERE, WITH PSYCHOTIC FEATURES (HCC): Primary | ICD-10-CM

## 2018-10-30 PROCEDURE — 90834 PSYTX W PT 45 MINUTES: CPT | Performed by: SOCIAL WORKER

## 2018-10-30 NOTE — PSYCH
Psychotherapy Provided: Individual Psychotherapy 45 minutes     Length of time in session: 45 minutes, follow up in 2 week    Goals addressed in session: 1    Pain:      none    0    Current suicide risk : Low     D:  MSW met with Eda Hodges for session  He hurt his back a couple of weeks ago and was limited in his activities  He could not leave the house  "He seen was it was like to not physically be able to leave the house "  He had plans today get out and do things as per his tx plan  A:  Min progress on goals  P:  To begin  Addressing new [place] plan goals  Behavioral Health Treatment Plan [facility]: Diagnosis and Treatment Plan explained to Manuela Biswas relates understanding diagnosis and is agreeable to Treatment Plan   Yes

## 2018-11-08 ENCOUNTER — OFFICE VISIT (OUTPATIENT)
Dept: PSYCHIATRY | Facility: CLINIC | Age: 38
End: 2018-11-08
Payer: MEDICARE

## 2018-11-08 DIAGNOSIS — F31.9 BIPOLAR 1 DISORDER (HCC): Primary | ICD-10-CM

## 2018-11-08 DIAGNOSIS — F40.01 PANIC DISORDER WITH AGORAPHOBIA: ICD-10-CM

## 2018-11-08 DIAGNOSIS — F41.1 GAD (GENERALIZED ANXIETY DISORDER): ICD-10-CM

## 2018-11-08 DIAGNOSIS — F51.04 PSYCHOPHYSIOLOGICAL INSOMNIA: Primary | ICD-10-CM

## 2018-11-08 PROCEDURE — 99213 OFFICE O/P EST LOW 20 MIN: CPT | Performed by: PSYCHIATRY & NEUROLOGY

## 2018-11-08 RX ORDER — CLONAZEPAM 1 MG/1
1 TABLET ORAL 3 TIMES DAILY
Qty: 90 TABLET | Refills: 2 | Status: SHIPPED | OUTPATIENT
Start: 2018-11-08 | End: 2019-03-13 | Stop reason: SDUPTHER

## 2018-11-08 RX ORDER — DOXEPIN HYDROCHLORIDE 10 MG/1
10 CAPSULE ORAL
Qty: 30 CAPSULE | Refills: 2 | Status: SHIPPED | OUTPATIENT
Start: 2018-11-08 | End: 2018-11-30 | Stop reason: ALTCHOICE

## 2018-11-08 NOTE — PSYCH
Subjective:Medication Management      Patient ID: Daniel Wilson is a 45 y o  male  HPI ROS Appetite Changes and Sleep: normal appetite, decreased energy, no weight change and normal number of sleep hours   Patient is reporting mood swings and difficulties falling and staying asleep  He also noticed poor attention and concentration during the day  Review Of Systems:     Mood Anxiety, Depression and Emotional Lability   Behavior Normal    Thought Content Disturbing Thoughts, Feelings and Unreasonalbe or Irrational Fears   General Emotional Problems and Decreased Functioning   Personality Normal   Other Psych Symptoms Normal   Constitutional Negative   ENT Negative   Cardiovascular Negative   Respiratory Negative   Gastrointestinal Negative   Genitourinary Negative   Musculoskeletal Negative   Integumentary Negative   Neurological Negative   Endocrine Normal    Other Symptoms Normal              Laboratory Results: No results found for this or any previous visit  Substance Abuse History:  History   Drug Use No       Family Psychiatric History:   Family History   Problem Relation Age of Onset    Schizophrenia Father     Alcohol abuse Father     Drug abuse Brother        The following portions of the patient's history were reviewed and updated as appropriate: allergies, current medications, past family history, past medical history, past social history, past surgical history and problem list     Social History     Social History    Marital status: Single     Spouse name: N/A    Number of children: N/A    Years of education: N/A     Occupational History    Not on file       Social History Main Topics    Smoking status: Current Some Day Smoker    Smokeless tobacco: Never Used      Comment: he smoke only 1 cigarette a day     Alcohol use No      Comment: last use 2012    Drug use: No    Sexual activity: Not Currently     Other Topics Concern    Not on file     Social History Narrative    No narrative on file     Social History     Social History Narrative    No narrative on file       Objective:       Mental status:  Appearance calm and cooperative , adequate hygiene and grooming and good eye contact    Mood depressed and anxious   Affect affect was constricted   Speech a normal rate and fluent   Thought Processes coherent/organized and normal thought processes   Hallucinations no hallucinations present    Thought Content no delusions   Abnormal Thoughts no suicidal thoughts  and no homicidal thoughts    Orientation  oriented to person and place and time   Remote Memory short term memory intact and long term memory intact   Attention Span concentration impaired   Intellect Appears to be of Average Intelligence   Insight Limited insight   Judgement judgment was limited   Muscle Strength Muscle strength and tone were normal and Normal gait    Language no difficulty naming common objects, no difficulty repeating a phrase  and no difficulty writing a sentence    Fund of Knowledge displays adequate knowledge of current events, adequate fund of knowledge regarding past history and adequate fund of knowledge regarding vocabulary    Pain none   Pain Scale 0       Assessment/Plan:       Diagnoses and all orders for this visit:    Psychophysiological insomnia  -     doxepin (SINEquan) 10 mg capsule; Take 1 capsule (10 mg total) by mouth daily at bedtime    Panic disorder with agoraphobia  -     clonazePAM (KlonoPIN) 1 mg tablet; Take 1 tablet (1 mg total) by mouth 3 (three) times a day    RADHA (generalized anxiety disorder)  -     clonazePAM (KlonoPIN) 1 mg tablet;  Take 1 tablet (1 mg total) by mouth 3 (three) times a day            Treatment Recommendations- Risks Benefits      Immediate Medical/Psychiatric/Psychotherapy Treatments and Any Precautions: repeat carbamazepine level, add doxepin 10 mg qhs for persistent insomnia    Risks, Benefits And Possible Side Effects Of Medications:  {PSYCH RISK, BENEFITS AND POSSIBLE SIDE EFFECTS (Optional):38385    Controlled Medication Discussion: Discussed with patient Black Box warning on concurrent use of benzodiazepines and opioid medications including sedation, respiratory depression, coma and death  Patient understands the risk of treatment with benzodiazepines in addition to opioids and wants to continue taking those medications  , Discussed with patient the risks of sedation, respiratory depression, impairment of ability to drive and potential for abuse and addiction related to treatment with benzodiazepine medications  The patient understands risk of treatment with benzodiazepine medications, agrees to not drive if feels impaired and agrees to take medications as prescribed   and The patient has been filling controlled prescriptions on time as prescribed to [facility]

## 2018-11-28 ENCOUNTER — APPOINTMENT (OUTPATIENT)
Dept: LAB | Facility: CLINIC | Age: 38
End: 2018-11-28
Payer: MEDICARE

## 2018-11-28 DIAGNOSIS — F31.9 BIPOLAR 1 DISORDER (HCC): ICD-10-CM

## 2018-11-28 LAB — CARBAMAZEPINE SERPL-MCNC: 7.4 UG/ML (ref 4–12)

## 2018-11-28 PROCEDURE — 80156 ASSAY CARBAMAZEPINE TOTAL: CPT

## 2018-11-28 PROCEDURE — 36415 COLL VENOUS BLD VENIPUNCTURE: CPT

## 2018-11-29 ENCOUNTER — OFFICE VISIT (OUTPATIENT)
Dept: BEHAVIORAL/MENTAL HEALTH CLINIC | Facility: CLINIC | Age: 38
End: 2018-11-29
Payer: MEDICARE

## 2018-11-29 DIAGNOSIS — F31.64 BIPOLAR DISORDER, CURR EPISODE MIXED, SEVERE, WITH PSYCHOTIC FEATURES (HCC): Primary | ICD-10-CM

## 2018-11-29 DIAGNOSIS — F40.00 AGORAPHOBIA: ICD-10-CM

## 2018-11-29 DIAGNOSIS — F43.10 POSTTRAUMATIC STRESS DISORDER: ICD-10-CM

## 2018-11-29 DIAGNOSIS — F63.81 INTERMITTENT EXPLOSIVE DISORDER: ICD-10-CM

## 2018-11-29 DIAGNOSIS — F41.1 GENERALIZED ANXIETY DISORDER: ICD-10-CM

## 2018-11-29 PROCEDURE — 90834 PSYTX W PT 45 MINUTES: CPT | Performed by: SOCIAL WORKER

## 2018-11-29 NOTE — PSYCH
Psychotherapy Provided: Individual Psychotherapy 45 minutes     Length of time in session: 45 minutes, follow up in 2 week    Goals addressed in session: 1    Pain:      none    0    Current suicide risk : Low     D:  MSW met with Kaylin Saenz for session  He meet with the psychiatrist   Discussed meds  He stopped his Zyprexia on his on "cold turkey" three weeks ago  Also, three weeks ago he stopped smoking  "He wants to loose weight and be healthy  Discussed all the side effects he has had over the past three weeks due to the above changes he has made  For Thanksgiving he did not go to his parents  He had dinner at his place with his roommate  A:  Mild progress on goals  He recognizes he should have worked with his Dr to decrease/stop his med  P:  To continue addressing  [place] plan goals  Behavioral Health Treatment Plan [facility]: Diagnosis and Treatment Plan explained to Obey Martínez relates understanding diagnosis and is agreeable to Treatment Plan   Yes

## 2018-11-30 ENCOUNTER — OFFICE VISIT (OUTPATIENT)
Dept: PSYCHIATRY | Facility: CLINIC | Age: 38
End: 2018-11-30
Payer: MEDICARE

## 2018-11-30 DIAGNOSIS — F31.9 BIPOLAR 1 DISORDER (HCC): ICD-10-CM

## 2018-11-30 PROCEDURE — 99213 OFFICE O/P EST LOW 20 MIN: CPT | Performed by: PSYCHIATRY & NEUROLOGY

## 2018-11-30 RX ORDER — OLANZAPINE 10 MG/1
10 TABLET ORAL
Qty: 30 TABLET | Refills: 2 | Status: SHIPPED | OUTPATIENT
Start: 2018-11-30 | End: 2019-01-22 | Stop reason: HOSPADM

## 2018-11-30 NOTE — PSYCH
Subjective:Medication Management      Patient ID: Zahra Montalvo is a 45 y o  male  HPI ROS Appetite Changes and Sleep: normal appetite, normal energy level, no weight change and normal number of sleep hours   Patient stated that since last seen he impulsively decided to d/c olanzapine and his mood quickly destabilized in addition to having withdrawal symptoms  He recognized it was a poor decision and stated he is willing to resume treatment  He continues to struggle with anxiety and agoraphobia  Review Of Systems:     Mood Anxiety, Depression and Emotional Lability   Behavior Impulsive Behavior   Thought Content Disturbing Thoughts, Feelings and Unreasonalbe or Irrational Fears   General Emotional Problems and Decreased Functioning   Personality Normal   Other Psych Symptoms Normal   Constitutional Negative   ENT Negative   Cardiovascular Negative   Respiratory Negative   Gastrointestinal Negative   Genitourinary Negative   Musculoskeletal Negative   Integumentary Negative   Neurological Negative   Endocrine Normal    Other Symptoms Normal              Laboratory Results: No results found for this or any previous visit  Substance Abuse History:  History   Drug Use No       Family Psychiatric History:   Family History   Problem Relation Age of Onset    Schizophrenia Father     Alcohol abuse Father     Drug abuse Brother        The following portions of the patient's history were reviewed and updated as appropriate: allergies, current medications, past family history, past medical history, past social history, past surgical history and problem list     Social History     Social History    Marital status: Single     Spouse name: N/A    Number of children: N/A    Years of education: N/A     Occupational History    Not on file       Social History Main Topics    Smoking status: Current Some Day Smoker    Smokeless tobacco: Never Used      Comment: he smoke only 1 cigarette a day     Alcohol use No      Comment: last use 2012    Drug use: No    Sexual activity: Not Currently     Other Topics Concern    Not on file     Social History Narrative    No narrative on file     Social History     Social History Narrative    No narrative on file       Objective:       Mental status:  Appearance calm and cooperative , adequate hygiene and grooming and good eye contact    Mood dysphoric, depressed and anxious   Affect affect was constricted   Speech a normal rate and fluent   Thought Processes coherent/organized and normal thought processes   Hallucinations no hallucinations present    Thought Content no delusions   Abnormal Thoughts no suicidal thoughts  and no homicidal thoughts    Orientation  oriented to person and place and time   Remote Memory short term memory intact and long term memory intact   Attention Span concentration impaired   Intellect Appears to be of Average Intelligence   Insight Limited insight   Judgement judgment was limited   Muscle Strength Muscle strength and tone were normal and Normal gait    Language no difficulty naming common objects, no difficulty repeating a phrase  and no difficulty writing a sentence    Fund of Knowledge displays adequate knowledge of current events, adequate fund of knowledge regarding past history and adequate fund of knowledge regarding vocabulary    Pain none   Pain Scale 0       Assessment/Plan:       Diagnoses and all orders for this visit:    [facility] ([facility]  -     OLANZapine (ZyPREXA) 10 mg tablet;  Take 1 tablet (10 mg total) by mouth daily at bedtime            Treatment Recommendations- Risks Benefits      Immediate Medical/Psychiatric/Psychotherapy Treatments and Any Precautions: restart olanzapine 10 mg qhs     Risks, Benefits And Possible Side Effects Of Medications:  {PSYCH RISK, BENEFITS AND POSSIBLE SIDE EFFECTS (Optional):09354    Controlled Medication Discussion: Discussed with patient Black Box warning on concurrent use of benzodiazepines and opioid medications including sedation, respiratory depression, coma and death  Patient understands the risk of treatment with benzodiazepines in addition to opioids and wants to continue taking those medications  , Discussed with patient the risks of sedation, respiratory depression, impairment of ability to drive and potential for abuse and addiction related to treatment with benzodiazepine medications  The patient understands risk of treatment with benzodiazepine medications, agrees to not drive if feels impaired and agrees to take medications as prescribed   and The patient has been filling controlled prescriptions on time as prescribed to [facility]

## 2018-12-06 ENCOUNTER — OFFICE VISIT (OUTPATIENT)
Dept: BEHAVIORAL/MENTAL HEALTH CLINIC | Facility: CLINIC | Age: 38
End: 2018-12-06
Payer: MEDICARE

## 2018-12-06 DIAGNOSIS — F31.63 BIPOLAR DISORDER, CURRENT EPISODE MIXED, SEVERE, WITHOUT PSYCHOTIC FEATURES (HCC): ICD-10-CM

## 2018-12-06 PROCEDURE — 90834 PSYTX W PT 45 MINUTES: CPT | Performed by: SOCIAL WORKER

## 2018-12-06 NOTE — PSYCH
Psychotherapy Provided: Individual Psychotherapy 45 minutes     Length of time in session: 45 minutes, follow up in 2 week    Goals addressed in session: 1    Pain:      none    0    Current suicide risk : Low     D:  MSW met with Jennifer Marie for session  He meet with the psychiatrist to discuss his meds  He restarted the Zyprexia per 's instructions and immediately felt better  He continues not to smoke cigarettes  He is unsure what he is going to do for Dana due to his brother  Discussed his roommate  He is getting out of the house more  He has been in grocery stores without incident  He is still anxious about going into other stores  He is going to wait until after the holidays to try it  A:  Mild progress on goals  P:  To continue addressing  [place] plan goals  Behavioral Health Treatment Plan [facility]: Diagnosis and Treatment Plan explained to Marcy Mendenhall relates understanding diagnosis and is agreeable to Treatment Plan   Yes

## 2018-12-19 DIAGNOSIS — F31.9 BIPOLAR 1 DISORDER (HCC): ICD-10-CM

## 2018-12-19 RX ORDER — CARBAMAZEPINE 200 MG/1
TABLET, EXTENDED RELEASE ORAL
Qty: 30 TABLET | Refills: 2 | Status: SHIPPED | OUTPATIENT
Start: 2018-12-19 | End: 2019-03-13 | Stop reason: SDUPTHER

## 2018-12-19 RX ORDER — CARBAMAZEPINE 400 MG/1
400 TABLET, EXTENDED RELEASE ORAL
Qty: 30 TABLET | Refills: 2 | Status: SHIPPED | OUTPATIENT
Start: 2018-12-19 | End: 2019-03-13 | Stop reason: SDUPTHER

## 2018-12-20 ENCOUNTER — OFFICE VISIT (OUTPATIENT)
Dept: BEHAVIORAL/MENTAL HEALTH CLINIC | Facility: CLINIC | Age: 38
End: 2018-12-20
Payer: MEDICARE

## 2018-12-20 DIAGNOSIS — F43.10 POSTTRAUMATIC STRESS DISORDER: ICD-10-CM

## 2018-12-20 DIAGNOSIS — F31.64 BIPOLAR DISORDER, CURR EPISODE MIXED, SEVERE, WITH PSYCHOTIC FEATURES (HCC): ICD-10-CM

## 2018-12-20 DIAGNOSIS — F40.00 AGORAPHOBIA: ICD-10-CM

## 2018-12-20 DIAGNOSIS — F41.1 GENERALIZED ANXIETY DISORDER: ICD-10-CM

## 2018-12-20 PROCEDURE — 90834 PSYTX W PT 45 MINUTES: CPT | Performed by: SOCIAL WORKER

## 2018-12-20 NOTE — PSYCH
Psychotherapy Provided: Individual Psychotherapy 45 minutes     Length of time in session: 45 minutes, follow up in 2 week    Goals addressed in session: ,2    Pain:      none    0    Current suicide risk : Low     D:  MSW met with Yuridia Richter for session  He forgot to take his meds this morning when he left the house and he "is anxious  His speech is rapid   "  He continues to feel better since he began taking his Zyprexa again  "The next time he goes off of it he is going to talk to the DR and taper himself down off of it "  He has not smoke a cig for 30+ days but continues to have cravings for nicotine  Discussed coping skills for the cravings  He did loose weight due to the withdrawal symptoms of Zyprexa  Continues to struggle to go out in public places  Discussed the holidays  A:  Mild progress on goals  He did not appear to have rapid speech in session  Min progress on goal 2  P:  To continue addressing  TX plan goals  Behavioral Health Treatment Plan [facility]: Diagnosis and Treatment Plan explained to Guevaar Kiser relates understanding diagnosis and is agreeable to Treatment Plan   Yes

## 2019-01-10 ENCOUNTER — OFFICE VISIT (OUTPATIENT)
Dept: PSYCHIATRY | Facility: CLINIC | Age: 39
End: 2019-01-10
Payer: MEDICARE

## 2019-01-10 ENCOUNTER — HOSPITAL ENCOUNTER (EMERGENCY)
Facility: HOSPITAL | Age: 39
End: 2019-01-11
Attending: EMERGENCY MEDICINE
Payer: MEDICARE

## 2019-01-10 DIAGNOSIS — F31.64 BIPOLAR DISORDER, CURR EPISODE MIXED, SEVERE, WITH PSYCHOTIC FEATURES (HCC): ICD-10-CM

## 2019-01-10 DIAGNOSIS — F41.1 GENERALIZED ANXIETY DISORDER: ICD-10-CM

## 2019-01-10 DIAGNOSIS — F40.00 AGORAPHOBIA: Primary | ICD-10-CM

## 2019-01-10 DIAGNOSIS — F41.9 ANXIETY: ICD-10-CM

## 2019-01-10 DIAGNOSIS — F43.10 POSTTRAUMATIC STRESS DISORDER: Primary | ICD-10-CM

## 2019-01-10 DIAGNOSIS — F40.00 AGORAPHOBIA: ICD-10-CM

## 2019-01-10 LAB
ALBUMIN SERPL BCP-MCNC: 4.1 G/DL (ref 3.5–5)
ALP SERPL-CCNC: 126 U/L (ref 46–116)
ALT SERPL W P-5'-P-CCNC: 69 U/L (ref 12–78)
AMPHETAMINES SERPL QL SCN: NEGATIVE
ANION GAP SERPL CALCULATED.3IONS-SCNC: 14 MMOL/L (ref 4–13)
AST SERPL W P-5'-P-CCNC: 32 U/L (ref 5–45)
BARBITURATES UR QL: NEGATIVE
BASOPHILS # BLD AUTO: 0.02 THOUSANDS/ΜL (ref 0–0.1)
BASOPHILS NFR BLD AUTO: 0 % (ref 0–1)
BENZODIAZ UR QL: NEGATIVE
BILIRUB SERPL-MCNC: 0.3 MG/DL (ref 0.2–1)
BUN SERPL-MCNC: 14 MG/DL (ref 5–25)
CALCIUM SERPL-MCNC: 8.8 MG/DL (ref 8.3–10.1)
CHLORIDE SERPL-SCNC: 105 MMOL/L (ref 100–108)
CO2 SERPL-SCNC: 25 MMOL/L (ref 21–32)
COCAINE UR QL: NEGATIVE
CREAT SERPL-MCNC: 1.16 MG/DL (ref 0.6–1.3)
EOSINOPHIL # BLD AUTO: 0.14 THOUSAND/ΜL (ref 0–0.61)
EOSINOPHIL NFR BLD AUTO: 2 % (ref 0–6)
ERYTHROCYTE [DISTWIDTH] IN BLOOD BY AUTOMATED COUNT: 13.9 % (ref 11.6–15.1)
ETHANOL EXG-MCNC: 0 MG/DL
GFR SERPL CREATININE-BSD FRML MDRD: 79 ML/MIN/1.73SQ M
GLUCOSE SERPL-MCNC: 152 MG/DL (ref 65–140)
HCT VFR BLD AUTO: 45.3 % (ref 36.5–49.3)
HGB BLD-MCNC: 16.3 G/DL (ref 12–17)
IMM GRANULOCYTES # BLD AUTO: 0.01 THOUSAND/UL (ref 0–0.2)
IMM GRANULOCYTES NFR BLD AUTO: 0 % (ref 0–2)
LYMPHOCYTES # BLD AUTO: 1.76 THOUSANDS/ΜL (ref 0.6–4.47)
LYMPHOCYTES NFR BLD AUTO: 27 % (ref 14–44)
MCH RBC QN AUTO: 30.6 PG (ref 26.8–34.3)
MCHC RBC AUTO-ENTMCNC: 36 G/DL (ref 31.4–37.4)
MCV RBC AUTO: 85 FL (ref 82–98)
METHADONE UR QL: NEGATIVE
MONOCYTES # BLD AUTO: 0.42 THOUSAND/ΜL (ref 0.17–1.22)
MONOCYTES NFR BLD AUTO: 6 % (ref 4–12)
NEUTROPHILS # BLD AUTO: 4.24 THOUSANDS/ΜL (ref 1.85–7.62)
NEUTS SEG NFR BLD AUTO: 65 % (ref 43–75)
OPIATES UR QL SCN: NEGATIVE
PCP UR QL: NEGATIVE
PLATELET # BLD AUTO: 286 THOUSANDS/UL (ref 149–390)
PMV BLD AUTO: 8.9 FL (ref 8.9–12.7)
POTASSIUM SERPL-SCNC: 3.3 MMOL/L (ref 3.5–5.3)
PROT SERPL-MCNC: 8.4 G/DL (ref 6.4–8.2)
RBC # BLD AUTO: 5.32 MILLION/UL (ref 3.88–5.62)
SODIUM SERPL-SCNC: 144 MMOL/L (ref 136–145)
THC UR QL: POSITIVE
WBC # BLD AUTO: 6.59 THOUSAND/UL (ref 4.31–10.16)

## 2019-01-10 PROCEDURE — 82075 ASSAY OF BREATH ETHANOL: CPT | Performed by: EMERGENCY MEDICINE

## 2019-01-10 PROCEDURE — 36415 COLL VENOUS BLD VENIPUNCTURE: CPT | Performed by: EMERGENCY MEDICINE

## 2019-01-10 PROCEDURE — 80307 DRUG TEST PRSMV CHEM ANLYZR: CPT | Performed by: EMERGENCY MEDICINE

## 2019-01-10 PROCEDURE — 85025 COMPLETE CBC W/AUTO DIFF WBC: CPT | Performed by: EMERGENCY MEDICINE

## 2019-01-10 PROCEDURE — 99285 EMERGENCY DEPT VISIT HI MDM: CPT

## 2019-01-10 PROCEDURE — 80053 COMPREHEN METABOLIC PANEL: CPT | Performed by: EMERGENCY MEDICINE

## 2019-01-10 PROCEDURE — 99213 OFFICE O/P EST LOW 20 MIN: CPT | Performed by: PSYCHIATRY & NEUROLOGY

## 2019-01-10 RX ORDER — LORAZEPAM 1 MG/1
1 TABLET ORAL ONCE
Status: COMPLETED | OUTPATIENT
Start: 2019-01-10 | End: 2019-01-10

## 2019-01-10 RX ORDER — CLONAZEPAM 0.5 MG/1
1 TABLET ORAL ONCE
Status: COMPLETED | OUTPATIENT
Start: 2019-01-11 | End: 2019-01-10

## 2019-01-10 RX ADMIN — LORAZEPAM 1 MG: 1 TABLET ORAL at 18:55

## 2019-01-10 RX ADMIN — CLONAZEPAM 1 MG: 0.5 TABLET ORAL at 23:56

## 2019-01-10 NOTE — ED NOTES
Patient contracts for safety while here at the hospital, states "I dont want to hurt myself, I want help now"     Chelsea Arana, SYLVIA  01/10/19 5412

## 2019-01-10 NOTE — ED PROVIDER NOTES
History  Chief Complaint   Patient presents with    Psychiatric Evaluation     Patient presents to the Er stating he suffers from social anxiety, having panic attacks going outside and saw his psychiatrist today who is recommending inpatient treatment  patient brought note from psychiatrist, patient states he has had intermittant thought of suicde without a plan  71-year-old male with history of agoraphobia, anxiety, and depression presents to the emergency department upon referral by his psychiatrist for evaluation of increasing agoraphobia and depression  Patient states that he has not been able to leave his apartment in several months with exception of medical visit  States that he is also having increasing anxiety  Denies active suicidal ideation, however does wish that he was no longer alive  He states that his older brother  2 months ago due to an overdose, and he would never commit suicide in order to keep his mom have pain  He denies illicit substance use, however did start using medical marijuana upon the recommendation of his primary care physician  Denies alcohol intake today  He is seeking inpatient treatment  Prior to Admission Medications   Prescriptions Last Dose Informant Patient Reported? Taking?    OLANZapine (ZyPREXA) 10 mg tablet   No No   Sig: Take 1 tablet (10 mg total) by mouth daily at bedtime   carBAMazepine (TEGretol XR) 200 mg 12 hr tablet   No No   Sig: TAKE 1 TABLET BY MOUTH EVERY DAY   carBAMazepine (TEGretol XR) 400 mg 12 hr tablet   No No   Sig: TAKE 1 TABLET (400 MG TOTAL) BY MOUTH DAILY AT BEDTIME   clonazePAM (KlonoPIN) 1 mg tablet   No No   Sig: Take 1 tablet (1 mg total) by mouth 3 (three) times a day      Facility-Administered Medications: None       Past Medical History:   Diagnosis Date    Anxiety     Bipolar disorder ([facility] )     Crohn's colitis ([facility] )     Panic disorder     Psychiatric disorder     PTSD (post-traumatic stress disorder)        Past Surgical History:   Procedure Laterality Date    HEMICOLECTOMY         Family History   Problem Relation Age of Onset    Schizophrenia Father     Alcohol abuse Father     Drug abuse Brother      I have reviewed and agree with the history as documented  Social History   Substance Use Topics    Smoking status: Current Some Day Smoker    Smokeless tobacco: Never Used      Comment: he smoke only 1 cigarette a day     Alcohol use No      Comment: last use 2012        Review of Systems   Constitutional: Positive for fatigue  Negative for chills, diaphoresis and fever  Eyes: Negative for visual disturbance  Respiratory: Negative for cough and shortness of breath  Cardiovascular: Negative for chest pain and palpitations  Gastrointestinal: Negative for abdominal pain, diarrhea, nausea and vomiting  Genitourinary: Negative for dysuria, flank pain and frequency  Musculoskeletal: Negative for arthralgias and myalgias  Skin: Negative for color change, rash and wound  Allergic/Immunologic: Negative for immunocompromised state  Neurological: Negative for dizziness and light-headedness  Hematological: Does not bruise/bleed easily  Psychiatric/Behavioral: Positive for dysphoric mood, sleep disturbance and suicidal ideas  Negative for behavioral problems and confusion  The patient is nervous/anxious  Physical Exam  Physical Exam   Constitutional: He is oriented to person, place, and time  He appears well-developed and well-nourished  No distress  HENT:   Head: Normocephalic and atraumatic  Eyes: Pupils are equal, round, and reactive to light  No scleral icterus  Neck: No JVD present  Cardiovascular: Regular rhythm  Tachycardia present  Exam reveals no gallop and no friction rub  No murmur heard  Pulmonary/Chest: No respiratory distress  He has no wheezes  He has no rales  Neurological: He is alert and oriented to person, place, and time     Generally tremulous   Skin: Skin is warm and dry  Capillary refill takes less than 2 seconds  He is not diaphoretic  Psychiatric: He has a normal mood and affect  His behavior is normal    Anxious, talkative/verbose  Not responding to internal stimuli  Cooperative   Vitals reviewed  Vital Signs  ED Triage Vitals [01/10/19 1801]   Temperature Pulse Respirations Blood Pressure SpO2   99 7 °F (37 6 °C) (!) 120 20 (!) 138/105 95 %      Temp Source Heart Rate Source Patient Position - Orthostatic VS BP Location FiO2 (%)   Tympanic Monitor Lying Right arm --      Pain Score       No Pain           Vitals:    01/10/19 1801   BP: (!) 138/105   Pulse: (!) 120   Patient Position - Orthostatic VS: Lying       Visual Acuity  Visual Acuity      Most Recent Value   L Pupil Size (mm)  4   R Pupil Size (mm)  4          ED Medications  Medications   LORazepam (ATIVAN) tablet 1 mg (1 mg Oral Given 1/10/19 1855)       Diagnostic Studies  Results Reviewed     Procedure Component Value Units Date/Time    Comprehensive metabolic panel [[id number]]  (Abnormal) Collected:  01/10/19 1833    Lab Status:  Final result Specimen:  Blood from Arm, Right Updated:  01/10/19 1907     Sodium 144 mmol/L      Potassium 3 3 (L) mmol/L      Chloride 105 mmol/L      CO2 25 mmol/L      ANION GAP 14 (H) mmol/L      BUN 14 mg/dL      Creatinine 1 16 mg/dL      Glucose 152 (H) mg/dL      Calcium 8 8 mg/dL      AST 32 U/L      ALT 69 U/L      Alkaline Phosphatase 126 (H) U/L      Total Protein 8 4 (H) g/dL      Albumin 4 1 g/dL      Total Bilirubin 0 30 mg/dL      eGFR 79 ml/min/1 73sq m     Narrative:         National Kidney Disease Education Program recommendations are as follows:  GFR calculation is accurate only with a steady state creatinine  Chronic Kidney disease less than 60 ml/min/1 73 sq  meters  Kidney failure less than 15 ml/min/1 73 sq  meters      Rapid drug screen, urine [[id number]]  (Abnormal) Collected:  01/10/19 1834    Lab Status:  Final result Specimen:  Urine from Urine, Clean Catch Updated:  01/10/19 1903     Amph/Meth UR Negative     Barbiturate Ur Negative     Benzodiazepine Urine Negative     Cocaine Urine Negative     Methadone Urine Negative     Opiate Urine Negative     PCP Ur Negative     THC Urine Positive (A)    Narrative:         Presumptive report  If requested, specimen will be sent to reference lab for confirmation  FOR MEDICAL PURPOSES ONLY  IF CONFIRMATION NEEDED PLEASE CONTACT THE LAB WITHIN 5 DAYS      Drug Screen Cutoff Levels:  AMPHETAMINE/METHAMPHETAMINES  1000 ng/mL  BARBITURATES     200 ng/mL  BENZODIAZEPINES     200 ng/mL  COCAINE      300 ng/mL  METHADONE      300 ng/mL  OPIATES      300 ng/mL  PHENCYCLIDINE     25 ng/mL  THC       50 ng/mL    CBC and differential [[id number]]  (Normal) Collected:  01/10/19 1833    Lab Status:  Final result Specimen:  Blood from Arm, Right Updated:  01/10/19 1852     WBC 6 59 Thousand/uL      RBC 5 32 Million/uL      Hemoglobin 16 3 g/dL      Hematocrit 45 3 %      MCV 85 fL      MCH 30 6 pg      MCHC 36 0 g/dL      RDW 13 9 %      MPV 8 9 fL      Platelets 938 Thousands/uL      Neutrophils Relative 65 %      Immat GRANS % 0 %      Lymphocytes Relative 27 %      Monocytes Relative 6 %      Eosinophils Relative 2 %      Basophils Relative 0 %      Neutrophils Absolute 4 24 Thousands/µL      Immature Grans Absolute 0 01 Thousand/uL      Lymphocytes Absolute 1 76 Thousands/µL      Monocytes Absolute 0 42 Thousand/µL      Eosinophils Absolute 0 14 Thousand/µL      Basophils Absolute 0 02 Thousands/µL     POCT alcohol breath test [[id number]]  (Normal) Resulted:  01/10/19 1840    Lab Status:  Final result Updated:  01/10/19 1840     EXTBreath Alcohol 0                 No orders to display              Procedures  Procedures       Phone Contacts  ED Phone Contact    ED Course  ED Course as of Chris 10 2224   Thu Chris 10, 2019   1900 Medically cleared for crisis                                MDM  Number of Diagnoses or Management Options  Agoraphobia: new and requires workup  Anxiety: new and requires workup  Diagnosis management comments: 44 yo male with severe agoraphobia and social anxiety  He wishes to sign 201 and will seek inpatient treatment  Ongoing observation signed out to Dr Anushka Jules pending behavioral health facility acceptance       Amount and/or Complexity of Data Reviewed  Clinical lab tests: ordered and reviewed  Tests in the medicine section of CPT®: ordered and reviewed  Review and summarize past medical records: yes      CritCare Time    Disposition  Final diagnoses:   Agoraphobia   Anxiety     Time reflects when diagnosis was documented in both MDM as applicable and the Disposition within this note     Time User Action Codes Description Comment    1/10/2019  9:24 PM Ian Tim [F40 00] Agoraphobia     1/10/2019  9:24 PM Ian Tim [F41 [id number]] Anxiety       ED Disposition     None      MD Documentation      Most Recent Value   Sending MD Shen      Follow-up Information    None         Patient's Medications   Discharge Prescriptions    No medications on file     No discharge procedures on file      ED Provider  Electronically Signed by           Shreya Menchaca PA-C  01/10/19 5085

## 2019-01-10 NOTE — PSYCH
Subjective:Medication Management      Patient ID: Radha Rose is a 45 y o  male  HPI ROS Appetite Changes and Sleep: normal appetite, decreased energy, no weight change and normal number of sleep hours   Patient came in with a friend and former AA sponsor that knows him for the past 6 years  He stated that patient has been staying with him until he gets his SSI approved  He stated that last Saturday he went to meet his niece at [facility] and he had a panic attack  He had to leave within 15 minutes of being there  Patient feels very hopeless and helpless and he stated he is not living an just existing  He is very frustrated about not getting his SSI approved  Patient stated he lost his brother to heroin OD and after that he met his friend Jose A Meza and he considers him a brother  He is asking for treatment alternatives since outpatient and PHP has not been helpful  Agrees to go to ED for admission  Review Of Systems:     Mood Anxiety, Depression and Emotional Lability   Behavior Normal    Thought Content Disturbing Thoughts, Feelings and Unreasonalbe or Irrational Fears   General Relationship Problems, Emotional Problems and Decreased Functioning   Personality Normal   Other Psych Symptoms Normal   Constitutional Negative   ENT Negative   Cardiovascular Negative   Respiratory Negative   Gastrointestinal Negative   Genitourinary Negative   Musculoskeletal Negative   Integumentary Negative   Neurological Negative   Endocrine Normal    Other Symptoms Normal              Laboratory Results: No results found for this or any previous visit      Substance Abuse History:  History   Drug Use No     Comment: medical marijuana       Family Psychiatric History:   Family History   Problem Relation Age of Onset    Schizophrenia Father     Alcohol abuse Father     Drug abuse Brother        The following portions of the patient's history were reviewed and updated as appropriate: allergies, current medications, past family history, past medical history, past social history, past surgical history and problem list     Social History     Social History    Marital status: Single     Spouse name: N/A    Number of children: N/A    Years of education: N/A     Occupational History    Not on file       Social History Main Topics    Smoking status: Current Some Day Smoker    Smokeless tobacco: Never Used      Comment: he smoke only 1 cigarette a day, quit 2 months ago    Alcohol use No      Comment: last use 2012, took 3 of alcohol to help with panic attack    Drug use: No      Comment: medical marijuana    Sexual activity: Not Currently     Other Topics Concern    Not on file     Social History Narrative    No narrative on file     Social History     Social History Narrative    No narrative on file       Objective:       Mental status:  Appearance calm and cooperative , poor hygiene /disheveled and good eye contact    Mood dysphoric, depressed and anxious   Affect affect was constricted   Speech a normal rate and fluent   Thought Processes coherent/organized and normal thought processes   Hallucinations no hallucinations present    Thought Content no delusions   Abnormal Thoughts no suicidal thoughts  and no homicidal thoughts    Orientation  oriented to person and place and time   Remote Memory short term memory intact and long term memory intact   Attention Span concentration impaired   Intellect Appears to be of Average Intelligence   Insight Limited insight   Judgement judgment was limited   Muscle Strength Muscle strength and tone were normal and Normal gait    Language no difficulty naming common objects, no difficulty repeating a phrase  and no difficulty writing a sentence    Fund of Knowledge displays adequate knowledge of current events, adequate fund of knowledge regarding past history and adequate fund of knowledge regarding vocabulary    Pain none   Pain Scale 0       Assessment/Plan:       Diagnoses and all orders for this visit:    Posttraumatic stress disorder    Generalized anxiety disorder    Bipolar disorder, curr episode mixed, severe, with psychotic features ([facility] [address] )    Agoraphobia            Treatment Recommendations- Risks Benefits      Immediate Medical/Psychiatric/Psychotherapy Treatments and Any Precautions: refer to Inpatient psychiatric tx    Risks, Benefits And Possible Side Effects Of Medications:  {PSYCH RISK, BENEFITS AND POSSIBLE SIDE EFFECTS (Optional):50478    Controlled Medication Discussion: Discussed with patient Black Box warning on concurrent use of benzodiazepines and opioid medications including sedation, respiratory depression, coma and death  Patient understands the risk of treatment with benzodiazepines in addition to opioids and wants to continue taking those medications  , Discussed with patient the risks of sedation, respiratory depression, impairment of ability to drive and potential for abuse and addiction related to treatment with benzodiazepine medications  The patient understands risk of treatment with benzodiazepine medications, agrees to not drive if feels impaired and agrees to take medications as prescribed   and The patient has been filling controlled prescriptions on time as prescribed to [facility]  program

## 2019-01-10 NOTE — LETTER
January 10, 2019     Patient: Alicia Zimmerman   YOB: 1980   Date of Visit: 1/10/2019       To Whom it May Concern:    Alicia Zimmerman is under my professional care  He was seen in my office on 1/10/2019  During appointment today patient reported increased anxiety and depression  He stated he is not able to go outside his apartment because of having panic attacks when out in public places  He stated he feels he is not living but rather existing  He reported having on and of suicidal ideations without a plan  At this point I am recommending that he goes to ER and gets admitted to Inpatient Psychiatry for further evaluation and treatment  He had been through [facility] without significant improvement  If you have any questions or concerns, please don't hesitate to call           Sincerely,          Augustin Cordova MD        CC: Alicia Zimmerman

## 2019-01-11 ENCOUNTER — HOSPITAL ENCOUNTER (INPATIENT)
Facility: HOSPITAL | Age: 39
LOS: 11 days | Discharge: HOME | DRG: 885 | End: 2019-01-22
Attending: PSYCHIATRY & NEUROLOGY | Admitting: PSYCHIATRY & NEUROLOGY
Payer: MEDICARE

## 2019-01-11 VITALS
DIASTOLIC BLOOD PRESSURE: 82 MMHG | RESPIRATION RATE: 16 BRPM | BODY MASS INDEX: 29.82 KG/M2 | TEMPERATURE: 97.3 F | SYSTOLIC BLOOD PRESSURE: 134 MMHG | OXYGEN SATURATION: 95 % | HEIGHT: 67 IN | WEIGHT: 190 LBS | HEART RATE: 75 BPM

## 2019-01-11 DIAGNOSIS — F31.32 BIPOLAR 1 DISORDER, DEPRESSED, MODERATE (HCC): Primary | ICD-10-CM

## 2019-01-11 DIAGNOSIS — F41.9 ANXIETY: ICD-10-CM

## 2019-01-11 RX ORDER — ACETAMINOPHEN 325 MG/1
650 TABLET ORAL EVERY 4 HOURS PRN
Status: CANCELLED | OUTPATIENT
Start: 2019-01-11

## 2019-01-11 RX ORDER — TRAZODONE HYDROCHLORIDE 50 MG/1
50 TABLET ORAL
Status: DISCONTINUED | OUTPATIENT
Start: 2019-01-11 | End: 2019-01-22 | Stop reason: HOSPADM

## 2019-01-11 RX ORDER — BENZTROPINE MESYLATE 1 MG/ML
1 INJECTION INTRAMUSCULAR; INTRAVENOUS EVERY 6 HOURS PRN
Status: CANCELLED | OUTPATIENT
Start: 2019-01-11

## 2019-01-11 RX ORDER — LORAZEPAM 1 MG/1
1 TABLET ORAL EVERY 6 HOURS PRN
Status: CANCELLED | OUTPATIENT
Start: 2019-01-11

## 2019-01-11 RX ORDER — BENZTROPINE MESYLATE 0.5 MG/1
1 TABLET ORAL EVERY 6 HOURS PRN
Status: CANCELLED | OUTPATIENT
Start: 2019-01-11

## 2019-01-11 RX ORDER — NICOTINE 21 MG/24HR
1 PATCH, TRANSDERMAL 24 HOURS TRANSDERMAL DAILY
Status: CANCELLED | OUTPATIENT
Start: 2019-01-11

## 2019-01-11 RX ORDER — ACETAMINOPHEN 325 MG/1
650 TABLET ORAL EVERY 6 HOURS PRN
Status: DISCONTINUED | OUTPATIENT
Start: 2019-01-11 | End: 2019-01-22 | Stop reason: HOSPADM

## 2019-01-11 RX ORDER — ACETAMINOPHEN 325 MG/1
975 TABLET ORAL EVERY 6 HOURS PRN
Status: CANCELLED | OUTPATIENT
Start: 2019-01-11

## 2019-01-11 RX ORDER — TRAZODONE HYDROCHLORIDE 50 MG/1
50 TABLET ORAL
Status: CANCELLED | OUTPATIENT
Start: 2019-01-11

## 2019-01-11 RX ORDER — LORAZEPAM 1 MG/1
1 TABLET ORAL EVERY 6 HOURS PRN
Status: DISCONTINUED | OUTPATIENT
Start: 2019-01-11 | End: 2019-01-12

## 2019-01-11 RX ORDER — ACETAMINOPHEN 325 MG/1
650 TABLET ORAL EVERY 4 HOURS PRN
Status: DISCONTINUED | OUTPATIENT
Start: 2019-01-11 | End: 2019-01-22 | Stop reason: HOSPADM

## 2019-01-11 RX ORDER — ACETAMINOPHEN 325 MG/1
650 TABLET ORAL EVERY 6 HOURS PRN
Status: CANCELLED | OUTPATIENT
Start: 2019-01-11

## 2019-01-11 RX ORDER — MAGNESIUM HYDROXIDE/ALUMINUM HYDROXICE/SIMETHICONE 120; 1200; 1200 MG/30ML; MG/30ML; MG/30ML
30 SUSPENSION ORAL EVERY 4 HOURS PRN
Status: CANCELLED | OUTPATIENT
Start: 2019-01-11

## 2019-01-11 RX ORDER — LORAZEPAM 2 MG/ML
2 INJECTION INTRAMUSCULAR EVERY 6 HOURS PRN
Status: CANCELLED | OUTPATIENT
Start: 2019-01-11

## 2019-01-11 RX ORDER — HALOPERIDOL 5 MG
5 TABLET ORAL EVERY 6 HOURS PRN
Status: CANCELLED | OUTPATIENT
Start: 2019-01-11

## 2019-01-11 RX ORDER — HALOPERIDOL 5 MG/ML
5 INJECTION INTRAMUSCULAR EVERY 6 HOURS PRN
Status: DISCONTINUED | OUTPATIENT
Start: 2019-01-11 | End: 2019-01-22 | Stop reason: HOSPADM

## 2019-01-11 RX ORDER — HALOPERIDOL 5 MG/ML
5 INJECTION INTRAMUSCULAR EVERY 6 HOURS PRN
Status: CANCELLED | OUTPATIENT
Start: 2019-01-11

## 2019-01-11 RX ORDER — BENZTROPINE MESYLATE 1 MG/ML
1 INJECTION INTRAMUSCULAR; INTRAVENOUS EVERY 6 HOURS PRN
Status: DISCONTINUED | OUTPATIENT
Start: 2019-01-11 | End: 2019-01-12

## 2019-01-11 RX ORDER — CLONAZEPAM 0.5 MG/1
1 TABLET ORAL ONCE
Status: COMPLETED | OUTPATIENT
Start: 2019-01-11 | End: 2019-01-11

## 2019-01-11 RX ORDER — TRAZODONE HYDROCHLORIDE 50 MG/1
100 TABLET ORAL ONCE
Status: COMPLETED | OUTPATIENT
Start: 2019-01-11 | End: 2019-01-11

## 2019-01-11 RX ORDER — LORAZEPAM 2 MG/ML
2 INJECTION INTRAMUSCULAR EVERY 6 HOURS PRN
Status: DISCONTINUED | OUTPATIENT
Start: 2019-01-11 | End: 2019-01-12

## 2019-01-11 RX ORDER — ACETAMINOPHEN 325 MG/1
650 TABLET ORAL EVERY 6 HOURS PRN
Status: DISCONTINUED | OUTPATIENT
Start: 2019-01-11 | End: 2019-01-12

## 2019-01-11 RX ORDER — CARBAMAZEPINE 200 MG/1
200 TABLET ORAL ONCE
Status: COMPLETED | OUTPATIENT
Start: 2019-01-11 | End: 2019-01-11

## 2019-01-11 RX ORDER — NICOTINE 21 MG/24HR
1 PATCH, TRANSDERMAL 24 HOURS TRANSDERMAL DAILY
Status: DISCONTINUED | OUTPATIENT
Start: 2019-01-11 | End: 2019-01-22 | Stop reason: HOSPADM

## 2019-01-11 RX ORDER — ACETAMINOPHEN 325 MG/1
975 TABLET ORAL EVERY 6 HOURS PRN
Status: DISCONTINUED | OUTPATIENT
Start: 2019-01-11 | End: 2019-01-22 | Stop reason: HOSPADM

## 2019-01-11 RX ORDER — HALOPERIDOL 5 MG
5 TABLET ORAL EVERY 6 HOURS PRN
Status: DISCONTINUED | OUTPATIENT
Start: 2019-01-11 | End: 2019-01-22 | Stop reason: HOSPADM

## 2019-01-11 RX ORDER — MAGNESIUM HYDROXIDE/ALUMINUM HYDROXICE/SIMETHICONE 120; 1200; 1200 MG/30ML; MG/30ML; MG/30ML
30 SUSPENSION ORAL EVERY 4 HOURS PRN
Status: DISCONTINUED | OUTPATIENT
Start: 2019-01-11 | End: 2019-01-22 | Stop reason: HOSPADM

## 2019-01-11 RX ORDER — BENZTROPINE MESYLATE 1 MG/1
1 TABLET ORAL EVERY 6 HOURS PRN
Status: DISCONTINUED | OUTPATIENT
Start: 2019-01-11 | End: 2019-01-22 | Stop reason: HOSPADM

## 2019-01-11 RX ADMIN — CARBAMAZEPINE 200 MG: 200 TABLET ORAL at 10:42

## 2019-01-11 RX ADMIN — TRAZODONE HYDROCHLORIDE 50 MG: 50 TABLET ORAL at 21:06

## 2019-01-11 RX ADMIN — CLONAZEPAM 1 MG: 0.5 TABLET ORAL at 10:42

## 2019-01-11 RX ADMIN — HALOPERIDOL 5 MG: 5 TABLET ORAL at 18:10

## 2019-01-11 RX ADMIN — TRAZODONE HYDROCHLORIDE 100 MG: 50 TABLET ORAL at 03:22

## 2019-01-11 RX ADMIN — BENZTROPINE MESYLATE 1 MG: 1 TABLET ORAL at 18:10

## 2019-01-11 RX ADMIN — LORAZEPAM 1 MG: 1 TABLET ORAL at 21:06

## 2019-01-11 RX ADMIN — HALOPERIDOL 5 MG: 5 TABLET ORAL at 13:47

## 2019-01-11 NOTE — ED NOTES
Patient was sent to the ED with a letter from his outpatient and partial hospitalization psychiatrist stating she wants him admitted to inpatient treatment  He is chronically unable to function because of extreme agoraphobia and anxiety attacks  These have left him unable to work, have a social life, participate in any activity at all outside his home aside from his medical appointments  This has caused him to become increasingly depressed and despairing  He is reporting that he no longer wants to live because he doesn't believe anything will get rid of his symptoms  He reports that he recently had plans to hang himself, and though he stopped that day, he is uncertain if he would stop himself again  On one hand he doesn't want to hurt his mother by killing himself, but for him he thinks death would be better than his current life  He has been through 6 partial hospital programs and several inpatient hospitalizations during the past couple of years, and says none of them helped  He also reports that he has been prescribed 15 or more different medications which didn't do much to releave his symptoms either  He denies HI and psychosis, but does describe some visual distortions that he sees during panic attacks  He is asking for inpatient treatment as outpatient and partial hospitalization have not helped him at all

## 2019-01-11 NOTE — ED NOTES
Pt asked for his AM medications  Provider notified awaiting orders        Butch Erwin, SYLVIA  01/11/19 6458

## 2019-01-11 NOTE — ED NOTES
Left message for Larry Barajas regarding chart note from 9:30am   Looking for update        Tess Soto RN  01/11/19 3701

## 2019-01-11 NOTE — PROGRESS NOTES
Pt is currently sleeping peacefully  Pt has been pleasant and cooperative since his admission  Pt is med/meal compliant  Will continue to monitor

## 2019-01-11 NOTE — ED NOTES
Patient in the door way of room, when asked if patient was okay, patient stated he was upset and anxious after talking to his mom  Patient requested something for anxiety  Dr Flakita Puente notified        Jett Hurley RN  01/10/19 3339

## 2019-01-11 NOTE — EMTALA/ACUTE CARE TRANSFER
[address]   [address] [facility] [place] [address]  Dept: [phone]      EMTALA TRANSFER CONSENT    NAME Suri Chew                                         1980                              MRN [id number]    I have been informed of my rights regarding examination, treatment, and transfer   by Dr Allyson Baumgarten:      Risks:        Consent for Transfer:  I acknowledge that my medical condition has been evaluated and explained to me by the emergency department physician or other qualified medical person and/or my attending physician, who has recommended that I be transferred to the service of  Accepting Physician: Dr Eve Proctor at [address], [address] : [facility]  [address] [facility]  The above potential benefits of such transfer, the potential risks associated with such transfer, and the probable risks of not being transferred have been explained to me, and I fully understand them  The doctor has explained that, in my case, the benefits of transfer outweigh the risks  I agree to be transferred  I authorize the performance of emergency medical procedures and treatments upon me in both transit and upon arrival at the receiving facility  Additionally, I authorize the release of any and all medical records to the receiving facility and request they be transported with me, if possible  I understand that the safest mode of transportation during a medical emergency is an ambulance and that the Hospital advocates the use of this mode of transport  Risks of traveling to the receiving facility by car, including absence of medical control, life sustaining equipment, such as oxygen, and medical personnel has been explained to me and I fully understand them  (KRIS CORRECT BOX BELOW)  [  ]  I consent to the stated transfer and to be transported by ambulance/helicopter    [  ]  I consent to the stated transfer, but refuse transportation by ambulance and accept full responsibility for my transportation by car  I understand the risks of non-ambulance transfers and I exonerate the Hospital and its staff from any deterioration in my condition that results from this refusal     X___________________________________________    DATE  19  TIME________  Signature of patient or legally responsible individual signing on patient behalf           RELATIONSHIP TO PATIENT_________________________          Provider Certification    NAME Vikram Hinds                                         1980                              MRN [id number]    A medical screening exam was performed on the above named patient  Based on the examination:    Condition Necessitating Transfer The primary encounter diagnosis was Agoraphobia  A diagnosis of Anxiety was also pertinent to this visit  Patient Condition:      Reason for Transfer:      Transfer Requirements: Facility [facility]  [address]   · Space available and qualified personnel available for treatment as acknowledged by Callum Hill  [phone]  · Agreed to accept transfer and to provide appropriate medical treatment as acknowledged by       Dr Delvin Bernal  · Appropriate medical records of the examination and treatment of the patient are provided at the time of transfer   [address], [address] _______  · Transfer will be performed by qualified personnel from [facility]  and appropriate transfer equipment as required, including the use of necessary and appropriate life support measures      Provider Certification: I have examined the patient and explained the following risks and benefits of being transferred/refusing transfer to the patient/family:         Based on these reasonable risks and benefits to the patient and/or the unborn child(loan), and based upon the information available at the time of the patients examination, I certify that the medical benefits reasonably to be expected from the provision of appropriate medical treatments at another medical facility outweigh the increasing risks, if any, to the individuals medical condition, and in the case of labor to the unborn child, from effecting the transfer      X____________________________________________ DATE 01/11/19        TIME_______      ORIGINAL - SEND TO MEDICAL RECORDS   COPY - SEND WITH PATIENT DURING TRANSFER

## 2019-01-11 NOTE — CONSULTS
Telepsychiatry Telephone Admission Note    I was consulted by phone to review the case of this 43yo man who was medically cleared and admitted for depression  PMH significant for Crohns, asthma  Allergy to Infliximab, PCN  Tachycardic, o/w AVSS, reassuring admission labs  UDS+cannabis  BAL negative  Impression:  Depressive disorder, unspecified    Plan:   --Admit to psychiatry under voluntary status  --Suicide precautions  --Routine admission orders placed

## 2019-01-11 NOTE — PLAN OF CARE
Problem: Depression  Goal: Treatment Goal: Demonstrate behavioral control of depressive symptoms, verbalize feelings of improved mood/affect, and adopt new coping skills prior to discharge  Outcome: Not Progressing    Goal: Verbalize thoughts and feelings  Interventions:  - Assess and re-assess patient's level of risk   - Engage patient in 1:1 interactions, daily, for a minimum of 15 minutes   - Encourage patient to express feelings, fears, frustrations, hopes   Outcome: Not Progressing    Goal: Refrain from harming self  Interventions:  - Monitor patient closely, per order   - Supervise medication ingestion, monitor effects and side effects   Outcome: Not Progressing    Goal: Refrain from isolation  Interventions:  - Develop a trusting relationship   - Encourage socialization   Outcome: Not Progressing    Goal: Refrain from self-neglect  Outcome: Not Progressing    Goal: Attend and participate in unit activities, including therapeutic, recreational, and educational groups  Interventions:  - Provide therapeutic and educational activities daily, encourage attendance and participation, and document same in the medical record   Outcome: Not Progressing    Goal: Complete daily ADLs, including personal hygiene independently, as able  Interventions:  - Observe, teach, and assist patient with ADLS  -  Monitor and promote a balance of rest/activity, with adequate nutrition and elimination   Outcome: Not Progressing      Problem: Anxiety  Goal: Anxiety is at manageable level  Interventions:  - Assess and monitor patient's anxiety level  - Monitor for signs and symptoms of anxiety both physical and emotional (heart palpitations, chest pain, shortness of breath, headaches, nausea, feeling jumpy, restlessness, irritable, apprehensive)  - Collaborate with interdisciplinary team and initiate plan and interventions as ordered    - [place] patient to unit/surroundings  - Explain treatment plan  - Encourage participation in care  - Encourage verbalization of concerns/fears  - Identify coping mechanisms  - Assist in developing anxiety-reducing skills  - Administer/offer alternative therapies  - Limit or eliminate stimulants  Outcome: Not Progressing      Comments: Initiated care plan

## 2019-01-11 NOTE — ED NOTES
[facility] will transport the patient to [facility] 3P between 0830 and 0900  Crisis portion of EMTALA completed  Marino Benitez, crisis worker at [facility] notified of ETA  RAJANI RN reminded to call report prior to transport

## 2019-01-11 NOTE — ED NOTES
Patient cooperative, states that he has had intermittent thoughts of suicide but denies any at present  States that due to more frequent thoughts of SI patient was referred for inpatient admission        Otilio Ernst RN  01/10/19 7221

## 2019-01-11 NOTE — PROGRESS NOTES
Admission Note: 45year old male admitted to [address] [place] [address] Dr unit from [address] [address] due to an increase in anxiety and depression  Pt states he has been having really bad panic attacks since April of last year  Pt went to [facility] this past weekend to see his niece and got a panic attack that caused him to realize how much help he needed  Pt came home from [facility] crying about how depressed and anxious he has gotten lately  Pt reports being in multiple outpatient facilities with no progress  Pt is starting to loss hope on getting the right help  Pt is determined to get long term help  Pt also wants to get on the right medications  Pt was pleasant and cooperative during the admission process  Pt reports 4/4 anxiety, 10/10 depression but denies any SI, HI, AH, or VH  Pt given Haldol PRN  Pt has a medical hx of Crohn's Disease, Agoraphobia, Hypertriglyceridemia, and post ileostomy  Pt has an allergy to Penicillin and Infliximab  UDS positive for THC  Pt has already received the flu and pneumonia vaccine this year  Will continue to monitor

## 2019-01-11 NOTE — ED NOTES
Insurance Authorization:   Phone call placed to [facility]  Phone number: [phone]  Spoke to Heather Monique  Coordination of Benefits approved  Level of care: acute inpatient mental health  Review per Medicare guidelines  Jessy Bhatti

## 2019-01-12 PROBLEM — F31.32 BIPOLAR 1 DISORDER, DEPRESSED, MODERATE (HCC): Status: ACTIVE | Noted: 2019-01-12

## 2019-01-12 LAB — TSH SERPL DL<=0.05 MIU/L-ACNC: 3.15 UIU/ML (ref 0.47–4.68)

## 2019-01-12 PROCEDURE — 84443 ASSAY THYROID STIM HORMONE: CPT | Performed by: INTERNAL MEDICINE

## 2019-01-12 RX ORDER — HYDRALAZINE HYDROCHLORIDE 25 MG/1
25 TABLET, FILM COATED ORAL EVERY 6 HOURS PRN
Status: DISCONTINUED | OUTPATIENT
Start: 2019-01-12 | End: 2019-01-22 | Stop reason: HOSPADM

## 2019-01-12 RX ORDER — HYDROXYZINE 50 MG/1
50 TABLET, FILM COATED ORAL EVERY 4 HOURS PRN
Status: DISCONTINUED | OUTPATIENT
Start: 2019-01-12 | End: 2019-01-22 | Stop reason: HOSPADM

## 2019-01-12 RX ORDER — DIPHENHYDRAMINE HYDROCHLORIDE 50 MG/ML
50 INJECTION INTRAMUSCULAR; INTRAVENOUS EVERY 4 HOURS PRN
Status: DISCONTINUED | OUTPATIENT
Start: 2019-01-12 | End: 2019-01-22 | Stop reason: HOSPADM

## 2019-01-12 RX ORDER — POTASSIUM CHLORIDE 750 MG/1
40 TABLET, EXTENDED RELEASE ORAL ONCE
Status: COMPLETED | OUTPATIENT
Start: 2019-01-12 | End: 2019-01-12

## 2019-01-12 RX ORDER — CARBAMAZEPINE 200 MG/1
400 TABLET, EXTENDED RELEASE ORAL
Status: DISCONTINUED | OUTPATIENT
Start: 2019-01-12 | End: 2019-01-22 | Stop reason: HOSPADM

## 2019-01-12 RX ORDER — CLONAZEPAM 1 MG/1
1 TABLET ORAL 3 TIMES DAILY
Status: DISCONTINUED | OUTPATIENT
Start: 2019-01-12 | End: 2019-01-22 | Stop reason: HOSPADM

## 2019-01-12 RX ORDER — OLANZAPINE 10 MG/1
10 TABLET ORAL DAILY
Status: DISCONTINUED | OUTPATIENT
Start: 2019-01-12 | End: 2019-01-15

## 2019-01-12 RX ORDER — CARBAMAZEPINE 200 MG/1
200 TABLET, EXTENDED RELEASE ORAL DAILY
Status: DISCONTINUED | OUTPATIENT
Start: 2019-01-12 | End: 2019-01-22 | Stop reason: HOSPADM

## 2019-01-12 RX ORDER — OLANZAPINE 10 MG/1
10 TABLET ORAL
Status: DISCONTINUED | OUTPATIENT
Start: 2019-01-12 | End: 2019-01-15

## 2019-01-12 RX ADMIN — LORAZEPAM 1 MG: 1 TABLET ORAL at 08:31

## 2019-01-12 RX ADMIN — POTASSIUM CHLORIDE 40 MEQ: 10 TABLET, EXTENDED RELEASE ORAL at 06:54

## 2019-01-12 RX ADMIN — HYDRALAZINE HYDROCHLORIDE 25 MG: 25 TABLET ORAL at 22:39

## 2019-01-12 RX ADMIN — CARBAMAZEPINE 400 MG: 200 TABLET, EXTENDED RELEASE ORAL at 21:23

## 2019-01-12 RX ADMIN — CLONAZEPAM 1 MG: 1 TABLET ORAL at 11:40

## 2019-01-12 RX ADMIN — CLONAZEPAM 1 MG: 1 TABLET ORAL at 21:23

## 2019-01-12 RX ADMIN — OLANZAPINE 10 MG: 10 TABLET, FILM COATED ORAL at 21:23

## 2019-01-12 RX ADMIN — OLANZAPINE 10 MG: 10 TABLET, FILM COATED ORAL at 11:41

## 2019-01-12 RX ADMIN — CARBAMAZEPINE 200 MG: 200 TABLET, EXTENDED RELEASE ORAL at 11:40

## 2019-01-12 RX ADMIN — CLONAZEPAM 1 MG: 1 TABLET ORAL at 16:09

## 2019-01-12 NOTE — H&P
Initial Psychiatric Evaluation    Medical Record Number: [id number]  Encounter: [id number]      History:     Vikram Hinds is an 45 y o , male, admitted to the psychiatric unit under a 201 status to Dr Khari Guzman' service with the chief complaint of  depression and suicidality  Patient also reports that he has high anxiety and agoraphobic be a and getting anxiety attacks when he leaves his house  He thought about hanging himself his depression worsen at after his brother overdosed about 2 months ag; he he then had 2nd thoughts about suicide and did not want to hurt his family so he came to the emergency room for hospitalization instead  Patient said he has had a number of hospitalizations in multiple outpatient treatments seeing Dr Aure Bird and a therapist but reports very little effective this  He was getting hopeless about ever feeling better and then started to become suicidal because of it  Patient reports when he wakes up in the morning he has overwhelming panic symptoms and experiences panic throughout the week  He also says that he uses marijuana to control his anxiety but that is not particularly effective either    He has a bipolar diagnosis in addition to his anxiety diagnosis and reports highs and lows        Past Medical History:   Diagnosis Date    Anxiety     Bipolar disorder ([facility] )     Crohn's colitis ([facility] )     Depression     Panic attack     Panic disorder     Psychiatric disorder     PTSD (post-traumatic stress disorder)     Sleep difficulties        Past surgical history:  Past Surgical History:   Procedure Laterality Date    HEMICOLECTOMY         Family history:  Family History   Problem Relation Age of Onset    Schizophrenia Father     Alcohol abuse Father     Drug abuse Brother        Current medications:    Current Facility-Administered Medications:     acetaminophen (TYLENOL) tablet 650 mg, 650 mg, Oral, Q6H PRN, Marzena Parada MD    acetaminophen (TYLENOL) tablet 650 mg, 650 mg, Oral, Q4H PRN, Ruben Erwin MD    acetaminophen (TYLENOL) tablet 975 mg, 975 mg, Oral, Q6H PRN, Ruben Erwin MD    aluminum-magnesium hydroxide-simethicone (MYLANTA) 200-200-20 mg/5 mL oral suspension 30 mL, 30 mL, Oral, Q4H PRN, Ruben Erwin MD    benztropine (COGENTIN) tablet 1 mg, 1 mg, Oral, Q6H PRN, Ruben Erwin MD, 1 mg at 01/11/19 1810    carBAMazepine (TEGretol XR) 12 hr tablet 200 mg, 200 mg, Oral, Daily, Caffie Sails, PA-C, 200 mg at 01/12/19 1140    carBAMazepine (TEGretol XR) 12 hr tablet 400 mg, 400 mg, Oral, HS, Caffie Sails, PA-C    clonazePAM (KlonoPIN) tablet 1 mg, 1 mg, Oral, TID, Caffie Sails, PA-C, 1 mg at 01/12/19 1140    diphenhydrAMINE (BENADRYL) injection 50 mg, 50 mg, Intramuscular, Q4H PRN, Caffie Sails, PA-C    haloperidol (HALDOL) tablet 5 mg, 5 mg, Oral, Q6H PRN, Ruben Erwin MD, 5 mg at 01/11/19 1810    haloperidol lactate (HALDOL) injection 5 mg, 5 mg, Intramuscular, Q6H PRN, Ruben Erwin MD    hydrALAZINE (APRESOLINE) tablet 25 mg, 25 mg, Oral, Q6H PRN, Meli Tran MD    hydrOXYzine HCL (ATARAX) tablet 50 mg, 50 mg, Oral, Q4H PRN, Caffie Sails, PA-C    magnesium hydroxide (MILK OF MAGNESIA) 400 mg/5 mL oral suspension 30 mL, 30 mL, Oral, Daily PRN, Ruben Erwin MD    nicotine (NICODERM CQ) 21 mg/24 hr TD 24 hr patch 1 patch, 1 patch, Transdermal, Daily, Ruben Erwin MD    OLANZapine (ZyPREXA) tablet 10 mg, 10 mg, Oral, Daily, Caffie Sails, PA-C, 10 mg at 01/12/19 1141    OLANZapine (ZyPREXA) tablet 10 mg, 10 mg, Oral, HS, Caffie Sails, PA-C    traZODone (DESYREL) tablet 50 mg, 50 mg, Oral, HS PRN, Ruben Erwin MD, 50 mg at 01/11/19 8037      Allergies:   Allergies   Allergen Reactions    Infliximab Other (See Comments)    Penicillins Other (See Comments)     rash    Penicillin V Rash       Social History:  Social History     Social History    Marital status: Single     Spouse name: N/A    Number of children: N/A    Years of education: N/A     Occupational History    Not on file  Social History Main Topics    Smoking status: Current Some Day Smoker    Smokeless tobacco: Never Used      Comment: he smoke only 1 cigarette a day, quit 2 months ago    Alcohol use No      Comment: last use 2012, took 3 of alcohol to help with panic attack    Drug use: No      Comment: medical marijuana    Sexual activity: Not Currently     Other Topics Concern    Not on file     Social History Narrative    No narrative on file       Trauma/ Abuse/ Neglect none reported    Physical Examination:     Vital Signs:  Vitals:    01/11/19 1220 01/11/19 1600 01/12/19 0700 01/12/19 0701   BP: (!) 154/111 (!) 154/112 150/81 152/100   BP Location: Right arm Left arm Left arm Left arm   Pulse: 82 76 (!) 54 65   Resp: 16 20 18    Temp: (!) 97 3 °F (36 3 °C) 97 9 °F (36 6 °C) 98 7 °F (37 1 °C)    TempSrc: Temporal Temporal     SpO2: 97%      Weight: 87 1 kg (192 lb)      Height: 5' 8 4" (1 737 m)            Appearance:  age appropriate and casually dressed   Behavior:  normal   Speech:  normal volume   Mood:  depressed   Affect:  constricted   Thought Process:  normal   Thought Content:  normal   Perceptual Disturbances: None   Risk Potential: Suicidal Ideations with plan To hang himself   Sensorium:  person, place, situation and time   Cognition:  intact   Consciousness:  alert and awake    Attention: attention span and concentration were age appropriate   Intellect: average   Insight:  fair   Judgment: fair      Motor Activity: no abnormal movements           Diagnostic Studies:     Recent Labs:  Results Reviewed     None          I/O Past 24 hours:  I/O last 3 completed shifts:  In: -   Out: 1 [Stool:1]  No intake/output data recorded          Impression / Plan:     Bipolar 1 disorder, depressed, moderate ([facility] )    Recommended Treatment:      Medications  1) will adjust the Zyprexa dose to help with his mood stabilization and his anxiety  ECT may be considered because patient has been treatment resistant  Non-pharmacological treatments  1) Continue with group therapy, milieu therapy and occupational therapy  2) Medical will be consulted to help manage comorbid conditions    Safety  1) Safety/communication plan established targeting dynamic risk factors above  Counseling / Coordination of Care    Total floor / unit time spent today 50 minutes  Greater than 50% of total time was spent with the patient and / or family counseling and / or coordination of care  A description of the counseling / coordination of care  Patient's Rights, confidentiality and exceptions to confidentiality, use of automated medical record, [facility]antha staff access to medical record, and consent to treatment reviewed        Arielle Marcano MD

## 2019-01-12 NOTE — H&P
Consulted for Medical Management   History of Present Illness     HPI:  Carrie Villagomez is a 45 y o  male who presents is admitted for increasing agoraphobia and depression  Pt with past medical history of crohn's disease s/p colectomy in the past and no longer on any medication, history of asthma with last exacerbation over 20 yrs ago  Does not use any rescue inhalers  Pt at this time voice no medical complaints         Historical Information   Past Medical History:   Diagnosis Date    Anxiety     Bipolar disorder ([place] [address] )     Crohn's colitis ([place] [address] )     Depression     Panic attack     Panic disorder     Psychiatric disorder     PTSD (post-traumatic stress disorder)     Sleep difficulties      Patient Active Problem List   Diagnosis    Bipolar disorder, curr episode mixed, severe, with psychotic features ([place] [address] )    Posttraumatic stress disorder    Intermittent explosive disorder    Agoraphobia    Asthma with exacerbation    Combined drug dependence, excluding opioid, in remission ([place] [address] )    Complicated grief    Crohn's disease ([place] [address] )    Encounter for long-term (current) use of other medications    Generalized anxiety disorder    Status post ileostomy ([place] [address] )    Marijuana use, continuous    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[place] [address] )    Affective psychosis, bipolar ([place] [address] )    High risk medication use    Hypertriglyceridemia     Past Surgical History:   Procedure Laterality Date    HEMICOLECTOMY         Social History   History   Alcohol Use No     Comment: last use 2012, took 3 of alcohol to help with panic attack     History   Drug Use No     Comment: medical marijuana     History   Smoking Status    Current Some Day Smoker   Smokeless Tobacco    Never Used     Comment: he smoke only 1 cigarette a day, quit 2 months ago       Family History:   Family History   Problem Relation Age of Onset    Schizophrenia Father     Alcohol abuse Father     Drug abuse Brother        Meds/Allergies       Current Facility-Administered Medications:     acetaminophen (TYLENOL) tablet 650 mg, 650 mg, Oral, Q6H PRN, Rory Roberson MD    acetaminophen (TYLENOL) tablet 650 mg, 650 mg, Oral, Q6H PRN, Rory Roberson MD    acetaminophen (TYLENOL) tablet 650 mg, 650 mg, Oral, Q4H PRN, Rory Roberson MD    acetaminophen (TYLENOL) tablet 975 mg, 975 mg, Oral, Q6H PRN, Rory Roberson MD    aluminum-magnesium hydroxide-simethicone (MYLANTA) 200-200-20 mg/5 mL oral suspension 30 mL, 30 mL, Oral, Q4H PRN, Rory Roberson MD    benztropine (COGENTIN) injection 1 mg, 1 mg, Intramuscular, Q6H PRN, Rory Roberson MD    benztropine (COGENTIN) tablet 1 mg, 1 mg, Oral, Q6H PRN, Rory Roberson MD, 1 mg at 01/11/19 1810    haloperidol (HALDOL) tablet 5 mg, 5 mg, Oral, Q6H PRN, Rory Roberson MD, 5 mg at 01/11/19 1810    haloperidol lactate (HALDOL) injection 5 mg, 5 mg, Intramuscular, Q6H PRN, Rory Roberson MD    LORazepam (ATIVAN) 2 mg/mL injection 2 mg, 2 mg, Intramuscular, Q6H PRN, Rory Roberson MD    LORazepam (ATIVAN) tablet 1 mg, 1 mg, Oral, Q6H PRN, Rory Roberson MD, 1 mg at 01/11/19 2106    magnesium hydroxide (MILK OF MAGNESIA) 400 mg/5 mL oral suspension 30 mL, 30 mL, Oral, Daily PRN, Rory Roberson MD    nicotine (NICODERM CQ) 21 mg/24 hr TD 24 hr patch 1 patch, 1 patch, Transdermal, Daily, Rory Roberson MD    traZODone (DESYREL) tablet 50 mg, 50 mg, Oral, HS PRN, Rory Roberson MD, 50 mg at 01/11/19 2106    Allergies   Allergen Reactions    Infliximab Other (See Comments)    Penicillins Other (See Comments)     rash    Penicillin V Rash       Review of Systems  A detailed 12 point review of systems was conducted and is negative apart from those mentioned in the HPI  Objective   Vitals: Blood pressure (!) 154/112, pulse 76, temperature 97 9 °F (36 6 °C), temperature source Temporal, resp  rate 20, height 5' 8 4" (1 737 m), weight 87 1 kg (192 lb), SpO2 97 %      Physical Exam   General- Awake and alert, oriented X3, NAD  HEENT: PERRLA, EOMI, sclera anicteric, moist mucous membranes, tongue mucosa without lesions  Neck: supple, no JVD, lymphadenopathy, thyromegaly  Heart: Regular rate and rhythm, S1S2 present  No murmur, rub or gallop  Lungs; Clear to auscultation bilaterally  No wheezing, crackles or rhonchi  No accessory muscle use or respiratory distress  Abdomen: soft, non-tender, non-distended, NABS  No guarding or rebound  No peritoneal sound or mass  Extremities: no clubbing, cyanosis, or edema  2+ pedal pulses bilaterally  Full range of motion  Neurologic:  Cranial nerves II-XII intact  Strength and sensation globally intact  Speech fluent and goal directed  Awake, alert and oriented x 3  Skin: warm and dry  No petechiae, purpura or rash  Results from last 7 days  Lab Units 01/10/19  1833   WBC Thousand/uL 6 59   HEMOGLOBIN g/dL 16 3   HEMATOCRIT % 45 3   PLATELETS Thousands/uL 286       Results from last 7 days  Lab Units 01/10/19  1833   POTASSIUM mmol/L 3 3*   CHLORIDE mmol/L 105   CO2 mmol/L 25   BUN mg/dL 14   CREATININE mg/dL 1 16   CALCIUM mg/dL 8 8           Assessment/Plan     Assessment:  Anxiety and depression with agoraphobia- Continue management per psychiatrist    Elevated BP without mention of Hypertension- Will place on prn hydralazine, if BP continues to be elevated during this hospitalization, would recommend to start on Norvasc 5mg daily  For now, will monitor for next 2 days and have medical team review his BP readings  However please call if BP>180/105  Will add TSH level to AM blood work    Mild hypokalemia- Give KCL 40meq  Crohns disease- In remission, s/p hemicolectomy    Asthma- Mild, last exacerbation several yrs ago  Code Status: Level 1 - Full Code      Counseling / Coordination of Care  Total floor / unit time spent today 30 minutes  Greater than 50% of total time was spent with the patient and / or family counseling and / or coordination of care       Portions of the record may have been created with voice recognition software  Occasional wrong word or "sound a like" substitutions may have occurred due to the inherent limitations of voice recognition software  Read the chart carefully and recognize, using context, where substitutions have occurred

## 2019-01-12 NOTE — PROGRESS NOTES
Psychiatry Progress Note    Subjective: Interval History     Patient is a 29-year-old male with history of bipolar disorder and agoraphobia who was admitted to the [address] [place] [address] on a 201 status due to increased depression uncontrolled anxiety and suicidal ideations  Patient presents to the emergency department reporting that due to his depression and anxiety he had become increasingly overwhelmed and felt life was not worth living  Patient was expressing that he had been thinking about suicide  Patient was denying that he would overdose as his brother had  2 months ago, and felt he cannot do that to his family  Patient was deemed medically stable was then transferred for inpatient psychiatric treatment  Patient was alert and oriented x4 during the assessment  Patient was anxious and overwhelmed  Patient was reporting that he had been in treatment for a long time and that over the past 10 months his symptoms have continued to worsen  Patient reports he has been treated for bipolar disorder and agoraphobia  Patient reports approximately 10 months ago he was in a store when he experiences severe panic attack that paralyzed him  Patient reports that he continues to have ongoing fears of having panic attacks that his degree in the community  Patient reports that as result he does not leave his house  Patient reports that he whenever he attempts to he again has overwhelming panic  Patient believes that people will want to harm him or that something is going to happen to him  Reports that when he wakes up in the morning he has overwhelming panic  Patient reports that as result he has been isolative to his home and been having increased depression and suicidal thoughts  Patient expressing that he feels that he cannot continue to live as he has been  Patient reports that he has been receiving outpatient psychiatric services through Dr Holly Bahena as as well as psychotherapy services    Patient reports that he has had 4 inpatient psychiatric admissions and that he has been through partial hospitalizations on 6 occasions  Patient requesting further referrals in attempt to help aid his symptoms  Patient reports that his outpatient psychiatrist did recommend the trial of medical marijuana to help control his anxiety however this has not been effective  Patient's urine drug screen was positive for THC at the time of admission  Patient continues report ongoing suicidal ideations however denying any plan or intent to harm self in the hospital at this time  Patient denies any history of suicide attempts  Patient reporting that his parents who he lives with are his protective factors as his brother had  2 months ago due to an overdose any felt he cannot put his parents to any further pain  Patient reports that he does have a history of bipolar disorder experiencing highs and lows in his mood with excessive energy decreased need for sleep and impulsive behaviors  Patient denying any auditory visual hallucinations      Behavior over the last 24 hours:  unchanged  Sleep: normal  Appetite: normal  Medication side effects: No  ROS: no complaints    Current medications:    Current Facility-Administered Medications:     acetaminophen (TYLENOL) tablet 650 mg, 650 mg, Oral, Q6H PRN, Sara Vivas MD    acetaminophen (TYLENOL) tablet 650 mg, 650 mg, Oral, Q6H PRN, Sara Vivas MD    acetaminophen (TYLENOL) tablet 650 mg, 650 mg, Oral, Q4H PRN, Sara Vivas MD    acetaminophen (TYLENOL) tablet 975 mg, 975 mg, Oral, Q6H PRN, Sara Vivas MD    aluminum-magnesium hydroxide-simethicone (MYLANTA) 200-200-20 mg/5 mL oral suspension 30 mL, 30 mL, Oral, Q4H PRN, Sara Vivas MD    benztropine (COGENTIN) injection 1 mg, 1 mg, Intramuscular, Q6H PRN, Sara Vivas MD    benztropine (COGENTIN) tablet 1 mg, 1 mg, Oral, Q6H PRN, Sara Vivas MD, 1 mg at 19    carBAMazepine (TEGretol XR) 12 hr tablet 200 mg, 200 mg, Oral, Daily, Kaitlynn Vásquez PA-C, 200 mg at 01/12/19 1140    carBAMazepine (TEGretol XR) 12 hr tablet 400 mg, 400 mg, Oral, HS, Kaitlynn Vásquez PA-C    clonazePAM (KlonoPIN) tablet 1 mg, 1 mg, Oral, TID, Kaitlynn Vásquez PA-C, 1 mg at 01/12/19 1140    haloperidol (HALDOL) tablet 5 mg, 5 mg, Oral, Q6H PRN, Marcia Hawley MD, 5 mg at 01/11/19 1810    haloperidol lactate (HALDOL) injection 5 mg, 5 mg, Intramuscular, Q6H PRN, Marcia Hawley MD    hydrALAZINE (APRESOLINE) tablet 25 mg, 25 mg, Oral, Q6H PRN, Meli Mathews MD    magnesium hydroxide (MILK OF MAGNESIA) 400 mg/5 mL oral suspension 30 mL, 30 mL, Oral, Daily PRN, Marcia Hawley MD    nicotine (NICODERM CQ) 21 mg/24 hr TD 24 hr patch 1 patch, 1 patch, Transdermal, Daily, Marcia Hawley MD    OLANZapine (ZyPREXA) tablet 10 mg, 10 mg, Oral, Daily, Kaitlynn Vásquez PA-C, 10 mg at 01/12/19 1141    OLANZapine (ZyPREXA) tablet 10 mg, 10 mg, Oral, HS, Kaitlynn Vásquez PA-C    traZODone (DESYREL) tablet 50 mg, 50 mg, Oral, HS PRN, Marcia Hawley MD, 50 mg at 01/11/19 2106    Current Problem List:    Patient Active Problem List   Diagnosis    Posttraumatic stress disorder    Agoraphobia    Asthma with exacerbation    Combined drug dependence, excluding opioid, in remission ([facility]    Complicated grief    Crohn's disease ([facility]    Encounter for long-term (current) use of other medications    Generalized anxiety disorder    Status post ileostomy ([facility]    Marijuana use, continuous    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[facility]    Affective psychosis, bipolar ([facility]    High risk medication use    Hypertriglyceridemia    Bipolar 1 disorder, depressed, moderate ([facility] )       Problem list reviewed 01/12/19     Objective:     Vital Signs:  Vitals:    01/11/19 1220 01/11/19 1600 01/12/19 0700 01/12/19 0701   BP: (!) 154/111 (!) 154/112 150/81 152/100   BP Location: Right arm Left arm Left arm Left arm   Pulse: 82 76 (!) 54 65   Resp: 16 20 18    Temp: Yesica Simmons 97 3 °F (36 3 °C) 97 9 °F (36 6 °C) 98 7 °F (37 1 °C)    TempSrc: Temporal Temporal     SpO2: 97%      Weight: 87 1 kg (192 lb)      Height: 5' 8 4" (1 737 m)            Appearance:  age appropriate and casually dressed   Behavior:  normal   Speech:  normal volume   Mood:  anxious and depressed   Affect:  constricted   Thought Process:  normal   Thought Content:  delusions  persecutory   Perceptual Disturbances: None   Risk Potential: Suicidal Ideations without plan   Sensorium:  person, place, situation and time   Cognition:  intact   Consciousness:  alert and awake    Attention: attention span and concentration were age appropriate   Intellect: average   Insight:  limited   Judgment: limited      Motor Activity: no abnormal movements       I/O Past 24 hours:  I/O last 3 completed shifts:  In: -   Out: 1 [Stool:1]  No intake/output data recorded  Labs:  Reviewed 01/12/19    Progress Toward Goals:  Unchanged, ongoing depression anxiety and suicidality  Just admitted to the unit  Assessment / Plan:     Bipolar 1 disorder, depressed, moderate ([facility] )    Recommended Treatment:      Medication changes:  1) continue suicide precautions  Increase Zyprexa 10 mg twice daily; trial clonidine 0 1 mg at bedtime  Continue Tegretol extended release 200 mg daily and 400 mg at bedtime, and Klonopin 1 mg t i d  As prescribed    Non-pharmacological treatments  1) Continue with group therapy, milieu therapy and occupational therapy  Safety  1) Safety/communication plan established targeting dynamic risk factors above  2) Risks, benefits, and possible side effects of medications explained to patient and patient verbalizes understanding  Counseling / Coordination of Care    Total floor / unit time spent today 20 minutes  Greater than 50% of total time was spent with the patient and / or family counseling and / or coordination of care  A description of the counseling / coordination of care       Patient's Rights, confidentiality and exceptions to confidentiality, use of automated medical record, [facility] staff access to medical record, and consent to treatment reviewed      Carlie Cortez PA-C

## 2019-01-12 NOTE — PROGRESS NOTES
01/12/19 1015   Activity/Group Checklist   Group Other (Comment)  (Art Therapy Process Group/Quotes, Discussion)   Attendance Attended   Attendance Duration (min) Greater than 60   Interactions Interacted appropriately   Affect/Mood Appropriate   Goals Achieved Able to listen to others; Able to engage in interactions; Able to reflect/comment on own behavior;Able to recieve feedback; Able to give feedback to another  (Able to engage art materials)

## 2019-01-12 NOTE — PLAN OF CARE
Problem: Depression  Goal: Treatment Goal: Demonstrate behavioral control of depressive symptoms, verbalize feelings of improved mood/affect, and adopt new coping skills prior to discharge  Outcome: Progressing    Goal: Verbalize thoughts and feelings  Interventions:  - Assess and re-assess patient's level of risk   - Engage patient in 1:1 interactions, daily, for a minimum of 15 minutes   - Encourage patient to express feelings, fears, frustrations, hopes    Outcome: Progressing    Goal: Refrain from harming self  Interventions:  - Monitor patient closely, per order   - Supervise medication ingestion, monitor effects and side effects    Outcome: Progressing    Goal: Refrain from isolation  Interventions:  - Develop a trusting relationship   - Encourage socialization    Outcome: Progressing    Goal: Refrain from self-neglect  Outcome: Progressing    Goal: Attend and participate in unit activities, including therapeutic, recreational, and educational groups  Interventions:  - Provide therapeutic and educational activities daily, encourage attendance and participation, and document same in the medical record    Outcome: Progressing    Goal: Complete daily ADLs, including personal hygiene independently, as able  Interventions:  - Observe, teach, and assist patient with ADLS  -  Monitor and promote a balance of rest/activity, with adequate nutrition and elimination    Outcome: Progressing      Problem: Anxiety  Goal: Anxiety is at manageable level  Interventions:  - Assess and monitor patient's anxiety level  - Monitor for signs and symptoms of anxiety both physical and emotional (heart palpitations, chest pain, shortness of breath, headaches, nausea, feeling jumpy, restlessness, irritable, apprehensive)  - Collaborate with interdisciplinary team and initiate plan and interventions as ordered    - [place] patient to unit/surroundings  - Explain treatment plan  - Encourage participation in care  - Encourage verbalization of concerns/fears  - Identify coping mechanisms  - Assist in developing anxiety-reducing skills  - Administer/offer alternative therapies  - Limit or eliminate stimulants   Outcome: Progressing      Comments: Pt pleasant upon approach and cooperative with care  Pt requested and given Ativan 1mg po prn at 08:31 for anxiety  Pt socializing with select peers this morning in dining room  Pt complied with lab draw for TSH  Will continue to monitor and provide therapeutic support  Will continue to monitor bp and will notify SLIM should bp be higher than 170/100

## 2019-01-12 NOTE — PROGRESS NOTES
Pt remains pleasant and cooperative  Pt was given PRN Ativan and Trazodone at 2106  Will continue to monitor

## 2019-01-13 RX ADMIN — CARBAMAZEPINE 200 MG: 200 TABLET, EXTENDED RELEASE ORAL at 08:06

## 2019-01-13 RX ADMIN — CLONAZEPAM 1 MG: 1 TABLET ORAL at 08:06

## 2019-01-13 RX ADMIN — OLANZAPINE 10 MG: 10 TABLET, FILM COATED ORAL at 21:11

## 2019-01-13 RX ADMIN — CLONAZEPAM 1 MG: 1 TABLET ORAL at 21:11

## 2019-01-13 RX ADMIN — HYDRALAZINE HYDROCHLORIDE 25 MG: 25 TABLET ORAL at 19:01

## 2019-01-13 RX ADMIN — CARBAMAZEPINE 400 MG: 200 TABLET, EXTENDED RELEASE ORAL at 21:11

## 2019-01-13 RX ADMIN — CLONAZEPAM 1 MG: 1 TABLET ORAL at 15:44

## 2019-01-13 RX ADMIN — OLANZAPINE 10 MG: 10 TABLET, FILM COATED ORAL at 08:06

## 2019-01-13 RX ADMIN — NICOTINE 1 PATCH: 21 PATCH, EXTENDED RELEASE TRANSDERMAL at 08:06

## 2019-01-13 NOTE — PLAN OF CARE
Problem: Depression  Goal: Treatment Goal: Demonstrate behavioral control of depressive symptoms, verbalize feelings of improved mood/affect, and adopt new coping skills prior to discharge  Outcome: Progressing    Goal: Verbalize thoughts and feelings  Interventions:  - Assess and re-assess patient's level of risk   - Engage patient in 1:1 interactions, daily, for a minimum of 15 minutes   - Encourage patient to express feelings, fears, frustrations, hopes    Outcome: Progressing    Goal: Refrain from harming self  Interventions:  - Monitor patient closely, per order   - Supervise medication ingestion, monitor effects and side effects    Outcome: Progressing    Goal: Refrain from isolation  Interventions:  - Develop a trusting relationship   - Encourage socialization    Outcome: Progressing    Goal: Refrain from self-neglect  Outcome: Progressing    Goal: Attend and participate in unit activities, including therapeutic, recreational, and educational groups  Interventions:  - Provide therapeutic and educational activities daily, encourage attendance and participation, and document same in the medical record    Outcome: Progressing    Goal: Complete daily ADLs, including personal hygiene independently, as able  Interventions:  - Observe, teach, and assist patient with ADLS  -  Monitor and promote a balance of rest/activity, with adequate nutrition and elimination    Outcome: Progressing      Problem: Anxiety  Goal: Anxiety is at manageable level  Interventions:  - Assess and monitor patient's anxiety level  - Monitor for signs and symptoms of anxiety both physical and emotional (heart palpitations, chest pain, shortness of breath, headaches, nausea, feeling jumpy, restlessness, irritable, apprehensive)  - Collaborate with interdisciplinary team and initiate plan and interventions as ordered    - [place] patient to unit/surroundings  - Explain treatment plan  - Encourage participation in care  - Encourage verbalization of concerns/fears  - Identify coping mechanisms  - Assist in developing anxiety-reducing skills  - Administer/offer alternative therapies  - Limit or eliminate stimulants   Outcome: Progressing

## 2019-01-13 NOTE — NURSING NOTE
Patient visible on unit at start of shift  Easy to engage with  Affect WNL, mood anxious  Clear and organized thoughts  Patient observed interacting with his peers appropriately  Good eye contact  Cooperative with unit routine  Education provided on elevated blood pressures and ways to reduce it, including healthy lifestyle changes and medications  Patient had good attention span and verbalized an understanding  HS medication compliant  Will monitor

## 2019-01-13 NOTE — PROGRESS NOTES
Psychiatry Progress Note    Subjective: Interval History     Patient visible on the unit  Patient making an effort to participate in the milieu and interact with his peers  Reports that he tolerated increased zyprexa dosing with no adverse effects  Reports still having anxiety, but has been trying to gain as much as he can during his admission  Still overwhelmed and hoping to have longer term treatment in the community to help his agoraphobia  Reports feeling safe in the hospital; denies plan to harm himself at this time  Denies HI  Denies AH and VH  Slept well last evening without the aid of clonidine; no uncontrolled anxiety upon wakening this morning       Behavior over the last 24 hours:  unchanged  Sleep: normal  Appetite: normal  Medication side effects: No  ROS: no complaints    Current medications:    Current Facility-Administered Medications:     acetaminophen (TYLENOL) tablet 650 mg, 650 mg, Oral, Q6H PRN, Kristin Amezquita MD    acetaminophen (TYLENOL) tablet 650 mg, 650 mg, Oral, Q4H PRN, Kristin Amezquita MD    acetaminophen (TYLENOL) tablet 975 mg, 975 mg, Oral, Q6H PRN, Kristin Amezquita MD    aluminum-magnesium hydroxide-simethicone (MYLANTA) 200-200-20 mg/5 mL oral suspension 30 mL, 30 mL, Oral, Q4H PRN, Kristin Amezquita MD    benztropine (COGENTIN) tablet 1 mg, 1 mg, Oral, Q6H PRN, Kristin Amezquita MD, 1 mg at 01/11/19 1810    carBAMazepine (TEGretol XR) 12 hr tablet 200 mg, 200 mg, Oral, Daily, Forrest Russo PA-C, 200 mg at 01/13/19 0806    carBAMazepine (TEGretol XR) 12 hr tablet 400 mg, 400 mg, Oral, HS, Forrest Russo PA-C, 400 mg at 01/12/19 2123    clonazePAM (KlonoPIN) tablet 1 mg, 1 mg, Oral, TID, Forrest Russo PA-C, 1 mg at 01/13/19 0806    diphenhydrAMINE (BENADRYL) injection 50 mg, 50 mg, Intramuscular, Q4H PRN, Forrest Russo PA-C    haloperidol (HALDOL) tablet 5 mg, 5 mg, Oral, Q6H PRN, Kristin Amezquita MD, 5 mg at 01/11/19 1810    haloperidol lactate (HALDOL) injection 5 mg, 5 mg, Intramuscular, Q6H PRN, Clotilde Canela MD    hydrALAZINE (APRESOLINE) tablet 25 mg, 25 mg, Oral, Q6H PRN, James Larose MD, 25 mg at 01/12/19 2239    hydrOXYzine HCL (ATARAX) tablet 50 mg, 50 mg, Oral, Q4H PRN, Primitivo Owusu PA-C    magnesium hydroxide (MILK OF MAGNESIA) 400 mg/5 mL oral suspension 30 mL, 30 mL, Oral, Daily PRN, Clotilde Canela MD    nicotine (NICODERM CQ) 21 mg/24 hr TD 24 hr patch 1 patch, 1 patch, Transdermal, Daily, Clotilde Canela MD, 1 patch at 01/13/19 0806    OLANZapine (ZyPREXA) tablet 10 mg, 10 mg, Oral, Daily, Primitivo Owusu PA-C, 10 mg at 01/13/19 0806    OLANZapine (ZyPREXA) tablet 10 mg, 10 mg, Oral, HS, Primitivo Owusu PA-C, 10 mg at 01/12/19 2123    traZODone (DESYREL) tablet 50 mg, 50 mg, Oral, HS PRN, Clotilde Canela MD, 50 mg at 01/11/19 2106    Current Problem List:    Patient Active Problem List   Diagnosis    Posttraumatic stress disorder    Agoraphobia    Asthma with exacerbation    Combined drug dependence, excluding opioid, [facility]    Complicated grief    Crohn's disease ([facility]    Encounter for long-term (current) use of other medications    Generalized anxiety disorder    Status post ileostomy ([facility]    Marijuana use, continuous    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[facility]    Affective psychosis, bipolar ([facility]    High risk medication use    Hypertriglyceridemia    Bipolar 1 disorder, depressed, moderate ([facility] )       Problem list reviewed 01/13/19     Objective:     Vital Signs:  Vitals:    01/12/19 2215 01/12/19 2310 01/13/19 0500 01/13/19 0700   BP: (!) 172/112 160/96 140/99 140/99   BP Location: Right arm Left arm Left arm Left arm   Pulse: 95   61   Resp: 18   18   Temp:    97 9 °F (36 6 °C)   TempSrc:    Temporal   SpO2:       Weight:    88 5 kg (195 lb)   Height:             Appearance:  age appropriate and casually dressed   Behavior:  normal   Speech:  normal volume   Mood:  anxious and depressed   Affect:  constricted   Thought Process:  normal   Thought Content:  delusions  persecutory   Perceptual Disturbances: None   Risk Potential: Suicidal Ideations without plan   Sensorium:  person, place, situation and time   Cognition:  intact   Consciousness:  alert and awake    Attention: attention span and concentration were age appropriate   Intellect: average   Insight:  limited   Judgment: limited      Motor Activity: no abnormal movements       I/O Past 24 hours:  I/O last 3 completed shifts:  In: -   Out: 1 [Stool:1]  No intake/output data recorded  Labs:  Reviewed 01/13/19    Progress Toward Goals:  Unchanged, ongoing depression anxiety and suicidality  Just admitted to the unit  Assessment / Plan:     Bipolar 1 disorder, depressed, moderate ([facility]    Recommended Treatment:      Medication changes:  1) continue suicide precautions  Cont to monitor on increased zyprexa dosing    Non-pharmacological treatments  1) Continue with group therapy, milieu therapy and occupational therapy  Safety  1) Safety/communication plan established targeting dynamic risk factors above  2) Risks, benefits, and possible side effects of medications explained to patient and patient verbalizes understanding  Counseling / Coordination of Care    Total floor / unit time spent today 20 minutes  Greater than 50% of total time was spent with the patient and / or family counseling and / or coordination of care  A description of the counseling / coordination of care  Patient's Rights, confidentiality and exceptions to confidentiality, use of automated medical record, [facility] staff access to medical record, and consent to treatment reviewed      Skyla Mullins PA-C

## 2019-01-13 NOTE — NURSING NOTE
BP taken in right arm with automatic cuff 172/112  BP retaken on left arm with manual cuff 160/98  PRN hydralazine given  Education provided  Will monitor

## 2019-01-13 NOTE — NURSING NOTE
Patient slept well throughout the night  No periods of wakefulness noted  /99    Will continue to monitor

## 2019-01-13 NOTE — PROGRESS NOTES
Pt pleasant upon approach and cooperative with care  Pt rated depression 6/10 and rated anxiety 3/4  Pt stated his symptoms have improved since admission  Pt social with peers and visible within mileu  Will continue to monitor and provide therapeutic support

## 2019-01-14 RX ORDER — AMLODIPINE BESYLATE 5 MG/1
5 TABLET ORAL DAILY
Status: DISCONTINUED | OUTPATIENT
Start: 2019-01-14 | End: 2019-01-16

## 2019-01-14 RX ADMIN — AMLODIPINE BESYLATE 5 MG: 5 TABLET ORAL at 15:39

## 2019-01-14 RX ADMIN — CLONAZEPAM 1 MG: 1 TABLET ORAL at 08:24

## 2019-01-14 RX ADMIN — CARBAMAZEPINE 200 MG: 200 TABLET, EXTENDED RELEASE ORAL at 08:25

## 2019-01-14 RX ADMIN — CLONAZEPAM 1 MG: 1 TABLET ORAL at 15:39

## 2019-01-14 RX ADMIN — OLANZAPINE 10 MG: 10 TABLET, FILM COATED ORAL at 11:55

## 2019-01-14 RX ADMIN — OLANZAPINE 10 MG: 10 TABLET, FILM COATED ORAL at 21:08

## 2019-01-14 RX ADMIN — CLONAZEPAM 1 MG: 1 TABLET ORAL at 21:08

## 2019-01-14 RX ADMIN — OLANZAPINE 10 MG: 10 TABLET, FILM COATED ORAL at 08:25

## 2019-01-14 RX ADMIN — HYDRALAZINE HYDROCHLORIDE 25 MG: 25 TABLET ORAL at 22:04

## 2019-01-14 RX ADMIN — CARBAMAZEPINE 400 MG: 200 TABLET, EXTENDED RELEASE ORAL at 21:08

## 2019-01-14 NOTE — PROGRESS NOTES
Psychiatry Progress Note    Subjective: Interval History     Patient reports that he was experiencing fatigue with Zyprexa the morning dose yesterday  Patient reports that he needed to lay down and rest   Patient reports that the side effects resolved as the day progressed  Patient reporting that he feels comfortable trying the medication again today to see if he better just   If not to consider down titration of dosing  Patient reports that he continues to have anxiety throughout the day  Patient reports he still is fearful that people may want to harm him  Displaying some paranoia  Patient is still reporting feeling overwhelmed and hopeless over his ongoing agoraphobia  Patient denying any plan to harm self the hospital at this time  Continues to try to motivate himself to participate in the milieu to gain as much as he can during his hospitalization  Patient reports he slept well last evening  No excessive anxiety upon awakening      Behavior over the last 24 hours:  unchanged  Sleep: normal  Appetite: normal  Medication side effects: No  ROS: no complaints    Current medications:    Current Facility-Administered Medications:     acetaminophen (TYLENOL) tablet 650 mg, 650 mg, Oral, Q6H PRN, Clara Lao MD    acetaminophen (TYLENOL) tablet 650 mg, 650 mg, Oral, Q4H PRN, Clara Lao MD    acetaminophen (TYLENOL) tablet 975 mg, 975 mg, Oral, Q6H PRN, Clara Lao MD    aluminum-magnesium hydroxide-simethicone (MYLANTA) 200-200-20 mg/5 mL oral suspension 30 mL, 30 mL, Oral, Q4H PRN, Clara Lao MD    benztropine (COGENTIN) tablet 1 mg, 1 mg, Oral, Q6H PRN, Clara Lao MD, 1 mg at 01/11/19 1810    carBAMazepine (TEGretol XR) 12 hr tablet 200 mg, 200 mg, Oral, Daily, Yuridia Vo PA-C, 200 mg at 01/14/19 0825    carBAMazepine (TEGretol XR) 12 hr tablet 400 mg, 400 mg, Oral, HS, Yuridia Vo PA-C, 400 mg at 01/13/19 2111    clonazePAM (KlonoPIN) tablet 1 mg, 1 mg, Oral, TID, Forrest Russo PA-C, 1 mg at 01/14/19 0485    diphenhydrAMINE (BENADRYL) injection 50 mg, 50 mg, Intramuscular, Q4H PRN, Forrest Russo PA-C    haloperidol (HALDOL) tablet 5 mg, 5 mg, Oral, Q6H PRN, Kristin Amezquita MD, 5 mg at 01/11/19 1810    haloperidol lactate (HALDOL) injection 5 mg, 5 mg, Intramuscular, Q6H PRN, Kristin Amezquita MD    hydrALAZINE (APRESOLINE) tablet 25 mg, 25 mg, Oral, Q6H PRN, Boo Tirado MD, 25 mg at 01/13/19 1901    hydrOXYzine HCL (ATARAX) tablet 50 mg, 50 mg, Oral, Q4H PRN, Forrest Russo PA-C    magnesium hydroxide (MILK OF MAGNESIA) 400 mg/5 mL oral suspension 30 mL, 30 mL, Oral, Daily PRN, Kristin Amezquita MD    nicotine (NICODERM CQ) 21 mg/24 hr TD 24 hr patch 1 patch, 1 patch, Transdermal, Daily, Kristin Amezquita MD, 1 patch at 01/13/19 0806    OLANZapine (ZyPREXA) tablet 10 mg, 10 mg, Oral, Daily, Forrest Russo PA-C, 10 mg at 01/13/19 0806    OLANZapine (ZyPREXA) tablet 10 mg, 10 mg, Oral, HS, Forrest Russo PA-C, 10 mg at 01/14/19 0825    traZODone (DESYREL) tablet 50 mg, 50 mg, Oral, HS PRN, Kristin Amezquita MD, 50 mg at 01/11/19 2106    Current Problem List:    Patient Active Problem List   Diagnosis    Posttraumatic stress disorder    Agoraphobia    Asthma with exacerbation    [facility]    Complicated grief    Crohn's disease ([facility]    Encounter for long-term (current) use of other medications    Generalized anxiety disorder    Status post ileostomy ([facility]    Marijuana use, continuous    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[facility]    Affective psychosis, bipolar ([facility]    High risk medication use    Hypertriglyceridemia    Bipolar 1 disorder, depressed, moderate ([facility] )       Problem list reviewed 01/14/19     Objective:     Vital Signs:  Vitals:    01/13/19 1901 01/13/19 1952 01/14/19 0735 01/14/19 0740   BP: (!) 157/113 140/88 143/100 146/97   BP Location:  Right arm Left arm Left arm   Pulse:   55 (!) 54 Resp:   16    Temp:   (!) 97 °F (36 1 °C)    TempSrc:   Temporal    SpO2:   97%    Weight:       Height:             Appearance:  age appropriate and casually dressed   Behavior:  normal   Speech:  normal volume   Mood:  anxious and depressed   Affect:  constricted   Thought Process:  normal   Thought Content:  delusions  persecutory   Perceptual Disturbances: None   Risk Potential: Suicidal Ideations without plan   Sensorium:  person, place, situation and time   Cognition:  intact   Consciousness:  alert and awake    Attention: attention span and concentration were age appropriate   Intellect: average   Insight:  limited   Judgment: limited      Motor Activity: no abnormal movements       I/O Past 24 hours:  No intake/output data recorded  No intake/output data recorded  Labs:  Reviewed 01/14/19    Progress Toward Goals:  Unchanged, ongoing depression anxiety and suicidality  Assessment / Plan:     Bipolar 1 disorder, depressed, moderate ([facility] [address] )    Recommended Treatment:      Medication changes:  1) continue suicide precautions  Continue current medication regimen  Non-pharmacological treatments  1) Continue with group therapy, milieu therapy and occupational therapy  Safety  1) Safety/communication plan established targeting dynamic risk factors above  2) Risks, benefits, and possible side effects of medications explained to patient and patient verbalizes understanding  Counseling / Coordination of Care    Total floor / unit time spent today 20 minutes  Greater than 50% of total time was spent with the patient and / or family counseling and / or coordination of care  A description of the counseling / coordination of care  Patient's Rights, confidentiality and exceptions to confidentiality, use of automated medical record, [facility] staff access to medical record, and consent to treatment reviewed      Jayson Patel PA-C

## 2019-01-14 NOTE — PLAN OF CARE
Problem: DISCHARGE PLANNING  Goal: Discharge to home or other facility with appropriate resources  INTERVENTIONS:  - Identify barriers to discharge w/patient and caregiver  - Deny SI, depression and anxiety  Symptoms will resolve or diminish upon discharge  - Comply with meds, attend 75% of group therapy and identify positive coping skills  - Arrange for needed discharge resources and transportation as appropriate  - Identify discharge learning needs (meds, outpatient mental health services)  - Arrange for interpretive services to assist at discharge as needed  - Refer to Case Management Department for coordinating discharge planning if the patient needs post-hospital services based on physician/advanced practitioner order or complex needs related to functional status, cognitive ability, or social support system   Outcome: Progressing  Worker left voicemail for pt's mother
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
## 2019-01-14 NOTE — PLAN OF CARE
Anxiety     Anxiety is at manageable level Progressing        Depression     Treatment Goal: Demonstrate behavioral control of depressive symptoms, verbalize feelings of improved mood/affect, and adopt new coping skills prior to discharge Progressing     Verbalize thoughts and feelings Progressing     Refrain from harming self Progressing     Refrain from [address] from self-neglect Progressing     Attend and participate in unit activities, including therapeutic, recreational, and educational groups Progressing     Complete daily ADLs, including personal hygiene independently, as able [address] Discharge to home or other facility with appropriate resources Progressing

## 2019-01-14 NOTE — PROGRESS NOTES
Patient is compliant with medicine and and guzman routine He is pleasant and friendly to peers He attends groups once in a while

## 2019-01-14 NOTE — PROGRESS NOTES
Pt attended self discovery group  Pt cooperative and engaged in group dialogue when prompted  Pt was helpful with peer support when another peer was struggling with understanding materials  Pt blunt affect but able to self reflect and demonstrated self confidence and self esteem  Continue to provide therepeutic group support

## 2019-01-14 NOTE — NURSING NOTE
Patient visible on unit at start of shift engaging appropriately with his peers  At 1945 /88, hydralazine effective  Patient educated again on his blood pressure  Affect WNL  Mood improving  Good eye contact maintained  Hygiene/dress appropriate  Clear and organized thinking  Denies SI  HS medication compliant  Retreated to his room for sleep without issue  Will continue to monitor

## 2019-01-14 NOTE — DISCHARGE INSTR - OTHER ORDERS
If you are experiencing a mental health emergency, you may call the [address] [place] [address] 24 hours a day, 7 days per week at [phone]  In [facility], call [phone]  When you need someone to listen, the [email or web address] is available for 16 hours a day, 7 days a week, from the time of 7-10am and 2pm-2am   It is not available from the hours of 2am-6am and 10am-2pm  A representative can be reached at [phone]

## 2019-01-15 RX ADMIN — AMLODIPINE BESYLATE 5 MG: 5 TABLET ORAL at 08:20

## 2019-01-15 RX ADMIN — CARBAMAZEPINE 200 MG: 200 TABLET, EXTENDED RELEASE ORAL at 08:19

## 2019-01-15 RX ADMIN — CLONAZEPAM 1 MG: 1 TABLET ORAL at 21:20

## 2019-01-15 RX ADMIN — CLONAZEPAM 1 MG: 1 TABLET ORAL at 08:20

## 2019-01-15 RX ADMIN — CLONAZEPAM 1 MG: 1 TABLET ORAL at 15:35

## 2019-01-15 RX ADMIN — OLANZAPINE 15 MG: 5 TABLET, FILM COATED ORAL at 21:20

## 2019-01-15 RX ADMIN — CARBAMAZEPINE 400 MG: 200 TABLET, EXTENDED RELEASE ORAL at 21:20

## 2019-01-15 NOTE — PLAN OF CARE
Problem: DISCHARGE PLANNING  Goal: Discharge to home or other facility with appropriate resources  INTERVENTIONS:  - Identify barriers to discharge w/patient and caregiver  - Deny SI, depression and anxiety  Symptoms will resolve or diminish upon discharge  - Comply with meds, attend 75% of group therapy and identify positive coping skills  - Arrange for needed discharge resources and transportation as appropriate  - Identify discharge learning needs (meds, outpatient mental health services)  - Arrange for interpretive services to assist at discharge as needed  - Refer to Case Management Department for coordinating discharge planning if the patient needs post-hospital services based on physician/advanced practitioner order or complex needs related to functional status, cognitive ability, or social support system   Outcome: Progressing  Worker spoke with pt's mother  Mother tearful at times because she would like to see her son get better and be able to experience life  Mother reports their family continuously offer pt a variety of activities to do with them in the community but pt chooses not to because he becomes anxious and feels as though someone is behind him  Mother explained their family is willing to do whatever to offer pt support and help him get better

## 2019-01-15 NOTE — SOCIAL WORK
Worker spoke with pt's mother on the telephone  Mother tearful at times due to wanting pt to get better and experience his life  Mother reports their family has been trying to help pt and offer a variety of activities to do with family outside of the home but pt continues to refused due to anxiety and feeling as though someone is behind him  Mother reports their family is willing to do anything to support and help pt improve

## 2019-01-15 NOTE — PROGRESS NOTES
Psychiatry Progress Note    Subjective: Interval History     Patient reports that he again had daytime fatigue following morning Zyprexa dosing  Patient reporting that he was also slurring his words at 1 point while attending group  Patient however reports that his mood was better during the day as his depression and anxiety have been better controlled  Patient denying any suicidal ideations or homicidal ideations  Patient continuing to push his boundaries and participating in the milieu despite his agoraphobia  Continuing to express desire for additional services in the community to help him further in his to reach cover E      Behavior over the last 24 hours:  Mild improvement  Sleep: normal  Appetite: normal  Medication side effects:  Yes, daytime fatigue secondary to a m   Zyprexa dosing  ROS: no complaints    Current medications:    Current Facility-Administered Medications:     acetaminophen (TYLENOL) tablet 650 mg, 650 mg, Oral, Q6H PRN, Jelly Adams MD    acetaminophen (TYLENOL) tablet 650 mg, 650 mg, Oral, Q4H PRN, Jelly Adams MD    acetaminophen (TYLENOL) tablet 975 mg, 975 mg, Oral, Q6H PRN, Jelly Adams MD    aluminum-magnesium hydroxide-simethicone (MYLANTA) 200-200-20 mg/5 mL oral suspension 30 mL, 30 mL, Oral, Q4H PRN, Jelly Adams MD    amLODIPine (NORVASC) tablet 5 mg, 5 mg, Oral, Daily, Shellie Ciminieri, CRNP, 5 mg at 01/14/19 1539    benztropine (COGENTIN) tablet 1 mg, 1 mg, Oral, Q6H PRN, Jelly Adams MD, 1 mg at 01/11/19 1810    carBAMazepine (TEGretol XR) 12 hr tablet 200 mg, 200 mg, Oral, Daily, Sg Olsen PA-C, 200 mg at 01/14/19 0825    carBAMazepine (TEGretol XR) 12 hr tablet 400 mg, 400 mg, Oral, HS, Sg Olsen PA-C, 400 mg at 01/14/19 2108    clonazePAM (KlonoPIN) tablet 1 mg, 1 mg, Oral, TID, Sg Olsen PA-C, 1 mg at 01/14/19 2108    diphenhydrAMINE (BENADRYL) injection 50 mg, 50 mg, Intramuscular, Q4H PRN, Sg Olsen PA-C   haloperidol (HALDOL) tablet 5 mg, 5 mg, Oral, Q6H PRN, Rylie Clemons MD, 5 mg at 01/11/19 1810    haloperidol lactate (HALDOL) injection 5 mg, 5 mg, Intramuscular, Q6H PRN, Rylie Clemons MD    hydrALAZINE (APRESOLINE) tablet 25 mg, 25 mg, Oral, Q6H PRN, Piyush Perkins MD, 25 mg at 01/14/19 2204    hydrOXYzine HCL (ATARAX) tablet 50 mg, 50 mg, Oral, Q4H PRN, Edwinna Course, PA-C    magnesium hydroxide (MILK OF MAGNESIA) 400 mg/5 mL oral suspension 30 mL, 30 mL, Oral, Daily PRN, Rylie Clemons MD    nicotine (NICODERM CQ) 21 mg/24 hr TD 24 hr patch 1 patch, 1 patch, Transdermal, Daily, Rylie Clemons MD, 1 patch at 01/13/19 0806    OLANZapine (ZyPREXA) tablet 15 mg, 15 mg, Oral, HS, Edwinna Course, PA-C    traZODone (DESYREL) tablet 50 mg, 50 mg, Oral, HS PRN, Rylie Clemons MD, 50 mg at 01/11/19 2106    Current Problem List:    Patient Active Problem List   Diagnosis    Posttraumatic stress disorder    Agoraphobia    Asthma with exacerbation    Combined drug dependence, excluding opioid, in remission ([facility]    Complicated grief    [facility]'s disease [facility]    Encounter for long-term (current) use of other medications    Generalized anxiety disorder    Status post ileostomy ([facility]    Marijuana use, continuous    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[facility]    Affective psychosis, bipolar ([facility] )    High risk medication use    Hypertriglyceridemia    Bipolar 1 disorder, depressed, moderate ([facility] )       Problem list reviewed 01/15/19     Objective:     Vital Signs:  Vitals:    01/14/19 1600 01/14/19 2100 01/15/19 0745 01/15/19 0747   BP: (!) 164/109 (!) 147/116 (!) 155/105 (!) 150/102   BP Location: Left arm Left arm Left arm Left arm   Pulse: 76 (!) 115 63 66   Resp: 18  18    Temp: 97 7 °F (36 5 °C)  (!) 96 9 °F (36 1 °C)    TempSrc: Temporal  Temporal    SpO2:       Weight:       Height:             Appearance:  age appropriate and casually dressed   Behavior:  normal   Speech:  normal volume   Mood:  anxious and depressed   Affect:  constricted   Thought Process:  normal   Thought Content:  delusions  persecutory   Perceptual Disturbances: None   Risk Potential: Suicidal Ideations without plan   Sensorium:  person, place, situation and time   Cognition:  intact   Consciousness:  alert and awake    Attention: attention span and concentration were age appropriate   Intellect: average   Insight:  limited   Judgment: limited      Motor Activity: no abnormal movements       I/O Past 24 hours:  I/O last 3 completed shifts:  In: -   Out: 1 [Stool:1]  No intake/output data recorded  Labs:  Reviewed 01/15/19    Progress Toward Goals:  Mild improvement, decrease depression and anxiety    Assessment / Plan:     Bipolar 1 disorder, depressed, moderate (HCC)    Recommended Treatment:      Medication changes:  1) continue suicide precautions  Discontinue morning Zyprexa dosing  Increase HS Zyprexa dosing to 15 mg    Non-pharmacological treatments  1) Continue with group therapy, milieu therapy and occupational therapy  Safety  1) Safety/communication plan established targeting dynamic risk factors above  2) Risks, benefits, and possible side effects of medications explained to patient and patient verbalizes understanding  Counseling / Coordination of Care    Total floor / unit time spent today 20 minutes  Greater than 50% of total time was spent with the patient and / or family counseling and / or coordination of care  A description of the counseling / coordination of care  Patient's Rights, confidentiality and exceptions to confidentiality, use of automated medical record, [facility] staff access to medical record, and consent to treatment reviewed      Ketty Pineda PA-C

## 2019-01-15 NOTE — PROGRESS NOTES
Patient continues to express depression and anxiety   He does attend groups at times and is social with some peers  He continues to be compliant with medicine and guzman routine   Will continue to offer  Support and guidance when ever needed

## 2019-01-15 NOTE — PROGRESS NOTES
Pt remains pleasant and cooperative  Pt reports a 4/10 depression and 3/4 anxiety but denies any SI, HI, AH, or VH  Pt's BP at 2000 was 147/116 with a pulse of 115  Pt was given PRN apresoline at 2204  Will continue to monitor

## 2019-01-15 NOTE — PROGRESS NOTES
01/15/19 1355   Activity/Group Checklist   Group Other (Comment)  (Art Therapy Process Group/Open Choice, Discussion)   Attendance Attended   Attendance Duration (min) Greater than 60   Interactions Interacted appropriately   Affect/Mood Appropriate   Goals Achieved Identified feelings; Able to listen to others; Able to engage in interactions; Able to reflect/comment on own behavior;Able to recieve feedback; Able to give feedback to another;Able to experience relief/decrease in symptoms  (Able to engage art materials)     Patient able to gain insight through his creative process regarding his ability to let go of needing to control something and allow things to unfold naturally  Patient expressed a feeling of relief and accomplishment

## 2019-01-15 NOTE — PROGRESS NOTES
Pt attended problem solving group  Pt alert but observed mainly during group  Flat affect and limited eye contact  Pt completed written problem solving activity but did not engage in discussion when prompted  Continue to provide therapeutic group support

## 2019-01-16 RX ORDER — AMLODIPINE BESYLATE 5 MG/1
10 TABLET ORAL DAILY
Status: DISCONTINUED | OUTPATIENT
Start: 2019-01-17 | End: 2019-01-22 | Stop reason: HOSPADM

## 2019-01-16 RX ADMIN — OLANZAPINE 15 MG: 5 TABLET, FILM COATED ORAL at 21:22

## 2019-01-16 RX ADMIN — CARBAMAZEPINE 200 MG: 200 TABLET, EXTENDED RELEASE ORAL at 08:12

## 2019-01-16 RX ADMIN — CLONAZEPAM 1 MG: 1 TABLET ORAL at 08:12

## 2019-01-16 RX ADMIN — CARBAMAZEPINE 400 MG: 200 TABLET, EXTENDED RELEASE ORAL at 21:22

## 2019-01-16 RX ADMIN — AMLODIPINE BESYLATE 5 MG: 5 TABLET ORAL at 08:14

## 2019-01-16 RX ADMIN — CLONAZEPAM 1 MG: 1 TABLET ORAL at 16:09

## 2019-01-16 RX ADMIN — CLONAZEPAM 1 MG: 1 TABLET ORAL at 21:22

## 2019-01-16 NOTE — PLAN OF CARE
Problem: DISCHARGE PLANNING  Goal: Discharge to home or other facility with appropriate resources  INTERVENTIONS:  - Identify barriers to discharge w/patient and caregiver  - Deny SI, depression and anxiety  Symptoms will resolve or diminish upon discharge  - Comply with meds, attend 75% of group therapy and identify positive coping skills  - Arrange for needed discharge resources and transportation as appropriate  - Identify discharge learning needs (meds, outpatient mental health services)  - Arrange for interpretive services to assist at discharge as needed  - Refer to Case Management Department for coordinating discharge planning if the patient needs post-hospital services based on physician/advanced practitioner order or complex needs related to functional status, cognitive ability, or social support system   Outcome: Progressing  ICM referral completed and faxed to [facility]

## 2019-01-16 NOTE — PLAN OF CARE
Problem: Depression  Goal: Treatment Goal: Demonstrate behavioral control of depressive symptoms, verbalize feelings of improved mood/affect, and adopt new coping skills prior to discharge  Outcome: Progressing    Goal: Verbalize thoughts and feelings  Interventions:  - Assess and re-assess patient's level of risk   - Engage patient in 1:1 interactions, daily, for a minimum of 15 minutes   - Encourage patient to express feelings, fears, frustrations, hopes    Outcome: Progressing    Goal: Refrain from harming self  Interventions:  - Monitor patient closely, per order   - Supervise medication ingestion, monitor effects and side effects    Outcome: Progressing    Goal: Refrain from isolation  Interventions:  - Develop a trusting relationship   - Encourage socialization    Outcome: Progressing    Goal: Refrain from self-neglect  Outcome: Progressing    Goal: Attend and participate in unit activities, including therapeutic, recreational, and educational groups  Interventions:  - Provide therapeutic and educational activities daily, encourage attendance and participation, and document same in the medical record    Outcome: Progressing    Goal: Complete daily ADLs, including personal hygiene independently, as able  Interventions:  - Observe, teach, and assist patient with ADLS  -  Monitor and promote a balance of rest/activity, with adequate nutrition and elimination    Outcome: Progressing      Problem: Anxiety  Goal: Anxiety is at manageable level  Interventions:  - Assess and monitor patient's anxiety level  - Monitor for signs and symptoms of anxiety both physical and emotional (heart palpitations, chest pain, shortness of breath, headaches, nausea, feeling jumpy, restlessness, irritable, apprehensive)  - Collaborate with interdisciplinary team and initiate plan and interventions as ordered    - [place] patient to unit/surroundings  - Explain treatment plan  - Encourage participation in care  - Encourage verbalization of concerns/fears  - Identify coping mechanisms  - Assist in developing anxiety-reducing skills  - Administer/offer alternative therapies  - Limit or eliminate stimulants   Outcome: Progressing      Comments: Pt is pleasant and cooperative  Pt is med/meal compliant  Pt denies any anxiety, depression, SI, HI, AH, or VH  Pt states "I'm good"  Will continue to monitor

## 2019-01-16 NOTE — NURSING NOTE
Received patient at 1900  Patient was cooperative and social with peers during the evening  Patient pleasant on approach  Patient reported feeling "really good " Patient reported that he feels safe on unit and able to socialize with others like he has not been able to at home  Patient denied anxiety, depression, SI/HI, AH/VH, and pain  Patient has appeared to be sleeping for the past couple of hours  Will continue to monitor closely on suicide precautions

## 2019-01-16 NOTE — SOCIAL WORK
Worker completed and faxed ICM referral to [facility]MICAL DEPENDENCY [facility]

## 2019-01-16 NOTE — PROGRESS NOTES
Psychiatry Progress Note    Subjective: Interval History     Patient reports that he did not have any daytime fatigue yesterday after discontinuation morning Zyprexa dosing  Patient reports that he is grateful that he came to the hospital for treatment  Patient reporting that he has been improving with his treatment  Patient reporting that groups have been helping to get him to be less anxious around others and socialize  Patient reporting that his medication has further aided controlled his depression and anxiety  Patient reporting that he is feeling more optimistic about his future  Patient denying any suicidal or homicidal ideations  Patient with good sleep and appetite  Patient visible on the unit participating in the milieu  Patient interacting appropriately with staff and peers  Patient with no somatic complaints today      Behavior over the last 24 hours:  Improving  Sleep: normal  Appetite: normal  Medication side effects:  None  ROS: no complaints    Current medications:    Current Facility-Administered Medications:     acetaminophen (TYLENOL) tablet 650 mg, 650 mg, Oral, Q6H PRN, Radha Marino MD    acetaminophen (TYLENOL) tablet 650 mg, 650 mg, Oral, Q4H PRN, Radha Marino MD    acetaminophen (TYLENOL) tablet 975 mg, 975 mg, Oral, Q6H PRN, Radha Marino MD    aluminum-magnesium hydroxide-simethicone (MYLANTA) 200-200-20 mg/5 mL oral suspension 30 mL, 30 mL, Oral, Q4H PRN, Radha Marino MD    amLODIPine (NORVASC) tablet 5 mg, 5 mg, Oral, Daily, Shellie Ciminieri, CRNP, 5 mg at 01/16/19 9671    benztropine (COGENTIN) tablet 1 mg, 1 mg, Oral, Q6H PRN, Radha Marino MD, 1 mg at 01/11/19 1810    carBAMazepine (TEGretol XR) 12 hr tablet 200 mg, 200 mg, Oral, Daily, Elda Oconnor PA-C, 200 mg at 01/16/19 7853    carBAMazepine (TEGretol XR) 12 hr tablet 400 mg, 400 mg, Oral, HS, Elda Oconnor PA-C, 400 mg at 01/15/19 2120    clonazePAM (KlonoPIN) tablet 1 mg, 1 mg, Oral, TID, Aleene Danger BILL Thakur, 1 mg at 01/16/19 7658    diphenhydrAMINE (BENADRYL) injection 50 mg, 50 mg, Intramuscular, Q4H PRN, Carlie Cortez PA-C    haloperidol (HALDOL) tablet 5 mg, 5 mg, Oral, Q6H PRN, Breann Ponce MD, 5 mg at 01/11/19 1810    haloperidol lactate (HALDOL) injection 5 mg, 5 mg, Intramuscular, Q6H PRN, Breann Ponce MD    hydrALAZINE (APRESOLINE) tablet 25 mg, 25 mg, Oral, Q6H PRN, Nelson Henry MD, 25 mg at 01/14/19 2204    hydrOXYzine HCL (ATARAX) tablet 50 mg, 50 mg, Oral, Q4H PRN, Carlie Cortez PA-C    magnesium hydroxide (MILK OF MAGNESIA) 400 mg/5 mL oral suspension 30 mL, 30 mL, Oral, Daily PRN, Breann Ponce MD    nicotine (NICODERM CQ) 21 mg/24 hr TD 24 hr patch 1 patch, 1 patch, Transdermal, Daily, Breann Ponce MD, 1 patch at 01/13/19 0806    OLANZapine (ZyPREXA) tablet 15 mg, 15 mg, Oral, HS, Carlie Cortez PA-C, 15 mg at 01/15/19 2120    traZODone (DESYREL) tablet 50 mg, 50 mg, Oral, HS PRN, Breann Ponce MD, 50 mg at 01/11/19 2106    Current Problem List:    Patient Active Problem List   Diagnosis    Posttraumatic stress disorder    Agoraphobia    Asthma with exacerbation    Combined drug dependence, excluding opioid, [facility] remission ([facility]    Complicated grief    Crohn's disease ([facility]    Encounter for long-term (current) use of other medications    Generalized anxiety disorder    Status post ileostomy ([facility]    Marijuana use, continuous    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[facility]    Affective psychosis, bipolar ([facility]    High risk medication use    Hypertriglyceridemia    Bipolar 1 disorder, depressed, moderate ([facility] )       Problem list reviewed 01/16/19     Objective:     Vital Signs:  Vitals:    01/15/19 2001 01/16/19 0814 01/16/19 0822 01/16/19 0823   BP: 146/100 (!) 147/107 (!) 147/107 136/94   BP Location: Right arm  Left arm Left arm   Pulse: 97  62 75   Resp:   16    Temp:   (!) 97 °F (36 1 °C)    TempSrc:   Temporal    SpO2:   97%    Weight:       Height: Appearance:  age appropriate and casually dressed   Behavior:  normal   Speech:  normal volume   Mood:  anxious   Affect:  constricted   Thought Process:  normal   Thought Content:  normal   Perceptual Disturbances: None   Risk Potential: none   Sensorium:  person, place, situation and time   Cognition:  intact   Consciousness:  alert and awake    Attention: attention span and concentration were age appropriate   Intellect: average   Insight:  limited   Judgment: limited      Motor Activity: no abnormal movements       I/O Past 24 hours:  No intake/output data recorded  No intake/output data recorded  Labs:  Reviewed 01/16/19    Progress Toward Goals:  Improving, improvement depressive symptoms and control of anxiety    Assessment / Plan:     Bipolar 1 disorder, depressed, moderate (HCC)    Recommended Treatment:      Medication changes:  1) discontinue suicide precautions  Continue to monitor for ongoing improvement with current psychotropic medications  Non-pharmacological treatments  1) Continue with group therapy, milieu therapy and occupational therapy  Safety  1) Safety/communication plan established targeting dynamic risk factors above  2) Risks, benefits, and possible side effects of medications explained to patient and patient verbalizes understanding  Counseling / Coordination of Care    Total floor / unit time spent today 20 minutes  Greater than 50% of total time was spent with the patient and / or family counseling and / or coordination of care  A description of the counseling / coordination of care  Patient's Rights, confidentiality and exceptions to confidentiality, use of automated medical record, [facility] staff access to medical record, and consent to treatment reviewed      Sg Olsen PA-C

## 2019-01-16 NOTE — PROGRESS NOTES
Clinical Pharmacy Note: Oracio Alexandre is a 45 y o  male who presents with  depression and suicidality    Assessment/Plan:    Carbamazepine indication: bipolar disorder maintenance  Norberto Meigs is currently taking 600 mg carbamazepine extended release tablet as 200 mg AM and 400 mg PM    It is recommended to take steady state trough after 4 days of therapy  Patient specific Drug Interactions:  Amlodipine-Consider alternatives to dihydropyridine calcium channel blockers in patients receiving concomitant carbamazepine  If this combination is used, monitor for reduced therapeutic effects of the calcium channel blocker  Dose adjustment may be needed  The nimodipine [facility] product monograph specifically contraindicates concurrent use with carbamazepine  Olanzapine-Monitor for decreased olanzapine effects if combined with carbamazepine  Increased olanzapine doses may be required to maintain a therapeutic effect  Conversely, monitor for increased olanzapine effects/toxicities if carbamazepine is discontinued  Pharmacy Recommendations:  Please order carbamazepine level, already at steady state  Monitor blood pressures closely and consider an alternative agent for BP control  Monitoring:    Therapeutic carbamazepine levels studied for epilepsy are 4-12 mg/L  It is recommended to target this serum range to prevent toxicity and achieve symptom resolvement  Common side effects include: upset stomach, dry mouth, constipation, dizziness, drowsiness  Carbamazepine may cause hyponatremia and agranulocytosis so monitor baseline and weekly sodium levels and complete blood count  Assess for worsening depressive symptoms, mild rash, and severe rash with blistering of the skin and mucus membranes  If a severe rash occurs, discontinue carbamazepine and assess for Kessler-Octavio Syndrome       Carbamazepine has several drug interactions and may decrease the effectiveness of: oral contraceptives, antipsychotics, anticonvulsants, calcium channel blockers, benzodiazepines, antibiotics, statins, HIV medications, anticoagulations, tricyclic antidepressants, and MAO-Is  Recommend to reassess carbamazepine level if liver function or mental status changes      Carbamazepine:    0  Lab Value Date/Time   CARBAMAZEPIN 7 4 11/28/2018 1111       Sodium:    0  Lab Value Date/Time   SODIUM 144 01/10/2019 1833   SODIUM 138 07/25/2018 0827       Complete Blood Count:    0  Lab Value Date/Time   WBC 6 59 01/10/2019 1833   WBC 5 79 07/25/2018 0827       0  Lab Value Date/Time   HGB 16 3 01/10/2019 1833   HGB 14 0 07/25/2018 0827       0  Lab Value Date/Time   HCT 45 3 01/10/2019 1833   HCT 42 6 07/25/2018 0827       0  Lab Value Date/Time    01/10/2019 1833    07/25/2018 0827       0  Lab Value Date/Time   MCV 85 01/10/2019 1833   MCV 88 07/25/2018 0827       0  Lab Value Date/Time   MCH 30 6 01/10/2019 1833   MCH 29 0 07/25/2018 0827       0  Lab Value Date/Time   MCHC 36 0 01/10/2019 1833   MCHC 32 9 07/25/2018 0827       0  Lab Value Date/Time   RDW 13 9 01/10/2019 1833   RDW 14 0 07/25/2018 0827       0  Lab Value Date/Time   NEUTROABS 4 24 01/10/2019 1833   NEUTROABS 3 42 07/25/2018 0827       0  Lab Value Date/Time   LYMPHSABS 1 76 01/10/2019 1833   LYMPHSABS 1 66 07/25/2018 0827       0  Lab Value Date/Time   MONOSABS 0 42 01/10/2019 1833   MONOSABS 0 40 07/25/2018 0827       0  Lab Value Date/Time   EOSABS 0 14 01/10/2019 1833   EOSABS 0 26 07/25/2018 0827       0  Lab Value Date/Time   BASOSABS 0 02 01/10/2019 1833   BASOSABS 0 03 07/25/2018 0827         Liver Function:    0  Lab Value Date/Time   ALB 4 1 01/10/2019 1833   ALB 3 6 07/25/2018 0827       0  Lab Value Date/Time   TBILI 0 30 01/10/2019 1833   TBILI 0 47 07/25/2018 0827       0  Lab Value Date/Time   AST 32 01/10/2019 1833   AST 18 07/25/2018 0827       0  Lab Value Date/Time   ALT 69 01/10/2019 1833   ALT 35 07/25/2018 0827     No results found for: INR    Pharmacy will continue to follow patient with team     Electronically signed by: Rikki Larios, Pharmacist

## 2019-01-16 NOTE — PLAN OF CARE
Problem: SELF CARE DEFICIT  Goal: Return ADL status to a safe level of function  INTERVENTIONS: Prudence Milena MPS, ATR-BC  - Encourage to attend and participate in art therapy   - Provide means incorporate new coping strategies and self care  - Explore concepts of alternative perspective, awareness  and healthy expression through discussion and exercising properties of art materials and process      Outcome: Progressing  Patient regularly attends art therapy with full participation  Able to independently incorporate new coping strategies and perspectives through creative process

## 2019-01-16 NOTE — SOCIAL WORK
Worker spoke with pt regarding discharge planning and outpatient mental health options  Pt reports he is willing to try PHP again and work the program differently  Pt reports he intends to try to reintegrate himself back into the community and social settings while in [facility] so he can process his experiences in [facility] rather than waiting to reintegrate after he completes the program  Worker educated pt on [facility] and psych rehab services  Pt in agreement with all of the above  Pt signed ECHO for [facility]MICAL DEPENDENC[facility] RECOV[facility], [address] [facility] and [facility]

## 2019-01-16 NOTE — PROGRESS NOTES
Pt is very pleasant and cooperative  Pt's BP remains elevated  Pt is med/meal compliant  Pt has not requested any PRNs this shift  Will continue to monitor

## 2019-01-16 NOTE — PROGRESS NOTES
01/16/19 6385   Activity/Group Checklist   Group Other (Comment)  (Art Therapy Process Group/Open Choice, Discussion)   Attendance Attended   Attendance Duration (min) Greater than 60   Interactions Interacted appropriately   Affect/Mood Appropriate   Goals Achieved Identified feelings; Discussed coping strategies; Identified distorted thoughts/beliefs; Able to listen to others; Able to engage in interactions; Able to reflect/comment on own behavior;Able to recieve feedback; Able to give feedback to another;Verbalized increased hopefulness  (Able to engage art materials and gain insight)     Patient continues to challenge self with his need to control; able to let go of pressure he puts on himself and find a sense of relief

## 2019-01-16 NOTE — PROGRESS NOTES
Pt attended discharge group  Pt cooperative and engaged in group dialogue  Pt expressed concerns about understanding mh needs  Pt respectful to peers  Pt anxious and trying to understand services to enhance mh recovery  Group focused on expectations of discharge planning, community resources and utilization,  and mh recovery needs  Continue to provide therepeutic group support  Kavita Tilley

## 2019-01-16 NOTE — PROGRESS NOTES
Clinical Pharmacy Note: Antipsychotic Monitoring Requirements     Mortimer Buster is a 45 y o  male who presents with depression and suicidality and is currently taking  olanzapine 15 mg once daily  Performing metabolic monitoring on patients receiving any antipsychotics is a [facility] [employer] (CMS) requirement  Antipsychotic treatment increases the risk of developing type 2 diabetes mellitus, hypertension, and hyperlipidemia  According to the [facility] [address] (NICE) guidelines, baseline weight, waist circumference, pulse, blood pressure, fasting blood glucose, hemoglobin A1c, lipid profile, and prolactin level (if initiating risperidone or paliperidone) should be collected  These guidelines coincide with Centers and Medicare & Medicaid Services (CMS) requirements including baseline Body Mass Index, blood pressure, fasting glucose, hemoglobin A1c, and fasting lipid panel  CMS states laboratory values collected 12 months from discharge date are acceptable for evaluation  CMS Checklist:     BMI: yes  BP: yes  Fasting glucose: yes  A1c within last 12 months: no   Lipid panel within last 12 months: yes  Nicotine Replacement Therapy: yes    The following values have been collected:  Value Status Result    BMI Body mass index is 29 3 kg/m²     Blood pressure /94 (BP Location: Left arm)   Pulse 75   Temp (!) 97 °F (36 1 °C) (Temporal)   Resp 16   Ht 5' 8 4" (1 737 m)   Wt 88 5 kg (195 lb)   SpO2 97%   BMI 29 30 kg/m²    Fasting glucose   0  Lab Value Date/Time   GLUC 152 (H) 01/10/2019 1833      Hemoglobin A1c No results found for: HGBA1C   Lipid panel   0  Lab Value Date/Time   CHOLESTEROL 152 07/25/2018 0827       0  Lab Value Date/Time   HDL 40 07/25/2018 0827       0  Lab Value Date/Time   LDLCALC 49 07/25/2018 0827        0  Lab Value Date/Time   TRIG 316 (H) 07/25/2018 0827          Recommendations    Based on the results of hemoglobin A1c, lipid panel, and blood pressure monitoring, John Jones is an potential candidate for no pharmacological interventions and routine monitoring based on  HbA1c<6 5%, ASCVD<7 5% and BP within goal       Based on the above information, pharmacy recommends the following: Continue yearly metabolic monitoring         Pharmacy will continue to follow patient with team   Electronically signed by: Reid Lopez, Pharmacist

## 2019-01-17 ENCOUNTER — TELEPHONE (OUTPATIENT)
Dept: BEHAVIORAL/MENTAL HEALTH CLINIC | Facility: CLINIC | Age: 39
End: 2019-01-17

## 2019-01-17 RX ORDER — LISINOPRIL 10 MG/1
10 TABLET ORAL EVERY EVENING
Status: DISCONTINUED | OUTPATIENT
Start: 2019-01-17 | End: 2019-01-21

## 2019-01-17 RX ADMIN — AMLODIPINE BESYLATE 10 MG: 5 TABLET ORAL at 08:19

## 2019-01-17 RX ADMIN — CARBAMAZEPINE 400 MG: 200 TABLET, EXTENDED RELEASE ORAL at 21:21

## 2019-01-17 RX ADMIN — CARBAMAZEPINE 200 MG: 200 TABLET, EXTENDED RELEASE ORAL at 08:19

## 2019-01-17 RX ADMIN — OLANZAPINE 15 MG: 5 TABLET, FILM COATED ORAL at 21:21

## 2019-01-17 RX ADMIN — CLONAZEPAM 1 MG: 1 TABLET ORAL at 21:22

## 2019-01-17 RX ADMIN — LISINOPRIL 10 MG: 10 TABLET ORAL at 17:22

## 2019-01-17 RX ADMIN — CLONAZEPAM 1 MG: 1 TABLET ORAL at 16:03

## 2019-01-17 RX ADMIN — CLONAZEPAM 1 MG: 1 TABLET ORAL at 08:19

## 2019-01-17 NOTE — PLAN OF CARE
Problem: DISCHARGE PLANNING  Goal: Discharge to home or other facility with appropriate resources  INTERVENTIONS:  - Identify barriers to discharge w/patient and caregiver  - Deny SI, depression and anxiety  Symptoms will resolve or diminish upon discharge  - Comply with meds, attend 75% of group therapy and identify positive coping skills  - Arrange for needed discharge resources and transportation as appropriate  - Identify discharge learning needs (meds, outpatient mental health services)  - Arrange for interpretive services to assist at discharge as needed  - Follow up with ICM, PHP, and psych rehab referrals  - Refer to Case Management Department for coordinating discharge planning if the patient needs post-hospital services based on physician/advanced practitioner order or complex needs related to functional status, cognitive ability, or social support system   Outcome: Progressing  Worker left voicemail for [employer] in order to determine openings/wait list in order to ensure after pt discharge PHP can begin the following day

## 2019-01-17 NOTE — PROGRESS NOTES
Pt attended stress management group  Pt alert and engaged in group discussion without prompting  Pt able to self reflect and recognize his MH needs  Pt mentioned he struggles with anxiety and panic attacks  Pt spoke about having stomach/digestive issues related to triggers  Pt provided insight into developing goals to work on and challenge self thru exposure and coping skills  Pt understood medication management needs  Continue to provide therepeutic group support

## 2019-01-17 NOTE — PROGRESS NOTES
01/17/19 3810   Activity/Group Checklist   Group Other (Comment)  (Art Therapy Process Group/Giving Artwork a Voice, Discussion)   Attendance Attended   Attendance Duration (min) Greater than 60   Interactions Interacted appropriately   Affect/Mood Appropriate   Goals Achieved Identified feelings; Discussed coping strategies; Increased hopefulness; Displayed empathy;Identified distorted thoughts/beliefs; Able to listen to others; Able to engage in interactions; Able to reflect/comment on own behavior;Able to recieve feedback; Able to give feedback to another;Able to experience relief/decrease in symptoms  (Able to engage art materials and gain insight)

## 2019-01-17 NOTE — SOCIAL WORK
[address]    Name and Date of Birth:  Radha Rose 45 y o  1980    Date of Referral: January 17, 2019    Presenting Symptoms and Stressors:      Symptoms:  worsening depression, anxiety, panic attacks and suicidal ideation  Stressors:  social difficulties and ongoing anxiety,agoraphonia, unemployment, lack of social life    Access to Weapons:  No    Smoking Status: quit 2 months ago    Substance Use:  None    Suicidal Ideation: None at present    Homicidal Ideation: None    Depressed Mood: sadness    Viviana/Hypomania: None    Psychosis: None    Agitation: No    Appetite Changes: normal appetite    Sleep Disturbance: normal sleep    Diagnoses:  1   Bipolar Disorder, type I, depressed, moderate    Current Psychiatrist or Therapist:    Psychiatrist: Dr Kishor Dominguez  Therapist: Jesika Barrios    Do they Require Ambulatory Assistance: No    Legal Issues: None        Glory Oseguera

## 2019-01-17 NOTE — PROGRESS NOTES
Psychiatry Progress Note    Subjective: Interval History     Patient continuing to show and report improvement  Patient continues to be visible on the milieu  Patient has been able to interact with his peers and staff without any exacerbation of panic attacks or anxiety  Patient continues to report that his depression has completely resolved  No thoughts to harm self or others  No psychosis  Good sleep and appetite  Working in his recovery  Expressing desire to transfer his inpatient services to outpatient services seamlessly to ensure ongoing improvement of his symptoms      Behavior over the last 24 hours:  Improving  Sleep: normal  Appetite: normal  Medication side effects:  None  ROS: no complaints    Current medications:    Current Facility-Administered Medications:     acetaminophen (TYLENOL) tablet 650 mg, 650 mg, Oral, Q6H PRN, Fay Gomze MD    acetaminophen (TYLENOL) tablet 650 mg, 650 mg, Oral, Q4H PRN, Fay Gomez MD    acetaminophen (TYLENOL) tablet 975 mg, 975 mg, Oral, Q6H PRN, Fay Gomez MD    aluminum-magnesium hydroxide-simethicone (MYLANTA) 200-200-20 mg/5 mL oral suspension 30 mL, 30 mL, Oral, Q4H PRN, Fay Gomez MD    amLODIPine (NORVASC) tablet 10 mg, 10 mg, Oral, Daily, Trevor Zhao MD, 10 mg at 01/17/19 2141    benztropine (COGENTIN) tablet 1 mg, 1 mg, Oral, Q6H PRN, Fay Gomez MD, 1 mg at 01/11/19 1810    carBAMazepine (TEGretol XR) 12 hr tablet 200 mg, 200 mg, Oral, Daily, Bryan Shaver PA-C, 200 mg at 01/17/19 0695    carBAMazepine (TEGretol XR) 12 hr tablet 400 mg, 400 mg, Oral, HS, Suet Chhaya, PA-C, 400 mg at 01/16/19 2122    clonazePAM (KlonoPIN) tablet 1 mg, 1 mg, Oral, TID, Bryan Shaver PA-C, 1 mg at 01/17/19 0819    diphenhydrAMINE (BENADRYL) injection 50 mg, 50 mg, Intramuscular, Q4H PRN, Bryan Shaver PA-C    haloperidol (HALDOL) tablet 5 mg, 5 mg, Oral, Q6H PRN, Fay Gomez MD, 5 mg at 01/11/19 1810    haloperidol lactate (HALDOL) injection 5 mg, 5 mg, Intramuscular, Q6H PRN, Clotilde Canela MD    hydrALAZINE (APRESOLINE) tablet 25 mg, 25 mg, Oral, Q6H PRN, James Larose MD, 25 mg at 01/14/19 2204    hydrOXYzine HCL (ATARAX) tablet 50 mg, 50 mg, Oral, Q4H PRN, Primitivo Owusu PA-C    magnesium hydroxide (MILK OF MAGNESIA) 400 mg/5 mL oral suspension 30 mL, 30 mL, Oral, Daily PRN, Clotilde Canela MD    nicotine (NICODERM CQ) 21 mg/24 hr TD 24 hr patch 1 patch, 1 patch, Transdermal, Daily, Clotilde Canela MD, 1 patch at 01/13/19 0806    OLANZapine (ZyPREXA) tablet 15 mg, 15 mg, Oral, HS, Primitivo Owusu PA-C, 15 mg at 01/16/19 2122    traZODone (DESYREL) tablet 50 mg, 50 mg, Oral, HS PRN, Clotilde Canela MD, 50 mg at 01/11/19 2106    Current Problem List:    Patient Active Problem List   Diagnosis    Posttraumatic stress disorder    Agoraphobia    Asthma with exacerbation    [facility]    Complicated grief    Cro[facility]'s disease ([facility]    Encounter for long-term (current) use of other medications    Generalized anxiety disorder    Status post ileostomy ([facility]    Marijuana use, continuous    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[facility]    [facility] psychosis, [facility]    High risk medication use    Hypertriglyceridemia    Bipolar 1 disorder, depressed, moderate ([facility] )       Problem list reviewed 01/17/19     Objective:     Vital Signs:  Vitals:    01/16/19 1600 01/17/19 0715 01/17/19 0716 01/17/19 0819   BP: (!) 155/111 (!) 150/106 (!) 142/104 (!) 150/106   BP Location: Left arm Left arm Left arm    Pulse: 80 60 74    Resp: 16 16     Temp: (!) 96 4 °F (35 8 °C) 97 6 °F (36 4 °C)     TempSrc: Temporal Temporal     SpO2:       Weight:       Height:             Appearance:  age appropriate and casually dressed   Behavior:  normal   Speech:  normal volume   Mood:  anxious   Affect:  normal   Thought Process:  normal   Thought Content:  normal   Perceptual Disturbances: None Risk Potential: none   Sensorium:  person, place, situation and time   Cognition:  intact   Consciousness:  alert and awake    Attention: attention span and concentration were age appropriate   Intellect: average   Insight:  limited   Judgment: limited      Motor Activity: no abnormal movements       I/O Past 24 hours:  No intake/output data recorded  No intake/output data recorded  Labs:  Reviewed 01/17/19    Progress Toward Goals:  Improving, symptom stabilizing    Assessment / Plan:     Bipolar 1 disorder, depressed, moderate (HCC)    Recommended Treatment:      Medication changes:  1) continue to monitor for ongoing improvement with current psychotropic medications  Non-pharmacological treatments  1) Continue with group therapy, milieu therapy and occupational therapy  Safety  1) Safety/communication plan established targeting dynamic risk factors above  2) Risks, benefits, and possible side effects of medications explained to patient and patient verbalizes understanding  Counseling / Coordination of Care    Total floor / unit time spent today 20 minutes  Greater than 50% of total time was spent with the patient and / or family counseling and / or coordination of care  A description of the counseling / coordination of care  Patient's Rights, confidentiality and exceptions to confidentiality, use of automated medical record, [facility] staff access to medical record, and consent to treatment reviewed      Norah Garza PA-C

## 2019-01-17 NOTE — PLAN OF CARE
Problem: DISCHARGE PLANNING  Goal: Discharge to home or other facility with appropriate resources  INTERVENTIONS:  - Identify barriers to discharge w/patient and caregiver  - Deny SI, depression and anxiety  Symptoms will resolve or diminish upon discharge  - Comply with meds, attend 75% of group therapy and identify positive coping skills  - Arrange for needed discharge resources and transportation as appropriate  - Identify discharge learning needs (meds, outpatient mental health services)  - Arrange for interpretive services to assist at discharge as needed  - Follow up with [facility], PHP, and psych rehab referrals  - Refer to Case Management Department for coordinating discharge planning if the patient needs post-hospital services based on physician/advanced practitioner order or complex needs related to functional status, cognitive ability, or social support system    Outcome: Progressing  Pt is scheduled to being [facility] on 1/23

## 2019-01-17 NOTE — NURSING NOTE
Received patient at 1900  Patient was cooperative and pleasant during the evening  Patient reported 1/4 anxiety due to a recent incident on unit  Patient denied depression, SI/HI, and pain  Patient was noted to have high blood pressure at 169/111 which has been ongoing  Viral Brunson MD was informed of high BP and increased daily dose of Norvasc  Patient has appeared to be sleeping for the past few hours  Will continue to monitor closely

## 2019-01-17 NOTE — PLAN OF CARE
Problem: Depression  Goal: Treatment Goal: Demonstrate behavioral control of depressive symptoms, verbalize feelings of improved mood/affect, and adopt new coping skills prior to discharge  Outcome: Progressing    Goal: Verbalize thoughts and feelings  Interventions:  - Assess and re-assess patient's level of risk   - Engage patient in 1:1 interactions, daily, for a minimum of 15 minutes   - Encourage patient to express feelings, fears, frustrations, hopes    Outcome: Progressing    Goal: Refrain from harming self  Interventions:  - Monitor patient closely, per order   - Supervise medication ingestion, monitor effects and side effects    Outcome: Progressing    Goal: Refrain from isolation  Interventions:  - Develop a trusting relationship   - Encourage socialization    Outcome: Progressing    Goal: Refrain from self-neglect  Outcome: Progressing    Goal: Attend and participate in unit activities, including therapeutic, recreational, and educational groups  Interventions:  - Provide therapeutic and educational activities daily, encourage attendance and participation, and document same in the medical record    Outcome: Progressing    Goal: Complete daily ADLs, including personal hygiene independently, as able  Interventions:  - Observe, teach, and assist patient with ADLS  -  Monitor and promote a balance of rest/activity, with adequate nutrition and elimination    Outcome: Progressing      Problem: Anxiety  Goal: Anxiety is at manageable level  Interventions:  - Assess and monitor patient's anxiety level  - Monitor for signs and symptoms of anxiety both physical and emotional (heart palpitations, chest pain, shortness of breath, headaches, nausea, feeling jumpy, restlessness, irritable, apprehensive)  - Collaborate with interdisciplinary team and initiate plan and interventions as ordered    - [place] patient to unit/surroundings  - Explain treatment plan  - Encourage participation in care  - Encourage verbalization of concerns/fears  - Identify coping mechanisms  - Assist in developing anxiety-reducing skills  - Administer/offer alternative therapies  - Limit or eliminate stimulants   Outcome: Progressing      Comments: Patient pleasant upon approach  Patient visible and social on the unit  Appears to have good insight  Denies anxiety, depression, S/HI,A/VH and pain  Attended groups this morning  Med and meal compliant  Will continue to monitor and provide therapeutic support

## 2019-01-17 NOTE — PLAN OF CARE
Problem: DISCHARGE PLANNING  Goal: Discharge to home or other facility with appropriate resources  INTERVENTIONS:  - Identify barriers to discharge w/patient and caregiver  - Deny SI, depression and anxiety  Symptoms will resolve or diminish upon discharge  - Comply with meds, attend 75% of group therapy and identify positive coping skills  - Arrange for needed discharge resources and transportation as appropriate  - Identify discharge learning needs (meds, outpatient mental health services)  - Arrange for interpretive services to assist at discharge as needed  - Follow up with [facility], PHP, and psych rehab referrals  - Refer to Case Management Department for coordinating discharge planning if the patient needs post-hospital services based on physician/advanced practitioner order or complex needs related to functional status, cognitive ability, or social support system    Outcome: Progressing  [facility] complete  Potential opening at [facility] on 1/23

## 2019-01-18 RX ADMIN — AMLODIPINE BESYLATE 10 MG: 5 TABLET ORAL at 08:21

## 2019-01-18 RX ADMIN — CLONAZEPAM 1 MG: 1 TABLET ORAL at 08:21

## 2019-01-18 RX ADMIN — CLONAZEPAM 1 MG: 1 TABLET ORAL at 16:32

## 2019-01-18 RX ADMIN — CLONAZEPAM 1 MG: 1 TABLET ORAL at 21:20

## 2019-01-18 RX ADMIN — CARBAMAZEPINE 400 MG: 200 TABLET, EXTENDED RELEASE ORAL at 21:19

## 2019-01-18 RX ADMIN — CARBAMAZEPINE 200 MG: 200 TABLET, EXTENDED RELEASE ORAL at 08:21

## 2019-01-18 RX ADMIN — OLANZAPINE 15 MG: 5 TABLET, FILM COATED ORAL at 21:19

## 2019-01-18 RX ADMIN — HYDRALAZINE HYDROCHLORIDE 25 MG: 25 TABLET ORAL at 21:20

## 2019-01-18 RX ADMIN — LISINOPRIL 10 MG: 10 TABLET ORAL at 17:23

## 2019-01-18 NOTE — PROGRESS NOTES
Patient slept eight hours through the night and did not awaken to report any problems or distress, or request any PRN's  Patient slept for entire shift with no issues, and was observed sleeping, with eyes closed, chest movements noted, and breathing heard  Q15 minute rounding continued for patient safety  Will continue to monitor

## 2019-01-18 NOTE — PROGRESS NOTES
01/18/19 0195   Activity/Group Checklist   Group Other (Comment)  (Art Therapy Process Group/Behind the Door, Discussion)   Attendance Attended   Attendance Duration (min) Greater than 60   Interactions Interacted appropriately   Affect/Mood Appropriate   Goals Achieved Able to listen to others; Able to engage in interactions; Able to recieve feedback; Able to give feedback to another;Able to reflect/comment on own behavior  (Able to engage art materials and directive)

## 2019-01-18 NOTE — PROGRESS NOTES
Psychiatry Progress Note    Subjective: Interval History     Patient visible on the unit  Patient with an appropriate affect  Patient continues to work on his recovery  Patient continues to attend groups and participating  Patient expressing his gratitude for the treatment he has received in the hospital   Patient reporting that he never thought his symptoms could be controlled  Patient expressing that his social ability and self-esteem have both been improving  Patient also expressing his gratitude that he will be able to have placement into a partial program next week to begin the day following his discharge  Patient continuing to want to ensure that his agoraphobia does not exacerbate  Patient reporting depression and anxiety are controlled  Patient denying any suicidal ideations  Patient with no mood lability  Patient with no psychosis  Patient with good sleep and appetite  Norvasc is taking effect and helping to improve patient's hypertension  Patient's blood pressure 130/88 today      Behavior over the last 24 hours:  Improving  Sleep: normal  Appetite: normal  Medication side effects:  None  ROS: no complaints    Current medications:    Current Facility-Administered Medications:     acetaminophen (TYLENOL) tablet 650 mg, 650 mg, Oral, Q6H PRN, Laila Hawk MD    acetaminophen (TYLENOL) tablet 650 mg, 650 mg, Oral, Q4H PRN, Laila Hawk MD    acetaminophen (TYLENOL) tablet 975 mg, 975 mg, Oral, Q6H PRN, Laila Hawk MD    aluminum-magnesium hydroxide-simethicone (MYLANTA) 200-200-20 mg/5 mL oral suspension 30 mL, 30 mL, Oral, Q4H PRN, Laila Hawk MD    amLODIPine (NORVASC) tablet 10 mg, 10 mg, Oral, Daily, Joyce South MD, 10 mg at 01/17/19 8822    benztropine (COGENTIN) tablet 1 mg, 1 mg, Oral, Q6H PRN, Laila Hawk MD, 1 mg at 01/11/19 1810    carBAMazepine (TEGretol XR) 12 hr tablet 200 mg, 200 mg, Oral, Daily, Lopez Rosales PA-C, 200 mg at 01/17/19 0819    carBAMazepine (TEGretol XR) 12 hr tablet 400 mg, 400 mg, Oral, HS, Rickey Vicente PA-C, 400 mg at 01/17/19 2121    clonazePAM (KlonoPIN) tablet 1 mg, 1 mg, Oral, TID, Rickey Vicente PA-C, 1 mg at 01/17/19 2122    diphenhydrAMINE (BENADRYL) injection 50 mg, 50 mg, Intramuscular, Q4H PRN, Rickey Vicente PA-C    haloperidol (HALDOL) tablet 5 mg, 5 mg, Oral, Q6H PRN, Terry Boyce MD, 5 mg at 01/11/19 1810    haloperidol lactate (HALDOL) injection 5 mg, 5 mg, Intramuscular, Q6H PRN, Terry Boyce MD    hydrALAZINE (APRESOLINE) tablet 25 mg, 25 mg, Oral, Q6H PRN, Erika Smith MD, 25 mg at 01/14/19 2204    hydrOXYzine HCL (ATARAX) tablet 50 mg, 50 mg, Oral, Q4H PRN, Rickey Vicente PA-C    lisinopril (ZESTRIL) tablet 10 mg, 10 mg, Oral, QPM, David Lu MD, 10 mg at 01/17/19 1722    magnesium hydroxide (MILK OF MAGNESIA) 400 mg/5 mL oral suspension 30 mL, 30 mL, Oral, Daily PRN, Terry Boyce MD    nicotine (NICODERM CQ) 21 mg/24 hr TD 24 hr patch 1 patch, 1 patch, Transdermal, Daily, Terry Boyce MD, 1 patch at 01/13/19 0806    OLANZapine (ZyPREXA) tablet 15 mg, 15 mg, Oral, HS, Rickey Vicente PA-C, 15 mg at 01/17/19 2121    traZODone (DESYREL) tablet 50 mg, 50 mg, Oral, HS PRN, Terry Boyce MD, 50 mg at 01/11/19 2106    Current Problem List:    Patient Active Problem List   Diagnosis    Posttraumatic stress disorder    Agoraphobia    Asthma with exacerbation    [facility] dependence, excluding opioid, [facility]    Complicated grief    Crohn's disease ([facility]    Encounter for long-term (current) use of other medications    Generalized anxiety disorder    [facility]    Marijuana use, continuous    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[facility]    Affective psychosis, bipolar ([facility]    High risk medication use    Hypertriglyceridemia    Bipolar 1 disorder, depressed, moderate ([facility] )       Problem list reviewed 01/18/19     Objective:     Vital Signs:  Vitals:    01/17/19 0819 01/17/19 1600 01/18/19 0735 01/18/19 0737   BP: (!) 150/106 (!) 157/111 130/85 131/87   BP Location:  Left arm Left arm Left arm   Pulse:  86 60 67   Resp:  18 18    Temp:  97 8 °F (36 6 °C) (!) 96 7 °F (35 9 °C)    TempSrc:  Temporal Temporal    SpO2:       Weight:       Height:             Appearance:  age appropriate and casually dressed   Behavior:  normal   Speech:  normal volume   Mood:  normal   Affect:  normal   Thought Process:  normal   Thought Content:  normal   Perceptual Disturbances: None   Risk Potential: none   Sensorium:  person, place, situation and time   Cognition:  intact   Consciousness:  alert and awake    Attention: attention span and concentration were age appropriate   Intellect: average   Insight:  limited   Judgment: limited      Motor Activity: no abnormal movements       I/O Past 24 hours:  I/O last 3 completed shifts:  In: -   Out: 1 [Stool:1]  No intake/output data recorded  Labs:  Reviewed 01/18/19    Progress Toward Goals:  Improving, symptoms stabilizing    Assessment / Plan:     Bipolar 1 disorder, depressed, moderate (HCC)    Recommended Treatment:      Medication changes:  1) continue to monitor for ongoing improvement with current psychotropic medications  Non-pharmacological treatments  1) Continue with group therapy, milieu therapy and occupational therapy  Safety  1) Safety/communication plan established targeting dynamic risk factors above  2) Risks, benefits, and possible side effects of medications explained to patient and patient verbalizes understanding  Counseling / Coordination of Care    Total floor / unit time spent today 20 minutes  Greater than 50% of total time was spent with the patient and / or family counseling and / or coordination of care  A description of the counseling / coordination of care       Patient's Rights, confidentiality and exceptions to confidentiality, use of automated medical record, [facility] staff access to medical record, and consent to treatment reviewed      Asim Garcia PA-C

## 2019-01-18 NOTE — PROGRESS NOTES
Patient received at 1900, and was very pleasant and amicable upon approach  Patient was cooperative with care and medication compliant  AAOx4  Patient was visible and social in milieu as he was observed ambulating around unit and socializing with select peers  Interactions with other patients and staff were appropriate  Patient denied SI, HI, A/V hallucinations, pain, anxiety, and depression  No negative behaviors or distress noted/observed, and patient was encouraged to attend groups tomorrow  No PRN's were requested  Q15 minute rounding was continued for patient safety  Will continue to monitor and offer therapeutic support

## 2019-01-18 NOTE — PROGRESS NOTES
Pt continues to show great improvement  Pt is very visible and social on the unit  Pt is med/meal compliant  No PRNs were requested  Will continue to monitor

## 2019-01-18 NOTE — PLAN OF CARE
Problem: Depression  Goal: Treatment Goal: Demonstrate behavioral control of depressive symptoms, verbalize feelings of improved mood/affect, and adopt new coping skills prior to discharge  Outcome: Progressing    Goal: Verbalize thoughts and feelings  Interventions:  - Assess and re-assess patient's level of risk   - Engage patient in 1:1 interactions, daily, for a minimum of 15 minutes   - Encourage patient to express feelings, fears, frustrations, hopes    Outcome: Progressing    Goal: Refrain from harming self  Interventions:  - Monitor patient closely, per order   - Supervise medication ingestion, monitor effects and side effects    Outcome: Progressing    Goal: Refrain from isolation  Interventions:  - Develop a trusting relationship   - Encourage socialization    Outcome: Progressing    Goal: Refrain from self-neglect  Outcome: Progressing    Goal: Attend and participate in unit activities, including therapeutic, recreational, and educational groups  Interventions:  - Provide therapeutic and educational activities daily, encourage attendance and participation, and document same in the medical record    Outcome: Progressing    Goal: Complete daily ADLs, including personal hygiene independently, as able  Interventions:  - Observe, teach, and assist patient with ADLS  -  Monitor and promote a balance of rest/activity, with adequate nutrition and elimination    Outcome: Progressing      Comments: Pt continues to be very pleasant and cooperative  Pt is med/meal compliant  Pt denies any anxiety, depression, SI, HI, AH, or VH  Pt explained how he feels this stay has been really helping him with his anxiety and panic attacks  He says he feels the best he's felt in a long time  Will continue to monitor

## 2019-01-18 NOTE — PROGRESS NOTES
Pt attended positive coping skills  Pt cooperative and demonstrated increased self esteem  Pt able to self reflect and initiate discussion  Pt expressed gratitude to the group format stating it made him think and be creative  Pt self disclosed his goals and ways to challenge himself and grow in confidence  Pt was thankful to the team for the group presentations and assistance from staff  Continue to provide therepeutic group support

## 2019-01-19 PROCEDURE — 99232 SBSQ HOSP IP/OBS MODERATE 35: CPT | Performed by: PHYSICIAN ASSISTANT

## 2019-01-19 RX ADMIN — CARBAMAZEPINE 200 MG: 200 TABLET, EXTENDED RELEASE ORAL at 08:57

## 2019-01-19 RX ADMIN — CLONAZEPAM 1 MG: 1 TABLET ORAL at 08:57

## 2019-01-19 RX ADMIN — CLONAZEPAM 1 MG: 1 TABLET ORAL at 16:15

## 2019-01-19 RX ADMIN — AMLODIPINE BESYLATE 10 MG: 5 TABLET ORAL at 08:57

## 2019-01-19 RX ADMIN — CARBAMAZEPINE 400 MG: 200 TABLET, EXTENDED RELEASE ORAL at 21:10

## 2019-01-19 RX ADMIN — CLONAZEPAM 1 MG: 1 TABLET ORAL at 21:10

## 2019-01-19 RX ADMIN — OLANZAPINE 15 MG: 5 TABLET, FILM COATED ORAL at 21:10

## 2019-01-19 RX ADMIN — LISINOPRIL 10 MG: 10 TABLET ORAL at 17:15

## 2019-01-19 NOTE — PLAN OF CARE
Anxiety     Anxiety is at manageable level Progressing        Depression     Treatment Goal: Demonstrate behavioral control of depressive symptoms, verbalize feelings of improved mood/affect, and adopt new coping skills prior to discharge Progressing     Verbalize thoughts and feelings Progressing     Refrain from harming self Progressing     Refrain from [address] from self-neglect Progressing     Attend and participate in unit activities, including therapeutic, recreational, and educational groups Progressing     Complete daily ADLs, including personal hygiene independently, as able [address] Discharge to home or other facility with appropriate resources Progressing        SELF CARE DEFICIT     Return ADL status to a safe level of function Progressing

## 2019-01-19 NOTE — PROGRESS NOTES
Patient is progressing   He is more co-operative and more verbal he is also attending some groups and participates in the groups also   He still remains compliant with medicine and guzman routine

## 2019-01-19 NOTE — PROGRESS NOTES
Progress Note - Kathrin KOLB  2  F [place]y 45 y o  male MRN: [id number]  Unit/Bed#: [facility] 384-01 Encounter: [id number]    Pt seen, chart reviewed and discussed with staff  Staff reports pt has been compliant with treatment plan  Pt reports that his anxiety and depression is better  "I still get very anxious when they check my blood pressure - I have white coat syndrome"  He also report higher anxiety around people  Overall he feels better with meds and no adverse effects noted  No suicidal ideation and denies any psychotic s/s  Physically he is feeling well      Behavior over the last 24 hours:  unchanged  Sleep: normal  Appetite: normal  Medication side effects: No  ROS: no complaints  /85 (BP Location: Left arm)   Pulse 92   Temp 97 9 °F (36 6 °C) (Temporal)   Resp 18   Ht 5' 8 4" (1 737 m)   Wt 88 5 kg (195 lb)   SpO2 97%   BMI 29 30 kg/m²     Medications:   Current Facility-Administered Medications   Medication Dose Route Frequency    acetaminophen (TYLENOL) tablet 650 mg  650 mg Oral Q6H PRN    acetaminophen (TYLENOL) tablet 650 mg  650 mg Oral Q4H PRN    acetaminophen (TYLENOL) tablet 975 mg  975 mg Oral Q6H PRN    aluminum-magnesium hydroxide-simethicone (MYLANTA) 200-200-20 mg/5 mL oral suspension 30 mL  30 mL Oral Q4H PRN    amLODIPine (NORVASC) tablet 10 mg  10 mg Oral Daily    benztropine (COGENTIN) tablet 1 mg  1 mg Oral Q6H PRN    carBAMazepine (TEGretol XR) 12 hr tablet 200 mg  200 mg Oral Daily    carBAMazepine (TEGretol XR) 12 hr tablet 400 mg  400 mg Oral HS    clonazePAM (KlonoPIN) tablet 1 mg  1 mg Oral TID    diphenhydrAMINE (BENADRYL) injection 50 mg  50 mg Intramuscular Q4H PRN    haloperidol (HALDOL) tablet 5 mg  5 mg Oral Q6H PRN    haloperidol lactate (HALDOL) injection 5 mg  5 mg Intramuscular Q6H PRN    hydrALAZINE (APRESOLINE) tablet 25 mg  25 mg Oral Q6H PRN    hydrOXYzine HCL (ATARAX) tablet 50 mg  50 mg Oral Q4H PRN    lisinopril (ZESTRIL) tablet 10 mg  10 mg Oral QPM    magnesium hydroxide (MILK OF MAGNESIA) 400 mg/5 mL oral suspension 30 mL  30 mL Oral Daily PRN    nicotine (NICODERM CQ) 21 mg/24 hr TD 24 hr patch 1 patch  1 patch Transdermal Daily    OLANZapine (ZyPREXA) tablet 15 mg  15 mg Oral HS    traZODone (DESYREL) tablet 50 mg  50 mg Oral HS PRN       Labs:   Admission on 01/11/2019   Component Date Value Ref Range Status    TSH 3RD GENERATON 01/12/2019 3 150  0 465 - 4 680 uIU/mL Final       Mental Status Evaluation:  Appearance:  age appropriate and casually dressed   Behavior:  calm, cooperative   Speech:  normal pitch and normal volume   Mood:  improved   Affect:  constricted   Thought Process:  goal directed   Thought Content:  normal   Perceptual Disturbances: None   Risk Potential: Suicidal Ideations none, Homicidal Ideations none and Potential for Aggression No   Sensorium:  person, place, time/date and situation   Cognition:  grossly intact   Consciousness:  alert and awake    Attention: attention span and concentration were age appropriate   Insight:  fair   Judgment: fair   Gait/Station: normal gait/station   Motor Activity: no abnormal movements     Progress Toward Goals: approaching baseline    Assessment/Plan   Principal Problem:    Bipolar 1 disorder, depressed, moderate (HCC)      Recommended Treatment:   Continue Tegretol/ Zyprexa per Dr Harrell Lob  Check Tegretol level as last level noted 11/28/18 at 7 4      Continue with group therapy, milieu therapy and occupational therapy  Risks, benefits and possible side effects of Medications:   Risks, benefits, and possible side effects of medications explained to patient and patient verbalizes understanding  Counseling / Coordination of Care  Total floor / unit time spent today 25 minutes  Greater than 50% of total time was spent with the patient and / or family counseling and / or coordination of care   A description of the counseling / coordination of care: med management, pt interview,chart review

## 2019-01-19 NOTE — PROGRESS NOTES
patient was very pleasant  upon approach  Patient was cooperative with care and medication compliant  Patient was visible and social in milieu as he was observed ambulating around unit and socializing with select peers  Patient denied SI, HI, A/V hallucinations, pain, anxiety, and depression  No negative behaviors or distress noted/observed, Will continue to monitor and offer therapeutic support

## 2019-01-19 NOTE — PLAN OF CARE
Anxiety     Anxiety is at manageable level Progressing        Depression     Treatment Goal: Demonstrate behavioral control of depressive symptoms, verbalize feelings of improved mood/affect, and adopt new coping skills prior to discharge Progressing     Verbalize thoughts and feelings Progressing     Refrain from harming self Progressing     Refrain from [address] from self-neglect Progressing     Attend and participate in unit activities, including therapeutic, recreational, and educational groups Progressing     Complete daily ADLs, including personal hygiene independently, as able [address] Discharge to home or other facility with appropriate resources Progressing        SELF CARE DEFICIT     Return ADL status to a safe level of function Progressing

## 2019-01-19 NOTE — PROGRESS NOTES
Patient was observed sleeping, with eyes closed, chest movements noted, and breathing heard during sleep period  Will continue to monitor and provide therapeutic support

## 2019-01-20 LAB — CARBAMAZEPINE SERPL-MCNC: 9.7 UG/ML (ref 4–12)

## 2019-01-20 PROCEDURE — 99232 SBSQ HOSP IP/OBS MODERATE 35: CPT | Performed by: PHYSICIAN ASSISTANT

## 2019-01-20 PROCEDURE — 80156 ASSAY CARBAMAZEPINE TOTAL: CPT | Performed by: PHYSICIAN ASSISTANT

## 2019-01-20 RX ADMIN — CARBAMAZEPINE 200 MG: 200 TABLET, EXTENDED RELEASE ORAL at 09:07

## 2019-01-20 RX ADMIN — HYDRALAZINE HYDROCHLORIDE 25 MG: 25 TABLET ORAL at 19:49

## 2019-01-20 RX ADMIN — CLONAZEPAM 1 MG: 1 TABLET ORAL at 21:15

## 2019-01-20 RX ADMIN — OLANZAPINE 15 MG: 5 TABLET, FILM COATED ORAL at 21:13

## 2019-01-20 RX ADMIN — LISINOPRIL 10 MG: 10 TABLET ORAL at 18:08

## 2019-01-20 RX ADMIN — AMLODIPINE BESYLATE 10 MG: 5 TABLET ORAL at 09:06

## 2019-01-20 RX ADMIN — TRAZODONE HYDROCHLORIDE 50 MG: 50 TABLET ORAL at 21:14

## 2019-01-20 RX ADMIN — CARBAMAZEPINE 400 MG: 200 TABLET, EXTENDED RELEASE ORAL at 21:13

## 2019-01-20 RX ADMIN — CLONAZEPAM 1 MG: 1 TABLET ORAL at 09:07

## 2019-01-20 RX ADMIN — CLONAZEPAM 1 MG: 1 TABLET ORAL at 16:19

## 2019-01-20 NOTE — PROGRESS NOTES
Progress Note - Kathrin KOLB  2  [facility] Camp[facility] 45 y o  male MRN: [id number]  Unit/Bed#: [facility] 384-01 Encounter: [id number]    Patient seen, chart reviewed, discussed with staff  Nursing staff notes the patient has been pleasant and cooperative with medications and treatment plan  Patient is bright and pleasant on approach  He states that his anxiety and depression are significantly improved  He reports that he is sleeping well and has a good appetite  He is anticipating discharge in following up at the partial hospitalization  He states that he is benefitting from the group therapies here and specifically enjoys art therapy  He denies any suicidal or homicidal ideation, no psychotic symptoms  Physically he is feeling well and denies any complaints or concerns  Patient's blood pressure was noted to be elevated last night and this morning it was within normal limits  He is compliant with his blood pressure medications      Behavior over the last 24 hours:  improved, unchanged  Sleep: normal  Appetite: normal  Medication side effects: No  ROS: no complaints  /84 (BP Location: Left arm)   Pulse (!) 106   Temp (!) 97 2 °F (36 2 °C) (Temporal)   Resp 16   Ht 5' 8 4" (1 737 m)   Wt 88 kg (194 lb)   SpO2 97%   BMI 29 15 kg/m²     Medications:   Current Facility-Administered Medications   Medication Dose Route Frequency    acetaminophen (TYLENOL) tablet 650 mg  650 mg Oral Q6H PRN    acetaminophen (TYLENOL) tablet 650 mg  650 mg Oral Q4H PRN    acetaminophen (TYLENOL) tablet 975 mg  975 mg Oral Q6H PRN    aluminum-magnesium hydroxide-simethicone (MYLANTA) 200-200-20 mg/5 mL oral suspension 30 mL  30 mL Oral Q4H PRN    amLODIPine (NORVASC) tablet 10 mg  10 mg Oral Daily    benztropine (COGENTIN) tablet 1 mg  1 mg Oral Q6H PRN    carBAMazepine (TEGretol XR) 12 hr tablet 200 mg  200 mg Oral Daily    carBAMazepine (TEGretol XR) 12 hr tablet 400 mg  400 mg Oral HS    clonazePAM (KlonoPIN) tablet 1 mg  1 mg Oral TID    diphenhydrAMINE (BENADRYL) injection 50 mg  50 mg Intramuscular Q4H PRN    haloperidol (HALDOL) tablet 5 mg  5 mg Oral Q6H PRN    haloperidol lactate (HALDOL) injection 5 mg  5 mg Intramuscular Q6H PRN    hydrALAZINE (APRESOLINE) tablet 25 mg  25 mg Oral Q6H PRN    hydrOXYzine HCL (ATARAX) tablet 50 mg  50 mg Oral Q4H PRN    lisinopril (ZESTRIL) tablet 10 mg  10 mg Oral QPM    magnesium hydroxide (MILK OF MAGNESIA) 400 mg/5 mL oral suspension 30 mL  30 mL Oral Daily PRN    nicotine (NICODERM CQ) 21 mg/24 hr TD 24 hr patch 1 patch  1 patch Transdermal Daily    OLANZapine (ZyPREXA) tablet 15 mg  15 mg Oral HS    traZODone (DESYREL) tablet 50 mg  50 mg Oral HS PRN       Labs:   Admission on 01/11/2019   Component Date Value Ref Range Status    TSH 3RD GENERATON 01/12/2019 3 150  0 465 - 4 680 uIU/mL Final    Carbamazepine Lvl 01/20/2019 9 7  4 0 - 12 0 ug/mL Final       Mental Status Evaluation:  Appearance:  age appropriate and casually dressed   Behavior:  Calm, cooperative, good eye contact   Speech:  normal pitch and normal volume   Mood:  euthymic   Affect:  mood-congruent   Thought Process:  goal directed and logical   Thought Content:  No delusions voiced   Perceptual Disturbances: No auditory or visual hallucinations   Risk Potential: Suicidal Ideations none, Homicidal Ideations none and Potential for Aggression No   Sensorium:  person, place, time/date and situation   Cognition:  grossly intact   Consciousness:  alert and awake    Attention: attention span and concentration were age appropriate   Insight:  fair   Judgment: fair   Gait/Station: normal gait/station   Motor Activity: no abnormal movements     Progress Toward Goals:   Improving    Assessment/Plan   Principal Problem:    Bipolar 1 disorder, depressed, moderate (HCC)      Recommended Treatment:   Tegretol level was 9 7 today  Continue with same medication treatment plan per Dr Colby Jones  Tegretol  mg in the morning and 400 mg HS  Klonopin 1 mg t i d   Olanzapine 15 mg at HS  Lisinopril and amlodipine for blood pressure    Continue with group therapy, milieu therapy and occupational therapy  Risks, benefits and possible side effects of Medications:   Risks, benefits, and possible side effects of medications explained to patient and patient verbalizes understanding  Counseling / Coordination of Care  Total floor / unit time spent today 25 minutes  Greater than 50% of total time was spent with the patient and / or family counseling and / or coordination of care  A description of the counseling / coordination of care:   Medication management, chart review, patient interview

## 2019-01-20 NOTE — PROGRESS NOTES
Patient remains co-operative and pleasant  He takes his medicine and follows guzman routine with out any problem   He socializes with mostly all his peers

## 2019-01-20 NOTE — NURSING NOTE
Patient denied all psych symptoms and reported he if feeling so much better due to all the help he has gotten here on our unit  Pleasant upon approach  Bright affect noted  Behaviors controlled and appropriate all evening  Medication complaint  Reports no issues related to sleep  Patient in bed at this time and appears to be sleeping  Eyes closed and chest movements noted  Offered no complaints  Patient did not wake up to request any PRN medications during the night

## 2019-01-21 LAB
ANION GAP SERPL CALCULATED.3IONS-SCNC: 11 MMOL/L (ref 5–14)
BUN SERPL-MCNC: 16 MG/DL (ref 5–25)
CALCIUM SERPL-MCNC: 9.9 MG/DL (ref 8.4–10.2)
CHLORIDE SERPL-SCNC: 100 MMOL/L (ref 97–108)
CO2 SERPL-SCNC: 29 MMOL/L (ref 22–30)
CREAT SERPL-MCNC: 1.13 MG/DL (ref 0.7–1.5)
GFR SERPL CREATININE-BSD FRML MDRD: 82 ML/MIN/1.73SQ M
GLUCOSE SERPL-MCNC: 85 MG/DL (ref 70–99)
POTASSIUM SERPL-SCNC: 4.4 MMOL/L (ref 3.6–5)
SODIUM SERPL-SCNC: 140 MMOL/L (ref 137–147)

## 2019-01-21 PROCEDURE — 80048 BASIC METABOLIC PNL TOTAL CA: CPT | Performed by: NURSE PRACTITIONER

## 2019-01-21 PROCEDURE — 93005 ELECTROCARDIOGRAM TRACING: CPT

## 2019-01-21 RX ORDER — OLANZAPINE 15 MG/1
15 TABLET ORAL
Refills: 0
Start: 2019-01-21 | End: 2019-04-12 | Stop reason: SDUPTHER

## 2019-01-21 RX ORDER — LISINOPRIL 10 MG/1
10 TABLET ORAL EVERY EVENING
Qty: 15 TABLET | Refills: 1 | Status: SHIPPED | OUTPATIENT
Start: 2019-01-21 | End: 2019-03-13 | Stop reason: SDUPTHER

## 2019-01-21 RX ORDER — AMLODIPINE BESYLATE 10 MG/1
10 TABLET ORAL DAILY
Qty: 15 TABLET | Refills: 1 | Status: SHIPPED | OUTPATIENT
Start: 2019-01-21 | End: 2019-03-13 | Stop reason: SDUPTHER

## 2019-01-21 RX ORDER — LISINOPRIL 10 MG/1
10 TABLET ORAL EVERY EVENING
Status: DISCONTINUED | OUTPATIENT
Start: 2019-01-21 | End: 2019-01-22 | Stop reason: HOSPADM

## 2019-01-21 RX ADMIN — LISINOPRIL 10 MG: 10 TABLET ORAL at 17:14

## 2019-01-21 RX ADMIN — AMLODIPINE BESYLATE 10 MG: 5 TABLET ORAL at 09:00

## 2019-01-21 RX ADMIN — CLONAZEPAM 1 MG: 1 TABLET ORAL at 09:00

## 2019-01-21 RX ADMIN — CLONAZEPAM 1 MG: 1 TABLET ORAL at 21:06

## 2019-01-21 RX ADMIN — CARBAMAZEPINE 200 MG: 200 TABLET, EXTENDED RELEASE ORAL at 09:00

## 2019-01-21 RX ADMIN — OLANZAPINE 15 MG: 5 TABLET, FILM COATED ORAL at 21:06

## 2019-01-21 RX ADMIN — CLONAZEPAM 1 MG: 1 TABLET ORAL at 15:53

## 2019-01-21 RX ADMIN — CARBAMAZEPINE 400 MG: 200 TABLET, EXTENDED RELEASE ORAL at 21:06

## 2019-01-21 NOTE — NURSING NOTE
Patient is compliant with medications and meals  Appetite is good with patient eating 100% of most meals  No complaints of pain or discomfort  Patient has improved enough for patient to be scheduled for discharge tomorrow per treatment team and   Patient is visible on the unit and attending scheduled groups  Patient is cooperative with staff and interacts with peers without difficulty  No new issues noted at this time

## 2019-01-21 NOTE — PROGRESS NOTES
STAT EKG was completed and Pearla Flow was made aware  [place] [facility] will review in the morning  BMP completed and all values were within normal limits  Will continue to monitor

## 2019-01-21 NOTE — SOCIAL WORK
Worker in contact with "[employer]"  Pt to attend [facility] on 1/23 and they will schedule pt a follow up appointment following PHP   Pt does have a therapy appointment scheduled already in February on 2/13 at [facility]

## 2019-01-21 NOTE — PLAN OF CARE
Problem: DISCHARGE PLANNING  Goal: Discharge to home or other facility with appropriate resources  INTERVENTIONS:  - Identify barriers to discharge w/patient and caregiver  - Deny SI, depression and anxiety  Symptoms will resolve or diminish upon discharge  - Comply with meds, attend 75% of group therapy and identify positive coping skills  - Arrange for needed discharge resources and transportation as appropriate  - Identify discharge learning needs (meds, outpatient mental health services)  - Arrange for interpretive services to assist at discharge as needed  - Follow up with [facility], PHP, and psych rehab referrals  - Refer to Case Management Department for coordinating discharge planning if the patient needs post-hospital services based on physician/advanced practitioner order or complex needs related to functional status, cognitive ability, or social support system    Outcome: Progressing  Worker spoke with [id number] for pt's follow up  Pt to attend [facility] on 1/23 and following PHP, they will schedule a follow up appointment for pt   Pt does have a therapy appointment scheduled in February on 2/13 at [facility]

## 2019-01-21 NOTE — PROGRESS NOTES
Pt continues to be pleasant and cooperative  Pt is expected to be discharged tomorrow  Pt feels ready to go home and believes he has gotten a lot of help while he was here  Will continue to monitor

## 2019-01-21 NOTE — PROGRESS NOTES
Pt attended self discovery and AMI group  Pt alert and attentive to group process  Pt able to self reflect and engage in group dialogue  In self discovery group, pt was able to identify feelings wheel/word guide  Pt contrasted feeling from admission to present  Pt able to acknowledge change and acceptance of MH recovery needs  Pt developing and identifying skills to assist in needs  Continue to provide thereuitc group support

## 2019-01-21 NOTE — PROGRESS NOTES
Psychiatry Progress Note    Subjective: Interval History     Patient continues to show and report improvement  Patient continues to express that his depression and anxiety have been controlled with his current medication regimen  Patient is continue to participate in his recovery  Patient visible on the milieu  Patient continues to be able to appropriately socialize with staff and peers  Attending groups and motivating himself to participate  Continues to expresses gratitude further his treatment  Patient with no suicidal ideations, no mood lability, no psychosis  Patient with good sleep and appetite  Blood pressures are improving with Norvasc and lisinopril  Patient optimistic about continuing his recovery at a partial hospitalization program following discharge      Behavior over the last 24 hours:  Improving  Sleep: normal  Appetite: normal  Medication side effects:  None  ROS: no complaints    Current medications:    Current Facility-Administered Medications:     acetaminophen (TYLENOL) tablet 650 mg, 650 mg, Oral, Q6H PRN, Etsiven Freed MD    acetaminophen (TYLENOL) tablet 650 mg, 650 mg, Oral, Q4H PRN, Estiven Freed MD    acetaminophen (TYLENOL) tablet 975 mg, 975 mg, Oral, Q6H PRN, Estiven Freed MD    aluminum-magnesium hydroxide-simethicone (MYLANTA) 200-200-20 mg/5 mL oral suspension 30 mL, 30 mL, Oral, Q4H PRN, Estiven Freed MD    amLODIPine (NORVASC) tablet 10 mg, 10 mg, Oral, Daily, Jacques Wilson MD, 10 mg at 01/20/19 7926    benztropine (COGENTIN) tablet 1 mg, 1 mg, Oral, Q6H PRN, Estiven Freed MD, 1 mg at 01/11/19 1810    carBAMazepine (TEGretol XR) 12 hr tablet 200 mg, 200 mg, Oral, Daily, Jagdish Duvall PA-C, 200 mg at 01/20/19 0907    carBAMazepine (TEGretol XR) 12 hr tablet 400 mg, 400 mg, Oral, HS, Jagdish Duvall PA-C, 400 mg at 01/20/19 2113    clonazePAM (KlonoPIN) tablet 1 mg, 1 mg, Oral, TID, Jagdish Duvall PA-C, 1 mg at 01/20/19 2115    diphenhydrAMINE (BENADRYL) injection 50 mg, 50 mg, Intramuscular, Q4H PRN, Juan José Owens PA-C    haloperidol (HALDOL) tablet 5 mg, 5 mg, Oral, Q6H PRN, Otto Toussaint MD, 5 mg at 01/11/19 1810    haloperidol lactate (HALDOL) injection 5 mg, 5 mg, Intramuscular, Q6H PRN, Otto Toussaint MD    hydrALAZINE (APRESOLINE) tablet 25 mg, 25 mg, Oral, Q6H PRN, Smith Dotson MD, 25 mg at 01/20/19 1949    hydrOXYzine HCL (ATARAX) tablet 50 mg, 50 mg, Oral, Q4H PRN, Juan José Owens PA-C    lisinopril (ZESTRIL) tablet 10 mg, 10 mg, Oral, QPM, Emmett Hicks MD, 10 mg at 01/20/19 1808    magnesium hydroxide (MILK OF MAGNESIA) 400 mg/5 mL oral suspension 30 mL, 30 mL, Oral, Daily PRN, Otto Toussaint MD    nicotine (NICODERM CQ) 21 mg/24 hr TD 24 hr patch 1 patch, 1 patch, Transdermal, Daily, Otto Toussaint MD, 1 patch at 01/13/19 0806    OLANZapine (ZyPREXA) tablet 15 mg, 15 mg, Oral, HS, Juan José Owens PA-C, 15 mg at 01/20/19 2113    traZODone (DESYREL) tablet 50 mg, 50 mg, Oral, HS PRN, Otto Toussaint MD, 50 mg at 01/20/19 2114    Current Problem List:    Patient Active Problem List   Diagnosis    Posttraumatic stress disorder    Agoraphobia    Asthma with exacerbation    Combined drug dependence, excluding opioid, in remission ([facility]    Complicated grief    Crohn's disease ([facility]    Encounter for long-term (current) use of other medications    Generalized anxiety disorder    Status post ileostomy ([facility]    Marijuana use, continuous    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[facility]    Affective psychosis, bipolar ([facility] )    High risk medication use    Hypertriglyceridemia    Bipolar 1 disorder, depressed, moderate ([facility] )       Problem list reviewed 01/21/19     Objective:     Vital Signs:  Vitals:    01/20/19 0849 01/20/19 1604 01/20/19 1700 01/20/19 1949   BP: 118/84 (!) 137/102 144/97 (!) 140/108   BP Location: Left arm Right arm  Right arm   Pulse: (!) 106 96 (!) 108 97   Resp:  16     Temp:  98 5 °F (36 9 °C)     TempSrc:  Temporal SpO2:       Weight:       Height:             Appearance:  age appropriate and casually dressed   Behavior:  normal   Speech:  normal volume   Mood:  normal   Affect:  normal   Thought Process:  normal   Thought Content:  normal   Perceptual Disturbances: None   Risk Potential: none   Sensorium:  person, place, situation and time   Cognition:  intact   Consciousness:  alert and awake    Attention: attention span and concentration were age appropriate   Intellect: average   Insight:  limited   Judgment: limited      Motor Activity: no abnormal movements       I/O Past 24 hours:  I/O last 3 completed shifts:  In: -   Out: 2 [Stool:2]  No intake/output data recorded  Labs:  Reviewed 01/21/19    Progress Toward Goals:  Improving, symptoms stabilizing    Assessment / Plan:     Bipolar 1 disorder, depressed, moderate (HCC)    Recommended Treatment:      Medication changes:  1) continue to monitor for ongoing improvement with current psychotropic medications  Non-pharmacological treatments  1) Continue with group therapy, milieu therapy and occupational therapy  Safety  1) Safety/communication plan established targeting dynamic risk factors above  2) Risks, benefits, and possible side effects of medications explained to patient and patient verbalizes understanding  Counseling / Coordination of Care    Total floor / unit time spent today 20 minutes  Greater than 50% of total time was spent with the patient and / or family counseling and / or coordination of care  A description of the counseling / coordination of care  Patient's Rights, confidentiality and exceptions to confidentiality, use of automated medical record, [facility] samantha staff access to medical record, and consent to treatment reviewed      Carlie Cortez PA-C

## 2019-01-21 NOTE — PLAN OF CARE
Problem: Depression  Goal: Treatment Goal: Demonstrate behavioral control of depressive symptoms, verbalize feelings of improved mood/affect, and adopt new coping skills prior to discharge  Outcome: Progressing    Goal: Verbalize thoughts and feelings  Interventions:  - Assess and re-assess patient's level of risk   - Engage patient in 1:1 interactions, daily, for a minimum of 15 minutes   - Encourage patient to express feelings, fears, frustrations, hopes    Outcome: Progressing    Goal: Refrain from harming self  Interventions:  - Monitor patient closely, per order   - Supervise medication ingestion, monitor effects and side effects    Outcome: Progressing    Goal: Refrain from isolation  Interventions:  - Develop a trusting relationship   - Encourage socialization    Outcome: Progressing    Goal: Refrain from self-neglect  Outcome: Progressing    Goal: Attend and participate in unit activities, including therapeutic, recreational, and educational groups  Interventions:  - Provide therapeutic and educational activities daily, encourage attendance and participation, and document same in the medical record    Outcome: Progressing    Goal: Complete daily ADLs, including personal hygiene independently, as able  Interventions:  - Observe, teach, and assist patient with ADLS  -  Monitor and promote a balance of rest/activity, with adequate nutrition and elimination    Outcome: Progressing      Problem: Anxiety  Goal: Anxiety is at manageable level  Interventions:  - Assess and monitor patient's anxiety level  - Monitor for signs and symptoms of anxiety both physical and emotional (heart palpitations, chest pain, shortness of breath, headaches, nausea, feeling jumpy, restlessness, irritable, apprehensive)  - Collaborate with interdisciplinary team and initiate plan and interventions as ordered    - [place] patient to unit/surroundings  - Explain treatment plan  - Encourage participation in care  - Encourage verbalization of concerns/fears  - Identify coping mechanisms  - Assist in developing anxiety-reducing skills  - Administer/offer alternative therapies  - Limit or eliminate stimulants   Outcome: Progressing

## 2019-01-21 NOTE — SOCIAL WORK
Worker discussed discharge planning with pt  Worker explained pt is to begin [facility] on 1/23 with the intake process starting at [address]  Worker explained psych rehab referral and [facility] referral were made and they will contact pt via phone call when they are ready to begin services  Pt verbalized understanding  Pt thankful for services and work provided

## 2019-01-22 VITALS
RESPIRATION RATE: 18 BRPM | DIASTOLIC BLOOD PRESSURE: 86 MMHG | HEART RATE: 83 BPM | WEIGHT: 194 LBS | HEIGHT: 68 IN | BODY MASS INDEX: 29.4 KG/M2 | SYSTOLIC BLOOD PRESSURE: 125 MMHG | OXYGEN SATURATION: 97 % | TEMPERATURE: 96.7 F

## 2019-01-22 RX ADMIN — AMLODIPINE BESYLATE 10 MG: 5 TABLET ORAL at 08:35

## 2019-01-22 RX ADMIN — CARBAMAZEPINE 200 MG: 200 TABLET, EXTENDED RELEASE ORAL at 08:35

## 2019-01-22 RX ADMIN — CLONAZEPAM 1 MG: 1 TABLET ORAL at 08:35

## 2019-01-22 NOTE — PROGRESS NOTES
Pt brightens upon approach  Denies s/s  States he is ready for discharge today  Pt has been visible and social on the unit  Medication and meal compliant  Will monitor

## 2019-01-22 NOTE — PLAN OF CARE
Problem: DISCHARGE PLANNING  Goal: Discharge to home or other facility with appropriate resources  INTERVENTIONS:  - Identify barriers to discharge w/patient and caregiver  - Deny SI, depression and anxiety  Symptoms will resolve or diminish upon discharge  - Comply with meds, attend 75% of group therapy and identify positive coping skills  - Arrange for needed discharge resources and transportation as appropriate  - Identify discharge learning needs (meds, outpatient mental health services)  - Arrange for interpretive services to assist at discharge as needed  - Follow up with [facility], [facility], and psych rehab referrals  - Refer to Case Management Department for coordinating discharge planning if the patient needs post-hospital services based on physician/advanced practitioner order or complex needs related to functional status, cognitive ability, or social support system    Outcome: Completed Date Met: 01/22/19  Pt to be discharged today  Pt denies SI/HI, AH/VH  Pt oriented x3  Pt to return home  Pt to follow up with [facility] on 1/23 at [address]  Pt to follow up with [facility]  Pt's meds faxed to  [facility]

## 2019-01-22 NOTE — PROGRESS NOTES
Pt remains pleasant and cooperative  Pt is also visible and social on the unit  Pt denies any anxiety, depression, SI, HI, AH, or VH  Will continue to monitor

## 2019-01-22 NOTE — PROGRESS NOTES
Pt attended problem solving group  Pt alert, cooperative and engaged in group dialogue  Pt able to identify his barriers and next steps in problem solving process  Pt expressed hope in decision making process and insight into mh recovery needs  Group focused on decision making wheel/mh recovery choices and changes  Continue to provide therepeuitc group support as needed

## 2019-01-22 NOTE — DISCHARGE SUMMARY
Psychiatric Discharge Summary    Medical Record Number: [id number]  Encounter: [id number]    Discharge diagnosis:  Bipolar 1 disorder, depressed, moderate ([place] [address] )    Secondary diagnoses:  Problem List     * (Principal)Bipolar 1 disorder, depressed, moderate ([place] [address] )    Posttraumatic stress disorder    Overview Signed 10/5/2018  4:07 PM by Dayton Park MD     Last Assessment & Plan:   Continue medication regimen and counseling as directed         Agoraphobia    Asthma with exacerbation    Combined drug dependence, excluding opioid, in remission ([place] [address] )    Complicated grief    Crohn's disease ([place] [address] )    Overview Signed 10/5/2018  4:07 PM by Dayton Park MD     Last Assessment & Plan:   No active symptoms  Will schedule GI follow up   Referral reprinted today          Encounter for long-term (current) use of other medications    Generalized anxiety disorder    Status post ileostomy ([place] [address] )    Marijuana use, continuous    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[place] [address] )    Affective psychosis, bipolar ([place] [address] )    High risk medication use    Hypertriglyceridemia    Overview Signed 10/5/2018  4:07 PM by Dayton Park MD     Last Assessment & Plan:   Dietary measures discussed in detail today                HPI:     Patient is a 70-year-old male with history of bipolar disorder and agoraphobia who was admitted to the [address] [place] [address] on a 201 status due to increased depression, uncontrolled anxiety and suicidal thoughts  Patient become increasingly overwhelmed at home due to ongoing depression and anxiety  Patient was not leaving his home  Patient was starting to feel hopeless and starting to developed thoughts of suicide  As result patient was brought to the emergency department and deemed medically stable for transfer for inpatient psychiatric care  Brief Hospital Course: Following admission to the unit patient was continued on Tegretol and Klonopin    Patient's Zyprexa dosing was increased to 10 mg daily and 10 mg at bedtime  Patient was having daytime fatigue that was interacting with his ability to function and as result his morning Zyprexa dosing was changed to 15 mg at bedtime  Patient was reporting that he was starting to have a decrease in his depression and anxiety  Patient was visible on the unit  Patient was pushing himself to participate in the milieu  Patient was reporting that he was feeling more optimistic about his future and was not having any suicidal thoughts  Patient was with no mood lability or homicidal ideations  Patient was with good sleep and appetite  Patient was continuing to show and report improvement throughout his hospitalization  Patient's affect was appropriate  Patient was with no report displayed any psychiatric symptoms  Patient was expressing his desire for outpatient services to be able to continue his recovery in the community  Patient was in agreement to trial a partial hospitalization program again  On January 22, 2019 patient was evaluated and deemed appropriate for discharge on this date to continue his care on an outpatient basis  Patient was with no report displayed any psychiatric symptoms  Patient was evaluated and deemed a low suicide risk as he was identifying his parents as his protective factors  Patient consented that he had any exacerbation of his symptoms he would merely contact Emergency Medical Services or return back to the emergency department  Patient was educated on his psychotropic medications are regards to medications in the uses as well as potential adverse effects  Patient verbalizes consent  [facility] to [facility] tomorrow        Condition on discharge:     Improved    Medications Upon Discharge:       Current Facility-Administered Medications:       amLODIPine (NORVASC) tablet 10 mg, 10 mg, Oral, Daily, Camelia Baxter MD, 10 mg at 01/21/19 0900    carBAMazepine (TEGretol XR) 12 hr tablet 200 mg, 200 mg, Oral, Daily, Carlie Cortez PA-C, 200 mg at 01/21/19 0900    carBAMazepine (TEGretol XR) 12 hr tablet 400 mg, 400 mg, Oral, HS, Carlie Cortez PA-C, 400 mg at 01/21/19 2106    clonazePAM (KlonoPIN) tablet 1 mg, 1 mg, Oral, TID, Carlie Cotrez PA-C, 1 mg at 01/21/19 2106    lisinopril (ZESTRIL) tablet 10 mg, 10 mg, Oral, QPM, Shellie Ciminieri, CRNP, 10 mg at 01/21/19 1714    OLANZapine (ZyPREXA) tablet 15 mg, 15 mg, Oral, HS, Carlie Cortez PA-C, 15 mg at 01/21/19 2106      Carlie Cortez PA-C

## 2019-01-22 NOTE — NURSING NOTE
Pt discharged to self  Denies s/s at time of discharge  This writer reviewed discharge paperwork with the pt  He verbalizes understanding  Provided with prescriptions at time of discharge

## 2019-01-22 NOTE — SOCIAL WORK
Ilda  [facility] requested to complete [facility] intake today  Worker discussed this with pt  Pt would like to be discharged and complete intake on outpatient basis

## 2019-01-22 NOTE — SOCIAL WORK
Pt to be discharged today  Pt denies SI/HI, AH/VH  Pt oriented x3  Pt to return home  Pt has personal transportation  Pt to follow up with Ilda Hernandez [facility] and [facility]  Pt's meds faxed to [facility]

## 2019-01-23 ENCOUNTER — OFFICE VISIT (OUTPATIENT)
Dept: PSYCHOLOGY | Facility: CLINIC | Age: 39
End: 2019-01-23
Payer: MEDICARE

## 2019-01-23 ENCOUNTER — OFFICE VISIT (OUTPATIENT)
Dept: PSYCHIATRY | Facility: CLINIC | Age: 39
End: 2019-01-23
Payer: MEDICARE

## 2019-01-23 VITALS
TEMPERATURE: 98.2 F | DIASTOLIC BLOOD PRESSURE: 108 MMHG | RESPIRATION RATE: 20 BRPM | WEIGHT: 193 LBS | HEART RATE: 83 BPM | BODY MASS INDEX: 30.29 KG/M2 | HEIGHT: 67 IN | SYSTOLIC BLOOD PRESSURE: 146 MMHG

## 2019-01-23 DIAGNOSIS — F31.32 BIPOLAR 1 DISORDER, DEPRESSED, MODERATE (HCC): Primary | ICD-10-CM

## 2019-01-23 DIAGNOSIS — F43.10 POSTTRAUMATIC STRESS DISORDER: ICD-10-CM

## 2019-01-23 DIAGNOSIS — F40.01 PANIC DISORDER WITH AGORAPHOBIA: ICD-10-CM

## 2019-01-23 PROBLEM — F31.9 AFFECTIVE PSYCHOSIS, BIPOLAR (HCC): Status: RESOLVED | Noted: 2018-07-24 | Resolved: 2019-01-23

## 2019-01-23 PROBLEM — F43.21 COMPLICATED GRIEF: Status: RESOLVED | Noted: 2017-07-18 | Resolved: 2019-01-23

## 2019-01-23 LAB
ATRIAL RATE: 71 BPM
P AXIS: 52 DEGREES
PR INTERVAL: 156 MS
QRS AXIS: 34 DEGREES
QRSD INTERVAL: 94 MS
QT INTERVAL: 382 MS
QTC INTERVAL: 415 MS
T WAVE AXIS: 18 DEGREES
VENTRICULAR RATE: 71 BPM

## 2019-01-23 PROCEDURE — G0177 OPPS/PHP; TRAIN & EDUC SERV: HCPCS

## 2019-01-23 PROCEDURE — 90791 PSYCH DIAGNOSTIC EVALUATION: CPT

## 2019-01-23 PROCEDURE — G0176 OPPS/PHP;ACTIVITY THERAPY: HCPCS

## 2019-01-23 PROCEDURE — 93010 ELECTROCARDIOGRAM REPORT: CPT | Performed by: INTERNAL MEDICINE

## 2019-01-23 PROCEDURE — 99215 OFFICE O/P EST HI 40 MIN: CPT | Performed by: PSYCHIATRY & NEUROLOGY

## 2019-01-23 NOTE — PSYCH
Subjective:     Patient ID: Mackenzie Greene is a 45 y o  male  [facility] Clinical Progress Notes      Specialized Services Documentation  Therapist must complete separate progress note for each specific clinical activity in which the individual participated during the day  Group Psychotherapy ([id number]) SYLVIA Covington did not attend wellness group as he was with his

## 2019-01-23 NOTE — PSYCH
Subjective:     Patient ID: Cony Son is a 45 y o  male  [facility] Clinical Progress Notes      Specialized Services Documentation  Therapist must complete separate progress note for each specific clinical activity in which the individual participated during the day  Group Psychotherapy     ([id number]) [place] [facility] was not present for group psychotherapy due to participating in the intake process  Therapist:  Patricia Malagon [facility]    Other     Case Management Note    Patricia Malagon [facility]    Current suicide risk : Low     ([id number]) CM met with Cony Son  Reviewed program structure, expectations and he was given on call number and crisis phone numbers  Cony Son completed releases and OP providers/ PCP notified of admission and health care coordination form completed  Completed initial psycho-social evaluation and initial treatment goals discussed  Completed all necessary insurance forms  Medications changes/added/denied? No    Treatment session number: 1    Individual Case Management Visit provided today?  Yes     [facility] follow up physician's orders: None at this time

## 2019-01-23 NOTE — PSYCH
Assessment/Plan:      Diagnoses and all orders for this visit:    Bipolar 1 disorder, depressed, moderate ([address] )    Panic disorder with agoraphobia    Posttraumatic stress disorder          Subjective:     Patient ID: Mortimer Buster is a 45 y o  male  HPI:     Pre-morbid level of function and History of Present Illness:     As per Dr Rosenthal Crimes:    Mortimer Buster is a 45 y o  male with Bipolar disorder, panic episode ,PTSD  and anxiety referred from [address] [place] [address] unit  where he was admitted from 1/11/2019 to 1//2019 due to severe depression and suicidal thoughts  He thought about hanging himself his depression worsen at after his brother overdosed about 2 months ago; he  then had 2nd thoughts about suicide and did not want to hurt his family so he went to the hospital  He was hopeless about ever feeling better and then started to become suicidal because of it  He reports when he wakes up in the morning he has overwhelming panic symptoms and experiences panic throughout the week  Johnny Vela also says that he uses marijuana to control his anxiety but that is not particularly effective either  Onset of symptoms was a few months ago with gradually worsening course since that time  Psychosocial Stressors: family and health   Today Mortimer Buster feels anxious, denies any suicidal or homicidal ideation, plan or intent and denies any psychotic symptoms   As per this writer: Mortimer Buster is a 45year old male referred to [facility] from a [address] [place] [address] Hospitalization  Stevan Mitchellica experienced suicidal ideation, severe panic attacks, agoraphobia, and overwhelming anxiety which led him to his most recent hospitalization  Stevan Pinedo has a history of mental health issues related to his physical health issues, and has struggled with bipolar disorder over the last eight years specifically    Stevan Pinedo shared that roughly 10 months ago he had a severe panic attack in [employer], stated that "it was like you see in the movies" and he developed panic disorder from this panic attack and has become afraid of being in any type of crowd  Most recently, Juanita Aldana had a panic attack in a [place] where he saw his niece and became suicidal because he was exhausted from experiencing the physical symptoms accompanied by his anxiety and panic  Juanita Aldana shared experiencing panic attacks with hives, an inability to breath, belly aches, hot flashes, and feeling like he is going to faint  Juanita Aldana states this condition has impeded his day to day functioning and has created an inability to work  Juanita Aldana states his relationships are affected and his family as well  As per Juanita Aldana:  "I don't want to go anywhere, I can't work, I feel hopeless "  Throughout the interview, Juanita Aldana would become visually upset regarding his current situation and his inability to perform day-to-day functions related to his anxiety, panic and agoraphobia  Juanita Aldana shared his strengths being "I'm artistic, funny, personable, humble, and I treat everyone the same no matter where they come from "    Reason for evaluation and partial hospitalization as an alternative to inpatient hospitalization: PHP is medically necessary to prevent rehospitalization as Cony Son transitions from IP to OP level of care  Milieu therapy to monitor for medication needs, provide wellness tools education and offer opportunity to share and connect to others  Group therapy, case management, psychiatric medication management, family contact and UR as indicated  ELOS 10 treatment days      Previous Psychiatric/psychological treatment/year: Inpatient hospitalization for 10 days, and this has been Marco Antonio's fourth hospitalization in the last 2-3 years, and his third time at [facility], with his last admission being in August       Current Psychiatrist/Therapist: [address] [address] Kayla Carias for medication management and [facility] for outpatient therapy  Outpatient and/or Partial and Other Community Resources Used (CTT, ICM, VNA): N/A      Problem Assessment:     SOCIAL/VOCATION:  Family Constellation (include parents, relationship with each and pertinent Psych/Medical History):     Family History   Problem Relation Age of Onset    Schizophrenia Father     Alcohol abuse Father     Drug abuse Brother      Pamela Oneil reports having a very close-knit and supportive family, although they are not very understanding of what he is going through  His parents are still together and his youngest sister lives with them and her two dogs  Mom is a nurse and dad does not work currently  Pamela Oneil stated his family is very protective, and the thought of his mother having to bury another child was what led him to not committing suicide  Pamela Oneil has two older brothers and two older sisters, he is the youngest   He stated that none of them have mental health issues except for his brother who passed away two years ago from a heroin overdose  Pamela Oneil stated "it wouldn't be fair to my mom to have to go through another son dying "  Pamela Oneil lives with his friend, Robert Walden, who is retired and understands what he is going through  Pamela Oneil does not have any children or a spouse or partner  Who is the person you relate to best "my friend, Morgan Kang lives with a roommate, he does not live alone  Domestic Violence: No past history of domestic violence and There is no history of child abuse reported to this writer  Pamela Oneil has experienced significant health issues related to Crohn's disease requiring a slew of surgeries and a colostomy bag  Pamela Oneil states that recovering from and experiencing all of the health issues was a difficult experience for him at the age of 29-32  Additional Comments related to family/relationships/peer support: Pamela Oneil states his family is very close and supportive       School or Work History (strengths/limitations/needs): "I am one semester shy of a bachelors degree "  Eda Hodges stated he went to school for Arts and Communication and would love to work in that field when he gets well  Eda Hodges currently is unable to work due to his psychiatric symptoms  Her highest grade level achieved was some college   history includes none  Financial status includes currently having no income, applied for Social Security disability and is awaiting outcome from October court hearing  Lives with Yadiel ko  LEISURE ASSESSMENT (Include past and present hobbies/interests and level of involvement (Ex: Group/Club Affiliations): music, art, going to concerts, spending time with family  His primary language is [place]  Preferred language is [place]  Ethnic considerations are none that would impact treatment  Religions affiliations and level of involvement "I go to [employer] with Yadiel and it's great "   FUNCTIONAL STATUS: There has been a recent change in the patient's ability to do the following: N/A    Level of Assistance Needed/By Whom?: N/A    Eda Hodges learns best by  reading, listening, demostration and picture    SUBSTANCE ABUSE ASSESSMENT: no substance abuse reported to this writer, mentioned recreationally using cocaine in his past and chart indicates an opioid addiction in remission  Do you currently smoke? No Offered smoking cessation? Yes     Substance/Route/Age/Amount/Frequency/Last Use: no substance abuse reported to this writer, mentioned recreationally using cocaine in his past and chart indicates an opioid addiction in remission  DETOX HISTORY: unknown, not reported    Previous detox/rehab treatment: unknown, not reported    HEALTH ASSESSMENT: Eda Hodges reported a significant health history including surgeries to alleviate Crohn's disease when he was 29years old  Currently reports no symptoms due to a healthy eating habit and physical activity       LEGAL: No Mental Health Advance Directive or Power of  on file    Risk Assessment:   The following ratings are based on my observation of this patient over the last initial assessment    Risk of Harm to Self:   Demographic risk factors include never  or  status and male  Historical Risk Factors include chronic psychiatric problems  Recent Specific Risk Factors include passive death wishes, made threats and stated suicide intentions/plans    Risk of Harm to Others:   Demographic Risk Factors include male and unemployed  Historical Risk Factors include none listed  Recent Specific Risk Factors include multiple stressors    Access to Weapons:   Yaritza Callahan has access to the following weapons: none noted  The following steps have been taken to ensure weapons are properly secured: none noted    Based on the above information, the client presents the following risk of harm to self or others:  low    The following interventions are recommended:   no intervention changes    Notes regarding this Risk Assessment: Provided crisis phone numbers for appropriate county and on-call number as well as warm lines and peer support hotlines         Review Of Systems:     Mood Anxiety   Behavior Normal    Thought Content Normal   General Decreased Functioning   Personality Normal   Other Psych Symptoms Normal   Constitutional Normal   ENT Normal   Cardiovascular Normal    Respiratory Normal    Gastrointestinal Normal   Genitourinary Normal    Musculoskeletal Negative   Integumentary Normal    Neurological Normal    Endocrine Normal    Other Symptoms Normal        Mental status:  Appearance restless and fidgety and good eye contact    Mood anxious   Affect affect was broad and affect appropriate    Speech volume loud and a normal rate   Thought Processes normal thought processes   Hallucinations no hallucinations present    Thought Content no delusions   Abnormal Thoughts no suicidal thoughts  and no homicidal thoughts    Orientation  oriented to person and place and time   Remote Memory short term memory intact and long term memory intact   Attention Span concentration intact   Intellect Appears to be of Average Intelligence   Fund of Knowledge displays adequate knowledge of current events, adequate fund of knowledge regarding past history and adequate fund of knowledge regarding vocabulary    Insight Limited insight   Judgement judgment was intact   Muscle Strength Muscle strength and tone were normal and Normal gait    Language no difficulty naming common objects, no difficulty repeating a phrase  and no difficulty writing a sentence    Pain none   Pain Scale 0       DSM:   1  Bipolar 1 disorder, depressed, moderate ([address] )     2  Panic disorder with agoraphobia     3  Posttraumatic stress disorder         Plan: admit to PHP  Admit to PHP  Group therapy, case management, medication management, UR and family contact as indicated  ELOS 10 treatment days  Refer to OP psychiatry and therapy     Anticipated aftercare plan: Return to outpatient therapy and psychiatry, consider support groups and behavioral activation
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
## 2019-01-23 NOTE — PSYCH
Subjective:     Patient ID: Vikram Hinds is a 45 y o  male  [facility] Clinical Progress Notes      Specialized Services Documentation  Therapist must complete separate progress note for each specific clinical activity in which the individual participated during the day  Allied Therapy  [id number]  Vikram Hinds actively participated in [facility] group focused on decreasing self- stigma and supports  He attentively listened to [facility] share his life story  Group encouraged power of learning about self, accepting illness and personal responsibility in recovery  Community resources reviewed in addition to personal resources like self talk/mantras  Yuridia Richter engaged in self-talk and mantra repeating exercise  Initial positive progress toward goal noted  Continue AT to encourage self -awareness and healthy engagement of supports     Tx Plan Objective: 1 2, Therapist: Prema BianchiBC    Education Therapy   Time:  5128-1937  Previous goal met: First Treatment Day  Readiness to Learning: Receptive  Barriers to Learning: None  Learning Assessment  Time: 1767-5131  Education Completed: Illness, Medication and Wellness Tools  Teaching Method: Verbal and Demonstration  Shared Area of Learning: Yes   Goal Set: Get some rest, take medications  Tx Plan Objective: 1 2, 1 3, Therapist: Prema BianchiBC

## 2019-01-23 NOTE — PSYCH
Assessment/Plan:      Diagnoses and all orders for this visit:    Bipolar 1 disorder, depressed, moderate ([address] )    Panic disorder with agoraphobia    Posttraumatic stress disorder          Subjective:     Patient ID: Nuris Zuñiga is a 45 y o  male  [facility] Treatment Plan   AREAS OF NEED:  Bipolar disorder and panic disorder as evidenced by suicidal thoughts, hopelessness, physical symptoms of anxiety, and a decrease in day-to-day functioning due to panic attacks and anxiety  Date Initiated: 01/23/19     Strengths: "humble, personable, caring"     LONG TERM GOAL:   Date Initiated: 01/23/19   1 0 I will identify and share three ways in which my day-to-day functioning has improved since attending program   Target Date: 02/20/19  Completion Date:       SHORT TERM OBJECTIVES:     Date Initiated: 01/23/19   1 1 I will create a list of at least five overwhelming fears that reinforce my anxiety and panic and share with my treatment team      Revision Date:   Target Date: 02/01/19  Completion Date:     Date Initiated: 01/23/19   1 2 I will engage in behavioral activation and perform one task per day that increases my positive coping habits and share my challenges/successes  Revision Date:   Target Date: 02/01/19  Completion Date:     Date Initiated: 01/23/19   1 3 I will take medications as prescribed and share questions and concerns if arise  Revision Date:   Target Date: 02/01/19  Completion Date:     Date Initiated: 01/23/19   1 4 I will identify 3 ways my supports can assist in my recovery and agree to staff/support contact as indicated      Revision Date:   Target Date: 02/01/19  Completion Date:              7 DAY REVISION:    Date Initiated:  Revision Date:   Target Date:   Completion Date:         PSYCHIATRY:  Date Initiated:  1/23/2019  Medication Management and Education       Revision Date:   The person(s) responsible for carrying out the plan is Chitra Wright MD    NURSING:   Date Initiated: 1/23/2019  1 1,1 2,1 3,1 4 This RN will provide daily wellness group five days weekly to educate Vikram Hinds on S/S of his diagnoses and medications used in treatment  Revision Date:  The person(s) responsible for carrying out the plan is Jaye Oconnor RN    PSYCHOLOGY:   Date Initiated: 1/23/2019       1 1, 1 2, 1 4 Provide psychotherapy group 5 times per week to allow opportunity for Vikram Hinds  to explore stressors and ways of coping  Revision Date:   The person(s) responsible for carrying out the plan is Leonard Garcia [place]    ALLIED THERAPY:   Date Initiated: 1/23/2019  1 1,1 2 Engage Vikram Hinds in AT group 5 times daily to encourage development and use of wellness tools to decrease symptoms and promote recovery through meaningful activity  Revision Date:   The person(s) responsible for carrying out the plan is BALDEMAR Larose     CO-OCCURING:   Date Initiated: 1/23/2019  2 1 Engage Vikram Hinds  in Co-occurring groups twice weekly to explore addiction and impact on mental health issues (and vice versa), alternative coping and value of sobriety through group discussion and education  Revision Date:   The person(s) responsible for carrying out the plan is Leonard Garcia [place]    CASE MANAGEMENT:   Date Initiated: 1/23/2019      1 0 This  will meet with Vikram Hinds  3-4 times weekly to assess treatment progress, discharge planning, connection to community supports and UR as indicated  Revision Date:   The person(s) responsible for carrying out the plan is Shawna Byrd MA, LPC       TREATMENT REVIEW/COMMENTS:     DISCHARGE CRITERIA: Identify 3 signs of progress and complete relapse prevention plan     DISCHARGE PLAN:  Return to outpatient therapy and psychiatry  Estimated Length of Stay: 10 treatment days           Diagnosis and Treatment Plan explained to Guevara Kiser relates understanding diagnosis and is agreeable to Treatment Plan           CLIENT COMMENTS / Please share your thoughts, feelings, need and/or experiences regarding your treatment plan: _____________________________________________________________________________________________________________________________________________________________________________________________________________________________________________________________________________________________________________________ Date/Time: ______________     Patient Signature: _________________________________     Date/Time: ______________      Signature: _________________________________     Date/Time: ______________

## 2019-01-24 ENCOUNTER — OFFICE VISIT (OUTPATIENT)
Dept: PSYCHOLOGY | Facility: CLINIC | Age: 39
End: 2019-01-24
Payer: MEDICARE

## 2019-01-24 VITALS — DIASTOLIC BLOOD PRESSURE: 100 MMHG | SYSTOLIC BLOOD PRESSURE: 144 MMHG | HEART RATE: 90 BPM | TEMPERATURE: 97.9 F

## 2019-01-24 DIAGNOSIS — F43.10 POSTTRAUMATIC STRESS DISORDER: ICD-10-CM

## 2019-01-24 DIAGNOSIS — F40.01 PANIC DISORDER WITH AGORAPHOBIA: ICD-10-CM

## 2019-01-24 DIAGNOSIS — F31.32 BIPOLAR 1 DISORDER, DEPRESSED, MODERATE (HCC): Primary | ICD-10-CM

## 2019-01-24 PROCEDURE — G0410 GRP PSYCH PARTIAL HOSP 45-50: HCPCS

## 2019-01-24 PROCEDURE — G0177 OPPS/PHP; TRAIN & EDUC SERV: HCPCS

## 2019-01-24 PROCEDURE — G0176 OPPS/PHP;ACTIVITY THERAPY: HCPCS

## 2019-01-24 NOTE — PSYCH
Subjective:     Patient ID: Daly Meza is a 45 y o  male  [facility] Clinical Progress Notes      Specialized Services Documentation  Therapist must complete separate progress note for each specific clinical activity in which the individual participated during the day  Group Psychotherapy      ([id number]) Daly Meza participated in group psychotherapy process today  Group began with clients checking in and identifying how they were feeling, and an emotion they usually attempt to push away  Clients shared current struggles they are encountering and utilized one another as supports  Clients engaged in open discussion and offered feedback based on their personal experiences  Clients engaged in the psychotherapy process of altruism and connected to peers  Kaylin Saenz shared feeling "good" today and that often he pushes sadness away  Kaylin Saenz was active in group and often supported his peers and shared his feelings  Kaylin Saenz made progress towards goals today through group participation and is encouraged to continue participating to progress towards long term goals  Tx Plan Objective: 1 2 Therapist:  Rell Lynch LPC    Case Management Note    Shawna Byrd LPC    Current suicide risk : Low     ([id number]) Spoke with Kaylin Saenz and reviewed his goals  Shared feeling overwhelmed by some of the potential steps he needs to take, and this writer encouraged a collaborative approach to goals and that he is able to take whatever steps he believes necessary to wellness  Kaylin Saenz shared being inspired by his art and finding meaning  This writer incorporated Marco Antonio's art into his wellness plan, encouraging using his art to address treatment plan goals of identifying fears  Kaylin Saenz appeared excited by this task  Medications changes/added/denied? No    Treatment session number: 2    Individual Case Management Visit provided today?  Yes     [facility] follow up physician's orders: None at this time

## 2019-01-24 NOTE — PSYCH
Subjective:     Patient ID: Radha Kramer is a 45 y o  male  [facility] Clinical Progress Notes      Specialized Services Documentation  Therapist must complete separate progress note for each specific clinical activity in which the individual participated during the day  Group Psychotherapy ([phone] SYLVIA Painter attended wellness group today on _THE CYCLE OF FEAR:  Discussion begins  about the definition of the word cycle-  an unpleasant feeling of apprehension or distress when you think something bad, dangerous or frightening might happen  Individuals with mental difficulties can have many over blown and paralyzing fears (e g  Shopping, answering the phone, coming to group, meeting new people etc  ) Maladaptive avoidance strategies are often developed by individuals in a misguided attempt to manage fear  This is called - THE CYCLE OF FEAR-   Discussion of FEAR having 2 meanings:  THE CHOICE IS YOURS  (1) FORGET EVERYTHING AND RUN   OR  (2) FACE EVERYTHING AND RISE  Questions to Consider:  (1) What am I avoiding because of my fear?  (2) What other factors may be adding to my fear?  (3) What small steps should I take to work through my fear? All you have to do to diminish your fear is develop more trust in your own ability to handle whatever comes you way and you are capable of doing this! Jennifer Hrenandez was an active participant in this group about fear and stated " My fear has made me get stuck in my bubble in my house for 11 months and I chose to run not rise but I know I need to face my fear and rise if I want to get better "  Jennifer Hernandez will continue to attend wellness groups in order to achieve his goals and his objectives        Tx Plan Objective: 1 1,1 2

## 2019-01-24 NOTE — PSYCH
Subjective:     Patient ID: Cleveland Page is a 45 y o  male  [facility] Clinical Progress Notes      Specialized Services Documentation  Therapist must complete separate progress note for each specific clinical activity in which the individual participated during the day  Allied Therapy  [id number]  Cleveland Page actively participated in [facility] group focused on DBT skill Interpersonal Effectiveness  Group focused on self-respect effectiveness with the FAST skill  Group reviewed DEAR MAN and GIVE and discussed how to balance self-respect with objectives and the relationship in interpersonal situations  Group discussed sticking to ones values and being true to oneself  Kumar Inocente was given a list of personal values and checked off several values that were important to him  He participated in task designing a personal coat of arms based on values  He shared that being humble was an important value to him  He also shared about a time in which he had to balance self-respect and an objective  Some positive progress towards goal noted  Continue AT to increase self-awareness and use of coping skills with daily practice    Tx Plan Objective: 1 2, Therapist: Lisseth Rogers    Education Therapy   Time:  0817-6961  Previous goal met: Yes   Readiness to Learning: Receptive  Barriers to Learning: None  Learning Assessment  Time: 3189-6390  Education Completed: Illness and Wellness Tools  Teaching Method: Verbal and Demonstration  Shared Area of Learning: Yes   Goal Set: Cleaning and laundry  Tx Plan Objective: 1 2, Therapist: Lisseth RogersBC

## 2019-01-25 ENCOUNTER — OFFICE VISIT (OUTPATIENT)
Dept: PSYCHOLOGY | Facility: CLINIC | Age: 39
End: 2019-01-25
Payer: MEDICARE

## 2019-01-25 DIAGNOSIS — F43.10 POSTTRAUMATIC STRESS DISORDER: ICD-10-CM

## 2019-01-25 DIAGNOSIS — F40.01 PANIC DISORDER WITH AGORAPHOBIA: ICD-10-CM

## 2019-01-25 DIAGNOSIS — F31.32 BIPOLAR 1 DISORDER, DEPRESSED, MODERATE (HCC): Primary | ICD-10-CM

## 2019-01-25 PROCEDURE — G0177 OPPS/PHP; TRAIN & EDUC SERV: HCPCS

## 2019-01-25 PROCEDURE — G0410 GRP PSYCH PARTIAL HOSP 45-50: HCPCS

## 2019-01-25 NOTE — PSYCH
Subjective:     Patient ID: Radha Kramer is a 45 y o  male  [facility] Clinical Progress Notes      Specialized Services Documentation  Therapist must complete separate progress note for each specific clinical activity in which the individual participated during the day  Group Psychotherapy ([id number]) Lakshmi Chang RN      Attended weekly wellness assessment and reviewing weekend supports and plan as we approach the weekend  The six (6) dimension of wellness discussed (Physical, Emotional,Cognitive,Vocational,,Social and Spiritual)  The format for assessing wellness discussed by exploring- (1) I intend to improve in this area by   , (2) My first step will be   (3) I will share my plans with Rdaha Cancino ask for their support by saying    Medications reviewed and no concerns or questions at this time  Jennifer Hernandez  participated in wellness group as he stated he was not feeling physically well today but he did stated " I need to learn from my mistakes because I keep repeating them and this is why I am not getting mentally stronger  I make my fear control me and I keep doing the same stupid behavior over and over again  Jennifer Hernandez will continue to attend wellness group in order to achieve his goals and his objectives       TX: Objectives: 1 1,1 3

## 2019-01-25 NOTE — PSYCH
Subjective:     Patient ID: Angie Barba is a 45 y o  male  [facility] Clinical Progress Notes      Specialized Services Documentation  Therapist must complete separate progress note for each specific clinical activity in which the individual participated during the day  Group Psychotherapy     ([id number]) Angie Barba participated in group psychotherapy process today  Group began with clients checking in and identifying how they were feeling, and a productive event they were planning for the weekend to enhance their wellness  This writer facilitated a discussion on emotional avoidance  This writer facilitated mindfulness exercises on emotional acceptance and sitting with and experiencing emotions  Clients shared their experiences for the exercises and debriefed  Lizz Flood shared feeling "good" togday and spending time with his family will be productive for him  Lizz Flood was quiet during group but appeared to participate in the activity  Lizz Flood made progress towards goals today through group participation and is encouraged to continue participating to progress towards long term goals  Tx Plan Objective: 1 2 Therapist:  Nelda Natarajan [facility]      Other     Case Management Note    Nelda Natarajan LPC    Current suicide risk : Low     Was unable to meet with Zamudioguillermo Flood today  He left program early  Medications changes/added/denied? No    Treatment session number: 3    Individual Case Management Visit provided today?  No    [facility] follow up physician's orders: None at this time

## 2019-01-25 NOTE — PSYCH
Subjective:     Patient ID: Daniel Wilson is a 45 y o  male  [facility] Clinical Progress Notes      Specialized Services Documentation  Therapist must complete separate progress note for each specific clinical activity in which the individual participated during the day  Allied Therapy   [id number] Daniel Wilson excused from [facility] due to illness  Tx Plan Objective: na, Therapist:  Lexi MCDERMOTT    Education Therapy   Time:  5794-5523  Previous goal met: Yes   Readiness to Learning: Receptive  Barriers to Learning: None  Learning Assessment  Time: 2374-4927  Education Completed: Illness and Wellness Tools  Teaching Method: Verbal  Shared Area of Learning: Yes   Goal Set: go to [facility] on Sunday  Tx Plan Objective: 1 4, Therapist:  Lexi Langford met with this writer "I'm too tired to stay"  He feels exhausted with taking Klonopin in the morning  Encouraged him to stay  Reviewed weekend supports - he set a goal and he did call when he reached home as he was offered a ride   Lexi MCDERMOTT

## 2019-01-28 ENCOUNTER — DOCUMENTATION (OUTPATIENT)
Dept: PSYCHOLOGY | Facility: CLINIC | Age: 39
End: 2019-01-28

## 2019-01-28 NOTE — PROGRESS NOTES
Subjective:     Patient ID: Vikram Hinds is a 45 y o  male  [facility] Clinical Progress Notes      Specialized Services Documentation  Therapist must complete separate progress note for each specific clinical activity in which the individual participated during the day  Case Management Note    Priyank Harry LPC    Current suicide risk : Unable to assess due to absence  Vikram Hinds contacted the program and stated they were not going to be present due to feeling as if he is getting a cold  This writer contacted Yuridia Richter and discussed attendance the following day, which he agreed to  Medications changes/added/denied? No    Treatment session number: N/A    Individual Case Management Visit provided today? No    [facility] follow up physician's orders: None at this time

## 2019-01-29 ENCOUNTER — OFFICE VISIT (OUTPATIENT)
Dept: PSYCHOLOGY | Facility: CLINIC | Age: 39
End: 2019-01-29
Payer: MEDICARE

## 2019-01-29 DIAGNOSIS — F40.01 PANIC DISORDER WITH AGORAPHOBIA: ICD-10-CM

## 2019-01-29 DIAGNOSIS — F43.10 POSTTRAUMATIC STRESS DISORDER: ICD-10-CM

## 2019-01-29 DIAGNOSIS — F31.32 BIPOLAR 1 DISORDER, DEPRESSED, MODERATE (HCC): Primary | ICD-10-CM

## 2019-01-29 PROCEDURE — G0177 OPPS/PHP; TRAIN & EDUC SERV: HCPCS

## 2019-01-29 PROCEDURE — G0410 GRP PSYCH PARTIAL HOSP 45-50: HCPCS

## 2019-01-29 PROCEDURE — G0176 OPPS/PHP;ACTIVITY THERAPY: HCPCS

## 2019-01-29 NOTE — PSYCH
Subjective:     Patient ID: Makenzie Sprague is a 45 y o  male  [facility] Clinical Progress Notes      Specialized Services Documentation  Therapist must complete separate progress note for each specific clinical activity in which the individual participated during the day  Group Psychotherapy   [id number] Myriam Oconnor actively shared in psychotherapy group exploring personal and community resources to support recovery  During the group, he completed a crisis card including supports and community support phone numbers  He engaged in task to identify over 50 wellness tools  He was active, attentive and shared experiences with resources he has used in the past   He remarked on how many things he didnt know about and felt the resources he was learning would be helpful now and in the future  Some progress noted toward goal shared  Continue group psychotherapy to education to resources internally and external to manage personal challenges      Tx Plan Objective: 1 4,1 2, Therapist:  Claritza MCDERMOTT

## 2019-01-29 NOTE — PSYCH
Subjective:     Patient ID: Daly Meza is a 45 y o  male  [facility] Clinical Progress Notes      Specialized Services Documentation  Therapist must complete separate progress note for each specific clinical activity in which the individual participated during the day  Allied Therapy  [id number]  Daly Meza actively participated in [facility] group focused on DBT skill Distress Tolerance  Group explored distress tolerance skills STOP, Pros and Cons, TIP, and distracting with ACCEPTS  Kaylinofelia Saenz was observed to engage in progressive muscle relaxation and task  Group reviewed goals of distress tolerance as well as when to use the skills to get through crisis situations  Kaylin Saenz shared about the importance of the STOP skill and that he would watch documentaries as a distracting skill  Some positive progress towards goal noted  Continue AT to explore coping strategies and encourage daily practice    Tx Plan Objective: 1 2, Therapist: Jorge Obrien    Education Therapy   Time:  8020-8377  Previous goal met: No  Readiness to Learning: Receptive  Barriers to Learning: None  Learning Assessment  Time: 6900-3505  Education Completed: Illness and Wellness Tools  Teaching Method: Verbal and Demonstration  Shared Area of Learning: Yes   Goal Set: Make sure sidewalks are salted  Tx Plan Objective: 1 2, Therapist: Jorge Obrien

## 2019-01-29 NOTE — PSYCH
Subjective:     Patient ID: Radha Rose is a 45 y o  male  [facility] Clinical Progress Notes      Specialized Services Documentation  Therapist must complete separate progress note for each specific clinical activity in which the individual participated during the day  Group Psychotherapy     ([id number]) Radha Rose participated in psychotherapy process group today  This writer facilitated a guided meditation focusing as an opening exercise  Clients checked in, shared how they were feeling, and a meaningful thing they do  Clients participated in a values assessment and shared how they were or were not living a values-based life  Clients shared barriers to living a values-based life  Clients engaged in an open discussion on how the steps they are taking towards wellness have aligned with their values  Jennifer Marie shared feeling way better than previous day and entering his art again is what is meaningful to him  Jennifer Marie shared being a part of this group as meaningful and helps him re-establish his values  Jennifer Marie continues to make progress towards goals by participating in group psychotherapy and is encouraged to continue to progress towards long term goals  Tx Plan Objective: 1 2 Therapist:  Eugenie Todd [facility]    Other     Case Management Note    Shawna Byrd LPC    Current suicide risk : Low     (8730-1670) Met with Jennifer Marie and reviewed homework and absences  Jennifer Marie shared his artwork and methods of coping  Stated that using his artwork and strengths has been very helpful and insightful  Was given the assignment to draw a picture about his anxiety  Stated he was inspired  Medications changes/added/denied? No    Treatment session number: 4    Individual Case Management Visit provided today?  Yes     [facility] follow up physician's orders:  None at this time

## 2019-01-30 ENCOUNTER — OFFICE VISIT (OUTPATIENT)
Dept: PSYCHOLOGY | Facility: CLINIC | Age: 39
End: 2019-01-30
Payer: MEDICARE

## 2019-01-30 VITALS
HEART RATE: 97 BPM | TEMPERATURE: 98 F | RESPIRATION RATE: 20 BRPM | SYSTOLIC BLOOD PRESSURE: 137 MMHG | DIASTOLIC BLOOD PRESSURE: 80 MMHG

## 2019-01-30 DIAGNOSIS — F40.01 PANIC DISORDER WITH AGORAPHOBIA: ICD-10-CM

## 2019-01-30 DIAGNOSIS — F31.32 BIPOLAR 1 DISORDER, DEPRESSED, MODERATE (HCC): Primary | ICD-10-CM

## 2019-01-30 PROCEDURE — G0177 OPPS/PHP; TRAIN & EDUC SERV: HCPCS

## 2019-01-30 PROCEDURE — G0410 GRP PSYCH PARTIAL HOSP 45-50: HCPCS

## 2019-01-30 PROCEDURE — G0176 OPPS/PHP;ACTIVITY THERAPY: HCPCS

## 2019-01-30 NOTE — PSYCH
Subjective:     Patient ID: Nuris Zuñiga is a 45 y o  male  [facility] Clinical Progress Notes      Specialized Services Documentation  Therapist must complete separate progress note for each specific clinical activity in which the individual participated during the day  Group Psychotherapy Jacqueline Negro RN    WELLNESS GROUP  Attended wellness group today  - Are You Under Stress? -  Discussion started stress symptoms  The question asked was - I know I am under stress when I -------  Then discussion effectively reducing stress  The question asked was - When I see these symptoms, I will -----------(and be specific)  I will do this alternative activity  for ----- minutes until I am back in control of my behavior  Then a discussion on -TIPS FOR STRESS MANAGEMENT GIVEN-  (1) Work off stress (physical activity to blow off steam)  (2) Talk it off  (3) Learn to accept what cannot be changed  (4) Avoid self medicating  (5) Get enough rest and sleep  (6) Balance work and recreation  (7) Get outside of yourself- sometimes when you are distressed, you might concentrate on your own troubles and situation to the exclusion of others  When this happens, try to get your mind off of yourself by helping others  The extra bonus of this technique is that it helps make friends  (8) Take one thing at a time- its defeating to tackle everything at once  Instead, prioritize your tasks and set aside time to work on the most urgent  (9) Give in once in a while- If you find out that the major stress of is other people, try giving in instead of fighting and insisting you are always right  You may find that others may begin to give in too  (10) Stay active instead of sitting alone and being frustrated  Make yourself available, become more involved in community groups and charitable organizations  Recognizing stress is ongoing, a normal part of life is the first step in dealing with it     Turn stress into a positive force and lets make life more interesting! Krista Cleary stated " Work off stress! That sounds good to me as I enjoy exercising so I can do this to help relieve the way I react to stress " Krista Cleary is making progress towards goals and objectives and will continue to attend wellness group as part of recovery        Tx Plan Objective: 1 2

## 2019-01-30 NOTE — PSYCH
Subjective:     Patient ID: Kishan Doshi is a 45 y o  male  [facility] Clinical Progress Notes      Specialized Services Documentation  Therapist must complete separate progress note for each specific clinical activity in which the individual participated during the day  Group Psychotherapy     ([id number]) Kishan Doshi  participated in psychotherapy process group today  This writer facilitated a guided meditation focusing on breathing and feeling a connection to the earth  Clients checked in and shared how they were feeling  Clients engaged in an open discussion regarding negative thoughts, thinking traps, and practiced changing their thoughts  Clients discussed connection between thoughts, feelings and behaviors  Clients learned skill of checking the facts and cognitive coping  Valery Avery shared feeling better in physical health and thinking a lot about things he needs to accomplish  Valery Avery shared his struggles with thinking traps and used words of encouragement to motivate others  Valery Avery continues to make progress towards goals by participating in group psychotherapy and is encouraged to continue to progress towards long term goals  Tx Plan Objective: 1 , Therapist:  Naty Giron LPC    Case Management Note    Naty Giron LPC    Current suicide risk : Low     ([id number]) Met with Valery Avery and reviewed homework  Valery Avery was eager to share his artwork with this writer and described how he has improved moods since utilizing art as a coping mechanism  Shared his experience with recently approaching situations that cause anxiety and that he has engaged in those activities despite having the anxiety  This writer reinforced approach coping and continued use of supports as a means to cope  Medications changes/added/denied? No    Treatment session number: 5    Individual Case Management Visit provided today?  Yes     [facility] follow up physician's orders: None at this time

## 2019-01-30 NOTE — PSYCH
Subjective:     Patient ID: Suri Chew is a 45 y o  male  [facility] Clinical Progress Notes      Specialized Services Documentation  Therapist must complete separate progress note for each specific clinical activity in which the individual participated during the day  Allied Therapy  [id number]  Suri Chew actively participated in [facility] group focused on self-care  He was observed to actively participate in imagery relaxation exercise and engaged in task listing things he does for self-care and barriers that hinder self-care  Group discussed the necessity of self-care as well as problem solving about barriers  Group engaged in stretching exercise  Maralbaro Karen shared that he would read for self-care tonight  Positive progress towards goal noted  Continue AT to increase self-awareness and coping skills with daily practice    Tx Plan Objective: 1 2, Therapist: Sharyn MccormackBC    Education Therapy   Time:  4737-0474  Previous goal met: Yes   Readiness to Learning: Receptive  Barriers to Learning: None  Learning Assessment  Time: 8993-2078  Education Completed: Illness and Wellness Tools  Teaching Method: Verbal and Demonstration  Shared Area of Learning: Yes   Goal Set: Do homework  Tx Plan Objective: 1 2, Therapist: Sharyn MccormackBC

## 2019-01-31 ENCOUNTER — OFFICE VISIT (OUTPATIENT)
Dept: PSYCHOLOGY | Facility: CLINIC | Age: 39
End: 2019-01-31
Payer: MEDICARE

## 2019-01-31 DIAGNOSIS — F40.01 PANIC DISORDER WITH AGORAPHOBIA: ICD-10-CM

## 2019-01-31 DIAGNOSIS — F31.32 BIPOLAR 1 DISORDER, DEPRESSED, MODERATE (HCC): Primary | ICD-10-CM

## 2019-01-31 PROCEDURE — G0176 OPPS/PHP;ACTIVITY THERAPY: HCPCS

## 2019-01-31 PROCEDURE — G0177 OPPS/PHP; TRAIN & EDUC SERV: HCPCS

## 2019-01-31 PROCEDURE — G0410 GRP PSYCH PARTIAL HOSP 45-50: HCPCS

## 2019-01-31 NOTE — PSYCH
Subjective:     Patient ID: Diony Oviedo is a 45 y o  male  [facility] Clinical Progress Notes      Specialized Services Documentation  Therapist must complete separate progress note for each specific clinical activity in which the individual participated during the day  Group Psychotherapy ([id number]) Arlet Mistry RN      Attended wellness group today on - A PLAN FOR DEALING WITH ANGER---  Anger is built on expectations  We expect people to treat us with fairly and when we think they do not treat us fairly we become angry  When there is a gap between expectation and reality, anger is ready to fill that gap  We may decline to act in an anger manner or we can accept  Its your choice  Positive Reactions vs Negative Reactions to dealing with anger:  (1) Walk away instead of reacting in anger  Its OK to stomp your feet if you need to  (2) Talk to someone who you are not feeling angry with  Phone a friend, relative, or professional   Tell them about what happened and how you are feeling  (3) Distract yourself  Do something you enjoy, like reading, listening to music, games going to the store or cooking a meal   This will take your mind off the anger then when you are calmer, come back and deal with it  (4) Count 10 breaths  This will calm and relax you and allow you to pause for a few moments before reacting automatically  (5) Write about it  Get your feelings on paper instead of confronting the source of your anger right away  (6) Get away from the situation  Come back when you are calmer  The more practice you get reacting in positive ways, waiting until the anger goes down a little before responding, and learning new habits, the more easily you will be able to manage your anger  Diony Oviedo did identify " I need to walk away when I feel I am getting angry  I have to conscious of my blood pressure because when I react with anger that is the first thing it affects    I am going to walk away from things that should not concern me to be able to be mentally and physically healthier " Daniel Katie is making progress towards goals and objective and will continue to attend groups towards wellness        Tx Plan Objective: 1 2

## 2019-01-31 NOTE — PSYCH
Subjective:     Patient ID: Selina Kwong is a 45 y o  male  [facility] Clinical Progress Notes      Specialized Services Documentation  Therapist must complete separate progress note for each specific clinical activity in which the individual participated during the day  Group Psychotherapy     ([id number]) Selina Kwong participated in psychotherapy process group today  This writer facilitated a mindfulness exercise on stress and panic  Clients checked in, shared how they were feeling and shared something they bring to a relationship  Clients were given the opportunity to participate in an open discussion on challenges they are currently experiencing  Clients imparted on information and shared with others their struggles in acceptance, relationships, and co-parenting in relationships  They built group cohesiveness and shared experiencing, growing their universality  Key Smith shared feeling anxious and tired today and he can offer love and affection in relationships  Key Smith was quiet but offered feedback a few times, sharing his support for peers  Keyanh Smith continues to make progress towards goals by participating in group psychotherapy and is encouraged to continue to progress towards long term goals  Tx Plan Objective: 1 2 Therapist:  Yuli Louis [facility] [place]    Other     Case Management Note    Yuli Louis, [facility]    Current suicide risk : Low     CM was unable to meet with Key Smith today to check in, but he did not address any concerns in his check in sheet, just that he has been consistent with completing his homework  Medications changes/added/denied? No    Treatment session number: 6    Individual Case Management Visit provided today?  No    [facility] follow up physician's orders: None at this time

## 2019-01-31 NOTE — PSYCH
Subjective:     Patient ID: Daniel Wilson is a 45 y o  male  [facility] Clinical Progress Notes      Specialized Services Documentation  Therapist must complete separate progress note for each specific clinical activity in which the individual participated during the day  Allied Therapy  [id number]  Daniel Wilson actively participated in [facility] group focused on DBT skill interpersonal effectiveness  Group discussed the differences between being assertive, passive, passive aggressive, and aggressive  Group explored the importance of being assertive for effective communication  Group discussed ways to balance getting what you want, maintaining a relationship, and maintaining self-respect  Darychad Cleary actively engaged in drumming exercise to practice assertiveness  He shared that he would eat healthier to practice assertiveness  He shared that the group was helpful to him as he realized he needs to be more assertive with himself and treatment  Positive progress towards goal noted  Continue AT to encourage self-awareness and daily practice of wellness tools    Tx Plan Objective: 1 2, Therapist: Lexi Bergeron    Education Therapy   Time:  7361-2582  Previous goal met: Yes   Readiness to Learning: Receptive  Barriers to Learning: None  Learning Assessment  Time: 6905-6708  Education Completed: Illness, Aftercare and Wellness Tools  Teaching Method: Verbal and Demonstration  Shared Area of Learning: Yes   Goal Set: Work on car  Tx Plan Objective: 1 2, Therapist: Lexi Bergeron

## 2019-02-01 ENCOUNTER — OFFICE VISIT (OUTPATIENT)
Dept: PSYCHOLOGY | Facility: CLINIC | Age: 39
End: 2019-02-01
Payer: MEDICARE

## 2019-02-01 DIAGNOSIS — F40.01 PANIC DISORDER WITH AGORAPHOBIA: ICD-10-CM

## 2019-02-01 DIAGNOSIS — F31.32 BIPOLAR 1 DISORDER, DEPRESSED, MODERATE (HCC): Primary | ICD-10-CM

## 2019-02-01 PROCEDURE — G0177 OPPS/PHP; TRAIN & EDUC SERV: HCPCS

## 2019-02-01 PROCEDURE — G0410 GRP PSYCH PARTIAL HOSP 45-50: HCPCS

## 2019-02-01 PROCEDURE — G0176 OPPS/PHP;ACTIVITY THERAPY: HCPCS

## 2019-02-01 NOTE — PSYCH
Assessment/Plan:       Diagnoses and all orders for this visit:     Bipolar 1 disorder, depressed, moderate (HCC)     Panic disorder with agoraphobia     Posttraumatic stress disorder            Subjective:      Patient ID: Suri Chew is a 45 y o  male      [facility] Treatment Plan   AREAS OF NEED:  Bipolar disorder and panic disorder as evidenced by suicidal thoughts, hopelessness, physical symptoms of anxiety, and a decrease in day-to-day functioning due to panic attacks and anxiety  Date Initiated: 01/23/19      Strengths: "humble, personable, caring"        LONG TERM GOAL:   Date Initiated: 01/23/19   1 0 I will identify and share three ways in which my day-to-day functioning has improved since attending program   Target Date: 02/20/19  Completion Date:         SHORT TERM OBJECTIVES:      Date Initiated: 01/23/19   1 1 I will create a list of at least five overwhelming fears that reinforce my anxiety and panic and share with my treatment team      Revision Date: 02/01/19   Target Date: 02/13/19  Completion Date:      Date Initiated: 01/23/19   1 2 I will engage in behavioral activation and perform one task per day that increases my positive coping habits and share my challenges/successes  Revision Date: 02/01/19   Target Date: 02/13/19  Completion Date:      Date Initiated: 01/23/19   1 3 I will take medications as prescribed and share questions and concerns if arise  Revision Date: 02/01/19   Target Date: 02/13/19  Completion Date:      Date Initiated: 01/23/19   1 4 I will identify 3 ways my supports can assist in my recovery and agree to staff/support contact as indicated  Revision Date: 02/01/19   Target Date: 02/13/19  Completion Date:                 7 DAY REVISION:     Date Initiated:02/01/19   1 5 I will engage in three behavior experiments per week to improve my social interactions and share my results with my supports    Revision Date:   Target Date: 02/13/19  Completion Date:       PSYCHIATRY:  Date Initiated: 01/23/19  Medication Management and Education       Revision Date: 2/1/2019       Continue medication management  The person(s) responsible for carrying out the plan is Abigail Jackson MD    NURSING:   Date Initiated: 01/23/19  1 1,1 2,1 3,1 4 This RN will provide daily wellness group five days weekly to educate Angie Barba on S/S of his diagnoses and medications used in treatment  Revision Date: 2/1/2019  1 1,1 2,1 3,1 4,1 5 Continue to encourage Angie Barba to participate in wellness groups daily to learn about symptoms, coping strategies and warning signs to promote relapse prevention  The person(s) responsible for carrying out the plan is Kenisha Duggan RN    PSYCHOLOGY:   Date Initiated: 01/23/19       1 1, 1 2, 1 4 Provide psychotherapy group 5 times per week to allow opportunity for Angie Barba to explore stressors and ways of coping  Revision Date: 2/1/2019  1 1,1 2,1 4,1 5 Continue to provide psychotherapy group daily to Angie Barba and encourage sharing of stressors, skills and positive change  The person(s) responsible for carrying out the plan is Nelda Natarajan MA, LPC    ALLIED THERAPY:   Date Initiated: 01/23/19  1 1,1 2 Engage Angie Barba in AT group 5 times daily to encourage development and use of wellness tools to decrease symptoms and promote recovery through meaningful activity  Revision Date: 2/1/2019  1 1,1 2,1 5 Continue to engage Angie Barba to participate in AT group to practice wellness tools within program and transfer to home sharing successes and barriers through healthy task involvement     The person(s) responsible for carrying out the plan is BALDEMAR Rivero        CASE MANAGEMENT:   Date Initiated: 01/23/19      1 0 This  will meet with Angie Barba 3-4 times weekly to assess treatment progress, discharge planning, connection to community supports and UR as indicated  Revision Date: 2/1/2019  1 0 Continue to meet with Michele Jamee 3-4 times weekly to assess growth, work toward goals, continued treatment needs, dc planning and use of supports  The person(s) responsible for carrying out the plan is Nicole Walsh MA, LPC            TREATMENT REVIEW/COMMENTS:      DISCHARGE CRITERIA: Identify 3 signs of progress and complete relapse prevention plan     DISCHARGE PLAN:  Return to outpatient therapy and psychiatry  Estimated Length of Stay: 10 treatment days               Diagnosis and Treatment Plan explained to Hemanthen Learn relates understanding diagnosis and is agreeable to Treatment Plan             CLIENT COMMENTS / Please share your thoughts, feelings, need and/or experiences regarding your treatment plan: _____________________________________________________________________________________________________________________________________________________________________________________________________________________________________________________________________________________________________________________ Date/Time: ______________      Patient Signature: _________________________________      Date/Time: ______________       Signature: _________________________________      Date/Time: ______________

## 2019-02-01 NOTE — PSYCH
Subjective:     Patient ID: Carrie Villagomez is a 45 y o  male  [facility] Clinical Progress Notes      Specialized Services Documentation  Therapist must complete separate progress note for each specific clinical activity in which the individual participated during the day  Group Psychotherapy Makenzie Loyola RN ([id number])    Attended weekly wellness assessment and reviewing weekend supports and plan as we approach the weekend  The six (6) dimension of wellness discussed (Physical, Emotional,Cognitive,Vocational,,Social and Spiritual)  The format for assessing wellness discussed by exploring- (1) I intend to improve in this area by   , (2) My first step will be   (3) I will share my plans with Everett Rios ask for their support by saying    Medications reviewed and no concerns or questions at this time  Carrie Villagomez did identify that " I need to live in the moment and not worry about the what may happen tomorrow or next week  I need to be mindful if I want to get better mentally  I let my mental health turn into mental illness and I have to stop it " Ingris Ortega is making progress towards goals and objectives and will continue to attend wellness group      Tx Plan Objective: 1 1,1 2,1 3,1 4

## 2019-02-01 NOTE — PSYCH
Subjective:     Patient ID: Tremaine Marte is a 45 y o  male  [facility] Clinical Progress Notes      Specialized Services Documentation  Therapist must complete separate progress note for each specific clinical activity in which the individual participated during the day  Allied Therapy  [id number]  Tremaine Marte actively participated in [facility] group focused on DBT skill Emotional Regulation  Sherif Rae appeared tired during group, but participated in task  Group explored the goals of emotional regulation as well as the importance of having emotions  Sherif Rae engaged in italia analysis comparing the avoidance of emotions versus acting impulsively on them  Group engaged in songwriting task describing triggers and feelings associated with emotions  Sherif Rae shared that it was helpful to learn that emotions are necessary and serve a purpose  Some positive progress towards goal noted  Continue AT to increase self-awareness and use of coping skills with daily practice    Tx Plan Objective: 1 1, 1 2, Therapist: Roselia Simpson    Education Therapy   Time:  6825-2972  Previous goal met: Yes   Readiness to Learning: Receptive  Barriers to Learning: None  Learning Assessment  Time: 9107-8942  Education Completed: Illness, Aftercare and Wellness Tools  Teaching Method: Verbal and Demonstration  Shared Area of Learning: Yes   Goal Set: Go to [employer] on Sunday  Tx Plan Objective: 1 2, Therapist: Roselia SimpsonBC

## 2019-02-01 NOTE — PSYCH
Subjective:     Patient ID: Zahra Montalvo is a 45 y o  male  [facility] Clinical Progress Notes      Specialized Services Documentation  Therapist must complete separate progress note for each specific clinical activity in which the individual participated during the day  Group Psychotherapy       ([id number]) Zahra Montalvo participated in psychotherapy process group today  This writer facilitated a mindfulness exercise practicing observing feelings  Clients checked in, shared how they were feeling and a positive activity they have planned for the weekend  Clients engaged in a discussion on different applications that have been helpful to them on their journey to wellness  Clients discussed different types of coping skills they engage in and openly discussed unproductive coping skills  Antonia Germain shared feeling balance but tired  He shared insight to his coping mechanisms and sharing feelings with others  Mikoapril Groves continues to make progress towards goals through participating in group psychotherapy and is encouraged to continue to progress towards long term goals  Tx Plan Objective: 1 2 Therapist:  Golden Holder    Other     Case Management Note    Shawna Byrd LPC    Current suicide risk : Low     ([id number]) Met with Aldo Groves to discuss treatment plan goals and updates  Identified wanting to continue to address agoraphobia and continue to make steps  Discussed behavioral activation over the weekend and walking around [facility] for ten minutes  Reviewed previous homework assignment and abilities to begin coping  Addressed new task of identifying potential feared experiences  Medications changes/added/denied? No    Treatment session number: 7    Individual Case Management Visit provided today?  Yes     [facility] follow up physician's orders: None at this time

## 2019-02-04 ENCOUNTER — OFFICE VISIT (OUTPATIENT)
Dept: PSYCHIATRY | Facility: CLINIC | Age: 39
End: 2019-02-04
Payer: MEDICARE

## 2019-02-04 ENCOUNTER — OFFICE VISIT (OUTPATIENT)
Dept: PSYCHOLOGY | Facility: CLINIC | Age: 39
End: 2019-02-04
Payer: MEDICARE

## 2019-02-04 DIAGNOSIS — F40.01 PANIC DISORDER WITH AGORAPHOBIA: ICD-10-CM

## 2019-02-04 DIAGNOSIS — F43.10 POSTTRAUMATIC STRESS DISORDER: ICD-10-CM

## 2019-02-04 DIAGNOSIS — F31.32 BIPOLAR 1 DISORDER, DEPRESSED, MODERATE (HCC): Primary | ICD-10-CM

## 2019-02-04 PROCEDURE — G0410 GRP PSYCH PARTIAL HOSP 45-50: HCPCS

## 2019-02-04 PROCEDURE — 99213 OFFICE O/P EST LOW 20 MIN: CPT | Performed by: PSYCHIATRY & NEUROLOGY

## 2019-02-04 PROCEDURE — G0177 OPPS/PHP; TRAIN & EDUC SERV: HCPCS

## 2019-02-04 PROCEDURE — G0176 OPPS/PHP;ACTIVITY THERAPY: HCPCS

## 2019-02-04 NOTE — PSYCH
Subjective:     Patient ID: Michael Ayala is a 45 y o  male  [facility] Clinical Progress Notes      Specialized Services Documentation  Therapist must complete separate progress note for each specific clinical activity in which the individual participated during the day  Group Psychotherapy     ([id number]) Michael Ayala participated in psychotherapy process group today  This writer facilitated a mindfulness exercise practicing becoming more energized and hopeful  Clients checked in, shared how they were feeling, and an accomplishment or a challenge they underwent over the weekend and the outcome  From check in, clients shared struggles they are currently enduring, and offered support to one another on how to overcome obstacles  Clients empathized with one another, sharing their challenges with work and how they have used skills in the past to overcome obstacles  Vero Khan shared feeling anxious and struggled with having weird dreams and waking up with anxiety  He identified improvements in mood from utilization of coping skills and was encouraged to look into improved wind-down routines  Vero Khan continues to make progress towards goals through participation in group and is encouraged to continue in order to progress towards long term goals  Tx Plan Objective: 1 1 Therapist:  Nelly Chester [facility] [place]    Other     Case Management Note    Shawna Byrd LPC    Current suicide risk : Low     ([id number]) Met with Vero Khan to review treatment plan and weekend  Shared his improvements in moods after utilizing coping skills and progress  Vero Khan shared wanting to continue to engage in behavior activation and experiment with his ability to confront social situations  Vero Khan appeared motivated  Medications changes/added/denied? No    Treatment session number: 8    Individual Case Management Visit provided today?  Yes     [facility] follow up physician's orders: None at this time

## 2019-02-04 NOTE — PSYCH
Treatment Recommendations- Risks Benefits     Immediate Medical/Psychiatric/Psychotherapy Treatments and Any Precautions:     Continue current medications  Continue Partial     Risks, Benefits And Possible Side Effects Of Medications:  Risks, benefits, and possible side effects of medications explained to patient and patient verbalizes understanding    Controlled Medication Discussion: Discussed with patient the risks of sedation, respiratory depression, impairment of ability to drive and potential for abuse and addiction related to treatment with benzodiazepine medications  The patient understands risk of treatment with benzodiazepine medications, agrees to not drive if feels impaired and agrees to take medications as prescribed  and The patient has been filling controlled prescriptions on time as prescribed to [facility]           Assessment/Plan:       Diagnoses and all orders for this visit:    Bipolar 1 disorder, depressed, moderate ([facility] [address] )    Panic disorder with agoraphobia    Posttraumatic stress disorder          Subjective:     Patient ID: Alicia Zimmerman is a 45 y o  male who has been participating in the program still  Feels anxious because now he has dreams that he can not fine people or objects and when he wake up feels sad and anxious  He denies any other issues  He is taking his medications, denies any side effects       HPI ROS Appetite Changes and Sleep: normal appetite, normal energy level and normal number of sleep hours    Review Of Systems:     Mood Anxiety and Depression   Behavior Normal    Thought Content Normal   General Decreased Functioning   Personality Normal   Other Psych Symptoms Normal   Constitutional Negative   ENT Negative   Cardiovascular Negative   Respiratory Negative   Gastrointestinal Negative   Genitourinary Negative   Musculoskeletal Negative   Integumentary Negative   Neurological Negative   Endocrine Normal    Other Symptoms Normal Laboratory Results: No results found for this or any previous visit  Substance Abuse History:  History   Drug Use No       Family Psychiatric History:   Family History   Problem Relation Age of Onset    Schizophrenia Father     Alcohol abuse Father     Drug abuse Brother        The following portions of the patient's history were reviewed and updated as appropriate:   He  has a past medical history of Anxiety; Bipolar disorder ([place] [address] ); Crohn's colitis ([place] [address] ); Depression; Panic attack; Panic disorder; Psychiatric disorder; PTSD (post-traumatic stress disorder); and Sleep difficulties  He   Patient Active Problem List    Diagnosis Date Noted    Bipolar 1 disorder, depressed, moderate ([place] [address] ) 01/12/2019    Hypertriglyceridemia 08/19/2018    High risk medication use 07/24/2018    Crohn's disease ([place] [address] ) 10/04/2016    Marijuana use, continuous 09/30/2016    Posttraumatic stress disorder 12/31/2015    Panic disorder with agoraphobia 10/23/2012    Encounter for long-term (current) use of other medications 12/13/2011    Asthma with exacerbation 05/11/2011    Combined drug dependence, excluding opioid, in remission ([place] [address] ) 05/11/2011    Status post ileostomy ([place] [address] ) 05/11/2011    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[address] ) 05/11/2011     He  has a past surgical history that includes Hemicolectomy  His family history includes Alcohol abuse in his father; Drug abuse in his brother; Schizophrenia in his father  He  reports that he has been smoking  He has never used smokeless tobacco  He reports that he does not drink alcohol or use drugs    Current Outpatient Prescriptions   Medication Sig Dispense Refill    amLODIPine (NORVASC) 10 mg tablet Take 1 tablet (10 mg total) by mouth daily 15 tablet 1    carBAMazepine (TEGretol XR) 200 mg 12 hr tablet TAKE 1 TABLET BY MOUTH EVERY DAY 30 tablet 2    carBAMazepine (TEGretol XR) 400 mg 12 hr tablet TAKE 1 TABLET (400 MG TOTAL) BY MOUTH DAILY AT BEDTIME 30 tablet 2    clonazePAM (KlonoPIN) 1 mg tablet Take 1 tablet (1 mg total) by mouth 3 (three) times a day 90 tablet 2    lisinopril (ZESTRIL) 10 mg tablet Take 1 tablet (10 mg total) by mouth every evening 15 tablet 1    OLANZapine (ZyPREXA) 15 mg tablet Take 1 tablet (15 mg total) by mouth daily at bedtime  0     No current facility-administered medications for this visit        He is allergic to infliximab; penicillins; and penicillin v       Objective:     Physical Exam        Mental status:  Appearance calm and cooperative , adequate hygiene and grooming and good eye contact    Mood depressed and anxious   Affect affect appropriate    Speech a normal rate   Thought Processes coherent/organized and normal thought processes   Hallucinations no hallucinations present    Thought Content no delusions   Abnormal Thoughts no suicidal thoughts  and no homicidal thoughts    Orientation  oriented to person and place and time   Recent & Remote Memory short term memory intact and long term memory intact   Attention & Concentration Span concentration impaired   Intellect Appears to be of Average Intelligence   Fund of Knowledge displays adequate knowledge of current events, adequate fund of knowledge regarding past history and adequate fund of knowledge regarding vocabulary    Insight Insight intact   Judgement judgment was intact   Muscle Strength Muscle strength and tone were normal and Normal gait    Language no difficulty naming common objects, no difficulty repeating a phrase  and no difficulty writing a sentence    Fund of Knowledge displays adequate knowledge of current events, adequate fund of knowledge regarding past history and adequate fund of knowledge regarding vocabulary    Pain none   Pain Scale 0       Best Sen MD

## 2019-02-04 NOTE — PSYCH
Subjective:     Patient ID: Selina Kwong is a 45 y o  male  [facility] Clinical Progress Notes      Specialized Services Documentation  Therapist must complete separate progress note for each specific clinical activity in which the individual participated during the day  Allied Therapy  3458-0716  Selina Kwong moderately participated in [facility] group focused on DBT Mindfulness skill Wise Mind  He appeared tired during the session  Group discussed the differences between emotional mind and reasonable mind, noting that arenas mind was a healthy synthesis of the two  Group was given handouts on ways to practice wise mind meditations  Key Smith actively engaged in task practicing wise mind and mindfulness meditation  He acknowledged that wise mind was a preferable state of mind  Some progress towards goal noted  Continue AT to increase self-awareness and use of coping skills with daily practice    Tx Plan Objective: 1 2, Therapist: Vero DaleBC    Education Therapy   Time:  9410-6938  Previous goal met: No  Readiness to Learning: Receptive  Barriers to Learning: None  Learning Assessment  Time: 3099-4157  Education Completed: Illness and Wellness Tools  Teaching Method: Verbal and Demonstration  Shared Area of Learning: Yes   Goal Set: Do laundry  Tx Plan Objective: 1 2, Therapist: Vero Dale MT-BC

## 2019-02-04 NOTE — PSYCH
Subjective:     Patient ID: Mackenzie Greene is a 45 y o  male  [facility] Clinical Progress Notes      Specialized Services Documentation  Therapist must complete separate progress note for each specific clinical activity in which the individual participated during the day  Group Psychotherapy ([id number]) Lacey Ozuna RN     Mackenzie Greene attended  wellness group today on medication  management and compliance  Managing medications well is an important part of recovery and relapse prevention  People typically need to try several strategies to find the one that works for them  There are many strategies that can be used  The key to find the one that best suit your needs  Some of theses include:  (1) Having medication with me in case I am not home  (2) Putting my medication in a visible location  (3) Reminding myself of what happens if I dont take my medications  (4) Someone reminding me to take my medications  (5) Setting up my medication in a 1-week organizer  (6) Setting up my daily medications  (7) Leaving myself a note with medications times  (8) Taking my medications with meals  (9) Setting up an alarm clock  (10) Taking my medications at bedtime  (11) Other  -------------------------------------  Are the strategies for your medication working consistently   -------------Yes------------ No--------------  If not, is there a strategy that you would like to try? Describe it:  --------------------------------------------  A medication blank medication work sheet provided with the headings   Name of Drug:  What its For:  Date Started:  Doctor:  Color/Shape:  Dose and Instructions :  Was able to understand the importance of medication management and was able to list medications effectively but was not sure of the correct dosages of his Zyprexa  Education provided and understood  Eda Hodges will continue to attend wellness groups in order to achieve his goals and his objectives             Tx Plan Objective: 1 3

## 2019-02-05 ENCOUNTER — OFFICE VISIT (OUTPATIENT)
Dept: PSYCHOLOGY | Facility: CLINIC | Age: 39
End: 2019-02-05
Payer: MEDICARE

## 2019-02-05 DIAGNOSIS — F31.32 BIPOLAR 1 DISORDER, DEPRESSED, MODERATE (HCC): Primary | ICD-10-CM

## 2019-02-05 DIAGNOSIS — F43.10 POSTTRAUMATIC STRESS DISORDER: ICD-10-CM

## 2019-02-05 DIAGNOSIS — F40.01 PANIC DISORDER WITH AGORAPHOBIA: ICD-10-CM

## 2019-02-05 PROCEDURE — G0410 GRP PSYCH PARTIAL HOSP 45-50: HCPCS

## 2019-02-05 PROCEDURE — G0176 OPPS/PHP;ACTIVITY THERAPY: HCPCS

## 2019-02-05 PROCEDURE — G0177 OPPS/PHP; TRAIN & EDUC SERV: HCPCS

## 2019-02-05 NOTE — PSYCH
Subjective:     Patient ID: Daly Meza is a 45 y o  male  [facility] Clinical Progress Notes      Specialized Services Documentation  Therapist must complete separate progress note for each specific clinical activity in which the individual participated during the day  Group Psychotherapy ([id number]) Jese Blake RN    Attended wellness group : How long  does it Actually Take to Form a New Habit? -  The Habit Change Cheatsheet:  Ways to Successfully Ingrain a Behavior  What is a Habit? -  Our daily lives are often a series of habits played out through the day  What you repeatedly do (i e  what you spend time thinking about and doing each day) ultimately forms the person you are, the things you believe, and the personality you portray  But what if you want to improve? What if you want to form new habits? How would you go about it? Every habit you have -good or bad- follows the same 3- step pattern  (1) Reminder ( the trigger that initiates the behavior)  (2) Routine (the behavior itself; the action you take and   (3) Reward (the benefit you gain from doing the behavior)  This pattern has been proven over and over again by behavioral psychology researchers  On average, according to research, it takes anywhere from 2 months to 8 months to build an new behavior into you life  How to start to change a habit discussed beginning with   (1) Keep it simple/ start small  (2) Do just one habit at a time  (3) Do a 30-day challenge  (4) Write it down  (5) Make a plan  (6) Know your motivations, and be sure they are strong  (7) Write down all the obstacles  (8) Identify your triggers  (9) For each trigger, identify a positive habit you are going to do instead    (10) Plan a support system  (11) Ask for help  (12) Become aware of self talk  (13) Have strategies to defeat that urge  (14) Prepare for sabotages  (15) Have a mantra  (16) Use visualizations  (17) Take it one urge at a time  (18) Have rewards  (19) No exceptions  (20) Get rest  (21) Drink lots of water  (22) Renew your commitment often  (23) Avoid situations where you normally do your old habit  (24) If you fail, figure out what went wrong, plan for it, and try again  At the end of the day , how long it takes to form a new habit is not the as important as putting the work in to change it  Michele Mancuso stated " I like giving myself a reward when I finally change my bad habit  I like incentives because it makes me want to work harder if I can treat myself  I plan to use this technique to change my bad habits, one habit at a time "  Johnna Valdez is making progress towards goals and objectives and will continue to attend wellness groups      Tx Plan Objective: 1 2

## 2019-02-05 NOTE — PSYCH
Subjective:     Patient ID: Eric Hector is a 45 y o  male  [facility] Clinical Progress Notes      Specialized Services Documentation  Therapist must complete separate progress note for each specific clinical activity in which the individual participated during the day  Allied Therapy  0746-7630  Eric Hector actively participated in [facility] group focused on DBT distress tolerance skill self-soothe  Group reviewed DBT skills STOP, TIP, and Pros and Cons  Group discussed soothing through the five senses to get through crises and was shown an example of a self-soothe kit, a collection of comforting items in a bag to use in times of distress  Group was encouraged to make a self-soothe kit and practice often  Oh Hawthorne was observed to moderately engage in progressive muscle relaxation and appeared to fall asleep  He shared it was "relaxing " He shared that he intended to make a self-soothe kit tonight as it would be helpful to learn to self-soothe, rather than constantly rely on others  Positive progress towards goal noted  Continue AT to increase use of coping skills and daily practice    Tx Plan Objective: 1 2, Therapist: Swetha Obrien    Education Therapy   Time:  3062-0463  Previous goal met: Yes   Readiness to Learning: Receptive  Barriers to Learning: None  Learning Assessment  Time: 1868-2459  Education Completed: Illness and Wellness Tools  Teaching Method: Verbal and Demonstration  Shared Area of Learning: Yes   Goal Set: Make a crisis kit  Tx Plan Objective: 1 2, Therapist: Swetha Obrien

## 2019-02-05 NOTE — PSYCH
Subjective:     Patient ID: Leatha Bruce is a 45 y o  male  [facility] Clinical Progress Notes      Specialized Services Documentation  Therapist must complete separate progress note for each specific clinical activity in which the individual participated during the day  Group Psychotherapy   0930-1030 Marco Antonio marian shared in psychotherapy group exploring expectations and managing loss  He identified that he comes to program to work on his social anxiety  He was quiet yet attentive throughout group sharing only when prompted  He shared in mindfulness meditation and was able to speak to the benefit of grounding techniques  Inconsistent progress noted toward goal  Continue psychotherapy to explore personal role in wellness and sharing of challenges and successes  Tx Plan Objective: 1 2, Therapist:  Blanka Dow [facility]      Case Management Note    Blanka Dow [facility]    Current suicide risk : [facility] felt he was doing okay when asked and did not need to check in  RN Evelin Brothers did meet with him and Dr Adore Zamora to clarify Zyprexa dose  Medications changes/added/denied? No    Treatment session number: 9    Individual Case Management Visit provided today?  No    [facility] follow up physician's orders: na

## 2019-02-06 ENCOUNTER — OFFICE VISIT (OUTPATIENT)
Dept: PSYCHOLOGY | Facility: CLINIC | Age: 39
End: 2019-02-06
Payer: MEDICARE

## 2019-02-06 DIAGNOSIS — F40.01 PANIC DISORDER WITH AGORAPHOBIA: ICD-10-CM

## 2019-02-06 DIAGNOSIS — F43.10 POSTTRAUMATIC STRESS DISORDER: ICD-10-CM

## 2019-02-06 DIAGNOSIS — F31.32 BIPOLAR 1 DISORDER, DEPRESSED, MODERATE (HCC): Primary | ICD-10-CM

## 2019-02-06 PROCEDURE — G0176 OPPS/PHP;ACTIVITY THERAPY: HCPCS

## 2019-02-06 PROCEDURE — G0177 OPPS/PHP; TRAIN & EDUC SERV: HCPCS

## 2019-02-06 PROCEDURE — G0410 GRP PSYCH PARTIAL HOSP 45-50: HCPCS

## 2019-02-06 NOTE — PSYCH
Subjective:     Patient ID: Nuris Zuñiga is a 45 y o  male  [facility] Clinical Progress Notes      Specialized Services Documentation  Therapist must complete separate progress note for each specific clinical activity in which the individual participated during the day  Allied Therapy  6028-1931  Nuris Zuñiga actively participated in [facility] group focused on decreasing self- stigma and supports  He attentively listened to [facility] share his life story  Group encouraged power of learning about self, accepting illness and personal responsibility in recovery  Community resources reviewed in addition to personal resources like self talk/mantras  Merritt Box actively engaged in self-talk and mantra repeating exercise  Positive progress toward goal noted  Continue AT to encourage self -awareness and healthy engagement of supports     Tx Plan Objective: 1 2, Therapist: Lindsay Jeong    Education Therapy   Time:  9302-9620  Previous goal met: Yes   Readiness to Learning: Receptive  Barriers to Learning: None  Learning Assessment  Time: 4010-9539  Education Completed: Illness, Aftercare and Wellness Tools  Teaching Method: Verbal and Demonstration  Shared Area of Learning: Yes   Goal Set: Clean room  Tx Plan Objective: 1 2, Therapist: Lindsay Jeong

## 2019-02-06 NOTE — PSYCH
Subjective:     Patient ID: Mallika David is a 45 y o  male  [facility] Clinical Progress Notes      Specialized Services Documentation  Therapist must complete separate progress note for each specific clinical activity in which the individual participated during the day  Group Psychotherapy   [id number]  Serjio Rodriguez actively shared in psychotherapy group exploring consistency and facing life challenges  He was increasingly active in group today  He offered feedback related to person role in being consistent as one faces daily challenges  He was more hopeful  He shared in limiting belief discussion and CBT strategy of inclusion and counter-beliefs reviewed  He was engaged in offering to others  The importance of consistency in skill practice reinforced  Some progress voiced toward goal  Continue psychotherapy to explore personal role in wellness and sharing of challenges and successes     Tx Plan Objective: 1 2,1 5, Therapist:  Alexa MCDERMOTT

## 2019-02-06 NOTE — PSYCH
Subjective:     Patient ID: Jese Gaviria is a 45 y o  male  [facility] Clinical Progress Notes      Specialized Services Documentation  Therapist must complete separate progress note for each specific clinical activity in which the individual participated during the day  Group Psychotherapy ([id number]) Loan Oscar RN      Jese Gaviria attended wellness group today on : Establishing Healthy Boundaries  Boundaries: We learn about boundaries in our earliest years  Our family of origin set the stage for our adult lives and how we manage boundaries  What are they? How do we express them? How are they set and, once set, how do we maintain them? Learning about healthy boundaries (external as well as internal is central to restoring health to an unhealthy relationship  Practical Rules of Boundaries:   (1) Behavioral ownership:  Take responsibility for behaviors and consequences  (2) Ownership of feeling:  I am not in charge of or responsible for your feelings  I am responsible for my feelings in an adult to adult manor  (3) Personal power: I only have power of myself  I cannot change some one else  (4) Self-respect teaches me to respect others boundaries  (5) Healthy motives increase the freedom to say no or yes without guilt  (6) Not all pain is bad pain  (Sometimes healthy boundaries come with some pain)  (7) Be consistent  Mean business  (8) Boundaries reduce anger  Lacks of boundaries build resentments  (9) Assertive boundaries arent secret, they are expressed and practiced  Summary:  Research shows that healthy boundaries make for healthy resilient relationships and that least angry people are people with good boundaries  Healthy boundaries maintain dignity and honors self respect    Na Henderson identified " I like the rule that healthy boundaries reduce anger and I will try to use that in my life to establish more healthier boundaries "  Na Henderson is making progress towards goals and objectives and will continue to attend wellness groups  Tx Plan Objective: 1 1,1 2          Case Management Note Kenisha Duggan RN ([id number])    Lizz Flood stated " I am feeling pretty [place] today  I like what I am getting from here and it was a refresher course that I needed to remind me of what I needed to work on to change in my life "  Lizz Flood was asked to bring in his bottle of Zyprexa tomorrow for Dr Abby Walsh MD to see dosage of the medication  Current suicide risk : Low     Medications changes/added/denied? No    Treatment session number: 10    Individual Case Management Visit provided today?  Yes     [facility] follow up physician's orders: none

## 2019-02-07 ENCOUNTER — OFFICE VISIT (OUTPATIENT)
Dept: PSYCHOLOGY | Facility: CLINIC | Age: 39
End: 2019-02-07
Payer: MEDICARE

## 2019-02-07 DIAGNOSIS — F43.10 POSTTRAUMATIC STRESS DISORDER: ICD-10-CM

## 2019-02-07 DIAGNOSIS — F40.01 PANIC DISORDER WITH AGORAPHOBIA: ICD-10-CM

## 2019-02-07 DIAGNOSIS — F31.32 BIPOLAR 1 DISORDER, DEPRESSED, MODERATE (HCC): Primary | ICD-10-CM

## 2019-02-07 PROCEDURE — G0177 OPPS/PHP; TRAIN & EDUC SERV: HCPCS

## 2019-02-07 PROCEDURE — G0410 GRP PSYCH PARTIAL HOSP 45-50: HCPCS

## 2019-02-07 PROCEDURE — G0176 OPPS/PHP;ACTIVITY THERAPY: HCPCS

## 2019-02-07 NOTE — PSYCH
Subjective:     Patient ID: Mallika David is a 45 y o  male  [facility] Clinical Progress Notes      Specialized Services Documentation  Therapist must complete separate progress note for each specific clinical activity in which the individual participated during the day  Group Psychotherapy Ad Lema RN ([id number])       Serjio Rodriguez attended wellness group today on - Relationships And You-  Questions asked:  (1) List 3 personal traits you like in others:  (2) List 3 personal traits you dislike in others:  (3) Name 3 positive qualities you bring or could bring to a relationship:  (4) Name 3 personal accomplishments or achievements youd enjoy sharing with someone:  (5) List any self-defeating behavior you see in yourself:  (6) Name 3 persons you like from other backgrounds other than you own:  (7) How do you nurture love and friendship:   Discussion on -  Supportive vs Toxic Relationships :   A toxic person can be any one you come across in your life, live with, work with or see from time to time  Sadly, many of us become victims of a toxic relationship  We have to know when to say enough and move on   A toxic person can be anyone who infects (like a disease) your thoughts, emotions, feelings, and behavior in ways that are not good  Supportive people keep us from feeling alone, offer approval, are honest, give strength, want what is best for us, supply us with kind listening, keep us from falling or sinking, speak in favor of us and supply us with kind listening  Was able to identify supportive and toxic relationships and will make action plan to keep supportive relationship and let go of the toxic relationships  Serjio Rodriguez was very vocal in group and did express that " I tend to attract toxic people because when I am not doing well mentally I tend to form most of my relationships    When I start to get mentally better is when I realize that they are toxic but I feel its too late then to let them out of my life  That is why the cycle continues "  Kumar Brower was given positive ways to get rid of toxic people in his life and why it was so imperative to do this and did agree with the feed back  Kumar Brower is making progress towards goals and objective and will continue to attend wellness groups        Tx Plan Objective: 1 1,1 2,1 4

## 2019-02-07 NOTE — PSYCH
Subjective:     Patient ID: Petar Sifuentes is a 45 y o  male  [facility] Clinical Progress Notes      Specialized Services Documentation  Therapist must complete separate progress note for each specific clinical activity in which the individual participated during the day  Group Psychotherapy     ([id number]) Petar Sifuentes participated in psychotherapy process group today  This writer facilitated a mindfulness exercise focusing on mindful awareness  Clients checked in, shared how they were feeling, and something that has been going on with them recently  Group discussion focused on self-love and lack of appreciation for the self  Clients discussed ways to improve their self-appreciation and their relationship with themselves, in order to be a more productive and encouraging partner in other relationships  Clients completed activities focusing on positive aspects and accomplishments about themselves and had discussion questions focusing on their strengths and opinions  Jo Benavidez shared feeling anxious today and he continues to fall into a pattern of not being productive after program  Jo Benavidez shared 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 and that rekindling a passion of his and implementing into treatment has improved his overall wellness  Jo Benavidez  continues to make progress towards goals through participation in groups and is encouraged to continue in order to progress towards long term goals  Tx Plan Objective: 1 1 Therapist:  Jeovany Man, [facility]    Other     Case Management Note    Shawna Byrd LPC    Current suicide risk : Low     ([id number]) Met with Jo Benavidez and discussed progress and accomplishing goals  Jo Benavidez discussed feeling like he is making progress and continues to improve  Shared ups and downs of treatment but also that overall he feels better  Discussed discharge and upcoming dates    Identified that 02/12/09 would be an ideal discharge date for him as he has individual therapy and group therapy scheduled  Medications changes/added/denied? No    Treatment session number: 11    Individual Case Management Visit provided today?  Yes     [facility] follow up physician's orders: None at this time

## 2019-02-07 NOTE — PSYCH
Subjective:     Patient ID: Alicia Zimmerman is a 45 y o  male  [facility] Clinical Progress Notes      Specialized Services Documentation  Therapist must complete separate progress note for each specific clinical activity in which the individual participated during the day  Allied Therapy  [id number]  Alicia Zimmerman actively participated in [facility] group focused on DBT interpersonal effectiveness skill DEAR MAN  Group engaged in italia analysis discussing barriers to communication  Group discussed difficulty asking for things they need and reinforced the importance of assertiveness  Pamela Oneil engaged in task practicing writing [address] scripts to ask for things  Group was encouraged to practice writing DEAR MAN scripts about situations in their own lives  Pamela Oneil shared that the group was really helpful to him as he got to practice using DEAR MAN in the moment  Positive progress towards goal noted  Continue AT to increase self-awareness and use of coping skills with daily practice    Tx Plan Objective: 1 2, Therapist: Linda Ruiz    Education Therapy   Time:  4320-6184  Previous goal met: Yes   Readiness to Learning: Receptive  Barriers to Learning: None  Learning Assessment  Time: 5015-2144  Education Completed: Illness, Medication and Wellness Tools  Teaching Method: Verbal and Demonstration  Shared Area of Learning: Yes   Goal Set: Make important phone call  Tx Plan Objective: 1 2, Therapist: Linda Ruiz

## 2019-02-08 ENCOUNTER — OFFICE VISIT (OUTPATIENT)
Dept: PSYCHOLOGY | Facility: CLINIC | Age: 39
End: 2019-02-08
Payer: MEDICARE

## 2019-02-08 DIAGNOSIS — F31.32 BIPOLAR 1 DISORDER, DEPRESSED, MODERATE (HCC): Primary | ICD-10-CM

## 2019-02-08 DIAGNOSIS — F40.01 PANIC DISORDER WITH AGORAPHOBIA: ICD-10-CM

## 2019-02-08 PROCEDURE — G0176 OPPS/PHP;ACTIVITY THERAPY: HCPCS

## 2019-02-08 PROCEDURE — G0410 GRP PSYCH PARTIAL HOSP 45-50: HCPCS

## 2019-02-08 PROCEDURE — G0177 OPPS/PHP; TRAIN & EDUC SERV: HCPCS

## 2019-02-08 NOTE — PSYCH
Subjective:     Patient ID: Zahra Montalvo is a 45 y o  male  [facility] Clinical Progress Notes      Specialized Services Documentation  Therapist must complete separate progress note for each specific clinical activity in which the individual participated during the day  Allied Therapy  [id number]  Zahra Montalvo actively participated in [facility] group focused on DBT emotional regulation skill opposite action  Group reviewed the importance of naming and understanding emotions  Group discussed how to determine if emotions to situations were justified or unjustified, if acting on it was effective, and opposite actions that could be used to change the emotion  Aldo Groves was encouraged to act opposite to emotions that would be a risk this weekend  He shared that he would go to a public place this weekend to challenge his social anxiety  He shared that the group was helpful to him  Positive progress towards goal noted  Continue AT to increase self awareness and encourage daily practice of coping strategies    Tx Plan Objective: 1 1, 1 2, Therapist: Alem Mohr    Education Therapy   Time:  5926-3659  Previous goal met: Yes   Readiness to Learning: Receptive  Barriers to Learning: None  Learning Assessment  Time: 7526-5179  Education Completed: Illness, Aftercare and Wellness Tools  Teaching Method: Verbal and Demonstration  Shared Area of Learning: Yes   Goal Set: Go to [facility]/public place  Tx Plan Objective: 1 1, 1 2, Therapist: Alem Mohr

## 2019-02-08 NOTE — PSYCH
Subjective:     Patient ID: Lydia Roa is a 45 y o  male  [facility] Clinical Progress Notes      Specialized Services Documentation  Therapist must complete separate progress note for each specific clinical activity in which the individual participated during the day  Group Psychotherapy     ([id number]) Lydia Roa participated in psychotherapy process group today  This writer facilitated a mindfulness exercise focusing on mindful immersion  Clients checked in, shared how they were feeling and identified a productive and pleasant activity they are scheduling for the weekend  Clients engaged in discussion on coping skills and continued development of wellness skills  Clients reviewed self-talk strategies and shared self-talk examples they use when coping, reviewing, or preparing for things  Clients shared positive affirmations that apply to them  Amanda Salter shared feeling "good" and having weird dreams and looking forward to seeing his parents this weekend, going to [employer] and going to [employer]  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 No    Treatment session number: 12    Individual Case Management Visit provided today?  No    [facility] follow up physician's orders: None at this time

## 2019-02-08 NOTE — PSYCH
Subjective:     Patient ID: Michael Ayala is a 45 y o  male  [facility] Clinical Progress Notes      Specialized Services Documentation  Therapist must complete separate progress note for each specific clinical activity in which the individual participated during the day  Group Psychotherapy ([id number]) Maryan Morris RN     Michael Ayala attended- : weekly  wellness assessment group today  Reviewing weekend supports and plan as we approach the weekend  Medications reviewed with  no concerns or questions at this time  Compliance of medications reviewed and understood  The six (6) dimension of wellness discussed (Physical, Emotional, Cognitive, Vocational, Social and Spiritual)  The format for assessing wellness and measuring progress   reviewed   Questions asked :  (1) I intend to improve in this area by    (2) My first step will be   (3) I will share my plans with Giana Jose ask for their support by saying    Vero Khan did say " I have learned to accept what I cannot change and to move on  This has made me so much more mentally stronger and happier  I feel that I am doing well as far as my mental wellness and I hope to continue with this "  Susanna Anders making  progress towards goals and objectives and will continue to attend wellness group       Tx Plan Objective: 1 1,1 2,1 3,1 4

## 2019-02-11 ENCOUNTER — DOCUMENTATION (OUTPATIENT)
Dept: PSYCHOLOGY | Facility: CLINIC | Age: 39
End: 2019-02-11

## 2019-02-11 ENCOUNTER — APPOINTMENT (OUTPATIENT)
Dept: PSYCHOLOGY | Facility: CLINIC | Age: 39
End: 2019-02-11
Payer: MEDICARE

## 2019-02-11 NOTE — PROGRESS NOTES
Subjective:     Patient ID: Nuris Zuñiga is a 45 y o  male  [facility] Clinical Progress Notes      Specialized Services Documentation  Therapist must complete separate progress note for each specific clinical activity in which the individual participated during the day  Case Management Note    Jaspal Coleman LPC    Current suicide risk : Unable to assess due to absence  Nuris Zuñiga contacted the program and stated they were not going to be present due to having car trouble and not being able to get a ride  He stated he will be present the following day  Medications changes/added/denied? No    Treatment session number: N/A    Individual Case Management Visit provided today? No    [facility] follow up physician's orders: None at this time

## 2019-02-11 NOTE — PROGRESS NOTES
Subjective:     Patient ID: Selina Kwong is a 45 y o  male  [facility] Clinical Progress Notes      Specialized Services Documentation  Therapist must complete separate progress note for each specific clinical activity in which the individual participated during the day  This writer notified Selina Kwong that [facility] will be closed tomorrow 02/12/19 due to inclement weather and will resume 02/13/19

## 2019-02-12 ENCOUNTER — APPOINTMENT (OUTPATIENT)
Dept: PSYCHOLOGY | Facility: CLINIC | Age: 39
End: 2019-02-12
Payer: MEDICARE

## 2019-02-13 ENCOUNTER — APPOINTMENT (OUTPATIENT)
Dept: PSYCHOLOGY | Facility: CLINIC | Age: 39
End: 2019-02-13
Payer: MEDICARE

## 2019-02-13 ENCOUNTER — DOCUMENTATION (OUTPATIENT)
Dept: PSYCHOLOGY | Facility: CLINIC | Age: 39
End: 2019-02-13

## 2019-02-13 NOTE — PROGRESS NOTES
Subjective:     Patient ID: Lydia Roa is a 45 y o  male  [facility] Clinical Progress Notes      Specialized Services Documentation  Therapist must complete separate progress note for each specific clinical activity in which the individual participated during the day  Lydia Roa was intended to attend an individual therapy session today with his outpatient therapist and complete discharge paperwork following that appointment, but he ran into a family emergency and is unable to attend  Stated he will contact this writer the following day to follow up

## 2019-02-13 NOTE — PROGRESS NOTES
[facility] Discharge Instructions:     Disposition: home     Address: [address] [place] [address]     Diagnosis:  Bipolar 1 disorder, depressed, moderate (HCC)  Panic disorder with agoraphobia  Posttraumatic stress disorder        Allergies (Drug/Food): Allergies   Allergen Reactions    Infliximab Other (See Comments)    Penicillins Other (See Comments)     rash    Penicillin V Rash     Activity: No activity resctrictions  Diet:no recommendations  Smoking Cessation:not a smoker   Diagnostic/Laboratory Orders: None at this time    Vaccines: If you received a vaccine, please notify your family physician on your next visit  For more information, please call [phone]  Follow-up appointments/Referrals: Outpatient therapy appointments with Lily Parrish:  02/13/19 at 1500, 02/27/19 at 1300  Bipolar Group Therapy 02/21/19 at 1400, and Medication Management with Dr Mal Ortiz on 03/13/19 at 1000  ICM/CTT: Continue to follow up with [facility] through [address] [address]: [place] [phone] and [facility] [phone]  Intake/Referral/Evaluation (Non-Emergency) [facility]: [phone]  Crisis Intervention (Emergency) [place] Service: [facility]: [phone], [facility]: [phone], [facility]: [phone], [facility]): [phone], [facility]: [phone] and [facility]: [phone]  [place] Crisis Intervention Hotline: 9-227.989.4128  National Suicide Crisis Hotline: 5-817.566.9862  I, the undersigned, have received and understand the above instructions          Patient/Rep Signature: __________________________________       Date/Time: ______________         Physician Signature: ____________________________________      Date/Time: ______________               Signature: ________________________________       Date/Time: ______________

## 2019-02-14 ENCOUNTER — DOCUMENTATION (OUTPATIENT)
Dept: PSYCHOLOGY | Facility: CLINIC | Age: 39
End: 2019-02-14

## 2019-02-14 ENCOUNTER — APPOINTMENT (OUTPATIENT)
Dept: PSYCHOLOGY | Facility: CLINIC | Age: 39
End: 2019-02-14
Payer: MEDICARE

## 2019-02-14 NOTE — PROGRESS NOTES
Subjective:     Patient ID: Philipp Zuñiga is a 45 y o  male  [facility] Discharge Summary:     Admission Date: 01/23/19    Patient was referred by [facility]  Discharge Date: 02/14/19     Was this a routine discharge? yes     Diagnosis: Axis I:   Bipolar 1 disorder, depressed, moderate (HCC)  Panic disorder with agoraphobia  Posttraumatic stress disorder    Treating Physician: Dr Richard Gilbert MD     Treatment Complications: None noted    Presenting Need:   As per Dr Gris Talley: Arville Morning a 45 y  o  male with Bipolar disorder, panic episode ,PTSD  and anxiety referred from [address] [place] [address] psychiatric unit [facility] he was admitted from 1/11/2019 to 1//2019 due to severe depression and suicidal thoughts  He thought about hanging himself his depression worsen at after his brother overdosed about 2 months ago; he Orpah Kin had 2nd thoughts about suicide and did not want to hurt his family so he went to the hospital  He was hopeless about ever feeling better and then started to become suicidal because of it  He reports when he wakes up in the morning he has overwhelming panic symptoms and experiences panic throughout the week  Ambrocio Carcamo also says that he uses marijuana to control his anxiety but that is not particularly effective either  Onset of symptoms was a few months ago with gradually worsening course since that time  Psychosocial Stressors: family and health   Today Zuhair Rudd Shorty feels anxious, denies any suicidal or homicidal ideation, plan or intent and denies any psychotic symptoms        As per this writer: Philipp Zuñiga is a 45year old male referred to [facility] from a [address] [place] [address] Hospitalization  Marquita Bonilla experienced suicidal ideation, severe panic attacks, agoraphobia, and overwhelming anxiety which led him to his most recent hospitalization    Marquita Bonilla has a history of mental health issues related to his physical health issues, and has struggled with bipolar disorder over the last eight years specifically  Yuridia Richter shared that roughly 10 months ago he had a severe panic attack in [employer], stated that "it was like you see in the movies" and he developed panic disorder from this panic attack and has become afraid of being in any type of crowd  Most recently, Yuridia Richter had a panic attack in a [place] where he saw his niece and became suicidal because he was exhausted from experiencing the physical symptoms accompanied by his anxiety and panic  Yuridia Richter shared experiencing panic attacks with hives, an inability to breath, belly aches, hot flashes, and feeling like he is going to faint  Yuridia Richter states this condition has impeded his day to day functioning and has created an inability to work  Yuridia Richter states his relationships are affected and his family as well        As per Yuridia Richter:  "I don't want to go anywhere, I can't work, I feel hopeless "  Throughout the interview, Yuridia Richter would become visually upset regarding his current situation and his inability to perform day-to-day functions related to his anxiety, panic and agoraphobia  Yuridia Richter shared his strengths being "I'm artistic, funny, personable, humble, and I treat everyone the same no matter where they come from "      Course of treatment includes:    group counseling, medication management, individual case management, allied therapy, psychoeducation and psychiatric evaluation    Treatment Progress: Vikram noel [facility] for 12 days  From the beginning, Yuridia Richter was actively involved in group participation and provided feedback throughout  Initially, he struggled with remaining awake due to medication side effects, but once he changed the times he took his medication as per recommended by the doctor, he was more alert and oriented in sessions    Yuridia Richter was actively involved in treatment, dedicating time outside of program to complete homework related to wellness and improvements  Miah Bazzi shared 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  He ran into transportation issues at the end of programming which led to an inability to attend to sign discahrge paperwork and facilitate a final session  Miah Bazzi has outpatient appointments and group therapy scheduled for the next month  Aftercare recommendations include: Attend outpatient therapy appointments scheduled with Najma Vicente at [facility] on 02/27/19 at 1300, group therapy scheduled with [facility] on 02/21/19 at 1400, and medication management appointment with Dr Orlando Moe at [facility] on 03/13/19 at 1000      Discharge Medications include:  Current Outpatient Medications:     amLODIPine (NORVASC) 10 mg tablet, Take 1 tablet (10 mg total) by mouth daily, Disp: 15 tablet, Rfl: 1    carBAMazepine (TEGretol XR) 200 mg 12 hr tablet, TAKE 1 TABLET BY MOUTH EVERY DAY, Disp: 30 tablet, Rfl: 2    carBAMazepine (TEGretol XR) 400 mg 12 hr tablet, TAKE 1 TABLET (400 MG TOTAL) BY MOUTH DAILY AT BEDTIME, Disp: 30 tablet, Rfl: 2    clonazePAM (KlonoPIN) 1 mg tablet, Take 1 tablet (1 mg total) by mouth 3 (three) times a day, Disp: 90 tablet, Rfl: 2    lisinopril (ZESTRIL) 10 mg tablet, Take 1 tablet (10 mg total) by mouth every evening, Disp: 15 tablet, Rfl: 1    OLANZapine (ZyPREXA) 15 mg tablet, Take 1 tablet (15 mg total) by mouth daily at bedtime, Disp: , Rfl: 0

## 2019-02-14 NOTE — PROGRESS NOTES
Subjective:     Patient ID: Daly Meza is a 45 y o  male  [facility] Clinical Progress Notes      Specialized Services Documentation  Therapist must complete separate progress note for each specific clinical activity in which the individual participated during the day  Case Management Note    Daly Meza was scheduled to attend program on Monday 02/11/19 and was unable due to transportation  His scheduled discharge date was 02/12/19 and the program was closed due to inclement weather  Due to ongoing car trouble, Kalee was unable to attend his outpatient therapy appointment and contacted this writer stating he would attempt to come in 02/14/19 to sign discharge paperwork        Shawna Byrd [facility]    [facility] follow up physician's orders:     DISCHARGE TODAY  Date 02/14/19   TIME 1010  Dr Josiane Ahumada MD

## 2019-03-06 ENCOUNTER — OFFICE VISIT (OUTPATIENT)
Dept: BEHAVIORAL/MENTAL HEALTH CLINIC | Facility: CLINIC | Age: 39
End: 2019-03-06
Payer: MEDICARE

## 2019-03-06 DIAGNOSIS — F43.10 POSTTRAUMATIC STRESS DISORDER: Primary | ICD-10-CM

## 2019-03-06 DIAGNOSIS — F31.32 BIPOLAR 1 DISORDER, DEPRESSED, MODERATE (HCC): ICD-10-CM

## 2019-03-06 DIAGNOSIS — F40.01 PANIC DISORDER WITH AGORAPHOBIA: ICD-10-CM

## 2019-03-06 PROCEDURE — 90834 PSYTX W PT 45 MINUTES: CPT | Performed by: SOCIAL WORKER

## 2019-03-06 NOTE — PSYCH
Treatment Plan Tracking    Treatment Plan not completed within required time limits due to: follow up regarding inpt and partial hospitalizations

## 2019-03-06 NOTE — PSYCH
Psychotherapy Provided: Individual Psychotherapy 45 minutes     Length of time in session: 45 minutes, follow up in 2 week    Goals addressed in session: ,2    Pain:      none    0    Current suicide risk : Low     D:  MSW met with Yuridia Richter for session  Last seen in Dec   Since last session in Jan he was in the psych hospital for 10 days due to [facility] after which he was in [employer] for 10 days  He now has an ICM  and goes to [employer] every day from 9-3  After he got out of [facility] he realized he won his SS case  He was upset that his  did not tell him weeks before when he was approved  He has since gotten his back pay  He is managing his money  He bought a new/used care  His car was a 0  He is trying to "live like he was prior to getting the money "  He is getting out more  Even though he has they car he wanted to go to [employer] but did not feel he could go alone  So he asked his roommate to take him but, he was able to go in and make a purchase  Discussing maybe being able to go to the gym to help with his weight lose  Interested in doing an anger management group again and "completing it this time "   A:  Mod progress on goals  Affect is brighter  Appears stable  P:  To continue addressing  TX plan goals  Behavioral Health Treatment Plan [facility]: Diagnosis and Treatment Plan explained to Guevara Kiser relates understanding diagnosis and is agreeable to Treatment Plan   Yes

## 2019-03-13 ENCOUNTER — OFFICE VISIT (OUTPATIENT)
Dept: PSYCHIATRY | Facility: CLINIC | Age: 39
End: 2019-03-13
Payer: MEDICARE

## 2019-03-13 DIAGNOSIS — F41.1 GAD (GENERALIZED ANXIETY DISORDER): ICD-10-CM

## 2019-03-13 DIAGNOSIS — F40.01 PANIC DISORDER WITH AGORAPHOBIA: ICD-10-CM

## 2019-03-13 DIAGNOSIS — F31.32 BIPOLAR 1 DISORDER, DEPRESSED, MODERATE (HCC): ICD-10-CM

## 2019-03-13 DIAGNOSIS — F31.9 BIPOLAR 1 DISORDER (HCC): ICD-10-CM

## 2019-03-13 PROCEDURE — 99213 OFFICE O/P EST LOW 20 MIN: CPT | Performed by: PSYCHIATRY & NEUROLOGY

## 2019-03-13 RX ORDER — CARBAMAZEPINE 200 MG/1
200 TABLET, EXTENDED RELEASE ORAL DAILY
Qty: 30 TABLET | Refills: 2 | Status: SHIPPED | OUTPATIENT
Start: 2019-03-13 | End: 2019-06-03 | Stop reason: SDUPTHER

## 2019-03-13 RX ORDER — CARBAMAZEPINE 400 MG/1
400 TABLET, EXTENDED RELEASE ORAL
Qty: 30 TABLET | Refills: 2 | Status: SHIPPED | OUTPATIENT
Start: 2019-03-13 | End: 2019-06-03 | Stop reason: SDUPTHER

## 2019-03-13 RX ORDER — AMLODIPINE BESYLATE 10 MG/1
10 TABLET ORAL DAILY
Qty: 30 TABLET | Refills: 1 | Status: SHIPPED | OUTPATIENT
Start: 2019-03-13 | End: 2019-04-04 | Stop reason: SDUPTHER

## 2019-03-13 RX ORDER — LISINOPRIL 10 MG/1
10 TABLET ORAL EVERY EVENING
Qty: 30 TABLET | Refills: 2 | Status: SHIPPED | OUTPATIENT
Start: 2019-03-13 | End: 2019-06-03 | Stop reason: SDUPTHER

## 2019-03-13 RX ORDER — CLONAZEPAM 1 MG/1
1 TABLET ORAL 3 TIMES DAILY
Qty: 90 TABLET | Refills: 2 | Status: SHIPPED | OUTPATIENT
Start: 2019-03-13 | End: 2019-07-01 | Stop reason: SDUPTHER

## 2019-03-13 NOTE — PSYCH
Subjective: Medication Management      Patient ID: Elaine Alexandre is a 45 y o  male  HPI ROS Appetite Changes and Sleep: normal appetite, normal energy level, no weight change and normal number of sleep hours   Since last seen patient was admitted to [facility] and then referred to [place]  He stated he also started working with a   Has a  from [place], he also attends [facility] at the [employer]  He stated he has difficulties waking up in the morning and wants to try taking half tablet of Olanzapine at bedtime  He reported less depression ans anxiety  He stated his SSI was approved  Review Of Systems:     Mood Anxiety, Depression and Emotional Lability   Behavior Impulsive Behavior   Thought Content Disturbing Thoughts, Feelings and Unreasonalbe or Irrational Fears   General Emotional Problems and Decreased Functioning   Personality Normal   Other Psych Symptoms Normal   Constitutional Negative   ENT Negative   Cardiovascular Negative   Respiratory Negative   Gastrointestinal Negative   Genitourinary Negative   Musculoskeletal Negative   Integumentary Negative   Neurological Negative   Endocrine Normal    Other Symptoms Normal              Laboratory Results: No results found for this or any previous visit      Substance Abuse History:  Social History     Substance and Sexual Activity   Drug Use No       Family Psychiatric History:   Family History   Problem Relation Age of Onset    Schizophrenia Father     Alcohol abuse Father     Drug abuse Brother        The following portions of the patient's history were reviewed and updated as appropriate: allergies, current medications, past family history, past medical history, past social history, past surgical history and problem list     Social History     Socioeconomic History    Marital status: Single     Spouse name: Not on file    Number of children: Not on file    Years of education: Not on file    Highest education level: Not on file   Occupational History    Not on file   Social Needs    Financial resource strain: Not on file    Food insecurity:     Worry: Not on file     Inability: Not on file    Transportation needs:     Medical: Not on file     Non-medical: Not on file   Tobacco Use    Smoking status: Current Some Day Smoker    Smokeless tobacco: Never Used    Tobacco comment: he smoke only 1 cigarette a day, quit 2 months ago   Substance and Sexual Activity    Alcohol use: No     Comment: last use 2012, took 3 of alcohol to help with panic attack    Drug use: No    Sexual activity: Not Currently   Lifestyle    Physical activity:     Days per week: Not on file     Minutes per session: Not on file    Stress: Not on file   Relationships    Social connections:     Talks on phone: Not on file     Gets together: Not on file     Attends Cheondoism service: Not on file     Active member of club or organization: Not on file     Attends meetings of clubs or organizations: Not on file     Relationship status: Not on file    Intimate partner violence:     Fear of current or ex partner: Not on file     Emotionally abused: Not on file     Physically abused: Not on file     Forced sexual activity: Not on file   Other Topics Concern    Not on file   Social History Narrative    Not on file     Social History     Social History Narrative    Not on file       Objective:       Mental status:  Appearance calm and cooperative , adequate hygiene and grooming and good eye contact    Mood depressed and anxious   Affect affect was constricted   Speech a normal rate and fluent   Thought Processes coherent/organized and normal thought processes   Hallucinations no hallucinations present    Thought Content no delusions   Abnormal Thoughts no suicidal thoughts  and no homicidal thoughts    Orientation  oriented to person and place and time   Remote Memory short term memory impaired and long term memory impaired Attention Span concentration impaired   Intellect Appears to be of Average Intelligence   Insight Limited insight   Judgement judgment was limited   Muscle Strength Muscle strength and tone were normal and Normal gait    Language no difficulty naming common objects, no difficulty repeating a phrase  and no difficulty writing a sentence    Fund of Knowledge displays adequate knowledge of current events, adequate fund of knowledge regarding past history and adequate fund of knowledge regarding vocabulary    Pain none   Pain Scale 0       Assessment/Plan:       Diagnoses and all orders for this visit:    Bipolar 1 disorder, depressed, moderate (HCC)  -     amLODIPine (NORVASC) 10 mg tablet; Take 1 tablet (10 mg total) by mouth daily  -     lisinopril (ZESTRIL) 10 mg tablet; Take 1 tablet (10 mg total) by mouth every evening    Bipolar 1 disorder (HCC)  -     carBAMazepine (TEGretol XR) 400 mg 12 hr tablet; Take 1 tablet (400 mg total) by mouth daily at bedtime  -     carBAMazepine (TEGretol XR) 200 mg 12 hr tablet; Take 1 tablet (200 mg total) by mouth daily    Panic disorder with agoraphobia  -     clonazePAM (KlonoPIN) 1 mg tablet; Take 1 tablet (1 mg total) by mouth 3 (three) times a day    RADHA (generalized anxiety disorder)  -     clonazePAM (KlonoPIN) 1 mg tablet; Take 1 tablet (1 mg total) by mouth 3 (three) times a day            Treatment Recommendations- Risks Benefits      Immediate Medical/Psychiatric/Psychotherapy Treatments and Any Precautions:decrease dose of olanzapine to 7 5 mg     Risks, Benefits And Possible Side Effects Of Medications:  {PSYCH RISK, BENEFITS AND POSSIBLE SIDE EFFECTS (Optional):19354    Controlled Medication Discussion: Discussed with patient Black Box warning on concurrent use of benzodiazepines and opioid medications including sedation, respiratory depression, coma and death   Patient understands the risk of treatment with benzodiazepines in addition to opioids and wants to continue taking those medications  , Discussed with patient the risks of sedation, respiratory depression, impairment of ability to drive and potential for abuse and addiction related to treatment with benzodiazepine medications  The patient understands risk of treatment with benzodiazepine medications, agrees to not drive if feels impaired and agrees to take medications as prescribed   and The patient has been filling controlled prescriptions on time as prescribed to [facility]

## 2019-03-15 NOTE — ED CARE HANDOFF
Emergency Department Sign Out Note        Sign out and transfer of care from Daylin Sahni  See Separate Emergency Department note  The patient, Pacheco Hamlin, was evaluated by the previous provider for agoraphobia, depression, suicidal thoughts  Workup Completed:  Medically cleared for psychiatric placement  Medicated for anxiety  ED Course / Workup Pending (followup): Requested medication for sleep  Patient calm and cooperative here  Accepted at [address] [facility] psychiatric [facility]  Await transport  Will continue to monitor to ensure safety  ED Course as of Jan 11 0541 Fri Jan 11, 2019   2085 Patient requests Zyprexa for sleep but he refuses the intermuscular form that we have here  Patient has used trazodone in the past and accepts a dose of trazodone help him sleep        Procedures  MDM  Number of Diagnoses or Management Options  Agoraphobia: new and requires workup  Anxiety: new and requires workup     Amount and/or Complexity of Data Reviewed  Clinical lab tests: reviewed  Discuss the patient with other providers: yes      CritCare Time      Disposition  Final diagnoses:   Agoraphobia   Anxiety     Time reflects when diagnosis was documented in both MDM as applicable and the Disposition within this note     Time User Action Codes Description Comment    1/10/2019  9:24 PM Paris  Add [F40 00] Agoraphobia     1/10/2019  9:24 PM Paris  Add [F41 9] Anxiety       ED Disposition     None      MD Documentation      Most Recent Value   Accepting Physician  [facility] [address] [place] [address]  [address]    (Name & Tel number)  [facility]  [phone]   Transported by [facility] and [address] #)  [facility]   Sending MD  [address] Name, [facility] [address] [place] [address]  [address]    (Name & Tel number)  [facility]  [phone]   Transported by [facility] and Unit #)  Former[facility]      Follow-up Information    None       Patient's Medications   Discharge Prescriptions    No medications on file     No discharge procedures on file         ED Provider  Electronically Signed by     Sera Deutsch DO  01/11/19 [address] [employer]

## 2019-04-03 ENCOUNTER — OFFICE VISIT (OUTPATIENT)
Dept: BEHAVIORAL/MENTAL HEALTH CLINIC | Facility: CLINIC | Age: 39
End: 2019-04-03
Payer: MEDICARE

## 2019-04-03 DIAGNOSIS — F40.01 PANIC DISORDER WITH AGORAPHOBIA: ICD-10-CM

## 2019-04-03 DIAGNOSIS — F43.10 POSTTRAUMATIC STRESS DISORDER: Primary | ICD-10-CM

## 2019-04-03 DIAGNOSIS — F31.32 BIPOLAR 1 DISORDER, DEPRESSED, MODERATE (HCC): ICD-10-CM

## 2019-04-03 PROCEDURE — 90853 GROUP PSYCHOTHERAPY: CPT | Performed by: SOCIAL WORKER

## 2019-04-04 DIAGNOSIS — F31.32 BIPOLAR 1 DISORDER, DEPRESSED, MODERATE (HCC): ICD-10-CM

## 2019-04-04 RX ORDER — AMLODIPINE BESYLATE 10 MG/1
TABLET ORAL
Qty: 30 TABLET | Refills: 0 | Status: SHIPPED | OUTPATIENT
Start: 2019-04-04 | End: 2019-04-12 | Stop reason: SDUPTHER

## 2019-04-10 ENCOUNTER — OFFICE VISIT (OUTPATIENT)
Dept: BEHAVIORAL/MENTAL HEALTH CLINIC | Facility: CLINIC | Age: 39
End: 2019-04-10
Payer: MEDICARE

## 2019-04-10 DIAGNOSIS — F31.32 BIPOLAR 1 DISORDER, DEPRESSED, MODERATE (HCC): ICD-10-CM

## 2019-04-10 DIAGNOSIS — F43.10 POSTTRAUMATIC STRESS DISORDER: Primary | ICD-10-CM

## 2019-04-10 PROCEDURE — 90853 GROUP PSYCHOTHERAPY: CPT | Performed by: SOCIAL WORKER

## 2019-04-12 ENCOUNTER — OFFICE VISIT (OUTPATIENT)
Dept: PSYCHIATRY | Facility: CLINIC | Age: 39
End: 2019-04-12
Payer: MEDICARE

## 2019-04-12 DIAGNOSIS — F31.32 BIPOLAR 1 DISORDER, DEPRESSED, MODERATE (HCC): ICD-10-CM

## 2019-04-12 PROCEDURE — 99213 OFFICE O/P EST LOW 20 MIN: CPT | Performed by: PSYCHIATRY & NEUROLOGY

## 2019-04-12 RX ORDER — OLANZAPINE 7.5 MG/1
7.5 TABLET ORAL
Qty: 30 TABLET | Refills: 2 | Status: SHIPPED | OUTPATIENT
Start: 2019-04-12 | End: 2019-07-01 | Stop reason: SDUPTHER

## 2019-04-12 RX ORDER — AMLODIPINE BESYLATE 10 MG/1
10 TABLET ORAL DAILY
Qty: 30 TABLET | Refills: 2 | Status: SHIPPED | OUTPATIENT
Start: 2019-04-12 | End: 2019-07-01 | Stop reason: SDUPTHER

## 2019-04-15 ENCOUNTER — OFFICE VISIT (OUTPATIENT)
Dept: BEHAVIORAL/MENTAL HEALTH CLINIC | Facility: CLINIC | Age: 39
End: 2019-04-15
Payer: MEDICARE

## 2019-04-15 DIAGNOSIS — F31.32 BIPOLAR 1 DISORDER, DEPRESSED, MODERATE (HCC): ICD-10-CM

## 2019-04-15 DIAGNOSIS — F40.01 PANIC DISORDER WITH AGORAPHOBIA: ICD-10-CM

## 2019-04-15 DIAGNOSIS — F43.10 POSTTRAUMATIC STRESS DISORDER: Primary | ICD-10-CM

## 2019-04-15 PROCEDURE — 90834 PSYTX W PT 45 MINUTES: CPT | Performed by: SOCIAL WORKER

## 2019-04-17 ENCOUNTER — OFFICE VISIT (OUTPATIENT)
Dept: BEHAVIORAL/MENTAL HEALTH CLINIC | Facility: CLINIC | Age: 39
End: 2019-04-17
Payer: MEDICARE

## 2019-04-17 DIAGNOSIS — F31.32 BIPOLAR 1 DISORDER, DEPRESSED, MODERATE (HCC): ICD-10-CM

## 2019-04-17 DIAGNOSIS — F40.01 PANIC DISORDER WITH AGORAPHOBIA: ICD-10-CM

## 2019-04-17 DIAGNOSIS — F43.10 POSTTRAUMATIC STRESS DISORDER: Primary | ICD-10-CM

## 2019-04-17 PROCEDURE — 90853 GROUP PSYCHOTHERAPY: CPT | Performed by: SOCIAL WORKER

## 2019-04-24 ENCOUNTER — OFFICE VISIT (OUTPATIENT)
Dept: BEHAVIORAL/MENTAL HEALTH CLINIC | Facility: CLINIC | Age: 39
End: 2019-04-24
Payer: MEDICARE

## 2019-04-24 DIAGNOSIS — F40.01 PANIC DISORDER WITH AGORAPHOBIA: ICD-10-CM

## 2019-04-24 DIAGNOSIS — F31.32 BIPOLAR 1 DISORDER, DEPRESSED, MODERATE (HCC): ICD-10-CM

## 2019-04-24 DIAGNOSIS — F43.10 POSTTRAUMATIC STRESS DISORDER: Primary | ICD-10-CM

## 2019-04-24 PROCEDURE — 90853 GROUP PSYCHOTHERAPY: CPT | Performed by: SOCIAL WORKER

## 2019-05-01 ENCOUNTER — OFFICE VISIT (OUTPATIENT)
Dept: BEHAVIORAL/MENTAL HEALTH CLINIC | Facility: CLINIC | Age: 39
End: 2019-05-01
Payer: MEDICARE

## 2019-05-01 DIAGNOSIS — F43.10 POSTTRAUMATIC STRESS DISORDER: Primary | ICD-10-CM

## 2019-05-01 DIAGNOSIS — F31.32 BIPOLAR 1 DISORDER, DEPRESSED, MODERATE (HCC): ICD-10-CM

## 2019-05-01 DIAGNOSIS — F40.01 PANIC DISORDER WITH AGORAPHOBIA: ICD-10-CM

## 2019-05-01 PROCEDURE — 90853 GROUP PSYCHOTHERAPY: CPT | Performed by: SOCIAL WORKER

## 2019-05-08 ENCOUNTER — OFFICE VISIT (OUTPATIENT)
Dept: BEHAVIORAL/MENTAL HEALTH CLINIC | Facility: CLINIC | Age: 39
End: 2019-05-08
Payer: MEDICARE

## 2019-05-08 DIAGNOSIS — F43.10 POSTTRAUMATIC STRESS DISORDER: Primary | ICD-10-CM

## 2019-05-08 DIAGNOSIS — F31.32 BIPOLAR 1 DISORDER, DEPRESSED, MODERATE (HCC): ICD-10-CM

## 2019-05-08 PROCEDURE — 90834 PSYTX W PT 45 MINUTES: CPT | Performed by: SOCIAL WORKER

## 2019-05-15 ENCOUNTER — OFFICE VISIT (OUTPATIENT)
Dept: BEHAVIORAL/MENTAL HEALTH CLINIC | Facility: CLINIC | Age: 39
End: 2019-05-15
Payer: MEDICARE

## 2019-05-15 DIAGNOSIS — F31.32 BIPOLAR 1 DISORDER, DEPRESSED, MODERATE (HCC): ICD-10-CM

## 2019-05-15 DIAGNOSIS — F40.01 PANIC DISORDER WITH AGORAPHOBIA: ICD-10-CM

## 2019-05-15 DIAGNOSIS — F43.10 POSTTRAUMATIC STRESS DISORDER: Primary | ICD-10-CM

## 2019-05-15 PROCEDURE — 90834 PSYTX W PT 45 MINUTES: CPT | Performed by: SOCIAL WORKER

## 2019-06-03 DIAGNOSIS — F31.32 BIPOLAR 1 DISORDER, DEPRESSED, MODERATE (HCC): ICD-10-CM

## 2019-06-03 DIAGNOSIS — F31.9 BIPOLAR 1 DISORDER (HCC): ICD-10-CM

## 2019-06-03 RX ORDER — LISINOPRIL 10 MG/1
TABLET ORAL
Qty: 30 TABLET | Refills: 2 | Status: SHIPPED | OUTPATIENT
Start: 2019-06-03 | End: 2019-09-05 | Stop reason: SDUPTHER

## 2019-06-03 RX ORDER — CARBAMAZEPINE 200 MG/1
TABLET, EXTENDED RELEASE ORAL
Qty: 30 TABLET | Refills: 2 | Status: SHIPPED | OUTPATIENT
Start: 2019-06-03 | End: 2019-09-05 | Stop reason: SDUPTHER

## 2019-06-03 RX ORDER — CARBAMAZEPINE 400 MG/1
400 TABLET, EXTENDED RELEASE ORAL
Qty: 30 TABLET | Refills: 2 | Status: SHIPPED | OUTPATIENT
Start: 2019-06-03 | End: 2019-09-05 | Stop reason: SDUPTHER

## 2019-06-04 ENCOUNTER — OFFICE VISIT (OUTPATIENT)
Dept: BEHAVIORAL/MENTAL HEALTH CLINIC | Facility: CLINIC | Age: 39
End: 2019-06-04
Payer: MEDICARE

## 2019-06-04 DIAGNOSIS — F43.10 POSTTRAUMATIC STRESS DISORDER: Primary | ICD-10-CM

## 2019-06-04 DIAGNOSIS — F19.21 COMBINED DRUG DEPENDENCE, EXCLUDING OPIOID, IN REMISSION (HCC): ICD-10-CM

## 2019-06-04 DIAGNOSIS — F31.32 BIPOLAR 1 DISORDER, DEPRESSED, MODERATE (HCC): ICD-10-CM

## 2019-06-04 DIAGNOSIS — F40.01 PANIC DISORDER WITH AGORAPHOBIA: ICD-10-CM

## 2019-06-04 DIAGNOSIS — Z87.898 MARIJUANA USE IN REMISSION: ICD-10-CM

## 2019-06-04 PROCEDURE — 90853 GROUP PSYCHOTHERAPY: CPT | Performed by: SOCIAL WORKER

## 2019-06-06 ENCOUNTER — OFFICE VISIT (OUTPATIENT)
Dept: BEHAVIORAL/MENTAL HEALTH CLINIC | Facility: CLINIC | Age: 39
End: 2019-06-06
Payer: MEDICARE

## 2019-06-06 DIAGNOSIS — F43.10 POSTTRAUMATIC STRESS DISORDER: Primary | ICD-10-CM

## 2019-06-06 DIAGNOSIS — F40.01 PANIC DISORDER WITH AGORAPHOBIA: ICD-10-CM

## 2019-06-06 DIAGNOSIS — F31.32 BIPOLAR 1 DISORDER, DEPRESSED, MODERATE (HCC): ICD-10-CM

## 2019-06-06 PROCEDURE — 90834 PSYTX W PT 45 MINUTES: CPT | Performed by: SOCIAL WORKER

## 2019-06-10 ENCOUNTER — DOCUMENTATION (OUTPATIENT)
Dept: PSYCHIATRY | Facility: CLINIC | Age: 39
End: 2019-06-10

## 2019-06-18 ENCOUNTER — OFFICE VISIT (OUTPATIENT)
Dept: BEHAVIORAL/MENTAL HEALTH CLINIC | Facility: CLINIC | Age: 39
End: 2019-06-18
Payer: MEDICARE

## 2019-06-18 DIAGNOSIS — F40.01 PANIC DISORDER WITH AGORAPHOBIA: ICD-10-CM

## 2019-06-18 DIAGNOSIS — F31.32 BIPOLAR 1 DISORDER, DEPRESSED, MODERATE (HCC): ICD-10-CM

## 2019-06-18 DIAGNOSIS — F19.21 COMBINED DRUG DEPENDENCE, EXCLUDING OPIOID, IN REMISSION (HCC): ICD-10-CM

## 2019-06-18 DIAGNOSIS — Z87.898 MARIJUANA USE IN REMISSION: ICD-10-CM

## 2019-06-18 DIAGNOSIS — F43.10 POSTTRAUMATIC STRESS DISORDER: Primary | ICD-10-CM

## 2019-06-18 PROCEDURE — 90853 GROUP PSYCHOTHERAPY: CPT | Performed by: SOCIAL WORKER

## 2019-06-27 ENCOUNTER — SOCIAL WORK (OUTPATIENT)
Dept: BEHAVIORAL/MENTAL HEALTH CLINIC | Facility: CLINIC | Age: 39
End: 2019-06-27
Payer: MEDICARE

## 2019-06-27 DIAGNOSIS — F31.32 BIPOLAR 1 DISORDER, DEPRESSED, MODERATE (HCC): ICD-10-CM

## 2019-06-27 DIAGNOSIS — F43.10 POSTTRAUMATIC STRESS DISORDER: Primary | ICD-10-CM

## 2019-06-27 PROCEDURE — 90834 PSYTX W PT 45 MINUTES: CPT | Performed by: SOCIAL WORKER

## 2019-07-01 ENCOUNTER — OFFICE VISIT (OUTPATIENT)
Dept: PSYCHIATRY | Facility: CLINIC | Age: 39
End: 2019-07-01
Payer: MEDICARE

## 2019-07-01 DIAGNOSIS — F43.10 POSTTRAUMATIC STRESS DISORDER: ICD-10-CM

## 2019-07-01 DIAGNOSIS — F40.01 PANIC DISORDER WITH AGORAPHOBIA: ICD-10-CM

## 2019-07-01 DIAGNOSIS — F41.1 GAD (GENERALIZED ANXIETY DISORDER): ICD-10-CM

## 2019-07-01 DIAGNOSIS — F31.32 BIPOLAR 1 DISORDER, DEPRESSED, MODERATE (HCC): ICD-10-CM

## 2019-07-01 DIAGNOSIS — F31.32 BIPOLAR 1 DISORDER, DEPRESSED, MODERATE (HCC): Primary | Chronic | ICD-10-CM

## 2019-07-01 PROCEDURE — 99213 OFFICE O/P EST LOW 20 MIN: CPT | Performed by: PSYCHIATRY & NEUROLOGY

## 2019-07-01 RX ORDER — AMLODIPINE BESYLATE 10 MG/1
10 TABLET ORAL DAILY
Qty: 30 TABLET | Refills: 2 | Status: SHIPPED | OUTPATIENT
Start: 2019-07-01 | End: 2019-10-25 | Stop reason: SDUPTHER

## 2019-07-01 RX ORDER — OLANZAPINE 7.5 MG/1
7.5 TABLET ORAL
Qty: 30 TABLET | Refills: 2 | Status: SHIPPED | OUTPATIENT
Start: 2019-07-01 | End: 2019-09-05 | Stop reason: ALTCHOICE

## 2019-07-01 RX ORDER — CLONAZEPAM 1 MG/1
1 TABLET ORAL 3 TIMES DAILY
Qty: 90 TABLET | Refills: 2 | Status: SHIPPED | OUTPATIENT
Start: 2019-07-01 | End: 2019-09-05 | Stop reason: SDUPTHER

## 2019-07-01 NOTE — PSYCH
Subjective: Medication Management      Patient ID: Lee Ann Decker is a 45 y o  male  HPI ROS Appetite Changes and Sleep: normal appetite, normal energy level, no weight change and normal number of sleep hours  Patient stated he recently started attending Mens Group at the clinic and he stated he has enjoyed the group  One day after coming back from group he experienced an anxiety attack  He stated he  took his evening  Medications and about an hour later he felt better  He still struggles with high anxiety and avoids driving places or visiting friends  He also continues to attend individual therapy  He continues to work with a   He also stated he is trying start a trial of medical MJ  Review Of Systems:     Mood Anxiety, Depression and Emotional Lability   Behavior Normal    Thought Content Disturbing Thoughts, Feelings and Unreasonalbe or Irrational Fears   General Emotional Problems and Decreased Functioning   Personality Normal   Other Psych Symptoms Normal   Constitutional Negative   ENT Negative   Cardiovascular Negative   Respiratory Negative   Gastrointestinal Negative   Genitourinary Negative   Musculoskeletal Negative   Integumentary Negative   Neurological Negative   Endocrine Normal    Other Symptoms Normal              Laboratory Results: No results found for this or any previous visit      Substance Abuse History:  Social History     Substance and Sexual Activity   Drug Use No       Family Psychiatric History:   Family History   Problem Relation Age of Onset    Schizophrenia Father     Alcohol abuse Father     Drug abuse Brother        The following portions of the patient's history were reviewed and updated as appropriate: allergies, current medications, past family history, past medical history, past social history, past surgical history and problem list     Social History     Socioeconomic History    Marital status: Single     Spouse name: Not on file    Number of children: Not on file    Years of education: Not on file    Highest education level: Not on file   Occupational History    Not on file   Social Needs    Financial resource strain: Not on file    Food insecurity:     Worry: Not on file     Inability: Not on file    Transportation needs:     Medical: Not on file     Non-medical: Not on file   Tobacco Use    Smoking status: Current Some Day Smoker    Smokeless tobacco: Never Used    Tobacco comment: he smoke only 1 cigarette a day, quit 2 months ago   Substance and Sexual Activity    Alcohol use: No     Comment: last use 2012, took 3 of alcohol to help with panic attack    Drug use: No    Sexual activity: Not Currently   Lifestyle    Physical activity:     Days per week: Not on file     Minutes per session: Not on file    Stress: Not on file   Relationships    Social connections:     Talks on phone: Not on file     Gets together: Not on file     Attends Restorationist service: Not on file     Active member of club or organization: Not on file     Attends meetings of clubs or organizations: Not on file     Relationship status: Not on file    Intimate partner violence:     Fear of current or ex partner: Not on file     Emotionally abused: Not on file     Physically abused: Not on file     Forced sexual activity: Not on file   Other Topics Concern    Not on file   Social History Narrative    Not on file     Social History     Social History Narrative    Not on file       Objective:       Mental status:  Appearance calm and cooperative , adequate hygiene and grooming and good eye contact    Mood dysphoric   Affect affect was constricted   Speech a normal rate and fluent   Thought Processes coherent/organized and normal thought processes   Hallucinations no hallucinations present    Thought Content no delusions   Abnormal Thoughts no suicidal thoughts  and no homicidal thoughts    Orientation  oriented to person and place and time   Remote Memory short term memory intact and long term memory intact   Attention Span concentration impaired   Intellect Appears to be of Average Intelligence   Insight Limited insight   Judgement judgment was limited   Muscle Strength Muscle strength and tone were normal and Normal gait    Language no difficulty naming common objects, no difficulty repeating a phrase  and no difficulty writing a sentence    Fund of Knowledge displays adequate knowledge of current events, adequate fund of knowledge regarding past history and adequate fund of knowledge regarding vocabulary    Pain none   Pain Scale 0       Assessment/Plan:       Diagnoses and all orders for this visit:    Panic disorder with agoraphobia  -     clonazePAM (KlonoPIN) 1 mg tablet; Take 1 tablet (1 mg total) by mouth 3 (three) times a day    RADHA (generalized anxiety disorder)  -     clonazePAM (KlonoPIN) 1 mg tablet; Take 1 tablet (1 mg total) by mouth 3 (three) times a day    Bipolar 1 disorder, depressed, moderate (HCC)  -     OLANZapine (ZyPREXA) 7 5 mg tablet; Take 1 tablet (7 5 mg total) by mouth daily at bedtime            Treatment Recommendations- Risks Benefits      Immediate Medical/Psychiatric/Psychotherapy Treatments and Any Precautions: continue current treatment     Risks, Benefits And Possible Side Effects Of Medications:  {PSYCH RISK, BENEFITS AND POSSIBLE SIDE EFFECTS (Optional):51443    Controlled Medication Discussion: Discussed with patient Black Box warning on concurrent use of benzodiazepines and opioid medications including sedation, respiratory depression, coma and death  Patient understands the risk of treatment with benzodiazepines in addition to opioids and wants to continue taking those medications  , Discussed with patient the risks of sedation, respiratory depression, impairment of ability to drive and potential for abuse and addiction related to treatment with benzodiazepine medications   The patient understands risk of treatment with benzodiazepine medications, agrees to not drive if feels impaired and agrees to take medications as prescribed   and The patient has been filling controlled prescriptions on time as prescribed to [facility]

## 2019-07-02 ENCOUNTER — OFFICE VISIT (OUTPATIENT)
Dept: BEHAVIORAL/MENTAL HEALTH CLINIC | Facility: CLINIC | Age: 39
End: 2019-07-02
Payer: MEDICARE

## 2019-07-02 DIAGNOSIS — F19.21 COMBINED DRUG DEPENDENCE, EXCLUDING OPIOID, IN REMISSION (HCC): ICD-10-CM

## 2019-07-02 DIAGNOSIS — F40.01 PANIC DISORDER WITH AGORAPHOBIA: ICD-10-CM

## 2019-07-02 DIAGNOSIS — Z87.898 MARIJUANA USE IN REMISSION: ICD-10-CM

## 2019-07-02 DIAGNOSIS — F31.32 BIPOLAR 1 DISORDER, DEPRESSED, MODERATE (HCC): ICD-10-CM

## 2019-07-02 DIAGNOSIS — F43.10 POSTTRAUMATIC STRESS DISORDER: Primary | ICD-10-CM

## 2019-07-02 PROCEDURE — 90853 GROUP PSYCHOTHERAPY: CPT | Performed by: SOCIAL WORKER

## 2019-07-16 ENCOUNTER — OFFICE VISIT (OUTPATIENT)
Dept: BEHAVIORAL/MENTAL HEALTH CLINIC | Facility: CLINIC | Age: 39
End: 2019-07-16
Payer: MEDICARE

## 2019-07-16 DIAGNOSIS — Z87.898 MARIJUANA USE IN REMISSION: ICD-10-CM

## 2019-07-16 DIAGNOSIS — F43.10 POSTTRAUMATIC STRESS DISORDER: Primary | ICD-10-CM

## 2019-07-16 DIAGNOSIS — F40.01 PANIC DISORDER WITH AGORAPHOBIA: ICD-10-CM

## 2019-07-16 DIAGNOSIS — F31.32 BIPOLAR 1 DISORDER, DEPRESSED, MODERATE (HCC): ICD-10-CM

## 2019-07-16 PROCEDURE — 90853 GROUP PSYCHOTHERAPY: CPT | Performed by: SOCIAL WORKER

## 2019-07-17 NOTE — PSYCH
Data:Marco Antonio attended the men's group  He discussed how he is trying on line dating and how he is getting out of his house more  Assessment: Teofilo Anaya is also insightful and supportive of others in the group  Plan: Will see him in 2 weeks in the next group

## 2019-07-30 ENCOUNTER — OFFICE VISIT (OUTPATIENT)
Dept: BEHAVIORAL/MENTAL HEALTH CLINIC | Facility: CLINIC | Age: 39
End: 2019-07-30
Payer: MEDICARE

## 2019-07-30 DIAGNOSIS — Z87.898 MARIJUANA USE IN REMISSION: ICD-10-CM

## 2019-07-30 DIAGNOSIS — F43.10 POSTTRAUMATIC STRESS DISORDER: Primary | ICD-10-CM

## 2019-07-30 DIAGNOSIS — F31.32 BIPOLAR 1 DISORDER, DEPRESSED, MODERATE (HCC): ICD-10-CM

## 2019-07-30 DIAGNOSIS — F19.21 COMBINED DRUG DEPENDENCE, EXCLUDING OPIOID, IN REMISSION (HCC): ICD-10-CM

## 2019-07-30 DIAGNOSIS — F40.01 PANIC DISORDER WITH AGORAPHOBIA: ICD-10-CM

## 2019-07-30 PROCEDURE — 90853 GROUP PSYCHOTHERAPY: CPT | Performed by: SOCIAL WORKER

## 2019-07-31 NOTE — PSYCH
Data: Kavin Canela attended the men's group He was attentive and supportive of others  He shared how the group has encouraged and helped him focus on living his life and forging ahead  He claims the group has helped him deal with his panic and avoidance  Assessment: Kavin Canela is experiencing more things and is focused on rebuilding his life  Plan: Will see him in 2 weeks in the group

## 2019-08-05 ENCOUNTER — OFFICE VISIT (OUTPATIENT)
Dept: PSYCHIATRY | Facility: CLINIC | Age: 39
End: 2019-08-05
Payer: MEDICARE

## 2019-08-05 DIAGNOSIS — F31.32 BIPOLAR 1 DISORDER, DEPRESSED, MODERATE (HCC): Primary | ICD-10-CM

## 2019-08-05 PROCEDURE — 99213 OFFICE O/P EST LOW 20 MIN: CPT | Performed by: PSYCHIATRY & NEUROLOGY

## 2019-08-05 NOTE — PSYCH
Subjective: medication Management      Patient ID: Leiyd Hill is a 45 y o  male  HPI ROS Appetite Changes and Sleep: normal appetite, decreased energy, no weight change and normal number of sleep hours   Patient stated that he started his medical MJ trial and he seems to be responding well in terms of having less anxiety and less mood swings  He stated he was prescribed 2 different strains one during the day and a different for night time  He stated he wants to wean off Zyprexa and agrees to cut tab in half down to 3 75 mg qhs for 1-2 weeks and then stop  He will also will like to start cutting Clonazepam to 0 5 tid for anxiety and eventually come off of it  Review Of Systems:     Mood Anxiety, Depression and Emotional Lability   Behavior Compulsive Behavior   Thought Content Disturbing Thoughts, Feelings and Unreasonalbe or Irrational Fears   General Emotional Problems and Decreased Functioning   Personality Normal   Other Psych Symptoms Normal   Constitutional Negative   ENT Negative   Cardiovascular Negative   Respiratory Negative   Gastrointestinal Negative   Genitourinary Negative   Musculoskeletal Negative   Integumentary Negative   Neurological Negative   Endocrine Normal    Other Symptoms Normal              Laboratory Results: No results found for this or any previous visit      Substance Abuse History:  Social History     Substance and Sexual Activity   Drug Use No       Family Psychiatric History:   Family History   Problem Relation Age of Onset    Schizophrenia Father     Alcohol abuse Father     Drug abuse Brother        The following portions of the patient's history were reviewed and updated as appropriate: allergies, current medications, past family history, past medical history, past social history, past surgical history and problem list     Social History     Socioeconomic History    Marital status: Single     Spouse name: Not on file    Number of children: Not on file    Years of education: Not on file    Highest education level: Not on file   Occupational History    Not on file   Social Needs    Financial resource strain: Not on file    Food insecurity:     Worry: Not on file     Inability: Not on file    Transportation needs:     Medical: Not on file     Non-medical: Not on file   Tobacco Use    Smoking status: Current Some Day Smoker    Smokeless tobacco: Never Used    Tobacco comment: he smoke only 1 cigarette a day, quit 2 months ago   Substance and Sexual Activity    Alcohol use: No     Comment: last use 2012, took 3 of alcohol to help with panic attack    Drug use: No    Sexual activity: Not Currently   Lifestyle    Physical activity:     Days per week: Not on file     Minutes per session: Not on file    Stress: Not on file   Relationships    Social connections:     Talks on phone: Not on file     Gets together: Not on file     Attends Temple service: Not on file     Active member of club or organization: Not on file     Attends meetings of clubs or organizations: Not on file     Relationship status: Not on file    Intimate partner violence:     Fear of current or ex partner: Not on file     Emotionally abused: Not on file     Physically abused: Not on file     Forced sexual activity: Not on file   Other Topics Concern    Not on file   Social History Narrative    Not on file     Social History     Social History Narrative    Not on file       Objective:       Mental status:  Appearance calm and cooperative , adequate hygiene and grooming and good eye contact    Mood dysphoric   Affect affect was constricted   Speech a normal rate and fluent   Thought Processes coherent/organized and normal thought processes   Hallucinations no hallucinations present    Thought Content no delusions   Abnormal Thoughts no suicidal thoughts  and no homicidal thoughts    Orientation  oriented to person and place and time   Remote Memory short term memory intact and long term memory intact   Attention Span concentration impaired   Intellect Appears to be of Average Intelligence   Insight Limited insight   Judgement judgment was limited   Muscle Strength Muscle strength and tone were normal and Normal gait    Language no difficulty naming common objects, no difficulty repeating a phrase  and no difficulty writing a sentence    Fund of Knowledge displays adequate knowledge of current events, adequate fund of knowledge regarding past history and adequate fund of knowledge regarding vocabulary    Pain none   Pain Scale 0       Assessment/Plan:       Diagnoses and all orders for this visit:    Bipolar 1 disorder, depressed, moderate ([facility] [address] )            Treatment Recommendations- Risks Benefits      Immediate Medical/Psychiatric/Psychotherapy Treatments and Any Precautions: continue current treatment cut Zyprexa down to 3 75 and then eventually stop  Also decrease Clonazepam 0 5 mg tid     Risks, Benefits And Possible Side Effects Of Medications:  {PSYCH RISK, BENEFITS AND POSSIBLE SIDE EFFECTS (Optional):35698    Controlled Medication Discussion: Discussed with patient Black Box warning on concurrent use of benzodiazepines and opioid medications including sedation, respiratory depression, coma and death  Patient understands the risk of treatment with benzodiazepines in addition to opioids and wants to continue taking those medications  , Discussed with patient the risks of sedation, respiratory depression, impairment of ability to drive and potential for abuse and addiction related to treatment with benzodiazepine medications  The patient understands risk of treatment with benzodiazepine medications, agrees to not drive if feels impaired and agrees to take medications as prescribed   and The patient has been filling controlled prescriptions on time as prescribed to [place] [address] program

## 2019-08-13 ENCOUNTER — OFFICE VISIT (OUTPATIENT)
Dept: BEHAVIORAL/MENTAL HEALTH CLINIC | Facility: CLINIC | Age: 39
End: 2019-08-13
Payer: MEDICARE

## 2019-08-13 DIAGNOSIS — F43.10 POSTTRAUMATIC STRESS DISORDER: Primary | ICD-10-CM

## 2019-08-13 DIAGNOSIS — F40.01 PANIC DISORDER WITH AGORAPHOBIA: ICD-10-CM

## 2019-08-13 DIAGNOSIS — F19.21 COMBINED DRUG DEPENDENCE, EXCLUDING OPIOID, IN REMISSION (HCC): ICD-10-CM

## 2019-08-13 DIAGNOSIS — F31.32 BIPOLAR 1 DISORDER, DEPRESSED, MODERATE (HCC): ICD-10-CM

## 2019-08-13 DIAGNOSIS — Z87.898 MARIJUANA USE IN REMISSION: ICD-10-CM

## 2019-08-13 PROCEDURE — 90853 GROUP PSYCHOTHERAPY: CPT | Performed by: SOCIAL WORKER

## 2019-08-14 NOTE — PSYCH
Data: Rickie Munson arrived for the men's group  He clearly admitted I know I am a hypochondriatic but is able to recognize it and laugh about it  He discussed the major issues he is currently dealing with involving car repairs and the games he is encountering involving women he is meeting on line  He asked for advice and he received feedback from the members and the undersigned  Assessment: Rickie Munson claims he loves the support, encouragement and advice he gets in group  He said he feels he can bring anything up in group and loves the feedback  Plan: Will see Rickie Munson in 2 weeks

## 2019-08-19 ENCOUNTER — SOCIAL WORK (OUTPATIENT)
Dept: BEHAVIORAL/MENTAL HEALTH CLINIC | Facility: CLINIC | Age: 39
End: 2019-08-19
Payer: MEDICARE

## 2019-08-19 DIAGNOSIS — F40.01 PANIC DISORDER WITH AGORAPHOBIA: ICD-10-CM

## 2019-08-19 DIAGNOSIS — F31.32 BIPOLAR 1 DISORDER, DEPRESSED, MODERATE (HCC): ICD-10-CM

## 2019-08-19 DIAGNOSIS — F43.10 POSTTRAUMATIC STRESS DISORDER: Primary | ICD-10-CM

## 2019-08-19 PROCEDURE — 90834 PSYTX W PT 45 MINUTES: CPT | Performed by: SOCIAL WORKER

## 2019-08-19 NOTE — PSYCH
Cheryl Armando  1980       Date of Initial Treatment Plan:   Date of Current Treatment Plan: 08/19/19    Treatment Plan Number     Strengths/Personal Resources for Self Care:     Diagnosis:   1  Posttraumatic stress disorder     2  Bipolar 1 disorder, depressed, moderate ([address] )     3  Panic disorder with agoraphobia         Area of Needs:  I have problem managing my anger  The agoraphobia took a year out of my life  I starting smoking again  Long Term Goal 1:   I want to be able to manage my anger  Target Date:  8/19  Completion Date:NA          Short Term Objectives for Goal 1: I will take my meds  I will attend anger management group  I will apply coping skills learned in group  Long Term Goal 2:  I want to be able to enjoy my summer  Target date:  8/19  Completion date:  NA    Short Term Objectives for Goal 2:  I will find a place to go camping  I will go hiking  I will walk every day  I will continue to go to the [facility]  I will take my meds  I will go to more places with others  I will go to the [employer] store by myself because it is a place where I feel comfortable  Long Term Goal 3:  I want to stop smoking again  Target date:  8/19  Completion date:  NA    Short Term Objectives for Goal 3:  I will throw away the pack of cigs I still have left  I will keep looking for a nicotine support group  I will get information from someone I know who has a support system over the phone  I will not buy anymore cigs  GOAL 1: Modality: Individual 2x per month   Completion Date NA, person responsible for the above Blanca Lui  Goal 2:  Modality:  Individual 2 X per month  Group  Completion Date NA, person responsible for the above Blanca Lui  Goal 3:  Modality:  Individual 2 X per month  Group    Completion Date NA, person responsible for the above [address]: Diagnosis and Treatment Plan explained to Frederick Vanna relates understanding diagnosis and is agreeable to Treatment Plan         Client Comments : Please share your thoughts, feelings, need and/or experiences regarding your treatment plan:       __________________________________________________________________    __________________________________________________________________    __________________________________________________________________    __________________________________________________________________    _______________________________________                Patient signature, Date Time: __________________________________________             Physician cosigner signature, Date, Time: ________________________________

## 2019-08-19 NOTE — PSYCH
Treatment Plan Tracking    Treatment Plan not completed within required time limits due to: issues he wanted to discuss

## 2019-08-19 NOTE — PSYCH
Psychotherapy Provided: Individual Psychotherapy 45 minutes     Length of time in session: 45 minutes, follow up in 2 week    Goals addressed in session: 3    Pain:      none    0    Current suicide risk : Low     D:  MSW met with Blanca Lui for session  He got prescribed medical marijuana for PTSD, anxiety and trouble sleeping  "It worked but vaping it hurt his throat  It scared him and he now longer is vaping "  Since last seen he has been on and off with smoking cigarettes  Every since the vaping episode he has not smoked marijuana and is afraid to do so  Discussed his roommate and "how he is "  Unable to do updated tx plan due to issues he wanted to discuss  A:  Mod progress on goals  P:  To continue addressing  [place] plan goals  Behavioral Health Treatment Plan [facility]: Diagnosis and Treatment Plan explained to John Coe relates understanding diagnosis and is agreeable to Treatment Plan   Yes

## 2019-08-27 ENCOUNTER — OFFICE VISIT (OUTPATIENT)
Dept: BEHAVIORAL/MENTAL HEALTH CLINIC | Facility: CLINIC | Age: 39
End: 2019-08-27
Payer: MEDICARE

## 2019-08-27 DIAGNOSIS — F19.21 COMBINED DRUG DEPENDENCE, EXCLUDING OPIOID, IN REMISSION (HCC): ICD-10-CM

## 2019-08-27 DIAGNOSIS — F31.32 BIPOLAR 1 DISORDER, DEPRESSED, MODERATE (HCC): ICD-10-CM

## 2019-08-27 DIAGNOSIS — F40.01 PANIC DISORDER WITH AGORAPHOBIA: ICD-10-CM

## 2019-08-27 DIAGNOSIS — F43.10 POSTTRAUMATIC STRESS DISORDER: Primary | ICD-10-CM

## 2019-08-27 PROCEDURE — 90853 GROUP PSYCHOTHERAPY: CPT | Performed by: SOCIAL WORKER

## 2019-09-04 ENCOUNTER — SOCIAL WORK (OUTPATIENT)
Dept: BEHAVIORAL/MENTAL HEALTH CLINIC | Facility: CLINIC | Age: 39
End: 2019-09-04
Payer: MEDICARE

## 2019-09-04 DIAGNOSIS — F40.01 PANIC DISORDER WITH AGORAPHOBIA: ICD-10-CM

## 2019-09-04 DIAGNOSIS — F43.10 POSTTRAUMATIC STRESS DISORDER: Primary | ICD-10-CM

## 2019-09-04 DIAGNOSIS — F31.32 BIPOLAR 1 DISORDER, DEPRESSED, MODERATE (HCC): ICD-10-CM

## 2019-09-04 PROCEDURE — 90834 PSYTX W PT 45 MINUTES: CPT | Performed by: SOCIAL WORKER
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
## 2019-09-04 NOTE — PSYCH
Tolu Hunger  1980       Date of Initial Treatment Plan:   Date of Current Treatment Plan: 09/04/19    Treatment Plan Number     Strengths/Personal Resources for Self Care:     Diagnosis:   1  Posttraumatic stress disorder     2  Panic disorder with agoraphobia     3  Bipolar 1 disorder, depressed, moderate ([address] )         Area of Needs:  I gained a lot of weight from being on Zyprexa  I have panic attacks and therefore limit the number of places I go  Long Term Goal 1:   I want to loose weight  Target Date:  1/20  Completion Date:NA          Short Term Objectives for Goal 1:  I will continue to work with the DR to get off of the Zyprexa  I will talk to the DR about being tired all the time  I am tired and eat to stay awake  I will get my blood pressure checked  I will eat healthy  I will start walking to get exercise  Long Term Goal 2:  I want to get out and go to more places  Target date:  1/20  Completion date:  NA    Short Term Objectives for Goal 2:  I will not limit my fears to keep me from getting the things I want  I will continue to go to the [facility] everyday  I will go to meetings, ie AA and   I will take my meds  I will go to [employer] on Sundays  I will go and visit family  Long Term Goal 3:  NA  Target date:  NA  Completion date:  NA    Short Term Objectives for Goal 3:         GOAL 1: Modality: Individual 2x per month   Completion Date NA, person responsible for the above [place] [facility]  Goal 2:  Modality:  Individual 2 X per month  Group  Completion Date NA, person responsible for the above [place] [facility]  Goal 3:  Modality:  Individual 2 X per month  Group  Completion Date NA, person responsible for the above [place] [facility]          Behavioral Health Treatment Plan [facility]: Diagnosis and Treatment Plan explained to Jaki Burrows relates understanding diagnosis and is agreeable to Treatment Plan        Client Comments : Please share your thoughts, feelings, need and/or experiences regarding your treatment plan:       __________________________________________________________________    __________________________________________________________________    __________________________________________________________________    __________________________________________________________________    _______________________________________                Patient signature, Date Time: __________________________________________             Physician cosigner signature, Date, Time: ________________________________

## 2019-09-05 ENCOUNTER — OFFICE VISIT (OUTPATIENT)
Dept: PSYCHIATRY | Facility: CLINIC | Age: 39
End: 2019-09-05
Payer: MEDICARE

## 2019-09-05 DIAGNOSIS — F31.9 BIPOLAR 1 DISORDER (HCC): ICD-10-CM

## 2019-09-05 DIAGNOSIS — F31.32 BIPOLAR 1 DISORDER, DEPRESSED, MODERATE (HCC): ICD-10-CM

## 2019-09-05 DIAGNOSIS — F40.01 PANIC DISORDER WITH AGORAPHOBIA: ICD-10-CM

## 2019-09-05 DIAGNOSIS — F41.1 GAD (GENERALIZED ANXIETY DISORDER): ICD-10-CM

## 2019-09-05 PROCEDURE — 99213 OFFICE O/P EST LOW 20 MIN: CPT | Performed by: PSYCHIATRY & NEUROLOGY

## 2019-09-05 RX ORDER — CARBAMAZEPINE 200 MG/1
200 TABLET, EXTENDED RELEASE ORAL DAILY
Qty: 180 TABLET | Refills: 0 | Status: SHIPPED | OUTPATIENT
Start: 2019-09-05 | End: 2019-12-09 | Stop reason: SDUPTHER

## 2019-09-05 RX ORDER — CARBAMAZEPINE 200 MG/1
TABLET, EXTENDED RELEASE ORAL
Qty: 90 TABLET | Refills: 0 | Status: SHIPPED | OUTPATIENT
Start: 2019-09-05 | End: 2019-09-05 | Stop reason: SDUPTHER

## 2019-09-05 RX ORDER — LISINOPRIL 10 MG/1
TABLET ORAL
Qty: 90 TABLET | Refills: 0 | Status: SHIPPED | OUTPATIENT
Start: 2019-09-05 | End: 2019-12-01 | Stop reason: SDUPTHER

## 2019-09-05 RX ORDER — CLONAZEPAM 1 MG/1
1 TABLET ORAL 3 TIMES DAILY
Qty: 90 TABLET | Refills: 2 | Status: SHIPPED | OUTPATIENT
Start: 2019-09-05 | End: 2019-12-09 | Stop reason: SDUPTHER

## 2019-09-05 RX ORDER — CARBAMAZEPINE 400 MG/1
400 TABLET, EXTENDED RELEASE ORAL
Qty: 90 TABLET | Refills: 0 | Status: SHIPPED | OUTPATIENT
Start: 2019-09-05 | End: 2019-09-05 | Stop reason: ALTCHOICE
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
## 2019-09-10 ENCOUNTER — OFFICE VISIT (OUTPATIENT)
Dept: BEHAVIORAL/MENTAL HEALTH CLINIC | Facility: CLINIC | Age: 39
End: 2019-09-10
Payer: MEDICARE

## 2019-09-10 DIAGNOSIS — F19.21 COMBINED DRUG DEPENDENCE, EXCLUDING OPIOID, IN REMISSION (HCC): ICD-10-CM

## 2019-09-10 DIAGNOSIS — F31.32 BIPOLAR 1 DISORDER, DEPRESSED, MODERATE (HCC): ICD-10-CM

## 2019-09-10 DIAGNOSIS — F40.01 PANIC DISORDER WITH AGORAPHOBIA: ICD-10-CM

## 2019-09-10 DIAGNOSIS — F43.10 POSTTRAUMATIC STRESS DISORDER: Primary | ICD-10-CM

## 2019-09-10 PROCEDURE — 90853 GROUP PSYCHOTHERAPY: CPT | Performed by: SOCIAL WORKER

## 2019-09-11 NOTE — PSYCH
Data:Marco Antonio attended the men's group  He laid out to the other members several encounters and scenarios he has encountered on the online dating service he is on  He said he likes hearing others feedback and opinions  Assessment: Kate Graves is getting out more and is trying to interact but shared he is amazed at the dysfunction he encounters with some of the individuals  However he has boundaries and he respects his own boundaries  Plan: Will see him in 2 weeks and marco antonio constantly shares how helpful group has been to him

## 2019-09-19 ENCOUNTER — SOCIAL WORK (OUTPATIENT)
Dept: BEHAVIORAL/MENTAL HEALTH CLINIC | Facility: CLINIC | Age: 39
End: 2019-09-19
Payer: MEDICARE

## 2019-09-19 DIAGNOSIS — F31.32 BIPOLAR 1 DISORDER, DEPRESSED, MODERATE (HCC): ICD-10-CM

## 2019-09-19 DIAGNOSIS — F43.10 POSTTRAUMATIC STRESS DISORDER: Primary | ICD-10-CM

## 2019-09-19 PROCEDURE — 90834 PSYTX W PT 45 MINUTES: CPT | Performed by: SOCIAL WORKER

## 2019-09-19 NOTE — PSYCH
Psychotherapy Provided: Individual Psychotherapy 45 minutes     Length of time in session: 45 minutes, follow up in 2 week    Goals addressed in session: 1,2    Pain:      none    0    Current suicide risk : Low     D:  MSW met with Francisco Pacheco for session  He continues not to smoke which is the longest he has gone without smoking for 4 years  Denies cravings  He has weaned off the Zyprexa  He had difficulty sleeping and was only sleeping 2 hrs a night  After a week of this he talked to the pharmacist and is now taking Elisa Jagdish which is helping him sleep through the night  He has met a women and discussed his thoughts and feelings regarding her  She "works out and eats right and is going to help him due so, so he can loose weight  Two years ago his brother past away and he "can't move passed it but, his family has "  Discussed his family and it does not appear they have moved past it  A:  He appears to have difficulty with his brother's death due to he has guilt and he was the one who found him dead  Mild progress on goal 1  Mod progress on goal 2  P:  To continue addressing  [place] plan goals  Behavioral Health Treatment Plan [facility]: Diagnosis and Treatment Plan explained to Oziel Miller relates understanding diagnosis and is agreeable to Treatment Plan   Yes

## 2019-09-24 ENCOUNTER — OFFICE VISIT (OUTPATIENT)
Dept: BEHAVIORAL/MENTAL HEALTH CLINIC | Facility: CLINIC | Age: 39
End: 2019-09-24
Payer: MEDICARE

## 2019-09-24 DIAGNOSIS — F40.01 PANIC DISORDER WITH AGORAPHOBIA: ICD-10-CM

## 2019-09-24 DIAGNOSIS — F31.32 BIPOLAR 1 DISORDER, DEPRESSED, MODERATE (HCC): ICD-10-CM

## 2019-09-24 DIAGNOSIS — F43.10 POSTTRAUMATIC STRESS DISORDER: Primary | ICD-10-CM

## 2019-09-24 DIAGNOSIS — F19.21 COMBINED DRUG DEPENDENCE, EXCLUDING OPIOID, IN REMISSION (HCC): ICD-10-CM

## 2019-09-24 PROCEDURE — 90853 GROUP PSYCHOTHERAPY: CPT | Performed by: SOCIAL WORKER

## 2019-09-24 NOTE — TELEPHONE ENCOUNTER
Stopped in today to see if dr Amy Rushing or a nurse can assist him  He tapered down on zyprexa, but has been having withdrawal symptoms, and unsure what to do  He's been agitated, anxious, diarrhea, trouble sleeping, nauseas sweating, and restlessness  He is free tomorrow, willing to come in if it's ok with dr Amy Rushing

## 2019-09-25 DIAGNOSIS — F31.9 BIPOLAR 1 DISORDER (HCC): Primary | ICD-10-CM

## 2019-09-25 RX ORDER — OLANZAPINE 5 MG/1
5 TABLET ORAL
Qty: 30 TABLET | Refills: 2 | Status: SHIPPED | OUTPATIENT
Start: 2019-09-25 | End: 2019-12-09 | Stop reason: SDUPTHER

## 2019-09-25 NOTE — TELEPHONE ENCOUNTER
JAMAL Bernard:  Dr Jose Alejandro Sears reviewed his message: can restart medication until next appointment, prescription sent to the pharmacy, check dosing with the pharamcist, given my number to call with an update

## 2019-09-25 NOTE — PSYCH
Data:Marco Antonio attended the men's group and he discussed the difficult time he is having being weened off his nueroleptic  He said he has been going thru a very difficult time for two weeks  I asked him why did he not call here to speak with a nurse or the doctor and he said he thought he could do it all by himself  He stated I am going thru hell  He was advised and stated he will call here the next day  I will also tell the MD  The good news he shared he has met a new woman and stated she was a quality nice woman who he sees things going in the right direction  Assessment: The group echoed my sentiment that anybody having trouble with their medications need to call here and not wait and he received a lot of support and well wishes on his new relationship  Lexy: Will see him in 2 weeks at the next group

## 2019-10-01 ENCOUNTER — OFFICE VISIT (OUTPATIENT)
Dept: BEHAVIORAL/MENTAL HEALTH CLINIC | Facility: CLINIC | Age: 39
End: 2019-10-01
Payer: MEDICARE

## 2019-10-01 DIAGNOSIS — F19.21 COMBINED DRUG DEPENDENCE, EXCLUDING OPIOID, IN REMISSION (HCC): ICD-10-CM

## 2019-10-01 DIAGNOSIS — F31.32 BIPOLAR 1 DISORDER, DEPRESSED, MODERATE (HCC): ICD-10-CM

## 2019-10-01 DIAGNOSIS — F40.01 PANIC DISORDER WITH AGORAPHOBIA: ICD-10-CM

## 2019-10-01 DIAGNOSIS — F43.10 POSTTRAUMATIC STRESS DISORDER: Primary | ICD-10-CM

## 2019-10-01 PROCEDURE — 90853 GROUP PSYCHOTHERAPY: CPT | Performed by: SOCIAL WORKER

## 2019-10-02 NOTE — PSYCH
Data:  Kamini Garza attended the bipolar support group  He shared the complications he is having with his medications  Others were able to relate  We as a group discussed the need to call here if one has an issue with their medications and not to wait till their next appointment  He discussed his symptoms and how he tries to manage them  He was very attentive and supportive of others  Assessment: Kamini Garza did call as was advised prior and will be seeing Dr Dwayne Finch sooner than was scheduled to have his medications reviewed  Plan: will see him at the next group in 2 weeks

## 2019-10-06 DIAGNOSIS — F31.32 BIPOLAR 1 DISORDER, DEPRESSED, MODERATE (HCC): Chronic | ICD-10-CM

## 2019-10-07 RX ORDER — OLANZAPINE 7.5 MG/1
7.5 TABLET ORAL
Qty: 90 TABLET | Refills: 0 | Status: SHIPPED | OUTPATIENT
Start: 2019-10-07 | End: 2019-12-09 | Stop reason: SDUPTHER

## 2019-10-08 ENCOUNTER — OFFICE VISIT (OUTPATIENT)
Dept: BEHAVIORAL/MENTAL HEALTH CLINIC | Facility: CLINIC | Age: 39
End: 2019-10-08
Payer: MEDICARE

## 2019-10-08 DIAGNOSIS — F40.01 PANIC DISORDER WITH AGORAPHOBIA: ICD-10-CM

## 2019-10-08 DIAGNOSIS — F43.10 POSTTRAUMATIC STRESS DISORDER: Primary | ICD-10-CM

## 2019-10-08 DIAGNOSIS — F19.21 COMBINED DRUG DEPENDENCE, EXCLUDING OPIOID, IN REMISSION (HCC): ICD-10-CM

## 2019-10-08 DIAGNOSIS — F31.32 BIPOLAR 1 DISORDER, DEPRESSED, MODERATE (HCC): ICD-10-CM

## 2019-10-08 PROCEDURE — 90853 GROUP PSYCHOTHERAPY: CPT | Performed by: SOCIAL WORKER

## 2019-10-09 NOTE — PSYCH
Data: Mica Decker attended the men's group  He shared that a new relationship he felt was going well recently was ended by the woman  He claims she criticized him for things she also gets treatment and help for  Then he said he has made no progress  We pointed out that in the last 2 months he is doing things he has not done for years  We pointed out as a group he is driving, he is getting out and he is interacting more  He then smiled and said you are all right  Assessment: Mica Decker is learning that progress is not all or nothing  He is learning to accept 5 steps forward and 2 steps backwards  We all in the group helped him see even though this relationship did not work out he is making progress  Plan: Will see him in 2 weeks in the next group

## 2019-10-22 ENCOUNTER — OFFICE VISIT (OUTPATIENT)
Dept: BEHAVIORAL/MENTAL HEALTH CLINIC | Facility: CLINIC | Age: 39
End: 2019-10-22
Payer: MEDICARE

## 2019-10-22 DIAGNOSIS — F43.10 POSTTRAUMATIC STRESS DISORDER: Primary | ICD-10-CM

## 2019-10-22 DIAGNOSIS — F19.21 COMBINED DRUG DEPENDENCE, EXCLUDING OPIOID, IN REMISSION (HCC): ICD-10-CM

## 2019-10-22 DIAGNOSIS — F40.01 PANIC DISORDER WITH AGORAPHOBIA: ICD-10-CM

## 2019-10-22 DIAGNOSIS — F31.32 BIPOLAR 1 DISORDER, DEPRESSED, MODERATE (HCC): ICD-10-CM

## 2019-10-22 PROCEDURE — 90853 GROUP PSYCHOTHERAPY: CPT | Performed by: SOCIAL WORKER
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
## 2019-10-25 DIAGNOSIS — F31.32 BIPOLAR 1 DISORDER, DEPRESSED, MODERATE (HCC): ICD-10-CM

## 2019-10-25 RX ORDER — AMLODIPINE BESYLATE 10 MG/1
TABLET ORAL
Qty: 90 TABLET | Refills: 0 | Status: SHIPPED | OUTPATIENT
Start: 2019-10-25 | End: 2020-01-29

## 2019-10-29 ENCOUNTER — SOCIAL WORK (OUTPATIENT)
Dept: BEHAVIORAL/MENTAL HEALTH CLINIC | Facility: CLINIC | Age: 39
End: 2019-10-29
Payer: MEDICARE

## 2019-10-29 DIAGNOSIS — F31.32 BIPOLAR 1 DISORDER, DEPRESSED, MODERATE (HCC): ICD-10-CM

## 2019-10-29 DIAGNOSIS — F43.10 POSTTRAUMATIC STRESS DISORDER: Primary | ICD-10-CM

## 2019-10-29 DIAGNOSIS — F40.01 PANIC DISORDER WITH AGORAPHOBIA: ICD-10-CM

## 2019-10-29 PROCEDURE — 90834 PSYTX W PT 45 MINUTES: CPT | Performed by: SOCIAL WORKER

## 2019-10-29 NOTE — PSYCH
Psychotherapy Provided: Individual Psychotherapy 45 minutes     Length of time in session: 45 minutes, follow up in 2 week    Goals addressed in session: 2    Pain:      none    0    Current suicide risk : Low     D:  MSW met with India Ogden for session  Discussed his relationship with his girlfriend  She told him, "She needed space  All he talks about is depressing stuff, ie his brother's death, his hospital stays, etc   He evaluated himself and realized she was right  He decided to move on from his past "  He is going to meet with her this weekend to talk  "He realized he was in long term and that he put himself there and he can get out when he wants to "  He wants to start working  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

## 2019-11-05 ENCOUNTER — SOCIAL WORK (OUTPATIENT)
Dept: BEHAVIORAL/MENTAL HEALTH CLINIC | Facility: CLINIC | Age: 39
End: 2019-11-05
Payer: MEDICARE

## 2019-11-05 DIAGNOSIS — F40.01 PANIC DISORDER WITH AGORAPHOBIA: ICD-10-CM

## 2019-11-05 DIAGNOSIS — F43.10 POSTTRAUMATIC STRESS DISORDER: Primary | ICD-10-CM

## 2019-11-05 DIAGNOSIS — F19.21 COMBINED DRUG DEPENDENCE, EXCLUDING OPIOID, IN REMISSION (HCC): ICD-10-CM

## 2019-11-05 DIAGNOSIS — F31.32 BIPOLAR 1 DISORDER, DEPRESSED, MODERATE (HCC): ICD-10-CM

## 2019-11-05 PROCEDURE — 90853 GROUP PSYCHOTHERAPY: CPT | Performed by: SOCIAL WORKER

## 2019-11-06 NOTE — PSYCH
Data: Nusrat Mosqueda attended the men's group He shared now due to the group he is getting out more, was dating, driving his car again and he has now applied for a job  He was attentive and helpful to other men  Assessment: Nusrat Mosqueda is proud of his achievements and encouraged a new member to try some of the recommendations he hears in his session  Plan: Continue to see him in the group

## 2019-11-19 ENCOUNTER — OFFICE VISIT (OUTPATIENT)
Dept: BEHAVIORAL/MENTAL HEALTH CLINIC | Facility: CLINIC | Age: 39
End: 2019-11-19
Payer: MEDICARE

## 2019-11-19 DIAGNOSIS — F43.10 POSTTRAUMATIC STRESS DISORDER: Primary | ICD-10-CM

## 2019-11-19 DIAGNOSIS — F31.32 BIPOLAR 1 DISORDER, DEPRESSED, MODERATE (HCC): ICD-10-CM

## 2019-11-19 DIAGNOSIS — F40.01 PANIC DISORDER WITH AGORAPHOBIA: ICD-10-CM

## 2019-11-19 DIAGNOSIS — F19.21 COMBINED DRUG DEPENDENCE, EXCLUDING OPIOID, IN REMISSION (HCC): ICD-10-CM

## 2019-11-19 PROCEDURE — 90853 GROUP PSYCHOTHERAPY: CPT | Performed by: SOCIAL WORKER
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
## 2019-12-01 DIAGNOSIS — F31.32 BIPOLAR 1 DISORDER, DEPRESSED, MODERATE (HCC): ICD-10-CM

## 2019-12-01 DIAGNOSIS — F31.9 BIPOLAR 1 DISORDER (HCC): ICD-10-CM

## 2019-12-03 RX ORDER — LISINOPRIL 10 MG/1
TABLET ORAL
Qty: 90 TABLET | Refills: 0 | Status: SHIPPED | OUTPATIENT
Start: 2019-12-03 | End: 2020-03-04

## 2019-12-03 RX ORDER — CARBAMAZEPINE 200 MG/1
TABLET, EXTENDED RELEASE ORAL
Qty: 90 TABLET | Refills: 0 | Status: SHIPPED | OUTPATIENT
Start: 2019-12-03 | End: 2020-01-14

## 2019-12-09 ENCOUNTER — OFFICE VISIT (OUTPATIENT)
Dept: PSYCHIATRY | Facility: CLINIC | Age: 39
End: 2019-12-09
Payer: MEDICARE

## 2019-12-09 DIAGNOSIS — F31.9 BIPOLAR 1 DISORDER (HCC): ICD-10-CM

## 2019-12-09 DIAGNOSIS — F41.1 GAD (GENERALIZED ANXIETY DISORDER): ICD-10-CM

## 2019-12-09 DIAGNOSIS — F40.01 PANIC DISORDER WITH AGORAPHOBIA: ICD-10-CM

## 2019-12-09 PROCEDURE — 99213 OFFICE O/P EST LOW 20 MIN: CPT | Performed by: PSYCHIATRY & NEUROLOGY

## 2019-12-09 RX ORDER — OLANZAPINE 5 MG/1
5 TABLET ORAL
Qty: 30 TABLET | Refills: 2 | Status: SHIPPED | OUTPATIENT
Start: 2019-12-09 | End: 2020-03-04

## 2019-12-09 RX ORDER — CLONAZEPAM 1 MG/1
1 TABLET ORAL 3 TIMES DAILY
Qty: 90 TABLET | Refills: 2 | Status: SHIPPED | OUTPATIENT
Start: 2019-12-09 | End: 2020-03-16 | Stop reason: SDUPTHER

## 2019-12-09 NOTE — PSYCH
Subjective: Medication Management    Patient ID: Daniel Garrido is a 44 y o  male  HPI ROS Appetite Changes and Sleep: normal appetite, normal energy level, no weight change and normal number of sleep hours     After last visit he stopped Zyprexa and he stated he did not do well after that  He stated he experienced withdrawal symptoms, even after he was gradually tapered off  He had called before todays appointment and we agree to restart Olanzapine 5 mg qhs  He stated he still feels very sedated in the am and he now wants to try to separate the times he takes Tegretol and his AM dose of Clonazepam  He stated he had a short relationship that ended after couple months that cause him a lot of stress  We will continue current medications  Review Of Systems:     Mood Anxiety, Depression and Emotional Lability   Behavior Normal    Thought Content Disturbing Thoughts, Feelings and Unreasonalbe or Irrational Fears   General Emotional Problems and Decreased Functioning   Personality Normal   Other Psych Symptoms Normal   Constitutional Negative   ENT Negative   Cardiovascular Negative   Respiratory Negative   Gastrointestinal Negative   Genitourinary Negative   Musculoskeletal Negative   Integumentary Negative   Neurological Negative   Endocrine Normal    Other Symptoms Normal              Laboratory Results: No results found for this or any previous visit      Substance Abuse History:  Social History     Substance and Sexual Activity   Drug Use No       Family Psychiatric History:   Family History   Problem Relation Age of Onset    Schizophrenia Father     Alcohol abuse Father     Drug abuse Brother        The following portions of the patient's history were reviewed and updated as appropriate: allergies, current medications, past family history, past medical history, past social history, past surgical history and problem list     Social History     Socioeconomic History    Marital status: Single Spouse name: Not on file    Number of children: Not on file    Years of education: Not on file    Highest education level: Not on file   Occupational History    Not on file   Social Needs    Financial resource strain: Not on file    Food insecurity:     Worry: Not on file     Inability: Not on file    Transportation needs:     Medical: Not on file     Non-medical: Not on file   Tobacco Use    Smoking status: Current Some Day Smoker    Smokeless tobacco: Never Used    Tobacco comment: he smoke only 1 cigarette a day, quit 2 months ago   Substance and Sexual Activity    Alcohol use: No     Comment: last use 2012, took 3 of alcohol to help with panic attack    Drug use: No    Sexual activity: Not Currently   Lifestyle    Physical activity:     Days per week: Not on file     Minutes per session: Not on file    Stress: Not on file   Relationships    Social connections:     Talks on phone: Not on file     Gets together: Not on file     Attends Jew service: Not on file     Active member of club or organization: Not on file     Attends meetings of clubs or organizations: Not on file     Relationship status: Not on file    Intimate partner violence:     Fear of current or ex partner: Not on file     Emotionally abused: Not on file     Physically abused: Not on file     Forced sexual activity: Not on file   Other Topics Concern    Not on file   Social History Narrative    Not on file     Social History     Social History Narrative    Not on file       Objective:       Mental status:  Appearance calm and cooperative , adequate hygiene and grooming and good eye contact    Mood dysphoric   Affect affect was constricted   Speech a normal rate and fluent   Thought Processes coherent/organized and normal thought processes   Hallucinations no hallucinations present    Thought Content no delusions   Abnormal Thoughts no suicidal thoughts  and no homicidal thoughts    Orientation  oriented to person and place and time   Remote Memory short term memory intact and long term memory intact   Attention Span concentration impaired   Intellect Appears to be of Average Intelligence   Insight Limited insight   Judgement judgment was limited   Muscle Strength Muscle strength and tone were normal and Normal gait    Language no difficulty naming common objects, no difficulty repeating a phrase  and no difficulty writing a sentence    Fund of Knowledge displays adequate knowledge of current events, adequate fund of knowledge regarding past history and adequate fund of knowledge regarding vocabulary    Pain none   Pain Scale 0       Assessment/Plan:       Diagnoses and all orders for this visit:    Bipolar 1 disorder ([facility] [address] )  -     OLANZapine (ZyPREXA) 5 mg tablet; Take 1 tablet (5 mg total) by mouth daily at bedtime    Panic disorder with agoraphobia  -     clonazePAM (KlonoPIN) 1 mg tablet; Take 1 tablet (1 mg total) by mouth 3 (three) times a day    RADHA (generalized anxiety disorder)  -     clonazePAM (KlonoPIN) 1 mg tablet; Take 1 tablet (1 mg total) by mouth 3 (three) times a day            Treatment Recommendations- Risks Benefits      Immediate Medical/Psychiatric/Psychotherapy Treatments and Any Precautions: continue current medications     Risks, Benefits And Possible Side Effects Of Medications:  {PSYCH RISK, BENEFITS AND POSSIBLE SIDE EFFECTS (Optional):78788    Controlled Medication Discussion: Discussed with patient Black Box warning on concurrent use of benzodiazepines and opioid medications including sedation, respiratory depression, coma and death  Patient understands the risk of treatment with benzodiazepines in addition to opioids and wants to continue taking those medications  , Discussed with patient the risks of sedation, respiratory depression, impairment of ability to drive and potential for abuse and addiction related to treatment with benzodiazepine medications   The patient understands risk of treatment with benzodiazepine medications, agrees to not drive if feels impaired and agrees to take medications as prescribed   and The patient has been filling controlled prescriptions on time as prescribed to [facility] program

## 2019-12-18 ENCOUNTER — SOCIAL WORK (OUTPATIENT)
Dept: BEHAVIORAL/MENTAL HEALTH CLINIC | Facility: CLINIC | Age: 39
End: 2019-12-18
Payer: MEDICARE

## 2019-12-18 DIAGNOSIS — F43.10 POSTTRAUMATIC STRESS DISORDER: Primary | ICD-10-CM

## 2019-12-18 DIAGNOSIS — F31.32 BIPOLAR 1 DISORDER, DEPRESSED, MODERATE (HCC): ICD-10-CM

## 2019-12-18 PROCEDURE — 90834 PSYTX W PT 45 MINUTES: CPT | Performed by: SOCIAL WORKER

## 2019-12-23 DIAGNOSIS — F31.32 BIPOLAR 1 DISORDER, DEPRESSED, MODERATE (HCC): Chronic | ICD-10-CM

## 2019-12-23 RX ORDER — OLANZAPINE 7.5 MG/1
7.5 TABLET ORAL
Qty: 90 TABLET | Refills: 0 | Status: SHIPPED | OUTPATIENT
Start: 2019-12-23 | End: 2020-03-28

## 2020-01-07 ENCOUNTER — SOCIAL WORK (OUTPATIENT)
Dept: BEHAVIORAL/MENTAL HEALTH CLINIC | Facility: CLINIC | Age: 40
End: 2020-01-07
Payer: MEDICARE

## 2020-01-07 DIAGNOSIS — F43.10 POSTTRAUMATIC STRESS DISORDER: Primary | ICD-10-CM

## 2020-01-07 DIAGNOSIS — F40.01 PANIC DISORDER WITH AGORAPHOBIA: ICD-10-CM

## 2020-01-07 DIAGNOSIS — F31.32 BIPOLAR 1 DISORDER, DEPRESSED, MODERATE (HCC): ICD-10-CM

## 2020-01-07 PROCEDURE — 90834 PSYTX W PT 45 MINUTES: CPT | Performed by: SOCIAL WORKER

## 2020-01-07 NOTE — PSYCH
Psychotherapy Provided: Individual Psychotherapy 45 minutes     Length of time in session: 45 minutes, follow up in 2 week    Goals addressed in session: 1,2    Pain:      none    0    Current suicide risk : Low     D:  MSW met with Kamini Garza for session  He has not heard yet from the [facility] to schedule a meeting this week  He called and they are on winter break  He is being patient  "He is not in a hurry "  He wants to start school in the fall semester this year  "She will follow up  He is going to give her some time "  He has opened up a savings acct  He has "not had one of them for years "  He is working towards budgeting his money  They are attaching his SS due to school loans that he defaulted in  He is working to get the payments reduced  Regarding goal 1 he has cut out pasta, sweets and soda  A:  Mod progress on goals 1 and 2  P:  To continue addressing  [place] plan goals  Behavioral Health Treatment Plan [facility]: Diagnosis and Treatment Plan explained to Maya Masterson relates understanding diagnosis and is agreeable to Treatment Plan   Yes

## 2020-01-14 ENCOUNTER — OFFICE VISIT (OUTPATIENT)
Dept: BEHAVIORAL/MENTAL HEALTH CLINIC | Facility: CLINIC | Age: 40
End: 2020-01-14
Payer: MEDICARE

## 2020-01-14 DIAGNOSIS — F19.21 COMBINED DRUG DEPENDENCE, EXCLUDING OPIOID, IN REMISSION (HCC): ICD-10-CM

## 2020-01-14 DIAGNOSIS — F31.32 BIPOLAR 1 DISORDER, DEPRESSED, MODERATE (HCC): ICD-10-CM

## 2020-01-14 DIAGNOSIS — F31.9 BIPOLAR 1 DISORDER (HCC): ICD-10-CM

## 2020-01-14 DIAGNOSIS — F40.01 PANIC DISORDER WITH AGORAPHOBIA: ICD-10-CM

## 2020-01-14 DIAGNOSIS — F43.10 POSTTRAUMATIC STRESS DISORDER: Primary | ICD-10-CM

## 2020-01-14 PROCEDURE — 90853 GROUP PSYCHOTHERAPY: CPT | Performed by: SOCIAL WORKER

## 2020-01-14 RX ORDER — CARBAMAZEPINE 200 MG/1
TABLET, EXTENDED RELEASE ORAL
Qty: 180 TABLET | Refills: 0 | Status: SHIPPED | OUTPATIENT
Start: 2020-01-14 | End: 2020-04-23 | Stop reason: SDUPTHER

## 2020-01-16 NOTE — PSYCH
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

## 2020-01-21 ENCOUNTER — SOCIAL WORK (OUTPATIENT)
Dept: BEHAVIORAL/MENTAL HEALTH CLINIC | Facility: CLINIC | Age: 40
End: 2020-01-21
Payer: MEDICARE

## 2020-01-21 DIAGNOSIS — F43.10 POSTTRAUMATIC STRESS DISORDER: Primary | ICD-10-CM

## 2020-01-21 DIAGNOSIS — F40.01 PANIC DISORDER WITH AGORAPHOBIA: ICD-10-CM

## 2020-01-21 DIAGNOSIS — F31.32 BIPOLAR 1 DISORDER, DEPRESSED, MODERATE (HCC): ICD-10-CM

## 2020-01-21 PROCEDURE — 90834 PSYTX W PT 45 MINUTES: CPT | Performed by: SOCIAL WORKER

## 2020-01-21 NOTE — PSYCH
Psychotherapy Provided: Individual Psychotherapy 45 minutes     Length of time in session: 45 minutes, follow up in 2 week    Goals addressed in session: 1,2    Pain:      none    0    Current suicide risk : Low     D:  MSW met with India Ogden for session  He is sticking to saving his money and focusing on his goals  With the college semester starting he plans on calling the college again to schedule an appt with the director of the dept he is interested in attending college in     A:  Mod progress on goals 1 and 2  P:  To continue addressing  [place] plan goals  Behavioral Health Treatment Plan [facility]: Diagnosis and Treatment Plan explained to Nick Jose relates understanding diagnosis and is agreeable to Treatment Plan   Yes

## 2020-01-28 ENCOUNTER — OFFICE VISIT (OUTPATIENT)
Dept: BEHAVIORAL/MENTAL HEALTH CLINIC | Facility: CLINIC | Age: 40
End: 2020-01-28
Payer: MEDICARE

## 2020-01-28 DIAGNOSIS — F43.10 POSTTRAUMATIC STRESS DISORDER: Primary | ICD-10-CM

## 2020-01-28 DIAGNOSIS — F31.32 BIPOLAR 1 DISORDER, DEPRESSED, MODERATE (HCC): ICD-10-CM

## 2020-01-28 DIAGNOSIS — F40.01 PANIC DISORDER WITH AGORAPHOBIA: ICD-10-CM

## 2020-01-28 DIAGNOSIS — F19.21 COMBINED DRUG DEPENDENCE, EXCLUDING OPIOID, IN REMISSION (HCC): ICD-10-CM

## 2020-01-28 PROCEDURE — 90853 GROUP PSYCHOTHERAPY: CPT | Performed by: SOCIAL WORKER

## 2020-01-29 DIAGNOSIS — F31.32 BIPOLAR 1 DISORDER, DEPRESSED, MODERATE (HCC): ICD-10-CM

## 2020-01-29 RX ORDER — AMLODIPINE BESYLATE 10 MG/1
TABLET ORAL
Qty: 90 TABLET | Refills: 0 | Status: SHIPPED | OUTPATIENT
Start: 2020-01-29 | End: 2020-05-04

## 2020-01-29 NOTE — PSYCH
Data:Marco Antonio attended the men's group  He shared how he is trying to be more social and he is trying to date  Assessment: He is making an effort to change things in his life  He is helpful to others  Plan: Will see him in 2 weeks at the next group

## 2020-02-28 ENCOUNTER — SOCIAL WORK (OUTPATIENT)
Dept: BEHAVIORAL/MENTAL HEALTH CLINIC | Facility: CLINIC | Age: 40
End: 2020-02-28
Payer: MEDICARE

## 2020-02-28 DIAGNOSIS — F43.10 POSTTRAUMATIC STRESS DISORDER: Primary | ICD-10-CM

## 2020-02-28 DIAGNOSIS — F31.32 BIPOLAR 1 DISORDER, DEPRESSED, MODERATE (HCC): ICD-10-CM

## 2020-02-28 PROCEDURE — 90834 PSYTX W PT 45 MINUTES: CPT | Performed by: SOCIAL WORKER

## 2020-02-28 NOTE — PSYCH
Psychotherapy Provided: Individual Psychotherapy 45 minutes     Length of time in session: 45 minutes, follow up in 2 week    Goals addressed in session: 1,2    Pain:      none    0    Current suicide risk : Low     D:  MSW met with Leslie Calloway for session  Discussed two of his friends  One of his friends age 44 yrs old  suddenly and another one 27 yrs old had a liver transplant 8 yrs old and now his liver is failing  He registered for classes at [employer] to start in the Spring of 2021  In 3 months he will have enough money saved to buy at [facility] computer  He is seeing the benefits of being disciplined with his money  He has not spent money on himself until recently when he bought a ticket to see his favorite band  A:  Mod progress on goals 1 and 2  P:  To continue addressing  TX plan goals  Behavioral Health Treatment Plan [facility]: Diagnosis and Treatment Plan explained to Saritha Monteiro relates understanding diagnosis and is agreeable to Treatment Plan   Yes

## 2020-03-04 DIAGNOSIS — F31.32 BIPOLAR 1 DISORDER, DEPRESSED, MODERATE (HCC): ICD-10-CM

## 2020-03-04 DIAGNOSIS — F31.9 BIPOLAR 1 DISORDER (HCC): ICD-10-CM

## 2020-03-04 RX ORDER — OLANZAPINE 5 MG/1
TABLET ORAL
Qty: 90 TABLET | Refills: 0 | Status: SHIPPED | OUTPATIENT
Start: 2020-03-04 | End: 2020-06-01

## 2020-03-04 RX ORDER — LISINOPRIL 10 MG/1
TABLET ORAL
Qty: 90 TABLET | Refills: 0 | Status: SHIPPED | OUTPATIENT
Start: 2020-03-04 | End: 2020-06-01

## 2020-03-16 DIAGNOSIS — F40.01 PANIC DISORDER WITH AGORAPHOBIA: ICD-10-CM

## 2020-03-16 DIAGNOSIS — F41.1 GAD (GENERALIZED ANXIETY DISORDER): ICD-10-CM

## 2020-03-17 RX ORDER — CLONAZEPAM 1 MG/1
1 TABLET ORAL 3 TIMES DAILY
Qty: 90 TABLET | Refills: 2 | Status: SHIPPED | OUTPATIENT
Start: 2020-03-17 | End: 2020-06-24 | Stop reason: SDUPTHER

## 2020-03-28 DIAGNOSIS — F31.32 BIPOLAR 1 DISORDER, DEPRESSED, MODERATE (HCC): Chronic | ICD-10-CM

## 2020-03-28 RX ORDER — OLANZAPINE 7.5 MG/1
7.5 TABLET ORAL
Qty: 90 TABLET | Refills: 0 | Status: SHIPPED | OUTPATIENT
Start: 2020-03-28 | End: 2020-04-23

## 2020-03-31 ENCOUNTER — TELEMEDICINE (OUTPATIENT)
Dept: BEHAVIORAL/MENTAL HEALTH CLINIC | Facility: CLINIC | Age: 40
End: 2020-03-31
Payer: MEDICARE

## 2020-03-31 DIAGNOSIS — F31.32 BIPOLAR 1 DISORDER, DEPRESSED, MODERATE (HCC): Primary | ICD-10-CM

## 2020-03-31 DIAGNOSIS — F40.01 PANIC DISORDER WITH AGORAPHOBIA: ICD-10-CM

## 2020-03-31 DIAGNOSIS — F43.10 POSTTRAUMATIC STRESS DISORDER: ICD-10-CM

## 2020-03-31 PROCEDURE — 90834 PSYTX W PT 45 MINUTES: CPT | Performed by: SOCIAL WORKER

## 2020-03-31 NOTE — BH TREATMENT PLAN
Marleen Martinez  1980         Date of Initial Treatment Plan:   Date of Current Treatment Plan: 09/20     Treatment Plan Number      Strengths/Personal Resources for Self Care:      Diagnosis:   1  Posttraumatic stress disorder      2  Panic disorder with agoraphobia      3  Bipolar 1 disorder, depressed, moderate ([address] )          Area of Needs: My agoraphobia is coming back with staying home due to the coronavirus  Long Term Goal 1: I want to decrease my anxiety and agoraphobia  Target Date:  9/20  Completion Date: NA         Short Term Objectives for Goal 1: I will start going to the park once per day or so in order to get out  I will keep into contact with my friends everyday  I will keep in contact with my family everyday  I will not watch crazy videos or the news  I will stay safe  I will take my meds  I will read and draw  I will write down collect comic books that I want to get from the [employer] stores once it opens  I will look up and read about comic books  I will do groups over the phone with [employer]  I will look into the virtual drop in center  I will continue with group when it resumes in person and or virtual              I will keep looking forward and towards my goal of buying my lap top  Long Term Goal 2: N/A    Target Date: N/A  Completion Date: N/A    Short Term Objectives for Goal 2: N/A         Long Term Goal # 3: N/A     Target Date: N/A  Completion Date: N/A    Short Term Objectives for Goal 3: N/A    GOAL 1: Modality: Individual 2x per month   Completion Date NA and The person(s) responsible for carrying out the plan is  Mehrdad Crumble     Group    GOAL 2: Modality:NA     GOAL 3: Modality:NA       Behavioral Health Treatment Plan  Luke: Diagnosis and Treatment Plan explained to Colin Collins relates understanding diagnosis and is agreeable to Treatment Plan        Client Comments : Please share your thoughts, feelings, need and/or experiences regarding your treatment plan:

## 2020-03-31 NOTE — PSYCH
Virtual Brief Visit    Problem List Items Addressed This Visit        Other    Posttraumatic stress disorder    Panic disorder with agoraphobia    Bipolar 1 disorder, depressed, moderate ([facility] ) - Primary              Reason for visit is PTSD, anxiety    Encounter provider Kristen Williamson    Provider located at [facility]        After connecting through telephone, the patient was identified by name and date of birth  Supriya Vivas was informed that this is a telemedicine visit and that the visit is being conducted through telephone  My office door was closed  No one else was in the room  He acknowledged consent and understanding of privacy and security of the video platform  The patient has agreed to participate and understands they can discontinue the visit at any time  Patient is aware this is a billable service  Subjective  Supriya Vivas is a 44 y o  male         Past Medical History:   Diagnosis Date    Anxiety     Bipolar disorder ([facility] )     Crohn's colitis ([facility] )     Depression     Panic attack     Panic disorder     Psychiatric disorder     PTSD (post-traumatic stress disorder)     Sleep difficulties        Past Surgical History:   Procedure Laterality Date    HEMICOLECTOMY         Current Outpatient Medications   Medication Sig Dispense Refill    amLODIPine (NORVASC) 10 mg tablet TAKE 1 TABLET BY MOUTH EVERY DAY 90 tablet 0    carBAMazepine (TEGretol XR) 200 mg 12 hr tablet TAKE 1 TABLET (200 MG TOTAL) BY MOUTH DAILY 1 TABLET BY MOUTH TWICE A  tablet 0    clonazePAM (KlonoPIN) 1 mg tablet Take 1 tablet (1 mg total) by mouth 3 (three) times a day 90 tablet 2    lisinopril (ZESTRIL) 10 mg tablet TAKE 1 TABLET BY MOUTH EVERY DAY IN THE EVENING 90 tablet 0    OLANZapine (ZyPREXA) 5 mg tablet TAKE 1 TABLET BY MOUTH AT BEDTIME 90 tablet 0    OLANZapine (ZyPREXA) 7 5 mg tablet TAKE 1 TABLET (7 5 MG TOTAL) BY MOUTH DAILY AT BEDTIME 90 tablet 0     No current facility-administered medications for this visit  Allergies   Allergen Reactions    Infliximab Other (See Comments)    Penicillins Other (See Comments)     rash    Penicillin V Rash       Review of Systems      I spent 40 minutes with the patient during this visit  Goals addressed in session: 1,2     Pain:       none     0     Current suicide risk : Low      D:  MSW met with Don Effie for session  Reviewed tx plans  Discussed  how he is doing with the state of the country  Developed new tx plan  He had started going out and doing this but since the coronavirus he has not been going out  He did one time to [employer] and had difficulty with his anxiety  A:  Mod progress on goals 1 and 2  P:  To begin addressing new [place]: Diagnosis and Treatment Plan explained to Lexi Sneed relates understanding diagnosis          Review of Systems      I spent 40 minutes with the patient during this visit

## 2020-04-21 ENCOUNTER — TELEMEDICINE (OUTPATIENT)
Dept: BEHAVIORAL/MENTAL HEALTH CLINIC | Facility: CLINIC | Age: 40
End: 2020-04-21
Payer: MEDICARE

## 2020-04-21 DIAGNOSIS — F31.32 BIPOLAR 1 DISORDER, DEPRESSED, MODERATE (HCC): ICD-10-CM

## 2020-04-21 DIAGNOSIS — F19.21 COMBINED DRUG DEPENDENCE, EXCLUDING OPIOID, IN REMISSION (HCC): ICD-10-CM

## 2020-04-21 DIAGNOSIS — F43.10 POSTTRAUMATIC STRESS DISORDER: Primary | ICD-10-CM

## 2020-04-21 DIAGNOSIS — F40.01 PANIC DISORDER WITH AGORAPHOBIA: ICD-10-CM

## 2020-04-21 PROCEDURE — 90853 GROUP PSYCHOTHERAPY: CPT | Performed by: SOCIAL WORKER

## 2020-04-23 ENCOUNTER — TELEMEDICINE (OUTPATIENT)
Dept: PSYCHIATRY | Facility: CLINIC | Age: 40
End: 2020-04-23
Payer: MEDICARE

## 2020-04-23 DIAGNOSIS — F31.9 BIPOLAR 1 DISORDER (HCC): ICD-10-CM

## 2020-04-23 PROCEDURE — 99214 OFFICE O/P EST MOD 30 MIN: CPT | Performed by: PSYCHIATRY & NEUROLOGY

## 2020-04-23 RX ORDER — CARBAMAZEPINE 200 MG/1
200 TABLET, EXTENDED RELEASE ORAL 2 TIMES DAILY
Qty: 180 TABLET | Refills: 0 | Status: SHIPPED | OUTPATIENT
Start: 2020-04-23 | End: 2020-07-20

## 2020-05-04 DIAGNOSIS — F31.32 BIPOLAR 1 DISORDER, DEPRESSED, MODERATE (HCC): ICD-10-CM

## 2020-05-04 RX ORDER — AMLODIPINE BESYLATE 10 MG/1
TABLET ORAL
Qty: 90 TABLET | Refills: 0 | Status: SHIPPED | OUTPATIENT
Start: 2020-05-04 | End: 2020-08-03

## 2020-05-06 ENCOUNTER — TELEPHONE (OUTPATIENT)
Dept: PSYCHIATRY | Facility: CLINIC | Age: 40
End: 2020-05-06

## 2020-05-08 ENCOUNTER — TELEMEDICINE (OUTPATIENT)
Dept: BEHAVIORAL/MENTAL HEALTH CLINIC | Facility: CLINIC | Age: 40
End: 2020-05-08
Payer: MEDICARE

## 2020-05-08 DIAGNOSIS — F43.10 POSTTRAUMATIC STRESS DISORDER: Primary | ICD-10-CM

## 2020-05-08 DIAGNOSIS — F31.32 BIPOLAR 1 DISORDER, DEPRESSED, MODERATE (HCC): ICD-10-CM

## 2020-05-08 DIAGNOSIS — F40.01 PANIC DISORDER WITH AGORAPHOBIA: ICD-10-CM

## 2020-05-08 DIAGNOSIS — F19.21 COMBINED DRUG DEPENDENCE, EXCLUDING OPIOID, IN REMISSION (HCC): ICD-10-CM

## 2020-05-08 PROCEDURE — 90834 PSYTX W PT 45 MINUTES: CPT | Performed by: SOCIAL WORKER

## 2020-05-19 ENCOUNTER — TELEMEDICINE (OUTPATIENT)
Dept: BEHAVIORAL/MENTAL HEALTH CLINIC | Facility: CLINIC | Age: 40
End: 2020-05-19
Payer: MEDICARE

## 2020-05-19 DIAGNOSIS — F43.10 POSTTRAUMATIC STRESS DISORDER: Primary | ICD-10-CM

## 2020-05-19 DIAGNOSIS — F19.21 COMBINED DRUG DEPENDENCE, EXCLUDING OPIOID, IN REMISSION (HCC): ICD-10-CM

## 2020-05-19 DIAGNOSIS — F40.01 PANIC DISORDER WITH AGORAPHOBIA: ICD-10-CM

## 2020-05-19 DIAGNOSIS — F31.32 BIPOLAR 1 DISORDER, DEPRESSED, MODERATE (HCC): ICD-10-CM

## 2020-05-19 PROCEDURE — 90834 PSYTX W PT 45 MINUTES: CPT | Performed by: SOCIAL WORKER

## 2020-05-29 ENCOUNTER — TELEMEDICINE (OUTPATIENT)
Dept: BEHAVIORAL/MENTAL HEALTH CLINIC | Facility: CLINIC | Age: 40
End: 2020-05-29
Payer: MEDICARE

## 2020-05-29 DIAGNOSIS — F40.01 PANIC DISORDER WITH AGORAPHOBIA: ICD-10-CM

## 2020-05-29 DIAGNOSIS — F43.10 POSTTRAUMATIC STRESS DISORDER: Primary | ICD-10-CM

## 2020-05-29 DIAGNOSIS — F31.32 BIPOLAR 1 DISORDER, DEPRESSED, MODERATE (HCC): ICD-10-CM

## 2020-05-29 DIAGNOSIS — F19.21 COMBINED DRUG DEPENDENCE, EXCLUDING OPIOID, IN REMISSION (HCC): ICD-10-CM

## 2020-05-29 PROCEDURE — 90834 PSYTX W PT 45 MINUTES: CPT | Performed by: SOCIAL WORKER

## 2020-06-01 DIAGNOSIS — F31.32 BIPOLAR 1 DISORDER, DEPRESSED, MODERATE (HCC): ICD-10-CM

## 2020-06-01 DIAGNOSIS — F31.9 BIPOLAR 1 DISORDER (HCC): ICD-10-CM

## 2020-06-01 RX ORDER — LISINOPRIL 10 MG/1
TABLET ORAL
Qty: 90 TABLET | Refills: 0 | Status: SHIPPED | OUTPATIENT
Start: 2020-06-01 | End: 2021-01-01 | Stop reason: SDUPTHER

## 2020-06-01 RX ORDER — OLANZAPINE 5 MG/1
TABLET ORAL
Qty: 90 TABLET | Refills: 0 | Status: SHIPPED | OUTPATIENT
Start: 2020-06-01 | End: 2020-06-12 | Stop reason: SDUPTHER

## 2020-06-02 ENCOUNTER — TELEMEDICINE (OUTPATIENT)
Dept: BEHAVIORAL/MENTAL HEALTH CLINIC | Facility: CLINIC | Age: 40
End: 2020-06-02
Payer: MEDICARE

## 2020-06-02 DIAGNOSIS — F19.21 COMBINED DRUG DEPENDENCE, EXCLUDING OPIOID, IN REMISSION (HCC): ICD-10-CM

## 2020-06-02 DIAGNOSIS — F43.10 POSTTRAUMATIC STRESS DISORDER: Primary | ICD-10-CM

## 2020-06-02 DIAGNOSIS — F31.32 BIPOLAR 1 DISORDER, DEPRESSED, MODERATE (HCC): ICD-10-CM

## 2020-06-02 DIAGNOSIS — F40.01 PANIC DISORDER WITH AGORAPHOBIA: ICD-10-CM

## 2020-06-02 PROCEDURE — 90834 PSYTX W PT 45 MINUTES: CPT | Performed by: SOCIAL WORKER

## 2020-06-12 ENCOUNTER — TELEMEDICINE (OUTPATIENT)
Dept: BEHAVIORAL/MENTAL HEALTH CLINIC | Facility: CLINIC | Age: 40
End: 2020-06-12
Payer: MEDICARE

## 2020-06-12 ENCOUNTER — TELEPHONE (OUTPATIENT)
Dept: PSYCHIATRY | Facility: CLINIC | Age: 40
End: 2020-06-12

## 2020-06-12 DIAGNOSIS — F40.01 PANIC DISORDER WITH AGORAPHOBIA: ICD-10-CM

## 2020-06-12 DIAGNOSIS — F19.21 COMBINED DRUG DEPENDENCE, EXCLUDING OPIOID, IN REMISSION (HCC): Primary | ICD-10-CM

## 2020-06-12 DIAGNOSIS — F31.9 BIPOLAR 1 DISORDER (HCC): ICD-10-CM

## 2020-06-12 DIAGNOSIS — F31.32 BIPOLAR 1 DISORDER, DEPRESSED, MODERATE (HCC): ICD-10-CM

## 2020-06-12 DIAGNOSIS — F43.10 POSTTRAUMATIC STRESS DISORDER: ICD-10-CM

## 2020-06-12 PROCEDURE — 90834 PSYTX W PT 45 MINUTES: CPT | Performed by: SOCIAL WORKER

## 2020-06-12 RX ORDER — OLANZAPINE 7.5 MG/1
7.5 TABLET ORAL
Qty: 30 TABLET | Refills: 2 | Status: SHIPPED | OUTPATIENT
Start: 2020-06-12 | End: 2020-09-08

## 2020-06-16 ENCOUNTER — TELEMEDICINE (OUTPATIENT)
Dept: BEHAVIORAL/MENTAL HEALTH CLINIC | Facility: CLINIC | Age: 40
End: 2020-06-16
Payer: MEDICARE

## 2020-06-16 DIAGNOSIS — F40.01 PANIC DISORDER WITH AGORAPHOBIA: ICD-10-CM

## 2020-06-16 DIAGNOSIS — F43.10 POSTTRAUMATIC STRESS DISORDER: Primary | ICD-10-CM

## 2020-06-16 DIAGNOSIS — F31.32 BIPOLAR 1 DISORDER, DEPRESSED, MODERATE (HCC): ICD-10-CM

## 2020-06-16 DIAGNOSIS — F19.21 COMBINED DRUG DEPENDENCE, EXCLUDING OPIOID, IN REMISSION (HCC): ICD-10-CM

## 2020-06-16 PROCEDURE — 90834 PSYTX W PT 45 MINUTES: CPT | Performed by: SOCIAL WORKER

## 2020-06-24 DIAGNOSIS — F41.1 GAD (GENERALIZED ANXIETY DISORDER): ICD-10-CM

## 2020-06-24 DIAGNOSIS — F40.01 PANIC DISORDER WITH AGORAPHOBIA: ICD-10-CM

## 2020-06-24 RX ORDER — CLONAZEPAM 1 MG/1
1 TABLET ORAL 3 TIMES DAILY
Qty: 90 TABLET | Refills: 2 | Status: SHIPPED | OUTPATIENT
Start: 2020-06-24 | End: 2020-09-28 | Stop reason: SDUPTHER

## 2020-06-26 ENCOUNTER — TELEMEDICINE (OUTPATIENT)
Dept: BEHAVIORAL/MENTAL HEALTH CLINIC | Facility: CLINIC | Age: 40
End: 2020-06-26
Payer: MEDICARE

## 2020-06-26 DIAGNOSIS — F43.10 POSTTRAUMATIC STRESS DISORDER: Primary | ICD-10-CM

## 2020-06-26 DIAGNOSIS — F31.32 BIPOLAR 1 DISORDER, DEPRESSED, MODERATE (HCC): ICD-10-CM

## 2020-06-26 DIAGNOSIS — F19.21 COMBINED DRUG DEPENDENCE, EXCLUDING OPIOID, IN REMISSION (HCC): ICD-10-CM

## 2020-06-26 DIAGNOSIS — F40.01 PANIC DISORDER WITH AGORAPHOBIA: ICD-10-CM

## 2020-06-26 PROCEDURE — 90834 PSYTX W PT 45 MINUTES: CPT | Performed by: SOCIAL WORKER

## 2020-06-30 ENCOUNTER — TELEMEDICINE (OUTPATIENT)
Dept: BEHAVIORAL/MENTAL HEALTH CLINIC | Facility: CLINIC | Age: 40
End: 2020-06-30
Payer: MEDICARE

## 2020-06-30 DIAGNOSIS — F40.01 PANIC DISORDER WITH AGORAPHOBIA: ICD-10-CM

## 2020-06-30 DIAGNOSIS — F31.32 BIPOLAR 1 DISORDER, DEPRESSED, MODERATE (HCC): ICD-10-CM

## 2020-06-30 DIAGNOSIS — F19.21 COMBINED DRUG DEPENDENCE, EXCLUDING OPIOID, IN REMISSION (HCC): ICD-10-CM

## 2020-06-30 DIAGNOSIS — F43.10 POSTTRAUMATIC STRESS DISORDER: Primary | ICD-10-CM

## 2020-06-30 PROCEDURE — 90853 GROUP PSYCHOTHERAPY: CPT | Performed by: SOCIAL WORKER

## 2020-07-01 NOTE — PSYCH
Data: Vero Khan attended the men's group   He shared how he is handling the pandemic but is also struggling dealing with the current state of national affairs  He discussed how he is coping and how he is upset with the national state of affairs  He interacted with the others and provided them support  ASSESSMENT: Vero Khan shared how he schedules healthy things to keep himself occupied  He shared how he appreciates the fact that we have the group and the support it offers him  Plan: Continue to see him in 2 weeks at the next group

## 2020-07-10 ENCOUNTER — TELEMEDICINE (OUTPATIENT)
Dept: BEHAVIORAL/MENTAL HEALTH CLINIC | Facility: CLINIC | Age: 40
End: 2020-07-10
Payer: MEDICARE

## 2020-07-10 DIAGNOSIS — F31.32 BIPOLAR 1 DISORDER, DEPRESSED, MODERATE (HCC): ICD-10-CM

## 2020-07-10 DIAGNOSIS — F40.01 PANIC DISORDER WITH AGORAPHOBIA: ICD-10-CM

## 2020-07-10 DIAGNOSIS — F43.10 POSTTRAUMATIC STRESS DISORDER: Primary | ICD-10-CM

## 2020-07-10 DIAGNOSIS — F19.21 COMBINED DRUG DEPENDENCE, EXCLUDING OPIOID, IN REMISSION (HCC): ICD-10-CM

## 2020-07-10 PROCEDURE — 90834 PSYTX W PT 45 MINUTES: CPT | Performed by: SOCIAL WORKER

## 2020-07-10 NOTE — PSYCH
Virtual Regular Visit     It was my intent to perform this visit via video technology but the patient was not able to do a video connection so the visit was completed via audio telephone only  Assessment/Plan:    Problem List Items Addressed This Visit        Other    Posttraumatic stress disorder - Primary    Panic disorder with agoraphobia    Combined drug dependence, excluding opioid, in remission ([facility] [address] )    Bipolar 1 disorder, depressed, moderate ([facility] [address] )               Reason for visit is due to the [facility] cov[facility]     Encounter provider Bridget Khanna /ELMER/Psychotherapist  Provider located at [address] [place] [address] [address]  [facility] [id number]      Recent Visits  No visits were found meeting these conditions  Showing recent visits within past 7 days and meeting all other requirements     Today's Visits  Date Type Provider Dept   07/10/20 [facility] Therapist   Showing today's visits and meeting all other requirements     Future Appointments  No visits were found meeting these conditions  Showing future appointments within next 150 days and meeting all other requirements        The patient was identified by name and date of birth  Alicia Zimmerman was informed that this is a telemedicine visit and that the visit is being conducted through [employer]  My office door was closed  No one else was in the room  He acknowledged consent and understanding of privacy and security of the video platform  The patient has agreed to participate and understands they can discontinue the visit at any time  Patient is aware this is a billable service  Subjective  Alicia Zimmerman is a 44 y o  male          HPI     Past Medical History:   Diagnosis Date    Anxiety     Bipolar disorder ([facility] [address] )     Crohn's colitis ([facility] [address] )     Depression     Panic attack     Panic disorder     Psychiatric disorder     PTSD (post-traumatic stress disorder)     Sleep difficulties        Past Surgical History:   Procedure Laterality Date    HEMICOLECTOMY         Current Outpatient Medications   Medication Sig Dispense Refill    amLODIPine (NORVASC) 10 mg tablet TAKE 1 TABLET BY MOUTH EVERY DAY 90 tablet 0    carBAMazepine (TEGretol XR) 200 mg 12 hr tablet Take 1 tablet (200 mg total) by mouth 2 (two) times a day 180 tablet 0    clonazePAM (KlonoPIN) 1 mg tablet Take 1 tablet (1 mg total) by mouth 3 (three) times a day 90 tablet 2    lisinopril (ZESTRIL) 10 mg tablet TAKE 1 TABLET BY MOUTH EVERY DAY IN THE EVENING 90 tablet 0    OLANZapine (ZyPREXA) 7 5 mg tablet Take 1 tablet (7 5 mg total) by mouth daily at bedtime 30 tablet 2     No current facility-administered medications for this visit  Allergies   Allergen Reactions    Infliximab Other (See Comments)    Penicillins Other (See Comments)     rash    Penicillin V Rash       Review of Systems  Data: Jennifer Hernandez wanted to do a virtual video session but he could not get his camera to work  We discussed how he is currently coping during the pandemic  He discussed every now and then his brother wants to go out and has to be reminded that Jennifer Hernandez has underlying health issues   We discussed his main goal that of reducing his anxiety and heightened agoraphobia due to the covid 19 pandemic is somewht getting better because in his words he simply is practicing common sense and things seem to be calming down regarding his anxiety  He is preparing what he needs to in order to start college in fall Assessment: Jennifer Hernandez denies suicidal and homicidal ideation, plan or intent  He shared for the most part he is getting better at managing his anxiety due to what he says are the benefits of his therapy and medications  He however admits as most of us that he is so tired of hearing about coronavirus  Plan: Continue to see him virtually throughout the pandemic     Video Exam    There were no vitals filed for this visit  Physical Exam n/a    I spent 45 minutes directly with the patient during this visit      VIRTUAL VISIT DISCLAIMER    Makenzie Darcie acknowledges that he has consented to an online visit or consultation  He understands that the online visit is based solely on information provided by him, and that, in the absence of a face-to-face physical evaluation by the physician, the diagnosis he receives is both limited and provisional in terms of accuracy and completeness  This is not intended to replace a full medical face-to-face evaluation by the physician  Makenzie Sprague understands and accepts these terms

## 2020-07-14 ENCOUNTER — TELEMEDICINE (OUTPATIENT)
Dept: BEHAVIORAL/MENTAL HEALTH CLINIC | Facility: CLINIC | Age: 40
End: 2020-07-14
Payer: MEDICARE

## 2020-07-14 DIAGNOSIS — F43.10 POSTTRAUMATIC STRESS DISORDER: Primary | ICD-10-CM

## 2020-07-14 DIAGNOSIS — F19.21 COMBINED DRUG DEPENDENCE, EXCLUDING OPIOID, IN REMISSION (HCC): ICD-10-CM

## 2020-07-14 DIAGNOSIS — F31.32 BIPOLAR 1 DISORDER, DEPRESSED, MODERATE (HCC): ICD-10-CM

## 2020-07-14 DIAGNOSIS — F40.01 PANIC DISORDER WITH AGORAPHOBIA: ICD-10-CM

## 2020-07-14 PROCEDURE — 90834 PSYTX W PT 45 MINUTES: CPT | Performed by: SOCIAL WORKER

## 2020-07-14 NOTE — PSYCH
Virtual Regular Visit      Assessment/Plan:    Problem List Items Addressed This Visit        Other    Posttraumatic stress disorder - Primary    Panic disorder with agoraphobia    Combined drug dependence, excluding opioid, in remission ([facility] [address] )    Bipolar 1 disorder, depressed, moderate ([facility] [address] [address] )               Reason for visit is due to the [facility] covid 19 pandemic     Encounter provider Liudmila Vasquez/LCSW/Psychotherapist    Provider located at [id number] [place] [address]  [address] [place] [id number]      Recent Visits  Date Type Provider Dept   07/10/20 [address] [facility]   Showing recent visits within past 7 days and meeting all other requirements     Today's Visits  Date Type Provider Dept   07/14/20 [address] [facility]   Showing today's visits and meeting all other requirements     Future Appointments  No visits were found meeting these conditions  Showing future appointments within next 150 days and meeting all other requirements        The patient was identified by name and date of birth  Alicia Zimmerman was informed that this is a telemedicine visit and that the visit is being conducted through [employer]  My office door was closed  No one else was in the room  He acknowledged consent and understanding of privacy and security of the video platform  The patient has agreed to participate and understands they can discontinue the visit at any time  Patient is aware this is a billable service  Subjective  Alicia Zimmerman is a 44 y o  male          HPI     Past Medical History:   Diagnosis Date    Anxiety     Bipolar disorder ([facility]     Crohn's colitis ([facility]     Depression     Panic attack     Panic disorder     Psychiatric disorder     PTSD (post-traumatic stress disorder)     Sleep difficulties        Past Surgical History:   Procedure Laterality Date    HEMICOLECTOMY         Current Outpatient Medications   Medication Sig Dispense Refill    amLODIPine (NORVASC) 10 mg tablet TAKE 1 TABLET BY MOUTH EVERY DAY 90 tablet 0    carBAMazepine (TEGretol XR) 200 mg 12 hr tablet Take 1 tablet (200 mg total) by mouth 2 (two) times a day 180 tablet 0    clonazePAM (KlonoPIN) 1 mg tablet Take 1 tablet (1 mg total) by mouth 3 (three) times a day 90 tablet 2    lisinopril (ZESTRIL) 10 mg tablet TAKE 1 TABLET BY MOUTH EVERY DAY IN THE EVENING 90 tablet 0    OLANZapine (ZyPREXA) 7 5 mg tablet Take 1 tablet (7 5 mg total) by mouth daily at bedtime 30 tablet 2     No current facility-administered medications for this visit  Allergies   Allergen Reactions    Infliximab Other (See Comments)    Penicillins Other (See Comments)     rash    Penicillin V Rash       Review of Systems Data: Marquita Bonilla discussed that he recently hurt his back and for the last 9 days he has had to rest and watch what he does so he does not reinjure his back  Then his mother and dad went to [facility] and he refuses to see them for 14 days  He discussed how upset he is that a person who met him on a dating site played a certain game and he called her out on it and ended the conversation  Regarding his goals he is wanting to get out but has been totally homebound for the last 9 days due to his back  He has had to call out of [facility] due to his injured back  Because he hurt his back and has been laid up for 9 days and he started getting panic attacks since it brought him back to where he was simply laying around in the past due to his behavioral health symptoms but this is different  He does not want to be that old person  Assessment: Marquita Bonilla laid a boundary down with his parents and his brother who went to [facility] to stay away for 14 days  He is on a new dating site but he is firm with boundaries if someone plays games  He is not suicidal or homicidal and he actually is doing better emotionally   He is anxious to start getting out more safely  Plan: Continue to help him skill build in distress tolerance  Video Exam    There were no vitals filed for this visit  Physical Exam N/A    I spent 45 minutes directly with the patient during this visit      VIRTUAL VISIT DISCLAIMER    Leatha Bruce acknowledges that he has consented to an online visit or consultation  He understands that the online visit is based solely on information provided by him, and that, in the absence of a face-to-face physical evaluation by the physician, the diagnosis he receives is both limited and provisional in terms of accuracy and completeness  This is not intended to replace a full medical face-to-face evaluation by the physician  Leatha Bruce understands and accepts these terms

## 2020-07-19 DIAGNOSIS — F31.9 BIPOLAR 1 DISORDER (HCC): ICD-10-CM

## 2020-07-20 RX ORDER — CARBAMAZEPINE 200 MG/1
TABLET, EXTENDED RELEASE ORAL
Qty: 180 TABLET | Refills: 0 | Status: SHIPPED | OUTPATIENT
Start: 2020-07-20 | End: 2020-08-31

## 2020-07-24 ENCOUNTER — TELEPHONE (OUTPATIENT)
Dept: PSYCHIATRY | Facility: CLINIC | Age: 40
End: 2020-07-24

## 2020-07-24 NOTE — TELEPHONE ENCOUNTER
Spoke with patient who stated he is having some issues with the sleep medication  When he first started it it worked great now it seems to not be working as well  It is taking longer for him to fall asleep and he is waking up during the night  Please advise

## 2020-07-28 ENCOUNTER — TELEMEDICINE (OUTPATIENT)
Dept: BEHAVIORAL/MENTAL HEALTH CLINIC | Facility: CLINIC | Age: 40
End: 2020-07-28
Payer: MEDICARE

## 2020-07-28 DIAGNOSIS — F19.21 COMBINED DRUG DEPENDENCE, EXCLUDING OPIOID, IN REMISSION (HCC): ICD-10-CM

## 2020-07-28 DIAGNOSIS — F43.10 POSTTRAUMATIC STRESS DISORDER: Primary | ICD-10-CM

## 2020-07-28 DIAGNOSIS — F31.32 BIPOLAR 1 DISORDER, DEPRESSED, MODERATE (HCC): ICD-10-CM

## 2020-07-28 DIAGNOSIS — F40.01 PANIC DISORDER WITH AGORAPHOBIA: ICD-10-CM

## 2020-07-28 PROCEDURE — 90853 GROUP PSYCHOTHERAPY: CPT | Performed by: SOCIAL WORKER

## 2020-07-28 NOTE — PSYCH
Virtual Regular Visit      Assessment/Plan:    Problem List Items Addressed This Visit        Other    Posttraumatic stress disorder - Primary    Panic disorder with agoraphobia    Combined drug dependence, excluding opioid, in remission ([facility] [address] )    Bipolar 1 disorder, depressed, moderate ([facility] )               Reason for visit is due to the covid 19 pandemic     Encounter provider Kameron Vasquez/ELVIAW/Psychotherapist    Provider located at [id number] [place] [address] [address]  [facility] [id number]      Recent Visits  No visits were found meeting these conditions  Showing recent visits within past 7 days and meeting all other requirements     Today's Visits  Date Type Provider Dept   07/28/20 [address] [facility]   Showing today's visits and meeting all other requirements     Future Appointments  No visits were found meeting these conditions  Showing future appointments within next 150 days and meeting all other requirements        The patient was identified by name and date of birth  Michael Ayala was informed that this is a telemedicine visit and that the visit is being conducted through [employer]  My office door was closed  No one else was in the room  He acknowledged consent and understanding of privacy and security of the video platform  The patient has agreed to participate and understands they can discontinue the visit at any time  Patient is aware this is a billable service  Subjective  Michael Ayala is a 44 y o  male          HPI     Past Medical History:   Diagnosis Date    Anxiety     B[facility] disorder [facility]     [facility]     Depression     Panic attack     Panic disorder     Psychiatric disorder     PTSD (post-traumatic stress disorder)     Sleep difficulties        Past Surgical History:   Procedure Laterality Date    HEMICOLECTOMY         Current Outpatient Medications Medication Sig Dispense Refill    amLODIPine (NORVASC) 10 mg tablet TAKE 1 TABLET BY MOUTH EVERY DAY 90 tablet 0    carBAMazepine (TEGretol XR) 200 mg 12 hr tablet TAKE 1 TABLET BY MOUTH TWICE A  tablet 0    clonazePAM (KlonoPIN) 1 mg tablet Take 1 tablet (1 mg total) by mouth 3 (three) times a day 90 tablet 2    lisinopril (ZESTRIL) 10 mg tablet TAKE 1 TABLET BY MOUTH EVERY DAY IN THE EVENING 90 tablet 0    OLANZapine (ZyPREXA) 7 5 mg tablet Take 1 tablet (7 5 mg total) by mouth daily at bedtime 30 tablet 2     No current facility-administered medications for this visit  Allergies   Allergen Reactions    Infliximab Other (See Comments)    Penicillins Other (See Comments)     rash    Penicillin V Rash       Review of Systems Data: Yuridia Richter attended the [employer] group  He discussed how he got his computer for his fall semester at [employer]  He discussed the safe activities he has been doing  He is very excited about college starting  Assessment: Yuridia Richter is doing well and he is excited about school  Plan: Continue to help him skill build in distress tolerance and will see in 2 weeks in the next men's group  Video Exam    There were no vitals filed for this visit  Physical Exam N/A    I spent 60 minutes directly with the patient during this visit      VIRTUAL VISIT DISCLAIMER    Vikram Hinds acknowledges that he has consented to an online visit or consultation  He understands that the online visit is based solely on information provided by him, and that, in the absence of a face-to-face physical evaluation by the physician, the diagnosis he receives is both limited and provisional in terms of accuracy and completeness  This is not intended to replace a full medical face-to-face evaluation by the physician  Vikram Hinds understands and accepts these terms

## 2020-08-01 DIAGNOSIS — F31.32 BIPOLAR 1 DISORDER, DEPRESSED, MODERATE (HCC): ICD-10-CM

## 2020-08-03 RX ORDER — AMLODIPINE BESYLATE 10 MG/1
TABLET ORAL
Qty: 90 TABLET | Refills: 0 | Status: SHIPPED | OUTPATIENT
Start: 2020-08-03 | End: 2021-01-01 | Stop reason: SDUPTHER

## 2020-08-25 ENCOUNTER — TELEMEDICINE (OUTPATIENT)
Dept: BEHAVIORAL/MENTAL HEALTH CLINIC | Facility: CLINIC | Age: 40
End: 2020-08-25
Payer: MEDICARE

## 2020-08-25 DIAGNOSIS — F43.10 POSTTRAUMATIC STRESS DISORDER: Primary | ICD-10-CM

## 2020-08-25 DIAGNOSIS — F19.21 COMBINED DRUG DEPENDENCE, EXCLUDING OPIOID, IN REMISSION (HCC): ICD-10-CM

## 2020-08-25 DIAGNOSIS — F31.32 BIPOLAR 1 DISORDER, DEPRESSED, MODERATE (HCC): ICD-10-CM

## 2020-08-25 DIAGNOSIS — F40.01 PANIC DISORDER WITH AGORAPHOBIA: ICD-10-CM

## 2020-08-25 PROCEDURE — 90834 PSYTX W PT 45 MINUTES: CPT | Performed by: SOCIAL WORKER

## 2020-08-25 NOTE — PSYCH
Virtual Regular Visit      Assessment/Plan:    Problem List Items Addressed This Visit        Other    Posttraumatic stress disorder - Primary    Panic disorder with agoraphobia    Combined drug dependence, excluding opioid, in remission ([facility] [address] )    Bipolar 1 disorder, depressed, moderate ([facility] [address] )               Reason for visit is due to the [facility] 19 pandemic    Encounter provider Diallo Cortez    Provider located at [address]  [facility] [address]      Recent Visits  No visits were found meeting these conditions  Showing recent visits within past 7 days and meeting all other requirements     Today's Visits  Date Type Provider Dept   08/25/20 40[address] [facility]   Showing today's visits and meeting all other requirements     Future Appointments  No visits were found meeting these conditions  Showing future appointments within next 150 days and meeting all other requirements        The patient was identified by name and date of birth  Daly Meza was informed that this is a telemedicine visit and that the visit is being conducted through [employer]  My office door was closed  No one else was in the room  He acknowledged consent and understanding of privacy and security of the video platform  The patient has agreed to participate and understands they can discontinue the visit at any time  Patient is aware this is a billable service       Subjective  Daly Meza is a 36 y o  male       HPI     Past Medical History:   Diagnosis Date    Anxiety     B[facility] disorder ([facility]     [facility]     Depression     Panic attack     Panic disorder     Psychiatric disorder     PTSD (post-traumatic stress disorder)     Sleep difficulties        Past Surgical History:   Procedure Laterality Date    HEMICOLECTOMY         Current Outpatient Medications   Medication Sig Dispense Refill    amLODIPine (NORVASC) 10 mg tablet TAKE 1 TABLET BY MOUTH EVERY DAY 90 tablet 0    carBAMazepine (TEGretol XR) 200 mg 12 hr tablet TAKE 1 TABLET BY MOUTH TWICE A  tablet 0    clonazePAM (KlonoPIN) 1 mg tablet Take 1 tablet (1 mg total) by mouth 3 (three) times a day 90 tablet 2    lisinopril (ZESTRIL) 10 mg tablet TAKE 1 TABLET BY MOUTH EVERY DAY IN THE EVENING 90 tablet 0    OLANZapine (ZyPREXA) 7 5 mg tablet Take 1 tablet (7 5 mg total) by mouth daily at bedtime 30 tablet 2     No current facility-administered medications for this visit  Allergies   Allergen Reactions    Infliximab Other (See Comments)    Penicillins Other (See Comments)     rash    Penicillin V Rash       Review of Systems Data: Serjio Rodriguez was the only person who arrived for the men's group  So we did an individual session  He discussed how a woman he was speaking to on the internet played games with him  He discussed his emotions over this  We discussed strategies for him to handle the situation  We discussed the goal as set up by his former therapist  We discussed his anxiety and how he is managing it and he is less agoraphobia which is less and less  Assessment: He denies suicidal and homicidal thinking  He is learning to more effectively manage his anxiety and he is more willing to venture out safely  Plan: Continue to help him skill build in distress tolerance and he will schedule some individual sessions  Video Exam    There were no vitals filed for this visit  Physical Exam N/A    I spent 45 minutes directly with the patient during this visit      VIRTUAL VISIT DISCLAIMER    Mallika David acknowledges that he has consented to an online visit or consultation   He understands that the online visit is based solely on information provided by him, and that, in the absence of a face-to-face physical evaluation by the physician, the diagnosis he receives is both limited and provisional in terms of accuracy and completeness  This is not intended to replace a full medical face-to-face evaluation by the physician  Eric Hector understands and accepts these terms

## 2020-08-29 DIAGNOSIS — F31.9 BIPOLAR 1 DISORDER (HCC): ICD-10-CM

## 2020-08-29 RX ORDER — OLANZAPINE 5 MG/1
TABLET ORAL
Qty: 90 TABLET | Refills: 0 | Status: SHIPPED | OUTPATIENT
Start: 2020-08-29 | End: 2021-12-21

## 2020-08-30 DIAGNOSIS — F31.9 BIPOLAR 1 DISORDER (HCC): ICD-10-CM

## 2020-08-31 RX ORDER — CARBAMAZEPINE 200 MG/1
TABLET, EXTENDED RELEASE ORAL
Qty: 180 TABLET | Refills: 0 | Status: SHIPPED | OUTPATIENT
Start: 2020-08-31 | End: 2020-12-14 | Stop reason: SDUPTHER

## 2020-09-06 DIAGNOSIS — F31.9 BIPOLAR 1 DISORDER (HCC): ICD-10-CM

## 2020-09-08 ENCOUNTER — TELEMEDICINE (OUTPATIENT)
Dept: BEHAVIORAL/MENTAL HEALTH CLINIC | Facility: CLINIC | Age: 40
End: 2020-09-08
Payer: MEDICARE

## 2020-09-08 DIAGNOSIS — F19.21 COMBINED DRUG DEPENDENCE, EXCLUDING OPIOID, IN REMISSION (HCC): ICD-10-CM

## 2020-09-08 DIAGNOSIS — F40.01 PANIC DISORDER WITH AGORAPHOBIA: ICD-10-CM

## 2020-09-08 DIAGNOSIS — F31.32 BIPOLAR 1 DISORDER, DEPRESSED, MODERATE (HCC): ICD-10-CM

## 2020-09-08 DIAGNOSIS — F43.10 POSTTRAUMATIC STRESS DISORDER: Primary | ICD-10-CM

## 2020-09-08 PROCEDURE — 90834 PSYTX W PT 45 MINUTES: CPT | Performed by: SOCIAL WORKER

## 2020-09-08 RX ORDER — OLANZAPINE 7.5 MG/1
7.5 TABLET ORAL
Qty: 90 TABLET | Refills: 0 | Status: SHIPPED | OUTPATIENT
Start: 2020-09-08 | End: 2020-12-17

## 2020-09-08 NOTE — PSYCH
Virtual Regular Visit      Assessment/Plan:    Problem List Items Addressed This Visit        Other    Posttraumatic stress disorder - Primary    Panic disorder with agoraphobia    Combined drug dependence, excluding opioid, in remission ([facility] [address] )    B[facility] disorder, depressed, moderate [facility]               Reason for visit is due to the global covid 19 pandemic  [facility]    Provider located at [address]  [facility] [phone]      Recent Visits  No visits were found meeting these conditions  Showing recent visits within past 7 days and meeting all other requirements     Future Appointments  No visits were found meeting these conditions  Showing future appointments within next 150 days and meeting all other requirements        The patient was identified by name and date of birth  Leatha Bruce was informed that this is a telemedicine visit and that the visit is being conducted through [employer].  My office door was closed  No one else was in the room  He acknowledged consent and understanding of privacy and security of the video platform  The patient has agreed to participate and understands they can discontinue the visit at any time  Patient is aware this is a billable service  Subjective  Leatha Bruce is a 36 y o  male          HPI     Past Medical History:   Diagnosis Date    Anxiety     [facility]     [facility]'s [facility] ([facility]     Depression     Panic attack     Panic disorder     Psychiatric disorder     PTSD (post-traumatic stress disorder)     Sleep difficulties        Past Surgical History:   Procedure Laterality Date    HEMICOLECTOMY         Current Outpatient Medications   Medication Sig Dispense Refill    amLODIPine (NORVASC) 10 mg tablet TAKE 1 TABLET BY MOUTH EVERY DAY 90 tablet 0    carBAMazepine (TEGretol XR) 200 mg 12 hr tablet TAKE 1 TABLET BY MOUTH TWICE A  tablet 0    clonazePAM (KlonoPIN) 1 mg tablet Take 1 tablet (1 mg total) by mouth 3 (three) times a day 90 tablet 2    lisinopril (ZESTRIL) 10 mg tablet TAKE 1 TABLET BY MOUTH EVERY DAY IN THE EVENING 90 tablet 0    OLANZapine (ZyPREXA) 5 mg tablet TAKE 1 TABLET BY MOUTH EVERYDAY AT BEDTIME 90 tablet 0    OLANZapine (ZyPREXA) 7 5 mg tablet TAKE 1 TABLET (7 5 MG TOTAL) BY MOUTH DAILY AT BEDTIME 90 tablet 0     No current facility-administered medications for this visit  Allergies   Allergen Reactions    Infliximab Other (See Comments)    Penicillins Other (See Comments)     rash    Penicillin V Rash       Review of Systems Data: Met with Valery Avery virtually  He is quite glad we discussed his situation about how he is so glad he got out of the dysfunctional relationship he had found himself in with a woman online  We updated his treatment plan as he was transferred from another therapist to the undersigned  Regarding his goal with the other therapist he has overcome his agoraphobia and he is less anxious than he was  The undersigned has seen him individually a few times but mostly via the men's group  However many times his intent was to join the men's group but during covid he often was the only member so his group session was converted to an individual session  He also requested the undersigned see him individually also  He discussed how he is all ready for college in January  Assessment: Valery Avery has always been thankful for his therapy and the support and encouragement the undersigned provides him  He is not suicidal or homicidal  He is consistent with therapy and his medications  Plan: Will continue to help him progress with the new goals, the first treatment plan he and I have developed  Video Exam    There were no vitals filed for this visit      Physical Exam N/A    I spent 45 minutes directly with the patient during this visit      VIRTUAL VISIT [facility] acknowledges that he has consented to an online visit or consultation  He understands that the online visit is based solely on information provided by him, and that, in the absence of a face-to-face physical evaluation by the physician, the diagnosis he receives is both limited and provisional in terms of accuracy and completeness  This is not intended to replace a full medical face-to-face evaluation by the physician  Daniel Wilson understands and accepts these terms

## 2020-09-08 NOTE — BH TREATMENT PLAN
[facility]  1980       Date of Initial Treatment Plan: 09/08/2020   Date of Current Treatment Plan: 09/08/20    Treatment Plan Number Initial treatment plan with this provider  Strengths/Personal Resources for Self Care: considerate, kind, planning for the future    Diagnosis:   1  Posttraumatic stress disorder     2  Panic disorder with agoraphobia     3  Combined drug dependence, excluding opioid, in remission ([facility] )     4  Bipolar 1 disorder, depressed, moderate ([facility] [facility] )         Area of Needs: see below      Long Term Goal 1: I need to make better choices and  of character and takes things slower with people and on line dating  Target Date: 01/08/2021  Completion Date: TBD         Short Term Objectives for Goal 1: I will be more thoughtful and less impulsive when it comes to relationships on line    Long Term Goal 2: I still need to continue to more effectively manage my anxiety  Target Date: 01/08/2021  Completion Date: TBD    Short Term Objectives for Goal 2: I will discuss strategies and implement them such as mindfulness, cbt and stress management         Long Term Goal # 3: I will keep my main focus mostly on college and finishing my degree     Target Date: 01/08/2021  Completion Date: TBD    Short Term Objectives for Goal 3: I will not get distracted by meaningless issues    GOAL 1: Modality: Individual 2xx per month   Completion Date tbd    GOAL 2: Modality: Individual 2xx per month   Completion Date tbd     GOAL 3: Modality: Individual 2xx per month   Completion Date tbd      Behavioral Health Treatment Plan [facility]: Diagnosis and Treatment Plan explained to Guevara Kiser relates understanding diagnosis and is agreeable to Treatment Plan  Client Comments : Please share your thoughts, feelings, need and/or experiences regarding your treatment plan: the client and the undersigned will work as a team to help him progress on the goals

## 2020-09-22 ENCOUNTER — TELEMEDICINE (OUTPATIENT)
Dept: BEHAVIORAL/MENTAL HEALTH CLINIC | Facility: CLINIC | Age: 40
End: 2020-09-22
Payer: MEDICARE

## 2020-09-22 DIAGNOSIS — F31.32 BIPOLAR 1 DISORDER, DEPRESSED, MODERATE (HCC): ICD-10-CM

## 2020-09-22 DIAGNOSIS — F43.10 POSTTRAUMATIC STRESS DISORDER: Primary | ICD-10-CM

## 2020-09-22 DIAGNOSIS — F40.01 PANIC DISORDER WITH AGORAPHOBIA: ICD-10-CM

## 2020-09-22 DIAGNOSIS — F19.21 COMBINED DRUG DEPENDENCE, EXCLUDING OPIOID, IN REMISSION (HCC): ICD-10-CM

## 2020-09-22 PROCEDURE — 90834 PSYTX W PT 45 MINUTES: CPT | Performed by: SOCIAL WORKER

## 2020-09-22 NOTE — PSYCH
Virtual Regular Visit      Assessment/Plan:    Problem List Items Addressed This Visit        Other    Posttraumatic stress disorder - Primary    Panic disorder with agoraphobia    Combined drug dependence, excluding opioid, in remission ([facility] )    Bipolar 1 disorder, depressed, moderate ([facility] )               Reason for visit is DUE TO THECOVID 19 PANDEMIC     Encounter provider Celestine Rivera    Provider located at [address] [facility]  [address]  [facility] [address]      Recent Visits  No visits were found meeting these conditions  Showing recent visits within past 7 days and meeting all other requirements     Today's Visits  Date Type Provider Dept   09/22/20 [address] [facility] Therapist   Showing today's visits and meeting all other requirements     Future Appointments  No visits were found meeting these conditions  Showing future appointments within next 150 days and meeting all other requirements        The patient was identified by name and date of birth  Mackenzie Greene was informed that this is a telemedicine visit and that the visit is being conducted through [employer]  My office door was closed  No one else was in the room  He acknowledged consent and understanding of privacy and security of the video platform  The patient has agreed to participate and understands they can discontinue the visit at any time  Patient is aware this is a billable service  Subjective  Mackenzie Greene is a 36 y o  male          HPI     Past Medical History:   Diagnosis Date    Anxiety     Bipolar disorder ([facility]     Crohn's colitis ([facility]     Depression     Panic attack     Panic disorder     Psychiatric disorder     PTSD (post-traumatic stress disorder)     Sleep difficulties        Past Surgical History:   Procedure Laterality Date    HEMICOLECTOMY         Current Outpatient Medications   Medication Sig Dispense Refill    amLODIPine (NORVASC) 10 mg tablet TAKE 1 TABLET BY MOUTH EVERY DAY 90 tablet 0    carBAMazepine (TEGretol XR) 200 mg 12 hr tablet TAKE 1 TABLET BY MOUTH TWICE A  tablet 0    clonazePAM (KlonoPIN) 1 mg tablet Take 1 tablet (1 mg total) by mouth 3 (three) times a day 90 tablet 2    lisinopril (ZESTRIL) 10 mg tablet TAKE 1 TABLET BY MOUTH EVERY DAY IN THE EVENING 90 tablet 0    OLANZapine (ZyPREXA) 5 mg tablet TAKE 1 TABLET BY MOUTH EVERYDAY AT BEDTIME 90 tablet 0    OLANZapine (ZyPREXA) 7 5 mg tablet TAKE 1 TABLET (7 5 MG TOTAL) BY MOUTH DAILY AT BEDTIME 90 tablet 0     No current facility-administered medications for this visit  Allergies   Allergen Reactions    Infliximab Other (See Comments)    Penicillins Other (See Comments)     rash    Penicillin V Rash       Review of Systems dATA:Yenny Jo Benavidez was the only one who showed up for the men's group  So we did an individual  All he wanted to discuss was that a woman he was in a relationship for 5 years about 14 years ago  he was recently thinking about  He shared then out of no where he contacted him stating she was searching for him and she was thinking about him  He stated they discussed getting together  She has a boyfriend but told him she still loves him  Assessment: Jo Benavidez was wondering what to do  We dicussed he needs to approach this slowly and gingerly  He is going to meet for coffee  He has a tendency to fall hard real fast and recently had two what he thought were internet relationships that ended poorly  The difference is these two dated and she is local but we still discussed for him to take it slow and carefully  Plan: He is going to start to schedule more individuals and we will meet face to face in the new office  Video Exam    There were no vitals filed for this visit      Physical Exam n/a    I spent 45 minutes directly with the patient during this visit      VIRTUAL VISIT [address] [facility] acknowledges that he has consented to an online visit or consultation  He understands that the online visit is based solely on information provided by him, and that, in the absence of a face-to-face physical evaluation by the physician, the diagnosis he receives is both limited and provisional in terms of accuracy and completeness  This is not intended to replace a full medical face-to-face evaluation by the physician  Leatha Bruce understands and accepts these terms

## 2020-09-28 ENCOUNTER — TELEPHONE (OUTPATIENT)
Dept: PSYCHIATRY | Facility: CLINIC | Age: 40
End: 2020-09-28

## 2020-09-28 DIAGNOSIS — F41.1 GAD (GENERALIZED ANXIETY DISORDER): ICD-10-CM

## 2020-09-28 DIAGNOSIS — F40.01 PANIC DISORDER WITH AGORAPHOBIA: ICD-10-CM

## 2020-09-28 RX ORDER — CLONAZEPAM 1 MG/1
1 TABLET ORAL 3 TIMES DAILY
Qty: 90 TABLET | Refills: 2 | Status: SHIPPED | OUTPATIENT
Start: 2020-09-28 | End: 2020-12-28 | Stop reason: SDUPTHER

## 2020-10-06 ENCOUNTER — TELEMEDICINE (OUTPATIENT)
Dept: BEHAVIORAL/MENTAL HEALTH CLINIC | Facility: CLINIC | Age: 40
End: 2020-10-06
Payer: MEDICARE

## 2020-10-06 DIAGNOSIS — F43.10 POSTTRAUMATIC STRESS DISORDER: Primary | ICD-10-CM

## 2020-10-06 DIAGNOSIS — F31.32 BIPOLAR 1 DISORDER, DEPRESSED, MODERATE (HCC): ICD-10-CM

## 2020-10-06 DIAGNOSIS — F19.21 COMBINED DRUG DEPENDENCE, EXCLUDING OPIOID, IN REMISSION (HCC): ICD-10-CM

## 2020-10-06 DIAGNOSIS — F40.01 PANIC DISORDER WITH AGORAPHOBIA: ICD-10-CM

## 2020-10-06 PROCEDURE — 90853 GROUP PSYCHOTHERAPY: CPT | Performed by: SOCIAL WORKER

## 2020-10-20 ENCOUNTER — TELEMEDICINE (OUTPATIENT)
Dept: BEHAVIORAL/MENTAL HEALTH CLINIC | Facility: CLINIC | Age: 40
End: 2020-10-20
Payer: MEDICARE

## 2020-10-20 DIAGNOSIS — F31.32 BIPOLAR 1 DISORDER, DEPRESSED, MODERATE (HCC): ICD-10-CM

## 2020-10-20 DIAGNOSIS — F40.01 PANIC DISORDER WITH AGORAPHOBIA: ICD-10-CM

## 2020-10-20 DIAGNOSIS — F19.21 COMBINED DRUG DEPENDENCE, EXCLUDING OPIOID, IN REMISSION (HCC): ICD-10-CM

## 2020-10-20 DIAGNOSIS — F43.10 POSTTRAUMATIC STRESS DISORDER: Primary | ICD-10-CM

## 2020-10-20 PROCEDURE — 90853 GROUP PSYCHOTHERAPY: CPT | Performed by: SOCIAL WORKER

## 2020-11-03 ENCOUNTER — TELEMEDICINE (OUTPATIENT)
Dept: BEHAVIORAL/MENTAL HEALTH CLINIC | Facility: CLINIC | Age: 40
End: 2020-11-03
Payer: MEDICARE

## 2020-11-03 DIAGNOSIS — F40.01 PANIC DISORDER WITH AGORAPHOBIA: ICD-10-CM

## 2020-11-03 DIAGNOSIS — F43.10 POSTTRAUMATIC STRESS DISORDER: Primary | ICD-10-CM

## 2020-11-03 DIAGNOSIS — F31.32 BIPOLAR 1 DISORDER, DEPRESSED, MODERATE (HCC): ICD-10-CM

## 2020-11-03 DIAGNOSIS — F19.21 COMBINED DRUG DEPENDENCE, EXCLUDING OPIOID, IN REMISSION (HCC): ICD-10-CM

## 2020-11-03 PROCEDURE — 90834 PSYTX W PT 45 MINUTES: CPT | Performed by: SOCIAL WORKER

## 2020-11-17 ENCOUNTER — TELEMEDICINE (OUTPATIENT)
Dept: BEHAVIORAL/MENTAL HEALTH CLINIC | Facility: CLINIC | Age: 40
End: 2020-11-17
Payer: MEDICARE

## 2020-11-17 DIAGNOSIS — F19.21 COMBINED DRUG DEPENDENCE, EXCLUDING OPIOID, IN REMISSION (HCC): ICD-10-CM

## 2020-11-17 DIAGNOSIS — F31.32 BIPOLAR 1 DISORDER, DEPRESSED, MODERATE (HCC): ICD-10-CM

## 2020-11-17 DIAGNOSIS — F40.01 PANIC DISORDER WITH AGORAPHOBIA: ICD-10-CM

## 2020-11-17 DIAGNOSIS — F43.10 POSTTRAUMATIC STRESS DISORDER: ICD-10-CM

## 2020-11-17 PROCEDURE — 90834 PSYTX W PT 45 MINUTES: CPT | Performed by: SOCIAL WORKER

## 2020-12-01 ENCOUNTER — TELEMEDICINE (OUTPATIENT)
Dept: BEHAVIORAL/MENTAL HEALTH CLINIC | Facility: CLINIC | Age: 40
End: 2020-12-01
Payer: MEDICARE

## 2020-12-01 DIAGNOSIS — F31.32 BIPOLAR 1 DISORDER, DEPRESSED, MODERATE (HCC): ICD-10-CM

## 2020-12-01 DIAGNOSIS — F40.01 PANIC DISORDER WITH AGORAPHOBIA: ICD-10-CM

## 2020-12-01 DIAGNOSIS — F43.10 POSTTRAUMATIC STRESS DISORDER: ICD-10-CM

## 2020-12-01 PROCEDURE — 90853 GROUP PSYCHOTHERAPY: CPT | Performed by: SOCIAL WORKER

## 2020-12-11 ENCOUNTER — TELEPHONE (OUTPATIENT)
Dept: PSYCHIATRY | Facility: CLINIC | Age: 40
End: 2020-12-11

## 2020-12-14 DIAGNOSIS — F31.9 BIPOLAR 1 DISORDER (HCC): ICD-10-CM

## 2020-12-14 RX ORDER — CARBAMAZEPINE 200 MG/1
200 TABLET, EXTENDED RELEASE ORAL 2 TIMES DAILY
Qty: 180 TABLET | Refills: 0 | Status: SHIPPED | OUTPATIENT
Start: 2020-12-14 | End: 2021-06-12

## 2020-12-15 ENCOUNTER — TELEMEDICINE (OUTPATIENT)
Dept: BEHAVIORAL/MENTAL HEALTH CLINIC | Facility: CLINIC | Age: 40
End: 2020-12-15
Payer: MEDICARE

## 2020-12-15 DIAGNOSIS — F31.32 BIPOLAR 1 DISORDER, DEPRESSED, MODERATE (HCC): ICD-10-CM

## 2020-12-15 DIAGNOSIS — F40.01 PANIC DISORDER WITH AGORAPHOBIA: ICD-10-CM

## 2020-12-15 DIAGNOSIS — F43.10 POSTTRAUMATIC STRESS DISORDER: ICD-10-CM

## 2020-12-15 DIAGNOSIS — F19.21 COMBINED DRUG DEPENDENCE, EXCLUDING OPIOID, IN REMISSION (HCC): ICD-10-CM

## 2020-12-15 PROCEDURE — 90853 GROUP PSYCHOTHERAPY: CPT | Performed by: SOCIAL WORKER

## 2020-12-17 DIAGNOSIS — F31.9 BIPOLAR 1 DISORDER (HCC): ICD-10-CM

## 2020-12-17 RX ORDER — OLANZAPINE 7.5 MG/1
7.5 TABLET ORAL
Qty: 90 TABLET | Refills: 0 | Status: SHIPPED | OUTPATIENT
Start: 2020-12-17 | End: 2021-03-11

## 2020-12-28 ENCOUNTER — TELEPHONE (OUTPATIENT)
Dept: PSYCHIATRY | Facility: CLINIC | Age: 40
End: 2020-12-28

## 2020-12-28 DIAGNOSIS — F41.1 GAD (GENERALIZED ANXIETY DISORDER): ICD-10-CM

## 2020-12-28 DIAGNOSIS — F40.01 PANIC DISORDER WITH AGORAPHOBIA: ICD-10-CM

## 2020-12-28 RX ORDER — CLONAZEPAM 1 MG/1
1 TABLET ORAL 3 TIMES DAILY
Qty: 90 TABLET | Refills: 2 | Status: SHIPPED | OUTPATIENT
Start: 2020-12-28 | End: 2021-03-30 | Stop reason: SDUPTHER

## 2021-01-01 DIAGNOSIS — F31.32 BIPOLAR 1 DISORDER, DEPRESSED, MODERATE (HCC): ICD-10-CM

## 2021-01-04 RX ORDER — LISINOPRIL 10 MG/1
10 TABLET ORAL EVERY EVENING
Qty: 90 TABLET | Refills: 0 | Status: SHIPPED | OUTPATIENT
Start: 2021-01-04 | End: 2021-03-22 | Stop reason: SDUPTHER

## 2021-01-04 RX ORDER — AMLODIPINE BESYLATE 10 MG/1
10 TABLET ORAL DAILY
Qty: 90 TABLET | Refills: 0 | Status: SHIPPED | OUTPATIENT
Start: 2021-01-04 | End: 2021-03-22 | Stop reason: SDUPTHER

## 2021-01-05 ENCOUNTER — TELEPHONE (OUTPATIENT)
Dept: PSYCHIATRY | Facility: CLINIC | Age: 41
End: 2021-01-05

## 2021-01-05 NOTE — TELEPHONE ENCOUNTER
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

## 2021-01-07 NOTE — TELEPHONE ENCOUNTER
Lisette Frank made me aware that he had spoken to Khloe Salter and staff at [facility]  They stated they would prefer his  psychiatrist write the letter that Khloe Salter is requesting  Sam Ellsworth stated that he put paperwork in Dr Karley Anglin box for review

## 2021-01-12 ENCOUNTER — TELEMEDICINE (OUTPATIENT)
Dept: BEHAVIORAL/MENTAL HEALTH CLINIC | Facility: CLINIC | Age: 41
End: 2021-01-12
Payer: MEDICARE

## 2021-01-12 DIAGNOSIS — F31.32 BIPOLAR 1 DISORDER, DEPRESSED, MODERATE (HCC): ICD-10-CM

## 2021-01-12 DIAGNOSIS — F19.21 COMBINED DRUG DEPENDENCE, EXCLUDING OPIOID, IN REMISSION (HCC): ICD-10-CM

## 2021-01-12 DIAGNOSIS — F40.01 PANIC DISORDER WITH AGORAPHOBIA: ICD-10-CM

## 2021-01-12 DIAGNOSIS — F43.10 POSTTRAUMATIC STRESS DISORDER: ICD-10-CM

## 2021-01-12 PROCEDURE — 90853 GROUP PSYCHOTHERAPY: CPT | Performed by: SOCIAL WORKER

## 2021-01-26 ENCOUNTER — TELEMEDICINE (OUTPATIENT)
Dept: BEHAVIORAL/MENTAL HEALTH CLINIC | Facility: CLINIC | Age: 41
End: 2021-01-26
Payer: MEDICARE

## 2021-01-26 DIAGNOSIS — F40.01 PANIC DISORDER WITH AGORAPHOBIA: ICD-10-CM

## 2021-01-26 DIAGNOSIS — F31.32 BIPOLAR 1 DISORDER, DEPRESSED, MODERATE (HCC): ICD-10-CM

## 2021-01-26 DIAGNOSIS — F19.21 COMBINED DRUG DEPENDENCE, EXCLUDING OPIOID, IN REMISSION (HCC): ICD-10-CM

## 2021-01-26 DIAGNOSIS — F43.10 POSTTRAUMATIC STRESS DISORDER: ICD-10-CM

## 2021-01-26 PROCEDURE — 90834 PSYTX W PT 45 MINUTES: CPT | Performed by: SOCIAL WORKER

## 2021-01-26 NOTE — PSYCH
Psychotherapy Provided: Individual Psychotherapy 45 minutes   This note was not shared with the patient due to this is a psychotherapy note  Length of time in session: 45 minutes, follow up in 2 week    Goals addressed in session: goal 2 and goal 3     Pain:      moderate to severe    0    Current suicide risk : Low     Data:Marco Antonio discussed how he is proud and so glad he started back at college  He is finishing his degree and spoke indepth about his course and his projects  He focused on discussing his 2nd and his 3rd goal  That of how he is managing his anxiety and how he is focusing on college and attending top his course  Assessment: His mood is good  He is more self confident and he appreciates the support  We continue to work on mindfulness and CBT  Plan: Will see him in 2 weeks  Behavioral Health Treatment Plan [facility]: Diagnosis and Treatment Plan explained to Javan Andersen relates understanding diagnosis and is agreeable to Treatment Plan   Yes

## 2021-01-26 NOTE — PSYCH
Treatment Plan Tracking    # updatedTreatment Plan not completed within required time limits due to:it is not due  The undersigned was doing updates every 4 months and they are not due for every 6 months  Therefore the update is due 3/8 not 1/8  Haider Jennings

## 2021-01-28 NOTE — TELEPHONE ENCOUNTER
India Ogden left message asking for an update-inquiring if his forms/accomodation letter for [employer] had been completed

## 2021-01-28 NOTE — TELEPHONE ENCOUNTER
Spoke to Kalee and told him it was in progress  Let me Michele Marrero completed,Dr Jhonny Kelley and I will call him as he needs to sign a Release too  Thanks

## 2021-02-23 ENCOUNTER — TELEMEDICINE (OUTPATIENT)
Dept: BEHAVIORAL/MENTAL HEALTH CLINIC | Facility: CLINIC | Age: 41
End: 2021-02-23
Payer: MEDICARE

## 2021-02-23 DIAGNOSIS — F43.10 POSTTRAUMATIC STRESS DISORDER: ICD-10-CM

## 2021-02-23 DIAGNOSIS — F31.32 BIPOLAR 1 DISORDER, DEPRESSED, MODERATE (HCC): ICD-10-CM

## 2021-02-23 DIAGNOSIS — F19.21 COMBINED DRUG DEPENDENCE, EXCLUDING OPIOID, IN REMISSION (HCC): ICD-10-CM

## 2021-02-23 DIAGNOSIS — F40.01 PANIC DISORDER WITH AGORAPHOBIA: ICD-10-CM

## 2021-02-23 PROCEDURE — 90853 GROUP PSYCHOTHERAPY: CPT | Performed by: SOCIAL WORKER

## 2021-02-23 NOTE — PSYCH
This note was not shared with the patient due to this is a psychotherapy note  Virtual Regular Visit      Assessment/Plan:    Problem List Items Addressed This Visit     None               Reason for visit is due to ongoing symptoms and the covid 19 pandemic     Encounter provider Viji Frias    Provider located at [address] [place] [id number] [phone]      Recent Visits  No visits were found meeting these conditions  Showing recent visits within past 7 days and meeting all other requirements     Future Appointments  No visits were found meeting these conditions  Showing future appointments within next 150 days and meeting all other requirements        The patient was identified by name and date of birth  Colette Dsouza was informed that this is a telemedicine visit and that the visit is being conducted through [email or web address] and patient was informed that this is a secure, HIPAA-compliant platform  He agrees to proceed     My office door was closed  No one else was in the room  He acknowledged consent and understanding of privacy and security of the video platform  The patient has agreed to participate and understands they can discontinue the visit at any time  Patient is aware this is a billable service       Subjective  Colette Dsouza is a 36 y o  male       HPI     Past Medical History:   Diagnosis Date    Anxiety     B[facility] disorder [facility]     Crohn's colitis ([facility]     Depression     Panic attack     Panic disorder     Psychiatric disorder     PTSD (post-traumatic stress disorder)     Sleep difficulties        Past Surgical History:   Procedure Laterality Date    HEMICOLECTOMY         Current Outpatient Medications   Medication Sig Dispense Refill    amLODIPine (NORVASC) 10 mg tablet Take 1 tablet (10 mg total) by mouth daily 90 tablet 0    carBAMazepine (TEGretol XR) 200 mg 12 hr tablet Take 1 tablet (200 mg total) by mouth 2 (two) times a day 180 tablet 0    clonazePAM (KlonoPIN) 1 mg tablet Take 1 tablet (1 mg total) by mouth 3 (three) times a day 90 tablet 2    lisinopril (ZESTRIL) 10 mg tablet Take 1 tablet (10 mg total) by mouth every evening 90 tablet 0    OLANZapine (ZyPREXA) 5 mg tablet TAKE 1 TABLET BY MOUTH EVERYDAY AT BEDTIME 90 tablet 0    OLANZapine (ZyPREXA) 7 5 mg tablet TAKE 1 TABLET (7 5 MG TOTAL) BY MOUTH DAILY AT BEDTIME 90 tablet 0     No current facility-administered medications for this visit  Allergies   Allergen Reactions    Infliximab Other (See Comments)    Penicillins Other (See Comments)     rash    Penicillin V Rash       Review of Systems Data:Marco Antonio attended the mens group  He shared he had some medical problems which precluded him from his college course  He self depricated and the undersigned and the group assured him he is not a loser because he got sick and could not attend class  He called himself this  He had a doctors excuse and the group helped him see he had no control over this  He shared he stopped drinking and he is thinking about online dating again  Assessment: Shareeeliazartimoteo Larsen found assurance what the undersigned and group members told him  HE APPRECIATED THE SUPPORT  HE WAS WARNED BY MEMBERS NOT TO FALL TOO FAST FOR PEOPLE AS HE has in the past  Plan: Will see him in 2 weeks  Video Exam    There were no vitals filed for this visit  Physical Exam N/A    I spent 45 minutes directly with the patient during this visit      VIRTUAL VISIT DISCLAIMER    Kyler Vee acknowledges that he has consented to an online visit or consultation  He understands that the online visit is based solely on information provided by him, and that, in the absence of a face-to-face physical evaluation by the physician, the diagnosis he receives is both limited and provisional in terms of accuracy and completeness   This is not intended to replace a full medical face-to-face evaluation by the physician  Radha Elias understands and accepts these terms  Bipin Vicente

## 2021-02-24 ENCOUNTER — TELEPHONE (OUTPATIENT)
Dept: PSYCHIATRY | Facility: CLINIC | Age: 41
End: 2021-02-24

## 2021-02-24 NOTE — TELEPHONE ENCOUNTER
I am working on it  I asked Tyesha Liu to call him because I have some questions before I finish typing the letter

## 2021-02-24 NOTE — TELEPHONE ENCOUNTER
Emanuel Mcdowell called inquiring about letter and forms he requested to be completed for [employer]  I did not see anything in the chart or scanning bin  Just wondering if you may still have it on your desk

## 2021-02-24 NOTE — TELEPHONE ENCOUNTER
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

## 2021-02-24 NOTE — TELEPHONE ENCOUNTER
IF you could type the questions I could call him back and ask him or if you want to speak with him, let me know what time is good for me to tell him  Thank you   Never mind Gavino Sims gave me the in basket you sent her

## 2021-02-25 NOTE — TELEPHONE ENCOUNTER
Dr Mantilla Divers hate to have to ask this but, I printed the letter and it is on the old [employer] letterhead  Did you want to correct this?

## 2021-03-02 NOTE — TELEPHONE ENCOUNTER
Left message for Emanuel Mcdowell letting him know that Dr Lay Beltran had completed the letter he requested and is ready    Notified Emanuel Mcdowell in message that he will need to sign a Release for at the time of

## 2021-03-09 ENCOUNTER — TELEMEDICINE (OUTPATIENT)
Dept: BEHAVIORAL/MENTAL HEALTH CLINIC | Facility: CLINIC | Age: 41
End: 2021-03-09
Payer: MEDICARE

## 2021-03-09 DIAGNOSIS — F19.21 COMBINED DRUG DEPENDENCE, EXCLUDING OPIOID, IN REMISSION (HCC): ICD-10-CM

## 2021-03-09 DIAGNOSIS — F40.01 PANIC DISORDER WITH AGORAPHOBIA: ICD-10-CM

## 2021-03-09 DIAGNOSIS — F31.32 BIPOLAR 1 DISORDER, DEPRESSED, MODERATE (HCC): ICD-10-CM

## 2021-03-09 DIAGNOSIS — F43.10 POSTTRAUMATIC STRESS DISORDER: ICD-10-CM

## 2021-03-09 PROCEDURE — 90834 PSYTX W PT 45 MINUTES: CPT | Performed by: SOCIAL WORKER

## 2021-03-09 NOTE — BH TREATMENT PLAN
Andrés Reyes   The undersigned and Aneesh Yin verbally signed the plan due to the covid 19 pandemics  1980       Date of Initial Treatment Plan: 09/08/2020  Date of Current Treatment Plan: 03/09/21    Treatment Plan Number update     Strengths/Personal Resources for Self Care: considerate,Kind to others, and has future goals    Diagnosis:   1  Bipolar 1 disorder, depressed, moderate ([facility] )     2  Combined drug dependence, excluding opioid, in remission ([facility] )     3  Panic disorder with agoraphobia     4  Posttraumatic stress disorder         Area of Needs: see below      Long Term Goal 1: I still need to take things slow over on line dating  Target Date: 09/09/2021  Completion Date: TBD         Short Term Objectives for Goal 1: I will not fall so quickly as I use to  Long Term Goal 2: I still need to more effectively manage my anxiety  Target Date: 09/09/2021  Completion Date: TBD    Short Term Objectives for Goal 2: Mindfulness and CBT strategies         Long Term Goal # 3: N/A     Target Date: N/A  Completion Date: N/A    Short Term Objectives for Goal 3: N/A    GOAL 1: Modality: Individual 2x per month   Completion Date tbd    GOAL 2: Modality: Individual 2x per month   Completion Date tbd     GOAL 3: Modality: Individual n/ax per month   Completion Date n/a      Behavioral Health Treatment Plan  Luke: Diagnosis and Treatment Plan explained to Kp Arthur relates understanding diagnosis and is agreeable to Treatment Plan  Client Comments : Please share your thoughts, feelings, need and/or experiences regarding your treatment plan: Aneesh Yin and the undersigned will work as a team to help him progress on his goals

## 2021-03-09 NOTE — PSYCH
This note was not shared with the patient due to this is a psychotherapy note  It was my intent to perform this visit via video technology but the patient was not able to do a video connection so the visit was completed via audio telephone only  Virtual Regular Visit      Assessment/Plan:    Problem List Items Addressed This Visit     None               Reason for visit is due to his ongoing symptoms and the covid19 pandemic  Encounter provider Sayra Guillen    Provider located at [address] [place] [id number] [phone]      Recent Visits  No visits were found meeting these conditions  Showing recent visits within past 7 days and meeting all other requirements     Future Appointments  No visits were found meeting these conditions  Showing future appointments within next 150 days and meeting all other requirements        The patient was identified by name and date of birth  Mary Robertson was informed that this is a telemedicine visit and that the visit is being conducted through [employer] and patient was informed that this is a secure, HIPAA-compliant platform  He agrees to proceed     My office door was closed  No one else was in the room  He acknowledged consent and understanding of privacy and security of the video platform  The patient has agreed to participate and understands they can discontinue the visit at any time  Patient is aware this is a billable service  Subjective  Mary Robertson is a 36 y o  male          HPI     Past Medical History:   Diagnosis Date    Anxiety     [facility]     Crohn's colitis ([facility]     Depression     Panic attack     Panic disorder     Psychiatric disorder     PTSD (post-traumatic stress disorder)     Sleep difficulties        Past Surgical History:   Procedure Laterality Date    HEMICOLECTOMY         Current Outpatient Medications   Medication Sig Dispense Refill    amLODIPine (NORVASC) 10 mg tablet Take 1 tablet (10 mg total) by mouth daily 90 tablet 0    carBAMazepine (TEGretol XR) 200 mg 12 hr tablet Take 1 tablet (200 mg total) by mouth 2 (two) times a day 180 tablet 0    clonazePAM (KlonoPIN) 1 mg tablet Take 1 tablet (1 mg total) by mouth 3 (three) times a day 90 tablet 2    lisinopril (ZESTRIL) 10 mg tablet Take 1 tablet (10 mg total) by mouth every evening 90 tablet 0    OLANZapine (ZyPREXA) 5 mg tablet TAKE 1 TABLET BY MOUTH EVERYDAY AT BEDTIME 90 tablet 0    OLANZapine (ZyPREXA) 7 5 mg tablet TAKE 1 TABLET (7 5 MG TOTAL) BY MOUTH DAILY AT BEDTIME 90 tablet 0     No current facility-administered medications for this visit  Allergies   Allergen Reactions    Infliximab Other (See Comments)    Penicillins Other (See Comments)     rash    Penicillin V Rash       Review of Systems Data: Rosy Cardoza admitted he got himself so upset about getting sick a few weeks ago and missing some of his classes  He now feels he is going to get sick again and miss more school  We talked about due to his anxiety he is catastrophizing things  We worked on mindfulness and CBT  He discussed his goals of taking dating on line slower and not getting so quickly wrapped up with people he meets, we discussed how he is managing his anxiety and helping him to deal with college  Assessment: Rosy Cardoza is not suicidal/homicidal but he is highly anxious about college We worked on [employer]  Plan: Continue to help him skill build in distress tolerance and to help him manage his anxiety  Video Exam    There were no vitals filed for this visit  Physical Exam N/A    I spent 45 minutes directly with the patient during this visit      VIRTUAL VISIT DISCLAIMER    Serjio Duran acknowledges that he has consented to an online visit or consultation   He understands that the online visit is based solely on information provided by him, and that, in the absence of a face-to-face physical evaluation by the physician, the diagnosis he receives is both limited and provisional in terms of accuracy and completeness  This is not intended to replace a full medical face-to-face evaluation by the physician  Serjio Duran understands and accepts these terms

## 2021-03-11 DIAGNOSIS — F31.9 BIPOLAR 1 DISORDER (HCC): ICD-10-CM

## 2021-03-11 RX ORDER — OLANZAPINE 7.5 MG/1
7.5 TABLET ORAL
Qty: 90 TABLET | Refills: 0 | Status: SHIPPED | OUTPATIENT
Start: 2021-03-11 | End: 2021-06-12

## 2021-03-22 DIAGNOSIS — F31.32 BIPOLAR 1 DISORDER, DEPRESSED, MODERATE (HCC): ICD-10-CM

## 2021-03-22 RX ORDER — AMLODIPINE BESYLATE 10 MG/1
10 TABLET ORAL DAILY
Qty: 90 TABLET | Refills: 0 | Status: SHIPPED | OUTPATIENT
Start: 2021-03-22 | End: 2021-06-13

## 2021-03-22 RX ORDER — LISINOPRIL 10 MG/1
10 TABLET ORAL EVERY EVENING
Qty: 90 TABLET | Refills: 0 | Status: SHIPPED | OUTPATIENT
Start: 2021-03-22 | End: 2021-06-13

## 2021-03-30 DIAGNOSIS — F40.01 PANIC DISORDER WITH AGORAPHOBIA: ICD-10-CM

## 2021-03-30 DIAGNOSIS — F41.1 GAD (GENERALIZED ANXIETY DISORDER): ICD-10-CM

## 2021-03-30 RX ORDER — CLONAZEPAM 1 MG/1
1 TABLET ORAL 3 TIMES DAILY
Qty: 90 TABLET | Refills: 2 | Status: SHIPPED | OUTPATIENT
Start: 2021-03-30 | End: 2021-06-28 | Stop reason: SDUPTHER

## 2021-04-06 ENCOUNTER — TELEMEDICINE (OUTPATIENT)
Dept: BEHAVIORAL/MENTAL HEALTH CLINIC | Facility: CLINIC | Age: 41
End: 2021-04-06

## 2021-04-06 DIAGNOSIS — F31.32 BIPOLAR 1 DISORDER, DEPRESSED, MODERATE (HCC): ICD-10-CM

## 2021-04-06 DIAGNOSIS — F43.10 POSTTRAUMATIC STRESS DISORDER: ICD-10-CM

## 2021-04-06 DIAGNOSIS — F19.21 COMBINED DRUG DEPENDENCE, EXCLUDING OPIOID, IN REMISSION (HCC): ICD-10-CM

## 2021-04-06 DIAGNOSIS — F40.01 PANIC DISORDER WITH AGORAPHOBIA: ICD-10-CM

## 2021-04-06 PROCEDURE — 90853 GROUP PSYCHOTHERAPY: CPT | Performed by: SOCIAL WORKER

## 2021-04-07 NOTE — PSYCH
This note was not shared with the patient due to this is a psychotherapy noteVirtual Regular Visit      Assessment/Plan:    Problem List Items Addressed This Visit        Other    Posttraumatic stress disorder    Panic disorder with agoraphobia    Combined drug dependence, excluding opioid, in remission ([facility] )    Bipolar 1 disorder, depressed, moderate ([facility] )               Reason for visit is [place] attended the men's group  Encounter provider Zoila Dubon    Provider located at [address] [place] [id number] [phone]      Recent Visits  Date Type Provider Dept   04/06/21 [address] [facility] Therapist [place]   Showing recent visits within past 7 days and meeting all other requirements     Future Appointments  No visits were found meeting these conditions  Showing future appointments within next 150 days and meeting all other requirements        The patient was identified by name and date of birth  Philipp Zuñiga was informed that this is a telemedicine visit and that the visit is being conducted through [employer] and patient was informed that this is a secure, HIPAA-compliant platform  He agrees to proceed     My office door was closed  No one else was in the room  He acknowledged consent and understanding of privacy and security of the video platform  The patient has agreed to participate and understands they can discontinue the visit at any time  Patient is aware this is a billable service  Subjective  Philipp Zuñiga is a 36 y o  male          HPI     Past Medical History:   Diagnosis Date    Anxiety     Bipolar disorder ([facility]     Crohn's colitis ([facility]     Depression     Panic attack     Panic disorder     Psychiatric disorder     PTSD (post-traumatic stress disorder)     Sleep difficulties        Past Surgical History:   Procedure Laterality Date    HEMICOLECTOMY         Current Outpatient Medications   Medication Sig Dispense Refill    amLODIPine (NORVASC) 10 mg tablet Take 1 tablet (10 mg total) by mouth daily 90 tablet 0    carBAMazepine (TEGretol XR) 200 mg 12 hr tablet Take 1 tablet (200 mg total) by mouth 2 (two) times a day 180 tablet 0    clonazePAM (KlonoPIN) 1 mg tablet Take 1 tablet (1 mg total) by mouth 3 (three) times a day 90 tablet 2    lisinopril (ZESTRIL) 10 mg tablet Take 1 tablet (10 mg total) by mouth every evening 90 tablet 0    OLANZapine (ZyPREXA) 5 mg tablet TAKE 1 TABLET BY MOUTH EVERYDAY AT BEDTIME 90 tablet 0    OLANZapine (ZyPREXA) 7 5 mg tablet TAKE 1 TABLET (7 5 MG TOTAL) BY MOUTH DAILY AT BEDTIME 90 tablet 0     No current facility-administered medications for this visit  Allergies   Allergen Reactions    Infliximab Other (See Comments)    Penicillins Other (See Comments)     rash    Penicillin V Rash       Review of Systems Data:Marco Antonio attended the [employer] group  He discussed he is meeting people on [employer] and he has made friends and contacts  He discussed how well school is going  He discussed how supportive the group is to him and how it has helped him to get thru things  Assessment: Kalee is not suicidal or homicidal and he feels the group has helped him thru difficult times  He was supportive of others  Plan: Continue to see him in the next 2 weeks at the next group  Video Exam    There were no vitals filed for this visit  Physical Exam N/A    I spent 45 minutes directly with the patient during this visit      VIRTUAL VISIT DISCLAIMER    Lydia Roa acknowledges that he has consented to an online visit or consultation  He understands that the online visit is based solely on information provided by him, and that, in the absence of a face-to-face physical evaluation by the physician, the diagnosis he receives is both limited and provisional in terms of accuracy and completeness  This is not intended to replace a full medical face-to-face evaluation by the physician  Cleveland Page understands and accepts these terms

## 2021-04-16 ENCOUNTER — IMMUNIZATIONS (OUTPATIENT)
Dept: FAMILY MEDICINE CLINIC | Facility: HOSPITAL | Age: 41
End: 2021-04-16

## 2021-04-16 DIAGNOSIS — Z23 ENCOUNTER FOR IMMUNIZATION: Primary | ICD-10-CM

## 2021-04-16 PROCEDURE — 91300 SARS-COV-2 / COVID-19 MRNA VACCINE (PFIZER-BIONTECH) 30 MCG: CPT

## 2021-04-16 PROCEDURE — 0001A SARS-COV-2 / COVID-19 MRNA VACCINE (PFIZER-BIONTECH) 30 MCG: CPT

## 2021-04-20 ENCOUNTER — TELEMEDICINE (OUTPATIENT)
Dept: BEHAVIORAL/MENTAL HEALTH CLINIC | Facility: CLINIC | Age: 41
End: 2021-04-20
Payer: MEDICARE

## 2021-04-20 DIAGNOSIS — F40.01 PANIC DISORDER WITH AGORAPHOBIA: ICD-10-CM

## 2021-04-20 DIAGNOSIS — F43.10 POSTTRAUMATIC STRESS DISORDER: ICD-10-CM

## 2021-04-20 DIAGNOSIS — F31.32 BIPOLAR 1 DISORDER, DEPRESSED, MODERATE (HCC): ICD-10-CM

## 2021-04-20 PROCEDURE — 90834 PSYTX W PT 45 MINUTES: CPT | Performed by: SOCIAL WORKER

## 2021-04-20 NOTE — PSYCH
This note was not shared with the patient due to this is a psychotherapy note  Virtual Regular Visit      Assessment/Plan:    Problem List Items Addressed This Visit        Other    Posttraumatic stress disorder    Panic disorder with agoraphobia    Bipolar 1 disorder, depressed, moderate ([facility] [address] )               Reason for visit is due to the men's group  Encounter provider Pablo Wilkinson    Provider located at [address] [place] [address] Anastasiya Huitron [place] [address] [phone]      Recent Visits  No visits were found meeting these conditions  Showing recent visits within past 7 days and meeting all other requirements     Today's Visits  Date Type Provider Dept   04/20/21 4050 Klaudia Schafer [facility] Therapist [facility]   Showing today's visits and meeting all other requirements     Future Appointments  No visits were found meeting these conditions  Showing future appointments within next 150 days and meeting all other requirements        The patient was identified by name and date of birth  Daniel Wilson was informed that this is a telemedicine visit and that the visit is being conducted through [employer] and patient was informed that this is a secure, HIPAA-compliant platform  He agrees to proceed     My office door was closed  No one else was in the room  He acknowledged consent and understanding of privacy and security of the video platform  The patient has agreed to participate and understands they can discontinue the visit at any time  Patient is aware this is a billable service  Subjective  Daniel Wilson is a 36 y o  male         HPI     Past Medical History:   Diagnosis Date    Anxiety     Bipolar disorder ([facility] [address] [facility]     Crohn's colitis ([facility] [address] [facility]     Depression     Panic attack     Panic disorder     Psychiatric disorder     PTSD (post-traumatic stress disorder)     Sleep difficulties        Past Surgical History:   Procedure Laterality Date    HEMICOLECTOMY         Current Outpatient Medications   Medication Sig Dispense Refill    amLODIPine (NORVASC) 10 mg tablet Take 1 tablet (10 mg total) by mouth daily 90 tablet 0    carBAMazepine (TEGretol XR) 200 mg 12 hr tablet Take 1 tablet (200 mg total) by mouth 2 (two) times a day 180 tablet 0    clonazePAM (KlonoPIN) 1 mg tablet Take 1 tablet (1 mg total) by mouth 3 (three) times a day 90 tablet 2    lisinopril (ZESTRIL) 10 mg tablet Take 1 tablet (10 mg total) by mouth every evening 90 tablet 0    OLANZapine (ZyPREXA) 5 mg tablet TAKE 1 TABLET BY MOUTH EVERYDAY AT BEDTIME 90 tablet 0    OLANZapine (ZyPREXA) 7 5 mg tablet TAKE 1 TABLET (7 5 MG TOTAL) BY MOUTH DAILY AT BEDTIME 90 tablet 0     No current facility-administered medications for this visit  Allergies   Allergen Reactions    Infliximab Other (See Comments)    Penicillins Other (See Comments)     rash    Penicillin V Rash       Review of Systems Data:Marco Antonio attended the men's group  He discussed how his brother almost  Saturday night due to his drinking  They tied in his heart problems to his drinking  Juanita Aldana got him to the hospital  Assessment: Juanita Aldana appreciated the support of the group  He will be done with his semester May 3rd  Plan: Will see him in 2 weeks  Video Exam    There were no vitals filed for this visit  Physical Exam N/A    I spent 45 minutes directly with the patient during this visit      VIRTUAL VISIT DISCLAIMER    Cony Son acknowledges that he has consented to an online visit or consultation  He understands that the online visit is based solely on information provided by him, and that, in the absence of a face-to-face physical evaluation by the physician, the diagnosis he receives is both limited and provisional in terms of accuracy and completeness   This is not intended to replace a full medical face-to-face evaluation by the physician  Michele Mancuso understands and accepts these terms

## 2021-05-04 ENCOUNTER — TELEMEDICINE (OUTPATIENT)
Dept: BEHAVIORAL/MENTAL HEALTH CLINIC | Facility: CLINIC | Age: 41
End: 2021-05-04
Payer: MEDICARE

## 2021-05-04 DIAGNOSIS — F31.32 BIPOLAR 1 DISORDER, DEPRESSED, MODERATE (HCC): ICD-10-CM

## 2021-05-04 DIAGNOSIS — F43.10 POSTTRAUMATIC STRESS DISORDER: ICD-10-CM

## 2021-05-04 DIAGNOSIS — F40.01 PANIC DISORDER WITH AGORAPHOBIA: ICD-10-CM

## 2021-05-04 PROCEDURE — 90853 GROUP PSYCHOTHERAPY: CPT | Performed by: SOCIAL WORKER

## 2021-05-04 NOTE — PSYCH
This note was not shared with the patient due to this is a psychotherapy note    Virtual Regular Visit      Assessment/Plan:    Problem List Items Addressed This Visit        Other    Posttraumatic stress disorder    Panic disorder with agoraphobia    Bipolar 1 disorder, depressed, moderate ([facility] [address] )          Goals addressed in session: Goal 1, Goal 2 and Goal 3           Reason for visit is attending the men's group     Encounter provider Lottie Boas    Provider located at [address] [place] [id number] [phone]      Recent Visits  No visits were found meeting these conditions  Showing recent visits within past 7 days and meeting all other requirements     Today's Visits  Date Type Provider Dept   05/04/21 4050 Klaudia Carballo [facility] Therapist [facility]   Showing today's visits and meeting all other requirements     Future Appointments  No visits were found meeting these conditions  Showing future appointments within next 150 days and meeting all other requirements        The patient was identified by name and date of birth  Leatha Bruce was informed that this is a telemedicine visit and that the visit is being conducted through [employer] and patient was informed that this is a secure, HIPAA-compliant platform  He agrees to proceed     My office door was closed  No one else was in the room  He acknowledged consent and understanding of privacy and security of the video platform  The patient has agreed to participate and understands they can discontinue the visit at any time  Patient is aware this is a billable service  Subjective  Leatha Bruce is a 36 y o  male          HPI     Past Medical History:   Diagnosis Date    Anxiety     Bipolar disorder ([facility] [address] [facility]     Crohn's colitis ([facility] [address] [facility]     Depression     Panic attack     Panic disorder     Psychiatric disorder     PTSD (post-traumatic stress disorder)     Sleep difficulties        Past Surgical History:   Procedure Laterality Date    HEMICOLECTOMY         Current Outpatient Medications   Medication Sig Dispense Refill    amLODIPine (NORVASC) 10 mg tablet Take 1 tablet (10 mg total) by mouth daily 90 tablet 0    carBAMazepine (TEGretol XR) 200 mg 12 hr tablet Take 1 tablet (200 mg total) by mouth 2 (two) times a day 180 tablet 0    clonazePAM (KlonoPIN) 1 mg tablet Take 1 tablet (1 mg total) by mouth 3 (three) times a day 90 tablet 2    lisinopril (ZESTRIL) 10 mg tablet Take 1 tablet (10 mg total) by mouth every evening 90 tablet 0    OLANZapine (ZyPREXA) 5 mg tablet TAKE 1 TABLET BY MOUTH EVERYDAY AT BEDTIME 90 tablet 0    OLANZapine (ZyPREXA) 7 5 mg tablet TAKE 1 TABLET (7 5 MG TOTAL) BY MOUTH DAILY AT BEDTIME 90 tablet 0     No current facility-administered medications for this visit  Allergies   Allergen Reactions    Infliximab Other (See Comments)    Penicillins Other (See Comments)     rash    Penicillin V Rash       Review of Systems Data: Krista Cleary attended the men's group  He discussed the college course he just completed and he discussed the work he did  He shared how he is going to [place] [address] and how he quit drinking  He discussed online dating and shared he is getting smarter about it  Assessment: Krista Cleary is really focusing on completing his education and is taking it quite seriously  He is no longer drinking and he is more careful about online dating  Plan: Continue to see him in the group every 2 weeks  Video Exam    There were no vitals filed for this visit  Physical Exam N/A    I spent 45 minutes directly with the patient during this visit      VIRTUAL VISIT DISCLAIMER    Daniel Burta acknowledges that he has consented to an online visit or consultation   He understands that the online visit is based solely on information provided by him, and that, in the absence of a face-to-face physical evaluation by the physician, the diagnosis he receives is both limited and provisional in terms of accuracy and completeness  This is not intended to replace a full medical face-to-face evaluation by the physician  Gerard Boyce understands and accepts these terms

## 2021-05-11 ENCOUNTER — IMMUNIZATIONS (OUTPATIENT)
Dept: FAMILY MEDICINE CLINIC | Facility: HOSPITAL | Age: 41
End: 2021-05-11

## 2021-05-11 DIAGNOSIS — Z23 ENCOUNTER FOR IMMUNIZATION: Primary | ICD-10-CM

## 2021-05-11 PROCEDURE — 0002A SARS-COV-2 / COVID-19 MRNA VACCINE (PFIZER-BIONTECH) 30 MCG: CPT

## 2021-05-11 PROCEDURE — 91300 SARS-COV-2 / COVID-19 MRNA VACCINE (PFIZER-BIONTECH) 30 MCG: CPT

## 2021-05-18 ENCOUNTER — TELEMEDICINE (OUTPATIENT)
Dept: BEHAVIORAL/MENTAL HEALTH CLINIC | Facility: CLINIC | Age: 41
End: 2021-05-18
Payer: MEDICARE

## 2021-05-18 DIAGNOSIS — F43.10 POSTTRAUMATIC STRESS DISORDER: ICD-10-CM

## 2021-05-18 DIAGNOSIS — F40.01 PANIC DISORDER WITH AGORAPHOBIA: ICD-10-CM

## 2021-05-18 DIAGNOSIS — F31.32 BIPOLAR 1 DISORDER, DEPRESSED, MODERATE (HCC): Primary | ICD-10-CM

## 2021-05-18 PROCEDURE — 90834 PSYTX W PT 45 MINUTES: CPT | Performed by: SOCIAL WORKER

## 2021-05-18 NOTE — PSYCH
Psychotherapy Provided: Individual Psychotherapy 45 minutes     Length of time in session: 45 minutes, follow up in 2 week    Bipolar 1 depressed moderate    Goals addressed in session: Goal 1, Goal 2 and Goal 3      Pain:      moderate to severe    0    Current suicide risk : Low     Data:Marco Antonio and the undersigned met for his session virtually  He discussed the fact he got an A in his college course  He discussed how he is no longer smoking or drinking and he shared how he is managing his anxiety  He is taking a break from trying to date and he shared the hobbies he is pursuing  Assessment: Ash Washington denies feelings of self harm and has no intention of harming others  We worked on mindfulness, CBT and solution focused strategies  Plan: Continue to help him progress on his goals  Behavioral Health Treatment Plan [facility]: Diagnosis and Treatment Plan explained to Elie Espinoza relates understanding diagnosis and is agreeable to Treatment Plan   Yes

## 2021-05-24 ENCOUNTER — TELEMEDICINE (OUTPATIENT)
Dept: BEHAVIORAL/MENTAL HEALTH CLINIC | Facility: CLINIC | Age: 41
End: 2021-05-24
Payer: MEDICARE

## 2021-05-24 DIAGNOSIS — F43.10 POSTTRAUMATIC STRESS DISORDER: Primary | ICD-10-CM

## 2021-05-24 DIAGNOSIS — F31.32 BIPOLAR 1 DISORDER, DEPRESSED, MODERATE (HCC): ICD-10-CM

## 2021-05-24 DIAGNOSIS — F40.01 PANIC DISORDER WITH AGORAPHOBIA: ICD-10-CM

## 2021-05-24 PROCEDURE — 90834 PSYTX W PT 45 MINUTES: CPT | Performed by: SOCIAL WORKER

## 2021-05-24 NOTE — PSYCH
This note was not shared with the patient due to this is a psychotherapy note    Virtual Regular Visit      Assessment/Plan:    Problem List Items Addressed This Visit     None          Goals addressed in session: 1, 2 and 3         Reason for visit is due to covid and ongoing symptoms     Encounter provider Delio Watson    Provider located at [address] [place] [id number] [phone]      Recent Visits  Date Type Provider Dept   05/18/21 [id number]   Showing recent visits within past 7 days and meeting all other requirements     Future Appointments  No visits were found meeting these conditions  Showing future appointments within next 150 days and meeting all other requirements        The patient was identified by name and date of birth  Angie Barba was informed that this is a telemedicine visit and that the visit is being conducted through [employer] and patient was informed that this is a secure, HIPAA-compliant platform  He agrees to proceed     My office door was closed  No one else was in the room  He acknowledged consent and understanding of privacy and security of the video platform  The patient has agreed to participate and understands they can discontinue the visit at any time  Patient is aware this is a billable service  Subjective  Angie Barba is a 36 y o  male          HPI     Past Medical History:   Diagnosis Date    Anxiety     [facility] [address] [facility]     Crohn's colitis ([facility] [address] [facility]     Depression     Panic attack     Panic disorder     Psychiatric disorder     PTSD (post-traumatic stress disorder)     Sleep difficulties        Past Surgical History:   Procedure Laterality Date    HEMICOLECTOMY         Current Outpatient Medications   Medication Sig Dispense Refill    amLODIPine (NORVASC) 10 mg tablet Take 1 tablet (10 mg total) by mouth daily 90 tablet 0    carBAMazepine (TEGretol XR) 200 mg 12 hr tablet Take 1 tablet (200 mg total) by mouth 2 (two) times a day 180 tablet 0    clonazePAM (KlonoPIN) 1 mg tablet Take 1 tablet (1 mg total) by mouth 3 (three) times a day 90 tablet 2    lisinopril (ZESTRIL) 10 mg tablet Take 1 tablet (10 mg total) by mouth every evening 90 tablet 0    OLANZapine (ZyPREXA) 5 mg tablet TAKE 1 TABLET BY MOUTH EVERYDAY AT BEDTIME 90 tablet 0    OLANZapine (ZyPREXA) 7 5 mg tablet TAKE 1 TABLET (7 5 MG TOTAL) BY MOUTH DAILY AT BEDTIME 90 tablet 0     No current facility-administered medications for this visit  Allergies   Allergen Reactions    Infliximab Other (See Comments)    Penicillins Other (See Comments)     rash    Penicillin V Rash       Review of Systems Data: Met virtually with Kumar Brower  He discussed he is awaiting word about how much financial aide he will get from school  He discussed how I of his brothers and his sisters dont talk to him and he does not know why  He is at the point he does not care  His sister is 44 and does not even talk to her family  His other brother pulls the same thing but at a picnic will say we got to get together  Regarding his anxiety he feels he is handling it better and is doing more stuff  However if he focuses on it he simply has to leave where he is at  But at least he is trying to do more  Regarding dating sites he is not on any currently  Assessments: Kumar Brower is not suicidal or homicidal and he is less anxious  He feels good things are happening  We continue to work on mindfulness ,CBT and solution focused therapy  Plan: Continue to help him progress thru his goals  Video Exam   There were no vitals filed for this visit  Physical Exam N/A    I spent 45 minutes directly with the patient during this visit      VIRTUAL VISIT DISCLAIMER    Cleveland Page acknowledges that he has consented to an online visit or consultation   He understands that the online visit is based solely on information provided by him, and that, in the absence of a face-to-face physical evaluation by the physician, the diagnosis he receives is both limited and provisional in terms of accuracy and completeness  This is not intended to replace a full medical face-to-face evaluation by the physician  Tremaine Marte understands and accepts these terms

## 2021-06-01 ENCOUNTER — TELEMEDICINE (OUTPATIENT)
Dept: BEHAVIORAL/MENTAL HEALTH CLINIC | Facility: CLINIC | Age: 41
End: 2021-06-01
Payer: MEDICARE

## 2021-06-01 DIAGNOSIS — F31.32 BIPOLAR 1 DISORDER, DEPRESSED, MODERATE (HCC): ICD-10-CM

## 2021-06-01 DIAGNOSIS — F43.10 POSTTRAUMATIC STRESS DISORDER: ICD-10-CM

## 2021-06-01 DIAGNOSIS — F40.01 PANIC DISORDER WITH AGORAPHOBIA: ICD-10-CM

## 2021-06-01 PROCEDURE — 90834 PSYTX W PT 45 MINUTES: CPT | Performed by: SOCIAL WORKER

## 2021-06-01 NOTE — PSYCH
This note was not shared with the patient due to this is a psychotherapy note    Virtual Regular Visit      Assessment/Plan:    Problem List Items Addressed This Visit        Other    Posttraumatic stress disorder    Panic disorder with agoraphobia    Bipolar 1 disorder, depressed, moderate ([facility] [address] )          Goals addressed in session: Goal 1, Goal 2 and Goal 3           Reason for visit is due to covid and ongoing symptoms  Encounter provider Giancarlo Ruiz    Provider located at [address] [place] [address] [phone]      Recent Visits  No visits were found meeting these conditions  Showing recent visits within past 7 days and meeting all other requirements     Today's Visits  Date Type Provider Dept   06/01/21 4050 Klaudia Carballo [facility] Therapist [place]   Showing today's visits and meeting all other requirements     Future Appointments  No visits were found meeting these conditions  Showing future appointments within next 150 days and meeting all other requirements        The patient was identified by name and date of birth  Jese Gaviria was informed that this is a telemedicine visit and that the visit is being conducted through [employer] and patient was informed that this is a secure, HIPAA-compliant platform  He agrees to proceed     My office door was closed  No one else was in the room  He acknowledged consent and understanding of privacy and security of the video platform  The patient has agreed to participate and understands they can discontinue the visit at any time  Patient is aware this is a billable service       Subjective  Jese Gaviria is a 36 y o  male       HPI     Past Medical History:   Diagnosis Date    Anxiety     B[facility] disorder ([facility] [address]     Crohn's colitis ([facility] [address]     Depression     Panic attack     Panic disorder     Psychiatric disorder     PTSD (post-traumatic stress disorder)     Sleep difficulties        Past Surgical History:   Procedure Laterality Date    HEMICOLECTOMY         Current Outpatient Medications   Medication Sig Dispense Refill    amLODIPine (NORVASC) 10 mg tablet Take 1 tablet (10 mg total) by mouth daily 90 tablet 0    carBAMazepine (TEGretol XR) 200 mg 12 hr tablet Take 1 tablet (200 mg total) by mouth 2 (two) times a day 180 tablet 0    clonazePAM (KlonoPIN) 1 mg tablet Take 1 tablet (1 mg total) by mouth 3 (three) times a day 90 tablet 2    lisinopril (ZESTRIL) 10 mg tablet Take 1 tablet (10 mg total) by mouth every evening 90 tablet 0    OLANZapine (ZyPREXA) 5 mg tablet TAKE 1 TABLET BY MOUTH EVERYDAY AT BEDTIME 90 tablet 0    OLANZapine (ZyPREXA) 7 5 mg tablet TAKE 1 TABLET (7 5 MG TOTAL) BY MOUTH DAILY AT BEDTIME 90 tablet 0     No current facility-administered medications for this visit  Allergies   Allergen Reactions    Infliximab Other (See Comments)    Penicillins Other (See Comments)     rash    Penicillin V Rash       Review of Systems Data:Marco Antonio attended the men's group  He shared how his financial aid came thru for college  He discussed how he is taking online dating slowly and how he quit alcohol and cigarettes  He also discussed how he is managing his anxiety more effectively  Assessment: Breanna Almaguer was supportive of others and he received supportive and encouraging feedback  Breanna Almaguer feels he is in a good space emotionally  Assessment: Breanna Almaguer feels the group is helpful to him  He denies suicidal/homicidal ideation, plan or intent  He is less anxious because the plan he put into place is working  Plan: Will see him in 2 weeks for the next group  Video Exam    There were no vitals filed for this visit      Physical Exam     I spent 45 minutes directly with the patient during this visit      VIRTUAL VISIT DISCLAIMER    Rileyisadora Terri acknowledges that he has consented to an online visit or consultation  He understands that the online visit is based solely on information provided by him, and that, in the absence of a face-to-face physical evaluation by the physician, the diagnosis he receives is both limited and provisional in terms of accuracy and completeness  This is not intended to replace a full medical face-to-face evaluation by the physician  Maye Dumont understands and accepts these terms

## 2021-06-12 DIAGNOSIS — F31.9 BIPOLAR 1 DISORDER (HCC): ICD-10-CM

## 2021-06-12 RX ORDER — CARBAMAZEPINE 200 MG/1
TABLET, EXTENDED RELEASE ORAL
Qty: 180 TABLET | Refills: 0 | Status: SHIPPED | OUTPATIENT
Start: 2021-06-12 | End: 2021-09-06

## 2021-06-12 RX ORDER — OLANZAPINE 7.5 MG/1
7.5 TABLET ORAL
Qty: 90 TABLET | Refills: 0 | Status: SHIPPED | OUTPATIENT
Start: 2021-06-12 | End: 2021-09-06

## 2021-06-13 DIAGNOSIS — F31.32 BIPOLAR 1 DISORDER, DEPRESSED, MODERATE (HCC): ICD-10-CM

## 2021-06-13 RX ORDER — AMLODIPINE BESYLATE 10 MG/1
TABLET ORAL
Qty: 90 TABLET | Refills: 0 | Status: SHIPPED | OUTPATIENT
Start: 2021-06-13 | End: 2021-09-06

## 2021-06-13 RX ORDER — LISINOPRIL 10 MG/1
TABLET ORAL
Qty: 90 TABLET | Refills: 0 | Status: SHIPPED | OUTPATIENT
Start: 2021-06-13 | End: 2021-09-06

## 2021-06-15 ENCOUNTER — TELEMEDICINE (OUTPATIENT)
Dept: BEHAVIORAL/MENTAL HEALTH CLINIC | Facility: CLINIC | Age: 41
End: 2021-06-15
Payer: MEDICARE

## 2021-06-15 DIAGNOSIS — F43.10 POSTTRAUMATIC STRESS DISORDER: ICD-10-CM

## 2021-06-15 DIAGNOSIS — F40.01 PANIC DISORDER WITH AGORAPHOBIA: ICD-10-CM

## 2021-06-15 DIAGNOSIS — F31.32 BIPOLAR 1 DISORDER, DEPRESSED, MODERATE (HCC): ICD-10-CM

## 2021-06-15 PROCEDURE — 90853 GROUP PSYCHOTHERAPY: CPT | Performed by: SOCIAL WORKER

## 2021-06-15 NOTE — PSYCH
Psychotherapy Provided: Individual Psychotherapy 45 minutes   This note was not shared with the patient due to reasonable likelihood of causing patient harm  Length of time in session: 45 minutes, follow up in 2 week    Encounter Diagnosis     ICD-10-CM    1  Bipolar 1 disorder, depressed, moderate ([facility] [address] )  F31 32    2  Panic disorder with agoraphobia  F40 01    3  Posttraumatic stress disorder  F43 10        Goals addressed in session: Goal 1, Goal 2 and Goal 3      Pain:      moderate to severe    0    Current suicide risk : Low     Data:We discussed his anxiety management and worked on strategies to help him better manage his symptoms  Assessment: He denies suicidal/homicidal ideation , plan or intent  We worked on mindfulness, CBT and SFT to help him manage his anxiety  Plan: Continue to help him skill build in distress tolerance  Behavioral Health Treatment Plan [facility]: Diagnosis and Treatment Plan explained to Shayna Kyle relates understanding diagnosis and is agreeable to Treatment Plan   Yes

## 2021-06-28 ENCOUNTER — TELEPHONE (OUTPATIENT)
Dept: PSYCHIATRY | Facility: CLINIC | Age: 41
End: 2021-06-28

## 2021-06-28 DIAGNOSIS — F41.1 GAD (GENERALIZED ANXIETY DISORDER): ICD-10-CM

## 2021-06-28 DIAGNOSIS — F40.01 PANIC DISORDER WITH AGORAPHOBIA: ICD-10-CM

## 2021-06-28 RX ORDER — CLONAZEPAM 1 MG/1
1 TABLET ORAL 3 TIMES DAILY
Qty: 90 TABLET | Refills: 2 | Status: SHIPPED | OUTPATIENT
Start: 2021-06-28 | End: 2021-09-29 | Stop reason: SDUPTHER

## 2021-06-28 NOTE — TELEPHONE ENCOUNTER
----- Message from [facility] sent at 6/28/2021  9:03 AM EDT -----  Regarding: NS 6/28/21  Pt was marked as a no show for his office visit with Melodie Coughlin on 6/28/21

## 2021-06-28 NOTE — TELEPHONE ENCOUNTER
Patient called to say he overslept and missed his 8am appointment with Cheo Domínguez this morning, 6-28-21  Provided Pt with his next Group date as well as his next individual appointment he has with Leola Dunn  Pt also requested a med refill of his Klonopin  Patient was last seen 4-

## 2021-06-29 ENCOUNTER — TELEMEDICINE (OUTPATIENT)
Dept: BEHAVIORAL/MENTAL HEALTH CLINIC | Facility: CLINIC | Age: 41
End: 2021-06-29
Payer: MEDICARE

## 2021-06-29 DIAGNOSIS — F19.21 COMBINED DRUG DEPENDENCE, EXCLUDING OPIOID, IN REMISSION (HCC): ICD-10-CM

## 2021-06-29 DIAGNOSIS — F40.01 PANIC DISORDER WITH AGORAPHOBIA: ICD-10-CM

## 2021-06-29 DIAGNOSIS — F43.10 POSTTRAUMATIC STRESS DISORDER: ICD-10-CM

## 2021-06-29 DIAGNOSIS — Z87.898 MARIJUANA USE IN REMISSION: ICD-10-CM

## 2021-06-29 DIAGNOSIS — F31.32 BIPOLAR 1 DISORDER, DEPRESSED, MODERATE (HCC): Primary | ICD-10-CM

## 2021-06-29 PROCEDURE — 90834 PSYTX W PT 45 MINUTES: CPT | Performed by: SOCIAL WORKER

## 2021-06-30 NOTE — PSYCH
Psychotherapy Provided: Individual Psychotherapy 45 minutes     Length of time in session: 45 minutes, follow up in 2 week    Bipolar 1 disorder depressed moderate, Multiple drug dependence in remission,Panic disorder, PTSD    Goals addressed in session: Goal 1 and Goal 2     Pain:      moderate to severe    0    Current suicide risk : Low     Data: Today Marco Antonio and the undersigned met virtually for his session He discussed how this semester he is 1 credit short for financial aide at [employer] and he had great difficulty getting in touch with the people he needed to discuss this with  This created a great deal of anxiety for Kalee  He discussed that he and the person somewhat got into it when he was finally able to reach her  She insisted her voicemail explained when she would be back and he said it did not  He continues to stay away from alcohol and cigarettes  He spent the rest of the session discussing his poor luck in the past with online dating and how now he is focusing on school and completing  Assessment: Kalee denies suicidal homicidal ideation , plan or intent  We continue to work on mindfulness, CBT and solution focused strategies to help him better manage his anxiety, to maintain mood stability and to help him prioritize his goals  Plan: Will continue to help him progress on his goals  Behavioral Health Treatment Plan [facility]: Diagnosis and Treatment Plan explained to Manuela Biswas relates understanding diagnosis and is agreeable to Treatment Plan   Yes

## 2021-07-13 ENCOUNTER — TELEMEDICINE (OUTPATIENT)
Dept: BEHAVIORAL/MENTAL HEALTH CLINIC | Facility: CLINIC | Age: 41
End: 2021-07-13
Payer: MEDICARE

## 2021-07-13 DIAGNOSIS — F40.01 PANIC DISORDER WITH AGORAPHOBIA: ICD-10-CM

## 2021-07-13 DIAGNOSIS — F43.10 POSTTRAUMATIC STRESS DISORDER: ICD-10-CM

## 2021-07-13 DIAGNOSIS — F31.32 BIPOLAR 1 DISORDER, DEPRESSED, MODERATE (HCC): Primary | ICD-10-CM

## 2021-07-13 DIAGNOSIS — Z87.898 MARIJUANA USE IN REMISSION: ICD-10-CM

## 2021-07-13 PROCEDURE — 90834 PSYTX W PT 45 MINUTES: CPT | Performed by: SOCIAL WORKER

## 2021-07-13 NOTE — PSYCH
Virtual Regular Visit    Verification of patient location:    Patient is currently located in the state [facility]  Patient is currently located in a state in which I am licensed      Assessment/Plan:    Problem List Items Addressed This Visit     None          Goals addressed in session: goal2         Reason for visit is   Chief Complaint   Patient presents with    Virtual Regular Visit        Encounter provider Beryle Cousin    Provider located at [address] [place] [address] J Luis Fraser [place] [address]  [phone]      Recent Visits  No visits were found meeting these conditions  Showing recent visits within past 7 days and meeting all other requirements  Future Appointments  No visits were found meeting these conditions  Showing future appointments within next 150 days and meeting all other requirements       The patient was identified by name and date of birth  Jet Reyes was informed that this is a telemedicine visit and that the visit is being conducted throughMicrosoft Teams and patient was informed that this is a secure, HIPAA-compliant platform  He agrees to proceed     My office door was closed  No one else was in the room  He acknowledged consent and understanding of privacy and security of the video platform  The patient has agreed to participate and understands they can discontinue the visit at any time  Patient is aware this is a billable service  Subjective  Jet Reyes is a 36 y o  male          HPI     Past Medical History:   Diagnosis Date    Anxiety     [facility]     Crohn's colitis ([facility]     Depression     Panic attack     Panic disorder     Psychiatric disorder     PTSD (post-traumatic stress disorder)     Sleep difficulties        Past Surgical History:   Procedure Laterality Date    HEMICOLECTOMY         Current Outpatient Medications   Medication Sig Dispense Refill    amLODIPine (NORVASC) 10 mg tablet TAKE 1 TABLET BY MOUTH EVERY DAY 90 tablet 0    carBAMazepine (TEGretol XR) 200 mg 12 hr tablet TAKE 1 TABLET BY MOUTH TWICE A  tablet 0    clonazePAM (KlonoPIN) 1 mg tablet Take 1 tablet (1 mg total) by mouth 3 (three) times a day 90 tablet 2    lisinopril (ZESTRIL) 10 mg tablet TAKE 1 TABLET BY MOUTH EVERY DAY IN THE EVENING 90 tablet 0    OLANZapine (ZyPREXA) 5 mg tablet TAKE 1 TABLET BY MOUTH EVERYDAY AT BEDTIME 90 tablet 0    OLANZapine (ZyPREXA) 7 5 mg tablet TAKE 1 TABLET (7 5 MG TOTAL) BY MOUTH DAILY AT BEDTIME 90 tablet 0     No current facility-administered medications for this visit  Allergies   Allergen Reactions    Infliximab Other (See Comments)    Penicillins Other (See Comments)     rash    Penicillin V Rash       Review of Systems Data:Marco Antonio had a recent panic attack over his financial aide  The entire session dealt with anxiety management  Assessment: He is not suicidal or homicidal   However he has anxiety that is self induced  We worked on anxiety management  Plan: Help him progress on his goals  Video Exam    There were no vitals filed for this visit  Physical Exam N/A    I spent 45 minutes directly with the patient during this visit    VIRTUAL VISIT Deshaun Garsia verbally agrees to participate in [employer]  Pt is aware that [employer] could be limited without vital signs or the ability to perform a full hands-on physical Mary Calix understands he or the provider may request at any time to terminate the video visit and request the patient to seek care or treatment in person

## 2021-07-21 ENCOUNTER — SOCIAL WORK (OUTPATIENT)
Dept: BEHAVIORAL/MENTAL HEALTH CLINIC | Facility: CLINIC | Age: 41
End: 2021-07-21
Payer: MEDICARE

## 2021-07-21 DIAGNOSIS — F43.10 POSTTRAUMATIC STRESS DISORDER: ICD-10-CM

## 2021-07-21 DIAGNOSIS — F31.32 BIPOLAR 1 DISORDER, DEPRESSED, MODERATE (HCC): ICD-10-CM

## 2021-07-21 DIAGNOSIS — F40.01 PANIC DISORDER WITH AGORAPHOBIA: ICD-10-CM

## 2021-07-21 PROCEDURE — 90834 PSYTX W PT 45 MINUTES: CPT | Performed by: SOCIAL WORKER

## 2021-07-21 NOTE — PSYCH
Psychotherapy Provided: Individual Psychotherapy 45 minutes     Length of time in session: 45 minutes, follow up in 2 week    Encounter Diagnosis     ICD-10-CM    1  Bipolar 1 disorder, depressed, moderate ([facility] [address] )  F31 32    2  Panic disorder with agoraphobia  F40 01    3  Posttraumatic stress disorder  F43 10        Goals addressed in session: Goal 1, Goal 2 and Goal 3      Pain:      moderate to severe    0    Current suicide risk : Low     Data:Marco Antonio got his financial aide straightened out  He is less anxious  Cesar Kaska He will be looking for an apartment  Assessment: He is not suicidal or homicidal and he is less anxious  He denies suicidal/homicidal ideation, plan or intent  We worked on mindfulness and distress tolerance  Plan: Continue to skill build in distress tolerance  Behavioral Health Treatment Plan [facility]: Diagnosis and Treatment Plan explained to David Cam relates understanding diagnosis and is agreeable to Treatment Plan   Yes

## 2021-07-22 ENCOUNTER — TELEPHONE (OUTPATIENT)
Dept: PSYCHIATRY | Facility: CLINIC | Age: 41
End: 2021-07-22

## 2021-07-22 NOTE — TELEPHONE ENCOUNTER
----- Message from Rosey Corcoran MD sent at 7/22/2021  2:01 PM EDT -----  ok  ----- Message -----  From: Madeleine Vazquez  Sent: 7/22/2021   1:08 PM EDT  To: Rosey Corcoran MD    I cancelled his re-eval for Friday and left him a voice mail message  He has not been seen since April 2020  Is it okay with you to push his re-eval out until October because you have no 60 minute appointment slots until then?

## 2021-07-22 NOTE — TELEPHONE ENCOUNTER
LVM  As per Dr Lucius Ospina, she will not be in the office until 10:30  Pt's appointment is cancelled and needs to be r/s

## 2021-07-27 ENCOUNTER — TELEMEDICINE (OUTPATIENT)
Dept: BEHAVIORAL/MENTAL HEALTH CLINIC | Facility: CLINIC | Age: 41
End: 2021-07-27
Payer: MEDICARE

## 2021-07-27 DIAGNOSIS — Z87.898 MARIJUANA USE IN REMISSION: ICD-10-CM

## 2021-07-27 DIAGNOSIS — F19.21 COMBINED DRUG DEPENDENCE, EXCLUDING OPIOID, IN REMISSION (HCC): ICD-10-CM

## 2021-07-27 DIAGNOSIS — F43.10 POSTTRAUMATIC STRESS DISORDER: ICD-10-CM

## 2021-07-27 DIAGNOSIS — F40.01 PANIC DISORDER WITH AGORAPHOBIA: ICD-10-CM

## 2021-07-27 DIAGNOSIS — F31.32 BIPOLAR 1 DISORDER, DEPRESSED, MODERATE (HCC): Primary | ICD-10-CM

## 2021-07-27 PROCEDURE — 90834 PSYTX W PT 45 MINUTES: CPT | Performed by: SOCIAL WORKER

## 2021-07-28 NOTE — PSYCH
Psychotherapy Provided: Individual Psychotherapy 45 minutes     Length of time in session: 45 minutes, follow up in 2 week    Bipolar 1 disorder depressed moderate    Goals addressed in session: Goal 1 and Goal 2     Pain:      moderate to severe    0    Current suicide risk : Low     Data: Cam Brown discussed his anxiety and how the strategies we discuss are helping him mitigate his anxiety  He spent the rest of the session discussing what his parents did to his older brother  He took them to [place] and paid for several rentals and tours spending thousands and they basically abandoned him visiting aND staying with family  He therefore paid for all that was never used  They never apologized  Assessment: Cam Brown is better managing his anxiety  There are no thoughts of self harm or harm to others  However he worries a lot and is very upset for his brother and angry at his ungrateful parents  I provided support  Plan: Will see him in 2 weeks  Behavioral Health Treatment Plan [facility]: Diagnosis and Treatment Plan explained to Valentin Martin relates understanding diagnosis and is agreeable to Treatment Plan   Yes

## 2021-08-10 ENCOUNTER — SOCIAL WORK (OUTPATIENT)
Dept: BEHAVIORAL/MENTAL HEALTH CLINIC | Facility: CLINIC | Age: 41
End: 2021-08-10
Payer: MEDICARE

## 2021-08-10 DIAGNOSIS — Z87.898 MARIJUANA USE IN REMISSION: ICD-10-CM

## 2021-08-10 DIAGNOSIS — F31.32 BIPOLAR 1 DISORDER, DEPRESSED, MODERATE (HCC): Primary | ICD-10-CM

## 2021-08-10 DIAGNOSIS — F43.10 POSTTRAUMATIC STRESS DISORDER: ICD-10-CM

## 2021-08-10 DIAGNOSIS — F40.01 PANIC DISORDER WITH AGORAPHOBIA: ICD-10-CM

## 2021-08-10 DIAGNOSIS — F19.21 COMBINED DRUG DEPENDENCE, EXCLUDING OPIOID, IN REMISSION (HCC): ICD-10-CM

## 2021-08-10 PROCEDURE — 90834 PSYTX W PT 45 MINUTES: CPT | Performed by: SOCIAL WORKER

## 2021-08-10 NOTE — PSYCH
Psychotherapy Provided: Individual Psychotherapy 45 minutes     Length of time in session: 45 minutes, follow up in 2 week    Bipolar PTSD    Goals addressed in session: Goal 1, Goal 2 and Goal 3      Pain:      moderate to severe    0    Current suicide risk : Low     Data:He discussed college starts soon  They have been told they must wear masks everywhere on campus  He is upset because he has a painting class for 6 hours  We discussed his goals of managing his anxiety and taking it slowly on the dating scene  Assessment: Blanca Lui denies suicidal/homicidal ideation, plan or intent  However he remains highly anxious  We worked on mindfulness, CBT and solution focused therapy  Plan: Continue to skill build in distress tolerance  Behavioral Health Treatment Plan [facility]: Diagnosis and Treatment Plan explained to John Coe relates understanding diagnosis and is agreeable to Treatment Plan   Yes

## 2021-08-24 ENCOUNTER — TELEMEDICINE (OUTPATIENT)
Dept: BEHAVIORAL/MENTAL HEALTH CLINIC | Facility: CLINIC | Age: 41
End: 2021-08-24
Payer: MEDICARE

## 2021-08-24 DIAGNOSIS — Z87.898 MARIJUANA USE IN REMISSION: ICD-10-CM

## 2021-08-24 DIAGNOSIS — F43.10 POSTTRAUMATIC STRESS DISORDER: ICD-10-CM

## 2021-08-24 DIAGNOSIS — F40.01 PANIC DISORDER WITH AGORAPHOBIA: ICD-10-CM

## 2021-08-24 DIAGNOSIS — F31.32 BIPOLAR 1 DISORDER, DEPRESSED, MODERATE (HCC): Primary | ICD-10-CM

## 2021-08-24 DIAGNOSIS — F19.21 COMBINED DRUG DEPENDENCE, EXCLUDING OPIOID, IN REMISSION (HCC): ICD-10-CM

## 2021-08-24 PROCEDURE — 90853 GROUP PSYCHOTHERAPY: CPT | Performed by: SOCIAL WORKER

## 2021-08-24 NOTE — PSYCH
Virtual Regular Visit    Verification of patient location:    Patient is located in the following state in which I hold an active license [place]      Assessment/Plan:    Problem List Items Addressed This Visit     None          Goals addressed in session: Goal 1, Goal 2 and Goal 3           Reason for visit is   Chief Complaint   Patient presents with    Virtual Regular Visit        Encounter provider Hilario Merlin    Provider located at [address] [place] [id number]  [phone]      Recent Visits  No visits were found meeting these conditions  Showing recent visits within past 7 days and meeting all other requirements  Future Appointments  No visits were found meeting these conditions  Showing future appointments within next 150 days and meeting all other requirements       The patient was identified by name and date of birth  Cesar Guaman was informed that this is a telemedicine visit and that the visit is being conducted throughMicrosoft Teams and patient was informed that this is a secure, HIPAA-compliant platform  He agrees to proceed     My office door was closed  No one else was in the room  He acknowledged consent and understanding of privacy and security of the video platform  The patient has agreed to participate and understands they can discontinue the visit at any time  Patient is aware this is a billable service  Subjective  Cesar Guaman is a 39 y o  male          HPI     Past Medical History:   Diagnosis Date    Anxiety     [facility] [address] [facility] )     Crohn's colitis ([facility] [facility] )     Depression     Panic attack     Panic disorder     Psychiatric disorder     PTSD (post-traumatic stress disorder)     Sleep difficulties        Past Surgical History:   Procedure Laterality Date    HEMICOLECTOMY         Current Outpatient Medications   Medication Sig Dispense Refill    amLODIPine (NORVASC) 10 mg tablet TAKE 1 TABLET BY MOUTH EVERY DAY 90 tablet 0    carBAMazepine (TEGretol XR) 200 mg 12 hr tablet TAKE 1 TABLET BY MOUTH TWICE A  tablet 0    clonazePAM (KlonoPIN) 1 mg tablet Take 1 tablet (1 mg total) by mouth 3 (three) times a day 90 tablet 2    lisinopril (ZESTRIL) 10 mg tablet TAKE 1 TABLET BY MOUTH EVERY DAY IN THE EVENING 90 tablet 0    OLANZapine (ZyPREXA) 5 mg tablet TAKE 1 TABLET BY MOUTH EVERYDAY AT BEDTIME 90 tablet 0    OLANZapine (ZyPREXA) 7 5 mg tablet TAKE 1 TABLET (7 5 MG TOTAL) BY MOUTH DAILY AT BEDTIME 90 tablet 0     No current facility-administered medications for this visit  Allergies   Allergen Reactions    Infliximab Other (See Comments)    Penicillins Other (See Comments)     rash    Penicillin V Rash       Review of Systems Data:Marco Antonio attended the men's group  He quit alcohol on his own  He still is not drinking but admitted during stressful times he still smokes cigarettes  He discussed how he is managing his anxiety and how he is not falling so quickly for people online  Assessment: Murray Muniz denies suicidal/homicidal ideation, plan or intent  He is however anxious about school starting next Monday  We worked on mindfulness, CBT and stress management  Plan: Continue to help him skill build in distress management  Video Exam    There were no vitals filed for this visit  Physical Exam N/A    I spent 45 minutes directly with the patient during this visit    VIRTUAL VISIT Caputa Modesto verbally agrees to participate in [employer]  Pt is aware that [employer] could be limited without vital signs or the ability to perform a full hands-on physical Jyoti Sanchez understands he or the provider may request at any time to terminate the video visit and request the patient to seek care or treatment in person

## 2021-09-04 DIAGNOSIS — F31.9 BIPOLAR 1 DISORDER (HCC): ICD-10-CM

## 2021-09-04 DIAGNOSIS — F31.32 BIPOLAR 1 DISORDER, DEPRESSED, MODERATE (HCC): ICD-10-CM

## 2021-09-06 RX ORDER — CARBAMAZEPINE 200 MG/1
TABLET, EXTENDED RELEASE ORAL
Qty: 180 TABLET | Refills: 0 | Status: SHIPPED | OUTPATIENT
Start: 2021-09-06 | End: 2021-12-07

## 2021-09-06 RX ORDER — OLANZAPINE 7.5 MG/1
7.5 TABLET ORAL
Qty: 90 TABLET | Refills: 0 | Status: SHIPPED | OUTPATIENT
Start: 2021-09-06 | End: 2021-12-07

## 2021-09-06 RX ORDER — LISINOPRIL 10 MG/1
TABLET ORAL
Qty: 90 TABLET | Refills: 0 | Status: SHIPPED | OUTPATIENT
Start: 2021-09-06 | End: 2021-12-07

## 2021-09-06 RX ORDER — AMLODIPINE BESYLATE 10 MG/1
TABLET ORAL
Qty: 90 TABLET | Refills: 0 | Status: SHIPPED | OUTPATIENT
Start: 2021-09-06 | End: 2021-12-07

## 2021-09-29 DIAGNOSIS — F40.01 PANIC DISORDER WITH AGORAPHOBIA: ICD-10-CM

## 2021-09-29 DIAGNOSIS — F41.1 GAD (GENERALIZED ANXIETY DISORDER): ICD-10-CM

## 2021-09-29 RX ORDER — CLONAZEPAM 1 MG/1
1 TABLET ORAL 3 TIMES DAILY
Qty: 90 TABLET | Refills: 2 | Status: SHIPPED | OUTPATIENT
Start: 2021-09-29 | End: 2021-12-21 | Stop reason: SDUPTHER

## 2021-10-05 ENCOUNTER — TELEMEDICINE (OUTPATIENT)
Dept: BEHAVIORAL/MENTAL HEALTH CLINIC | Facility: CLINIC | Age: 41
End: 2021-10-05
Payer: MEDICARE

## 2021-10-05 DIAGNOSIS — F43.10 POSTTRAUMATIC STRESS DISORDER: ICD-10-CM

## 2021-10-05 DIAGNOSIS — Z87.898 MARIJUANA USE IN REMISSION: ICD-10-CM

## 2021-10-05 DIAGNOSIS — F40.01 PANIC DISORDER WITH AGORAPHOBIA: ICD-10-CM

## 2021-10-05 DIAGNOSIS — F19.21 COMBINED DRUG DEPENDENCE, EXCLUDING OPIOID, IN REMISSION (HCC): ICD-10-CM

## 2021-10-05 DIAGNOSIS — F31.32 BIPOLAR 1 DISORDER, DEPRESSED, MODERATE (HCC): Primary | ICD-10-CM

## 2021-10-05 PROCEDURE — 90853 GROUP PSYCHOTHERAPY: CPT | Performed by: SOCIAL WORKER

## 2021-10-06 ENCOUNTER — SOCIAL WORK (OUTPATIENT)
Dept: BEHAVIORAL/MENTAL HEALTH CLINIC | Facility: CLINIC | Age: 41
End: 2021-10-06
Payer: MEDICARE

## 2021-10-06 DIAGNOSIS — F19.21 COMBINED DRUG DEPENDENCE, EXCLUDING OPIOID, IN REMISSION (HCC): ICD-10-CM

## 2021-10-06 DIAGNOSIS — F40.01 PANIC DISORDER WITH AGORAPHOBIA: ICD-10-CM

## 2021-10-06 DIAGNOSIS — F31.32 BIPOLAR 1 DISORDER, DEPRESSED, MODERATE (HCC): Primary | ICD-10-CM

## 2021-10-06 DIAGNOSIS — F43.10 POSTTRAUMATIC STRESS DISORDER: ICD-10-CM

## 2021-10-06 DIAGNOSIS — Z87.898 MARIJUANA USE IN REMISSION: ICD-10-CM

## 2021-10-06 PROCEDURE — 90834 PSYTX W PT 45 MINUTES: CPT | Performed by: SOCIAL WORKER

## 2021-10-07 NOTE — BH TREATMENT PLAN
Erika Palma  1980       Date of Initial Treatment Plan: 09/08/2020   Date of Current Treatment Plan: 08/24/2021    Treatment Plan Number update     Strengths/Personal Resources for Self Care: considerate, kind, has goals    Diagnosis:   1  Bipolar 1 disorder, depressed, moderate ([facility] [address] )     2  Combined drug dependence, excluding opioid, in remission [facility]     3  Posttraumatic stress disorder     4  Panic disorder with agoraphobia     5  Marijuana use in remission         Area of Needs: see below      Long Term Goal 1: I need to focus on my college education    Target Date: 02/15/2022  Completion Date: tbd         Short Term Objectives for Goal 1: focus on my classes and my homework    Long Term Goal 2: I need to reduce my anxiety    Target Date: 02/15/2022  Completion Date: TBD    Short Term Objectives for Goal 2: mindfulness, CBT         Long Term Goal # 3: mindfulness, cbt     Target Date: N/A  Completion Date: N/A    Short Term Objectives for Goal 3: N/A    GOAL 1: Modality: Individual 2x per month   Completion Date tbd    GOAL 2: Modality: Individual 2x per month   Completion Date tbd     GOAL 3: Modality: Individual 2x per month   Completion Date tbd      Behavioral Health Treatment Plan [facility]: Diagnosis and Treatment Plan explained to Frederick Prabhakar relates understanding diagnosis and is agreeable to Treatment Plan  Client Comments : Please share your thoughts, feelings, need and/or experiences regarding your treatment plan: Chandni Mckeon and the undersigned will work as a team to help him progress on his goals

## 2021-11-02 ENCOUNTER — TELEMEDICINE (OUTPATIENT)
Dept: BEHAVIORAL/MENTAL HEALTH CLINIC | Facility: CLINIC | Age: 41
End: 2021-11-02
Payer: MEDICARE

## 2021-11-02 DIAGNOSIS — F40.01 PANIC DISORDER WITH AGORAPHOBIA: ICD-10-CM

## 2021-11-02 DIAGNOSIS — Z87.898 MARIJUANA USE IN REMISSION: ICD-10-CM

## 2021-11-02 DIAGNOSIS — F31.32 BIPOLAR 1 DISORDER, DEPRESSED, MODERATE (HCC): Primary | ICD-10-CM

## 2021-11-02 DIAGNOSIS — F43.10 POSTTRAUMATIC STRESS DISORDER: ICD-10-CM

## 2021-11-02 PROCEDURE — 90834 PSYTX W PT 45 MINUTES: CPT | Performed by: SOCIAL WORKER

## 2021-11-23 ENCOUNTER — SOCIAL WORK (OUTPATIENT)
Dept: BEHAVIORAL/MENTAL HEALTH CLINIC | Facility: CLINIC | Age: 41
End: 2021-11-23
Payer: MEDICARE

## 2021-11-23 DIAGNOSIS — F31.32 BIPOLAR 1 DISORDER, DEPRESSED, MODERATE (HCC): Primary | ICD-10-CM

## 2021-11-23 DIAGNOSIS — F40.01 PANIC DISORDER WITH AGORAPHOBIA: ICD-10-CM

## 2021-11-23 DIAGNOSIS — F43.10 POSTTRAUMATIC STRESS DISORDER: ICD-10-CM

## 2021-11-23 PROCEDURE — 90834 PSYTX W PT 45 MINUTES: CPT | Performed by: SOCIAL WORKER

## 2021-11-26 ENCOUNTER — TELEPHONE (OUTPATIENT)
Dept: BEHAVIORAL/MENTAL HEALTH CLINIC | Facility: CLINIC | Age: 41
End: 2021-11-26

## 2021-12-03 ENCOUNTER — TELEMEDICINE (OUTPATIENT)
Dept: BEHAVIORAL/MENTAL HEALTH CLINIC | Facility: CLINIC | Age: 41
End: 2021-12-03
Payer: MEDICARE

## 2021-12-03 DIAGNOSIS — F43.10 POSTTRAUMATIC STRESS DISORDER: ICD-10-CM

## 2021-12-03 DIAGNOSIS — F40.01 PANIC DISORDER WITH AGORAPHOBIA: ICD-10-CM

## 2021-12-03 DIAGNOSIS — F31.32 BIPOLAR 1 DISORDER, DEPRESSED, MODERATE (HCC): Primary | ICD-10-CM

## 2021-12-03 PROCEDURE — 90834 PSYTX W PT 45 MINUTES: CPT | Performed by: SOCIAL WORKER

## 2021-12-07 ENCOUNTER — SOCIAL WORK (OUTPATIENT)
Dept: BEHAVIORAL/MENTAL HEALTH CLINIC | Facility: CLINIC | Age: 41
End: 2021-12-07
Payer: MEDICARE

## 2021-12-07 DIAGNOSIS — F40.01 PANIC DISORDER WITH AGORAPHOBIA: ICD-10-CM

## 2021-12-07 DIAGNOSIS — F31.32 BIPOLAR 1 DISORDER, DEPRESSED, MODERATE (HCC): Primary | ICD-10-CM

## 2021-12-07 DIAGNOSIS — F31.9 BIPOLAR 1 DISORDER (HCC): ICD-10-CM

## 2021-12-07 DIAGNOSIS — F31.32 BIPOLAR 1 DISORDER, DEPRESSED, MODERATE (HCC): ICD-10-CM

## 2021-12-07 DIAGNOSIS — F43.10 POSTTRAUMATIC STRESS DISORDER: ICD-10-CM

## 2021-12-07 PROCEDURE — 90834 PSYTX W PT 45 MINUTES: CPT | Performed by: SOCIAL WORKER

## 2021-12-07 RX ORDER — LISINOPRIL 10 MG/1
TABLET ORAL
Qty: 90 TABLET | Refills: 0 | Status: SHIPPED | OUTPATIENT
Start: 2021-12-07 | End: 2022-02-07

## 2021-12-07 RX ORDER — AMLODIPINE BESYLATE 10 MG/1
TABLET ORAL
Qty: 90 TABLET | Refills: 0 | Status: SHIPPED | OUTPATIENT
Start: 2021-12-07 | End: 2022-02-07

## 2021-12-07 RX ORDER — OLANZAPINE 7.5 MG/1
7.5 TABLET ORAL
Qty: 90 TABLET | Refills: 0 | Status: SHIPPED | OUTPATIENT
Start: 2021-12-07 | End: 2022-02-07

## 2021-12-07 RX ORDER — CARBAMAZEPINE 200 MG/1
TABLET, EXTENDED RELEASE ORAL
Qty: 180 TABLET | Refills: 0 | Status: SHIPPED | OUTPATIENT
Start: 2021-12-07 | End: 2022-02-07

## 2021-12-17 ENCOUNTER — SOCIAL WORK (OUTPATIENT)
Dept: BEHAVIORAL/MENTAL HEALTH CLINIC | Facility: CLINIC | Age: 41
End: 2021-12-17
Payer: MEDICARE

## 2021-12-17 DIAGNOSIS — F40.01 PANIC DISORDER WITH AGORAPHOBIA: ICD-10-CM

## 2021-12-17 DIAGNOSIS — F43.10 POSTTRAUMATIC STRESS DISORDER: ICD-10-CM

## 2021-12-17 DIAGNOSIS — F31.32 BIPOLAR 1 DISORDER, DEPRESSED, MODERATE (HCC): Primary | ICD-10-CM

## 2021-12-17 PROCEDURE — 90834 PSYTX W PT 45 MINUTES: CPT | Performed by: SOCIAL WORKER

## 2021-12-21 ENCOUNTER — OFFICE VISIT (OUTPATIENT)
Dept: PSYCHIATRY | Facility: CLINIC | Age: 41
End: 2021-12-21
Payer: MEDICARE

## 2021-12-21 DIAGNOSIS — F31.64 BIPOLAR DISORDER, CURR EPISODE MIXED, SEVERE, WITH PSYCHOTIC FEATURES (HCC): Primary | ICD-10-CM

## 2021-12-21 DIAGNOSIS — F19.21 COMBINED DRUG DEPENDENCE, EXCLUDING OPIOID, IN REMISSION (HCC): ICD-10-CM

## 2021-12-21 DIAGNOSIS — F40.01 PANIC DISORDER WITH AGORAPHOBIA: ICD-10-CM

## 2021-12-21 DIAGNOSIS — Z79.899 LONG TERM CURRENT USE OF ANTIPSYCHOTIC MEDICATION: ICD-10-CM

## 2021-12-21 DIAGNOSIS — F41.1 GAD (GENERALIZED ANXIETY DISORDER): ICD-10-CM

## 2021-12-21 PROCEDURE — 90792 PSYCH DIAG EVAL W/MED SRVCS: CPT | Performed by: PSYCHIATRY & NEUROLOGY

## 2021-12-21 RX ORDER — CLONAZEPAM 1 MG/1
1 TABLET ORAL 3 TIMES DAILY
Qty: 90 TABLET | Refills: 2 | Status: SHIPPED | OUTPATIENT
Start: 2021-12-21 | End: 2022-03-24 | Stop reason: SDUPTHER

## 2022-01-11 ENCOUNTER — SOCIAL WORK (OUTPATIENT)
Dept: BEHAVIORAL/MENTAL HEALTH CLINIC | Facility: CLINIC | Age: 42
End: 2022-01-11
Payer: MEDICARE

## 2022-01-11 DIAGNOSIS — F43.10 POSTTRAUMATIC STRESS DISORDER: ICD-10-CM

## 2022-01-11 DIAGNOSIS — F31.32 BIPOLAR 1 DISORDER, DEPRESSED, MODERATE (HCC): Primary | ICD-10-CM

## 2022-01-11 DIAGNOSIS — F40.01 PANIC DISORDER WITH AGORAPHOBIA: ICD-10-CM

## 2022-01-11 PROCEDURE — 90834 PSYTX W PT 45 MINUTES: CPT | Performed by: SOCIAL WORKER

## 2022-01-11 NOTE — PSYCH
Psychotherapy Provided: Individual Psychotherapy 45 minutes     Length of time in session: 45 minutes, follow up in 2 week    Bipolar 1 disorder    Goals addressed in session: Goal 1 and Goal 2     Pain:      moderate to severe    0    Current suicide risk : Low     Data: Serjio Rodriguez arrived for his session  He met a new woman he is currently dating who lives in [place]  He will be going back to [place] at the end of January  Now that he is in a nice relationship his anxiety he feels is much more under control  Assessment: Serjio Rodriguez denies suicidal/ homicidal ideation, plan or intent  He is less anxious and he is happy in a new relationship  I provided support and encouragement  We continue to work on mindfulness and CBT  Plan: Continue to skill build in distress tolerance  Behavioral Health Treatment Plan [facility]: Diagnosis and Treatment Plan explained to Swapna Valdes relates understanding diagnosis and is agreeable to Treatment Plan   Yes

## 2022-01-21 ENCOUNTER — SOCIAL WORK (OUTPATIENT)
Dept: BEHAVIORAL/MENTAL HEALTH CLINIC | Facility: CLINIC | Age: 42
End: 2022-01-21
Payer: MEDICARE

## 2022-01-21 DIAGNOSIS — F31.32 BIPOLAR 1 DISORDER, DEPRESSED, MODERATE (HCC): Primary | ICD-10-CM

## 2022-01-21 DIAGNOSIS — F43.10 POSTTRAUMATIC STRESS DISORDER: ICD-10-CM

## 2022-01-21 DIAGNOSIS — F40.01 PANIC DISORDER WITH AGORAPHOBIA: ICD-10-CM

## 2022-01-21 PROCEDURE — 90834 PSYTX W PT 45 MINUTES: CPT | Performed by: SOCIAL WORKER

## 2022-01-21 NOTE — PSYCH
Psychotherapy Provided: Individual Psychotherapy 45 minutes     Length of time in session: 45 minutes, follow up in 2 week    Bipolar I disorder    Goals addressed in session: Goal 1, Goal 2 and Goal 3      Pain:      moderate to severe    0    Current suicide risk : Low     Data: Marissa Cornejo arrived for his session  He is nervous about college starting on Monday  He is nervous about his new relationship and the possibility his girlfriend may be pregnant  He admits he creates scenarios that either havent happen or may not happen and he frets terribly about these things  Assessment: Marissa Cornejo kept perseverating about how his girlfriend may be pregnant even though she informed him she had her monthly meunstral cycle  He also was catastrophizing about the semester that has not even started as of yet  We continue to work on strategies to help him decrease his overall panic and anxiety  We work on mindfulness, CBT and solution focused therapy strategies  [facility]: Diagnosis and Treatment Plan explained to Rishi Sender relates understanding diagnosis and is agreeable to Treatment Plan   Yes

## 2022-02-07 ENCOUNTER — SOCIAL WORK (OUTPATIENT)
Dept: BEHAVIORAL/MENTAL HEALTH CLINIC | Facility: CLINIC | Age: 42
End: 2022-02-07
Payer: MEDICARE

## 2022-02-07 DIAGNOSIS — F31.9 BIPOLAR 1 DISORDER (HCC): ICD-10-CM

## 2022-02-07 DIAGNOSIS — F43.10 POSTTRAUMATIC STRESS DISORDER: ICD-10-CM

## 2022-02-07 DIAGNOSIS — F31.32 BIPOLAR 1 DISORDER, DEPRESSED, MODERATE (HCC): ICD-10-CM

## 2022-02-07 DIAGNOSIS — F31.32 BIPOLAR 1 DISORDER, DEPRESSED, MODERATE (HCC): Primary | ICD-10-CM

## 2022-02-07 DIAGNOSIS — F40.01 PANIC DISORDER WITH AGORAPHOBIA: ICD-10-CM

## 2022-02-07 PROCEDURE — 90834 PSYTX W PT 45 MINUTES: CPT | Performed by: SOCIAL WORKER

## 2022-02-07 RX ORDER — OLANZAPINE 7.5 MG/1
7.5 TABLET ORAL
Qty: 90 TABLET | Refills: 0 | Status: SHIPPED | OUTPATIENT
Start: 2022-02-07 | End: 2022-03-24 | Stop reason: SDUPTHER

## 2022-02-07 RX ORDER — AMLODIPINE BESYLATE 10 MG/1
TABLET ORAL
Qty: 90 TABLET | Refills: 0 | Status: SHIPPED | OUTPATIENT
Start: 2022-02-07 | End: 2022-03-24 | Stop reason: SDUPTHER

## 2022-02-07 RX ORDER — LISINOPRIL 10 MG/1
TABLET ORAL
Qty: 90 TABLET | Refills: 0 | Status: SHIPPED | OUTPATIENT
Start: 2022-02-07 | End: 2022-03-24 | Stop reason: SDUPTHER

## 2022-02-07 RX ORDER — CARBAMAZEPINE 200 MG/1
TABLET, EXTENDED RELEASE ORAL
Qty: 180 TABLET | Refills: 0 | Status: SHIPPED | OUTPATIENT
Start: 2022-02-07 | End: 2022-03-24 | Stop reason: SDUPTHER

## 2022-02-07 NOTE — PSYCH
Psychotherapy Provided: Individual Psychotherapy 45 minutes     Length of time in session: 45 minutes, follow up in 2 week    Bipolar I disorder,MDDR moderate, PTSD, Panic attacks  Goals addressed in session: Goal 1, Goal 2 and Goal 3      Pain:      moderate to severe    0    Current suicide risk : Low     Data:Marco Antonio discussed how school is going  He is staying sober and focusing on school  He tried to quit smoking  He discussed how he is managing his anxiety  His relationship is going well  Assessment: Jennifer Hernandez denies suicidal/homicidal ideation, plan or intent  However he is anxious about school but is doing well  We discuss mindfulness and CBT  Plan: Continue to skill build in distress tolerance  Behavioral Health Treatment Plan [facility]: Diagnosis and Treatment Plan explained to Janet Hernández relates understanding diagnosis and is agreeable to Treatment Plan   Yes

## 2022-02-07 NOTE — BH TREATMENT PLAN
Kishan Tico  1980       Date of Initial Treatment Plan: 09/08/2020  Date of Current Treatment Plan: 02/07/22    Treatment Plan Number update    Strengths/Personal Resources for Self Care: Kind, considerate, has goals    Diagnosis:   1  Bipolar 1 disorder, depressed, moderate ([address] )     2  Panic disorder with agoraphobia     3  Posttraumatic stress disorder         Area of Needs: please see below      Long Term Goal 1: I need to remain sober from drugs and alcohol  Target Date: 07/31/2022  Completion Date: TBD         Short Term Objectives for Goal 1: will work on relapse prevention strategies    Long Term Goal 2: I need to effectively manage my anxiety    Target Date: 07/31/2022  Completion Date: TBD    Short Term Objectives for Goal 2: mindfulness, CBT and SFT         Long Term Goal # 3: I need to focus on my college courses  Target Date: 07/31/2022  Completion Date: TBD    Short Term Objectives for Goal 3: mindfulness    GOAL 1: Modality: Individual 2x per month   Completion Date tbd    GOAL 2: Modality: Individual 2x per month   Completion Date tbd     GOAL 3: Modality: Individual 2x per month   Completion Date tbd      Behavioral Health Treatment Plan [facility]: Diagnosis and Treatment Plan explained to Peter Mckoy relates understanding diagnosis and is agreeable to Treatment Plan  Client Comments : Please share your thoughts, feelings, need and/or experiences regarding your treatment plan: Valery Avery and the undersigned will work as a team to help him progress on his goals

## 2022-02-18 ENCOUNTER — SOCIAL WORK (OUTPATIENT)
Dept: BEHAVIORAL/MENTAL HEALTH CLINIC | Facility: CLINIC | Age: 42
End: 2022-02-18
Payer: MEDICARE

## 2022-02-18 DIAGNOSIS — F40.01 PANIC DISORDER WITH AGORAPHOBIA: ICD-10-CM

## 2022-02-18 DIAGNOSIS — F31.32 BIPOLAR 1 DISORDER, DEPRESSED, MODERATE (HCC): Primary | ICD-10-CM

## 2022-02-18 DIAGNOSIS — F43.10 POSTTRAUMATIC STRESS DISORDER: ICD-10-CM

## 2022-02-18 PROCEDURE — 90834 PSYTX W PT 45 MINUTES: CPT | Performed by: SOCIAL WORKER

## 2022-02-18 NOTE — PSYCH
Psychotherapy Provided: Individual Psychotherapy 45 minutes     Length of time in session: 45 minutes, follow up in 2 week    Bipolar 1 depressed moderate    Goals addressed in session: Goal 1, Goal 2 and Goal 3      Pain:  3    moderate to severe        Current suicide risk : Low     :Data: Jennifer Marie continues to create problems that have not happened and then it causes him great anxiety  He is having issues with his girlfriends sister's boyfriend  He has created scenarios about this person that have not happened and that are not his to control  He remains sober but still is anxious about issues he creates in his mind that have not happened  He is trying to focus on school and his relationship  Assessment: Jennifer Marie denies suicidal/homicidal ideation, plan or intent  However he catastrophizes things and develops anxiety over these issues  Plan: Continue to help him not create issues before they are issues  Behavioral Health Treatment Plan [facility]: Diagnosis and Treatment Plan explained to Marcy Mendenhall relates understanding diagnosis and is agreeable to Treatment Plan   Yes

## 2022-03-21 ENCOUNTER — SOCIAL WORK (OUTPATIENT)
Dept: BEHAVIORAL/MENTAL HEALTH CLINIC | Facility: CLINIC | Age: 42
End: 2022-03-21
Payer: MEDICARE

## 2022-03-21 DIAGNOSIS — F31.32 BIPOLAR 1 DISORDER, DEPRESSED, MODERATE (HCC): Primary | ICD-10-CM

## 2022-03-21 DIAGNOSIS — F43.10 POSTTRAUMATIC STRESS DISORDER: ICD-10-CM

## 2022-03-21 DIAGNOSIS — F40.01 PANIC DISORDER WITH AGORAPHOBIA: ICD-10-CM

## 2022-03-21 PROCEDURE — 90834 PSYTX W PT 45 MINUTES: CPT | Performed by: SOCIAL WORKER

## 2022-03-21 NOTE — PSYCH
Psychotherapy Provided: Individual Psychotherapy 45 minutes     Length of time in session: 45 minutes, follow up in 2 week    Bipolar I  Depressed moderate, Panic attacks,PTSD  Goals addressed in session: Goal 1, Goal 2 and Goal 3      Pain:      moderate to severe    0    Current suicide risk : Low     Data: Johnna Valdez shared he had a beer on Friday and he was upset he violated his sobriety  He admitted he is very anxious about school but admits as we discussed he creates his own anxiety  He is highly focused on his college courses  He discussed how dysfunctional his girlfriends sister is especially with her child  He was recently down there and it really bothered him  Assessment: Johnna Valdez denies suicidal/homicidal ideation, plan or intent  However he creates his own anxiety  We continue to work on mindfulness and CBT  Plan: Continue to help him skill build in distress tolerance  Behavioral Health Treatment Plan [facility]: Diagnosis and Treatment Plan explained to Elizabeth Learn relates understanding diagnosis and is agreeable to Treatment Plan   Yes

## 2022-03-24 DIAGNOSIS — F41.1 GAD (GENERALIZED ANXIETY DISORDER): ICD-10-CM

## 2022-03-24 DIAGNOSIS — F31.32 BIPOLAR 1 DISORDER, DEPRESSED, MODERATE (HCC): ICD-10-CM

## 2022-03-24 DIAGNOSIS — F31.9 BIPOLAR 1 DISORDER (HCC): ICD-10-CM

## 2022-03-24 DIAGNOSIS — F40.01 PANIC DISORDER WITH AGORAPHOBIA: ICD-10-CM

## 2022-03-24 RX ORDER — OLANZAPINE 7.5 MG/1
7.5 TABLET ORAL
Qty: 90 TABLET | Refills: 0 | Status: SHIPPED | OUTPATIENT
Start: 2022-03-24 | End: 2022-05-11

## 2022-03-24 RX ORDER — AMLODIPINE BESYLATE 10 MG/1
10 TABLET ORAL DAILY
Qty: 90 TABLET | Refills: 0 | Status: SHIPPED | OUTPATIENT
Start: 2022-03-24 | End: 2022-05-11

## 2022-03-24 RX ORDER — CARBAMAZEPINE 200 MG/1
200 TABLET, EXTENDED RELEASE ORAL 2 TIMES DAILY
Qty: 180 TABLET | Refills: 0 | Status: SHIPPED | OUTPATIENT
Start: 2022-03-24 | End: 2022-05-11

## 2022-03-24 RX ORDER — CLONAZEPAM 1 MG/1
1 TABLET ORAL 3 TIMES DAILY
Qty: 90 TABLET | Refills: 2 | Status: SHIPPED | OUTPATIENT
Start: 2022-03-24 | End: 2022-04-08 | Stop reason: SDUPTHER

## 2022-03-24 RX ORDER — LISINOPRIL 10 MG/1
10 TABLET ORAL EVERY EVENING
Qty: 90 TABLET | Refills: 0 | Status: SHIPPED | OUTPATIENT
Start: 2022-03-24 | End: 2022-05-11

## 2022-04-08 ENCOUNTER — OFFICE VISIT (OUTPATIENT)
Dept: PSYCHIATRY | Facility: CLINIC | Age: 42
End: 2022-04-08
Payer: MEDICARE

## 2022-04-08 ENCOUNTER — SOCIAL WORK (OUTPATIENT)
Dept: BEHAVIORAL/MENTAL HEALTH CLINIC | Facility: CLINIC | Age: 42
End: 2022-04-08
Payer: MEDICARE

## 2022-04-08 DIAGNOSIS — F31.32 BIPOLAR 1 DISORDER, DEPRESSED, MODERATE (HCC): Primary | ICD-10-CM

## 2022-04-08 DIAGNOSIS — F31.64 BIPOLAR DISORDER, CURR EPISODE MIXED, SEVERE, WITH PSYCHOTIC FEATURES (HCC): Primary | ICD-10-CM

## 2022-04-08 DIAGNOSIS — F43.10 POSTTRAUMATIC STRESS DISORDER: ICD-10-CM

## 2022-04-08 DIAGNOSIS — F40.01 PANIC DISORDER WITH AGORAPHOBIA: ICD-10-CM

## 2022-04-08 DIAGNOSIS — Z79.899 LONG TERM CURRENT USE OF ANTIPSYCHOTIC MEDICATION: ICD-10-CM

## 2022-04-08 DIAGNOSIS — F41.1 GAD (GENERALIZED ANXIETY DISORDER): ICD-10-CM

## 2022-04-08 PROCEDURE — 90834 PSYTX W PT 45 MINUTES: CPT | Performed by: SOCIAL WORKER

## 2022-04-08 PROCEDURE — 99213 OFFICE O/P EST LOW 20 MIN: CPT | Performed by: PSYCHIATRY & NEUROLOGY

## 2022-04-08 RX ORDER — CLONAZEPAM 1 MG/1
1 TABLET ORAL 3 TIMES DAILY
Qty: 90 TABLET | Refills: 2 | Status: SHIPPED | OUTPATIENT
Start: 2022-04-08 | End: 2022-07-12 | Stop reason: SDUPTHER

## 2022-04-08 NOTE — PSYCH
Subjective:Medication Management      Patient ID: Richard Tinoco is a 39 y o  male with Bipolar disorder     HPI ROS Appetite Changes and Sleep: normal appetite, normal energy level, no weight change and normal number of sleep hours   Patient remains compliant with his medications and denies side effects  He denies recent health changes or new medications   He stated since last seen his mood has been stable  He continues to commute to college in [place] and is major in art  He enjoys going to school and he is doing well  He plans to rent an apartment close to school for the Fall semester  He stated that his GF is very supportive  He agrees to continue current treatment as prescribed and will schedule follow up in 3 months  Review Of Systems:     Mood Anxiety, Depression and Emotional Lability   Behavior Impulsive Behavior   Thought Content Disturbing Thoughts, Feelings   General Emotional Problems and Decreased Functioning   Personality Change in Personality   Other Psych Symptoms Normal   Constitutional Negative   ENT Negative   Cardiovascular Negative   Respiratory Negative   Gastrointestinal Negative   Genitourinary Negative   Musculoskeletal Negative   Integumentary Negative   Neurological Negative   Endocrine Normal    Other Symptoms Normal              Laboratory Results: No results found for this or any previous visit      Substance Abuse History:  Social History     Substance and Sexual Activity   Drug Use No       Family Psychiatric History:   Family History   Problem Relation Age of Onset    Schizophrenia Father     Alcohol abuse Father     Drug abuse Brother        The following portions of the patient's history were reviewed and updated as appropriate: allergies, current medications, past family history, past medical history, past social history, past surgical history and problem list     Social History     Socioeconomic History    Marital status: Single     Spouse name: Not on file    Number of children: Not on file    Years of education: Not on file    Highest education level: Not on file   Occupational History    Not on file   Tobacco Use    Smoking status: Current Some Day Smoker    Smokeless tobacco: Never Used    Tobacco comment: he smoke only 1 cigarette a day, quit 2 months ago   Substance and Sexual Activity    Alcohol use: No     Comment: last use 2012, took 3 of alcohol to help with panic attack    Drug use: No    Sexual activity: Not Currently   Other Topics Concern    Not on file   Social History Narrative    Not on file     Social Determinants of Health     Financial Resource Strain: Not on file   Food Insecurity: Not on file   Transportation Needs: Not on file   Physical Activity: Not on file   Stress: Not on file   Social Connections: Not on file   Intimate Partner Violence: Not on file   Housing Stability: Not on file     Social History     Social History Narrative    Not on file       Objective:       Mental status:  Appearance calm and cooperative , adequate hygiene and grooming and good eye contact    Mood dysphoric   Affect affect was constricted   Speech a normal rate and fluent   Thought Processes coherent/organized and normal thought processes   Hallucinations no hallucinations present    Thought Content no delusions   Abnormal Thoughts no suicidal thoughts  and no homicidal thoughts    Orientation  oriented to person and place and time   Remote Memory short term memory intact and long term memory intact   Attention Span concentration intact   Intellect Appears to be of Average Intelligence   Insight Limited insight   Judgement judgment was limited   Muscle Strength Muscle strength and tone were normal and Normal gait    Language no difficulty naming common objects and no difficulty repeating a phrase    Fund of Knowledge displays adequate knowledge of current events, adequate fund of knowledge regarding past history and adequate fund of knowledge regarding vocabulary                Assessment/Plan:       Diagnoses and all orders for this visit:    Bipolar disorder, curr episode mixed, severe, with psychotic features ([facility] [address] )    Panic disorder with agoraphobia  -     clonazePAM (KlonoPIN) 1 mg tablet; Take 1 tablet (1 mg total) by mouth 3 (three) times a day    Posttraumatic stress disorder    RADHA (generalized anxiety disorder)  -     clonazePAM (KlonoPIN) 1 mg tablet; Take 1 tablet (1 mg total) by mouth 3 (three) times a day            Treatment Recommendations- Risks Benefits      Immediate Medical/Psychiatric/Psychotherapy Treatments and Any Precautions: continue current treatment     Risks, Benefits And Possible Side Effects Of Medications:  {PSYCH RISK, BENEFITS AND POSSIBLE SIDE EFFECTS (Optional):40334    Controlled Medication Discussion: Discussed with patient Black Box warning on concurrent use of benzodiazepines and opioid medications including sedation, respiratory depression, coma and death  Patient understands the risk of treatment with benzodiazepines in addition to opioids and wants to continue taking those medications  , Discussed with patient the risks of sedation, respiratory depression, impairment of ability to drive and potential for abuse and addiction related to treatment with benzodiazepine medications  The patient understands risk of treatment with benzodiazepine medications, agrees to not drive if feels impaired and agrees to take medications as prescribed  and The patient has been filling controlled prescriptions on time as prescribed to [facility]       Psychotherapy Provided: Individual psychotherapy provided  Individual psychotherapy provided: Counseling was provided during the session today for 16 minutes  Medications, treatment progress and treatment plan reviewed with [facility]  Medication changes discussed with [facility]  Medication education provided to [facility]    Goals discussed during in session: continue improvement in mood stability  Coping strategies including compliance with medications, contacting a therapist, engaging in previously avoided activities, exercising, getting into a good routine, improving self-esteem, increasing interest in usual activities, increasing motivation, increasing social interaction, maintain healthy diet, maintain heathy sleeping hygiene and maintain positive attitude reviewed with Emanuel Mcdowell  Importance of medication and treatment compliance reviewed with Marco Antonio  Supportive therapy provided

## 2022-04-08 NOTE — PSYCH
Psychotherapy Provided: Individual Psychotherapy 45 minutes     Length of time in session: 45 minutes, follow up in 2 weeks  Bipolar I depressed moderate, panic,PTSD    Goals addressed in session: Goal 1, Goal 2 and Goal 3      Pain:      moderate to severe    0    Current suicide risk : Low     Data: Sindy Ortiz arrived for his session  He discussed the fallout he recently had with his older brother  He also has issues with his sister  He gets along quite well with his 2 other brothers  He discussed how the problem siblings he does not get along with they dont get along with the brothers he gets along with  Sindy Ortiz remains sober, he is trying to manage his anxiety and he is doing well in college  Assessment: Sindy Ortiz denies suicidal/homicidal ideation, plan or intent  He is quite upset about family issues  Also his girlfriends sister's boyfriend pulled a weird trick on April fools day which upset Sindy Ortiz and his girlfriend  We discussed strategies to help him deal with the family member and his girlfriend's sister's boyfriend  He is doing well in school  We worked on mindfulness and CBT  Plan: Continue to skill build in distress tolerance  Behavioral Health Treatment Plan [facility]: Diagnosis and Treatment Plan explained to Soheila Ledesma relates understanding diagnosis and is agreeable to Treatment Plan   Yes

## 2022-04-22 ENCOUNTER — SOCIAL WORK (OUTPATIENT)
Dept: BEHAVIORAL/MENTAL HEALTH CLINIC | Facility: CLINIC | Age: 42
End: 2022-04-22
Payer: MEDICARE

## 2022-04-22 DIAGNOSIS — F31.32 BIPOLAR 1 DISORDER, DEPRESSED, MODERATE (HCC): Primary | ICD-10-CM

## 2022-04-22 DIAGNOSIS — F43.10 POSTTRAUMATIC STRESS DISORDER: ICD-10-CM

## 2022-04-22 DIAGNOSIS — F40.01 PANIC DISORDER WITH AGORAPHOBIA: ICD-10-CM

## 2022-04-22 PROCEDURE — 90834 PSYTX W PT 45 MINUTES: CPT | Performed by: SOCIAL WORKER

## 2022-04-22 NOTE — PSYCH
Psychotherapy Provided: Individual Psychotherapy 45 minutes     Length of time in session: 45 minutes, follow up in 2 week    Bipolar I depressed, moderate   Panic, PTSD    Goals addressed in session: Goal 1, Goal 2 and Goal 3      Pain:      moderate to severe    0    Current suicide risk : Low     Data: Mica Decker had a cold and was unable to leave the house for 5 days  He hasn't seen his girlfriend due to school  She lives in [place]  Regarding his goals he remains sober,He continues to cope with anxiety  We continue to work on strategies to help him manage his symptoms  He is highly focused on school but it clearly is elevating his anxiety  Assessment: He denies suicidal/homicidal ideation, plan or intent  However he is highly anxious  We continue to work on mindfulness, CBT and distress tolerance  Plan: Continue to skill build in distress tolerance  Behavioral Health Treatment [facility]: Diagnosis and Treatment Plan explained to Tricia Isaac relates understanding diagnosis and is agreeable to Treatment Plan   Yes

## 2022-05-11 DIAGNOSIS — F31.32 BIPOLAR 1 DISORDER, DEPRESSED, MODERATE (HCC): ICD-10-CM

## 2022-05-11 DIAGNOSIS — F31.9 BIPOLAR 1 DISORDER (HCC): ICD-10-CM

## 2022-05-11 RX ORDER — AMLODIPINE BESYLATE 10 MG/1
TABLET ORAL
Qty: 90 TABLET | Refills: 0 | Status: SHIPPED | OUTPATIENT
Start: 2022-05-11

## 2022-05-11 RX ORDER — CARBAMAZEPINE 200 MG/1
TABLET, EXTENDED RELEASE ORAL
Qty: 180 TABLET | Refills: 0 | Status: SHIPPED | OUTPATIENT
Start: 2022-05-11

## 2022-05-11 RX ORDER — LISINOPRIL 10 MG/1
TABLET ORAL
Qty: 90 TABLET | Refills: 0 | Status: SHIPPED | OUTPATIENT
Start: 2022-05-11

## 2022-05-11 RX ORDER — OLANZAPINE 7.5 MG/1
7.5 TABLET ORAL
Qty: 90 TABLET | Refills: 0 | Status: SHIPPED | OUTPATIENT
Start: 2022-05-11

## 2022-05-12 ENCOUNTER — TELEPHONE (OUTPATIENT)
Dept: PSYCHIATRY | Facility: CLINIC | Age: 42
End: 2022-05-12

## 2022-05-12 NOTE — TELEPHONE ENCOUNTER
Patient cancelled therapy appointment for 5/13/22  He has finals at college  He will come in for his next scheduled appointment

## 2022-05-23 ENCOUNTER — TELEPHONE (OUTPATIENT)
Dept: PSYCHIATRY | Facility: CLINIC | Age: 42
End: 2022-05-23

## 2022-05-23 ENCOUNTER — SOCIAL WORK (OUTPATIENT)
Dept: BEHAVIORAL/MENTAL HEALTH CLINIC | Facility: CLINIC | Age: 42
End: 2022-05-23

## 2022-05-23 DIAGNOSIS — Z87.898 MARIJUANA USE IN REMISSION: ICD-10-CM

## 2022-05-23 DIAGNOSIS — F43.10 POSTTRAUMATIC STRESS DISORDER: ICD-10-CM

## 2022-05-23 DIAGNOSIS — F40.01 PANIC DISORDER WITH AGORAPHOBIA: ICD-10-CM

## 2022-05-23 DIAGNOSIS — F31.32 BIPOLAR 1 DISORDER, DEPRESSED, MODERATE (HCC): Primary | ICD-10-CM

## 2022-05-23 NOTE — TELEPHONE ENCOUNTER
Pt called and stated that he over slept and missed the appt today, he was informed on his next jhon appt with provider

## 2022-05-23 NOTE — PSYCH
I originally put this in as a no show but he did call in and it was a late cancel  Freddie Krishnamurthy no showed 05/23/22 appointment , staff called and left message to reschedule appointment     Treatment Plan not due at this session

## 2022-06-10 ENCOUNTER — SOCIAL WORK (OUTPATIENT)
Dept: BEHAVIORAL/MENTAL HEALTH CLINIC | Facility: CLINIC | Age: 42
End: 2022-06-10
Payer: MEDICARE

## 2022-06-10 DIAGNOSIS — F40.01 PANIC DISORDER WITH AGORAPHOBIA: ICD-10-CM

## 2022-06-10 DIAGNOSIS — Z87.898 MARIJUANA USE IN REMISSION: ICD-10-CM

## 2022-06-10 DIAGNOSIS — F31.32 BIPOLAR 1 DISORDER, DEPRESSED, MODERATE (HCC): Primary | ICD-10-CM

## 2022-06-10 DIAGNOSIS — F43.10 POSTTRAUMATIC STRESS DISORDER: ICD-10-CM

## 2022-06-10 PROCEDURE — 90834 PSYTX W PT 45 MINUTES: CPT | Performed by: SOCIAL WORKER

## 2022-06-10 NOTE — PSYCH
Psychotherapy Provided: Individual Psychotherapy 45 minutes     Length of time in session: 45 minutes, follow up in 2 week    Bipolar I disorder depressed moderate, PTSD,Panic disorder    Goals addressed in session: Goal 1, Goal 2 and Goal 3      Pain:      moderate to severe    0    Current suicide risk : Low     Data:Marco Antonio discussed the end of his college semester  Then he discussed that his girlfriend finally showed her true colors and they eventually broke up  He provided the many reasons they ended  He found out a lot about her and her family  Assessment: Shellie Quintero remains clean and sober  He is preparing for college in fall  Regarding his anxiety it is better  He is upset what happened with the girlfriend but he is happy how he handled it  He denies suicidal/homicidal ideation, plan or intent  I provide mindfulness, CBT and SFT  Plan: Continue to skill build in distress tolerance  Behavioral Health Treatment Plan [facility]: Diagnosis and Treatment Plan explained to Roxana Gudelia relates understanding diagnosis and is agreeable to Treatment Plan   Yes

## 2022-06-23 ENCOUNTER — SOCIAL WORK (OUTPATIENT)
Dept: BEHAVIORAL/MENTAL HEALTH CLINIC | Facility: CLINIC | Age: 42
End: 2022-06-23
Payer: MEDICARE

## 2022-06-23 DIAGNOSIS — F43.10 POSTTRAUMATIC STRESS DISORDER: ICD-10-CM

## 2022-06-23 DIAGNOSIS — F40.01 PANIC DISORDER WITH AGORAPHOBIA: ICD-10-CM

## 2022-06-23 DIAGNOSIS — Z87.898 MARIJUANA USE IN REMISSION: ICD-10-CM

## 2022-06-23 DIAGNOSIS — F31.32 BIPOLAR 1 DISORDER, DEPRESSED, MODERATE (HCC): Primary | ICD-10-CM

## 2022-06-23 PROCEDURE — 90834 PSYTX W PT 45 MINUTES: CPT | Performed by: SOCIAL WORKER

## 2022-06-23 NOTE — PSYCH
Psychotherapy Provided: Individual Psychotherapy 45 minutes     Length of time in session: 45 minutes, follow up in 2 week    Bipolar I depressed moderate    Goals addressed in session: Goal 1, Goal 2 and Goal 3      Pain:      moderate to severe    0    Current suicide risk : Low     Data: Leno Chow arrived for his session  He continues to process his current breakup and is realizing it was the best thing that could have happened due to her dysfunction and her family dysfunction  He tries to stay away from alcohol and he is more effectively managing his anxiety  He is also focusing on college  Assessment: Leno Chow denies suicidal/homicidal ideation, plan or intent  He is processing  his recent break up  However he sees it was to his benefit  We continue to work on mindfulness and CBT  He is now speaking to all of his siblings  Plan: Continue to skill build in distress tolerance  Behavioral Health Treatment Plan [facility]: Diagnosis and Treatment Plan explained to Carolina Gabriel relates understanding diagnosis and is agreeable to Treatment Plan   Yes

## 2022-07-12 DIAGNOSIS — F40.01 PANIC DISORDER WITH AGORAPHOBIA: ICD-10-CM

## 2022-07-12 DIAGNOSIS — F41.1 GAD (GENERALIZED ANXIETY DISORDER): ICD-10-CM

## 2022-07-12 RX ORDER — CLONAZEPAM 1 MG/1
1 TABLET ORAL 3 TIMES DAILY
Qty: 90 TABLET | Refills: 2 | Status: SHIPPED | OUTPATIENT
Start: 2022-07-12 | End: 2022-09-07 | Stop reason: SDUPTHER

## 2022-07-13 RX ORDER — CLONAZEPAM 1 MG/1
1 TABLET ORAL 3 TIMES DAILY
Qty: 90 TABLET | Refills: 2 | OUTPATIENT
Start: 2022-07-13

## 2022-07-19 ENCOUNTER — TELEPHONE (OUTPATIENT)
Dept: PSYCHIATRY | Facility: CLINIC | Age: 42
End: 2022-07-19

## 2022-07-19 NOTE — TELEPHONE ENCOUNTER
Writer called pt to offer available slot for Louise Dewitt 7/21/22 at 8am or 7/22/22 at 2pm, Pt took the appt on 7/22/22 at 2pm ,thank you

## 2022-07-22 ENCOUNTER — SOCIAL WORK (OUTPATIENT)
Dept: BEHAVIORAL/MENTAL HEALTH CLINIC | Facility: CLINIC | Age: 42
End: 2022-07-22
Payer: MEDICARE

## 2022-07-22 DIAGNOSIS — F43.10 POSTTRAUMATIC STRESS DISORDER: ICD-10-CM

## 2022-07-22 DIAGNOSIS — F40.01 PANIC DISORDER WITH AGORAPHOBIA: ICD-10-CM

## 2022-07-22 DIAGNOSIS — F12.91 MARIJUANA USE IN REMISSION: ICD-10-CM

## 2022-07-22 DIAGNOSIS — F31.32 BIPOLAR 1 DISORDER, DEPRESSED, MODERATE (HCC): Primary | ICD-10-CM

## 2022-07-22 PROCEDURE — 90834 PSYTX W PT 45 MINUTES: CPT | Performed by: SOCIAL WORKER

## 2022-07-22 NOTE — BH TREATMENT PLAN
Cleveland Page  1980       Date of Initial Treatment Plan: 09/08/2020  Date of Current Treatment Plan: 07/22/22    Treatment Plan Number update    Strengths/Personal Resources for Self Care: Kind,considerate,has goals    Diagnosis:   No diagnosis found  Area of Needs: please see below      Long Term Goal 1: I still need to manage my anxiety  Target Date: 01/18/2023  Completion Date: TBD         Short Term Objectives for Goal 1: mindfulness, CBT, SFT    Long Term Goal 2: I need to focus on my college education  Target Date: 01/18/2023  Completion Date: TBD    Short Term Objectives for Goal 2: stick to a schedule and do my work daily  Long Term Goal # 3: N/A     Target Date: N/A  Completion Date: N/A    Short Term Objectives for Goal 3: N/A    GOAL 1: Modality: Individual 2x per month   Completion Date tbd    GOAL 2: Modality: Individual 2x per month   Completion Date tbd     GOAL 3: Modality: Individual n/ax per month   Completion Date n/a      Behavioral Health Treatment Plan [facility]: Diagnosis and Treatment Plan explained to Blanquita Villar relates understanding diagnosis and is agreeable to Treatment Plan  Client Comments : Please share your thoughts, feelings, need and/or experiences regarding your treatment plan: My therapist and I will work as a team to help me progress on my goals

## 2022-07-22 NOTE — PSYCH
Psychotherapy Provided: Individual Psychotherapy 45 minutes     Length of time in session: 45 minutes, follow up in 2 week    Bipolar I depressed, moderate      Goals addressed in session: Goal 1 and Goal 2     Pain:      moderate to severe    0    Current suicide risk : Low     Data: Ingris Ortega talked about getting covid  He met a new woman on the internet  His anxiety was high when he had covid  He admits he was scared  However he is excited about college starting and his new apartment  He is also excited about meeting a new woman  Assessment: Ingris Ortega denies suicidal and homicidal ideation, plan or intent  He is less anxious  We work on mindfulness, and CBT  Plan: Continue to help him progress on his goals  Behavioral Health Treatment Plan [facility]: Diagnosis and Treatment Plan explained to Tom Rutledge relates understanding diagnosis and is agreeable to Treatment Plan   Yes

## 2022-08-03 ENCOUNTER — SOCIAL WORK (OUTPATIENT)
Dept: BEHAVIORAL/MENTAL HEALTH CLINIC | Facility: CLINIC | Age: 42
End: 2022-08-03
Payer: MEDICARE

## 2022-08-03 DIAGNOSIS — F40.01 PANIC DISORDER WITH AGORAPHOBIA: ICD-10-CM

## 2022-08-03 DIAGNOSIS — F43.10 POSTTRAUMATIC STRESS DISORDER: ICD-10-CM

## 2022-08-03 DIAGNOSIS — F31.32 BIPOLAR 1 DISORDER, DEPRESSED, MODERATE (HCC): Primary | ICD-10-CM

## 2022-08-03 DIAGNOSIS — F12.91 MARIJUANA USE IN REMISSION: ICD-10-CM

## 2022-08-03 PROCEDURE — 90834 PSYTX W PT 45 MINUTES: CPT | Performed by: SOCIAL WORKER

## 2022-08-03 NOTE — PSYCH
Psychotherapy Provided: Individual Psychotherapy 45 minutes     Length of time in session: 45 minutes, follow up in 2 week    Bipolar I depressed moderate, PTSD, Panic    Goals addressed in session: Goal 1, Goal 2 and Goal 3      Pain:      moderate to severe    0    Current suicide risk : Low     Data:Marco Antonio discussed the current woman he is speaking to online, thru text and on the phone  He spoke about the up and downs and where they already had a misunderstanding that has been settled and discussed  They are meeting in person for the first time this weekend  He discussed how he already had a disagreement with the rental agency for the apartment at his college he hasnt even moved into  Last but not least he spent the remainder of the session discussing his 76year old father's recent DUI and the total dysfunction in his family of origin  Assessment: He denies suicidal/homicidal ideation, plan or intent  However he admits he is over sensitive and he is anxious  We work on mindfulness and CBT related to college, his relationship and his dysfunctional family  Plan: Continue to skill build in distress tolerance  Behavioral Health Treatment Plan [facility]: Diagnosis and Treatment Plan explained to Janet Hernández relates understanding diagnosis and is agreeable to Treatment Plan   Yes

## 2022-08-17 ENCOUNTER — TELEPHONE (OUTPATIENT)
Dept: PSYCHIATRY | Facility: CLINIC | Age: 42
End: 2022-08-17

## 2022-08-17 NOTE — TELEPHONE ENCOUNTER
Patient called to reschedule his appt with provider on 8/19/22 at 330pm because he is not feeling well   Patient was rescheduled for 12/2/22 at 3pm

## 2022-09-07 ENCOUNTER — OFFICE VISIT (OUTPATIENT)
Dept: PSYCHIATRY | Facility: CLINIC | Age: 42
End: 2022-09-07
Payer: MEDICARE

## 2022-09-07 ENCOUNTER — TELEPHONE (OUTPATIENT)
Dept: PSYCHIATRY | Facility: CLINIC | Age: 42
End: 2022-09-07

## 2022-09-07 ENCOUNTER — APPOINTMENT (OUTPATIENT)
Dept: LAB | Age: 42
End: 2022-09-07
Payer: MEDICARE

## 2022-09-07 DIAGNOSIS — F31.64 BIPOLAR DISORDER, CURR EPISODE MIXED, SEVERE, WITH PSYCHOTIC FEATURES (HCC): ICD-10-CM

## 2022-09-07 DIAGNOSIS — F31.9 BIPOLAR 1 DISORDER (HCC): ICD-10-CM

## 2022-09-07 DIAGNOSIS — F41.1 GAD (GENERALIZED ANXIETY DISORDER): ICD-10-CM

## 2022-09-07 DIAGNOSIS — F31.64 BIPOLAR DISORDER, CURR EPISODE MIXED, SEVERE, WITH PSYCHOTIC FEATURES (HCC): Primary | ICD-10-CM

## 2022-09-07 DIAGNOSIS — F40.01 PANIC DISORDER WITH AGORAPHOBIA: ICD-10-CM

## 2022-09-07 LAB — CARBAMAZEPINE SERPL-MCNC: 6.2 UG/ML (ref 4–12)

## 2022-09-07 PROCEDURE — 90833 PSYTX W PT W E/M 30 MIN: CPT | Performed by: PSYCHIATRY & NEUROLOGY

## 2022-09-07 PROCEDURE — 36415 COLL VENOUS BLD VENIPUNCTURE: CPT

## 2022-09-07 PROCEDURE — 99213 OFFICE O/P EST LOW 20 MIN: CPT | Performed by: PSYCHIATRY & NEUROLOGY

## 2022-09-07 PROCEDURE — 80156 ASSAY CARBAMAZEPINE TOTAL: CPT

## 2022-09-07 RX ORDER — LAMOTRIGINE 25 MG/1
25 TABLET, ORALLY DISINTEGRATING ORAL DAILY
Qty: 49 TABLET | Refills: 0 | Status: SHIPPED | OUTPATIENT
Start: 2022-09-07 | End: 2022-10-05

## 2022-09-07 RX ORDER — CLONAZEPAM 0.5 MG/1
0.5 TABLET ORAL 3 TIMES DAILY
Qty: 90 TABLET | Refills: 0 | Status: SHIPPED | OUTPATIENT
Start: 2022-09-07 | End: 2022-10-06 | Stop reason: SDUPTHER

## 2022-09-07 RX ORDER — CARBAMAZEPINE 100 MG/1
100 TABLET, EXTENDED RELEASE ORAL 2 TIMES DAILY
Qty: 14 TABLET | Refills: 0 | Status: SHIPPED | OUTPATIENT
Start: 2022-09-07 | End: 2022-10-06

## 2022-09-07 RX ORDER — OLANZAPINE 7.5 MG/1
7.5 TABLET ORAL
Qty: 90 TABLET | Refills: 0 | Status: SHIPPED | OUTPATIENT
Start: 2022-09-07 | End: 2022-10-06 | Stop reason: SDUPTHER

## 2022-09-07 NOTE — PSYCH
Subjective: Medication Management      Patient ID: Tracey Salazar is a 43 y o  male with Bipolar disorder     HPI ROS Appetite Changes and Sleep: normal appetite, normal energy level, no weight change and normal number of sleep hours   Patient remains compliant with his medications and denies side effects  He denies recent health changes or new medications   He stated since last seen more depressed  He stated she moved to [place] and it has been an adjustment process because his roommates are younger and like to party  They get along well but he tries to stay in his room  He took 3 courses this time instead of the usual two and he as been feeling overwhelmed  He has difficulties focusing and stated he feels his medications make him too sedated to be able to focus  He agrees to reduce Clonazepam to 0 5 mg po tid prn  He is interested in switching Carbamazepine to something else  We discussed starting Lamictal instead and he agreed  Will gradually taper off Tegretol and will start and gradually titrate dose of Lamictal  He agrees with treatment plan and will schedule follow up in 4 weeks  He also mentioned his GF broke up with him because he was having difficulties getting to [place] every weekend to see her  Review Of Systems:     Mood Anxiety, Depression and Emotional Lability   Behavior Impulsive Behavior   Thought Content Disturbing Thoughts, Feelings   General Emotional Problems and Decreased Functioning   Personality Normal   Other Psych Symptoms Normal   Constitutional Negative   ENT Negative   Cardiovascular Negative   Respiratory Negative   Gastrointestinal Negative   Genitourinary Negative   Musculoskeletal Negative   Integumentary Negative   Neurological Negative   Endocrine Normal    Other Symptoms Normal              Laboratory Results: No results found for this or any previous visit      Substance Abuse History:  Social History     Substance and Sexual Activity   Drug Use No       Family Psychiatric History:   Family History   Problem Relation Age of Onset    Schizophrenia Father     Alcohol abuse Father     Drug abuse Brother        The following portions of the patient's history were reviewed and updated as appropriate: allergies, current medications, past family history, past medical history, past social history, past surgical history and problem list     Social History     Socioeconomic History    Marital status: Single     Spouse name: Not on file    Number of children: Not on file    Years of education: Not on file    Highest education level: Not on file   Occupational History    Not on file   Tobacco Use    Smoking status: Current Some Day Smoker    Smokeless tobacco: Never Used    Tobacco comment: he smoke only 1 cigarette a day, quit 2 months ago   Substance and Sexual Activity    Alcohol use: No     Comment: last use 2012, took 3 of alcohol to help with panic attack    Drug use: No    Sexual activity: Not Currently   Other Topics Concern    Not on file   Social History Narrative    Not on file     Social Determinants of Health     Financial Resource Strain: Not on file   Food Insecurity: Not on file   Transportation Needs: Not on file   Physical Activity: Not on file   Stress: Not on file   Social Connections: Not on file   Intimate Partner Violence: Not on file   Housing Stability: Not on file     Social History     Social History Narrative    Not on file       Objective:       Mental status:  Appearance calm and cooperative , adequate hygiene and grooming and good eye contact    Mood dysphoric   Affect affect was constricted   Speech a normal rate and fluent   Thought Processes coherent/organized and normal thought processes   Hallucinations no hallucinations present    Thought Content no delusions   Abnormal Thoughts no suicidal thoughts  and no homicidal thoughts    Orientation  oriented to person and place and time   Remote Memory short term memory intact and long term memory intact   Attention Span concentration intact   Intellect Appears to be of Average Intelligence   Insight Limited insight   Judgement judgment was limited   Muscle Strength Muscle strength and tone were normal and Normal gait    Language no difficulty naming common objects and no difficulty repeating a phrase    Fund of Knowledge displays adequate knowledge of current events, adequate fund of knowledge regarding past history and adequate fund of knowledge regarding vocabulary                Assessment/Plan:       Diagnoses and all orders for this visit:    Bipolar 1 disorder ([facility] [address] )  -     OLANZapine (ZyPREXA) 7 5 mg tablet; Take 1 tablet (7 5 mg total) by mouth daily at bedtime  -     carBAMazepine (TEGretol XR) 100 mg 12 hr tablet; Take 1 tablet (100 mg total) by mouth 2 (two) times a day Take 100 mg twice daily for 1 week then stop  -     lamoTRIgine (LaMICtal ODT) 25 MG disintegrating tablet; Take 1 tablet (25 mg total) by mouth daily Take 1 tab po qam for 1 week the increase to 1 tab po bid            Treatment Recommendations- Risks Benefits      Immediate Medical/Psychiatric/Psychotherapy Treatments and Any Precautions: continue current treatment     Risks, Benefits And Possible Side Effects Of Medications:  {PSYCH RISK, BENEFITS AND POSSIBLE SIDE EFFECTS (Optional):42732    Controlled Medication Discussion: Discussed with patient Black Box warning on concurrent use of benzodiazepines and opioid medications including sedation, respiratory depression, coma and death  Patient understands the risk of treatment with benzodiazepines in addition to opioids and wants to continue taking those medications  , Discussed with patient the risks of sedation, respiratory depression, impairment of ability to drive and potential for abuse and addiction related to treatment with benzodiazepine medications   The patient understands risk of treatment with benzodiazepine medications, agrees to not drive if feels impaired and agrees to take medications as prescribed  and The patient has been filling controlled prescriptions on time as prescribed to [facility]       Psychotherapy Provided: Individual psychotherapy provided  Individual psychotherapy provided: Counseling was provided during the session today for 16 minutes  Medications, treatment progress and treatment plan reviewed with [facility]  Medication changes discussed with [facility]  Medication education provided to [facility]  Goals discussed during in session: continue improvement in mood stability  Coping strategies including compliance with medications, contacting a therapist, engaging in previously avoided activities, exercising, getting into a good routine, improving self-esteem, increasing interest in usual activities, increasing motivation, increasing social interaction, maintain healthy diet, maintain heathy sleeping hygiene and maintain positive attitude reviewed with [facility]  Importance of medication and treatment compliance reviewed with Marco Antonio  Supportive therapy provided

## 2022-09-07 NOTE — TELEPHONE ENCOUNTER
Spoke to Sherif Rae  He reports he wakes up fine but after taking the Tegretol and Clonazepam he feels extremely tired and "out of it" He reports he feels this way every time after taking the Klonopin  He stated "he honestly has felt this way since last semester" and has added stress due to moving  He feels so sleepy and cannot concentrate  He is wondering if any of the medications can be decreased or what she recommends  He said he is in [place] at his brother's house in case Dr Debby Salazar wants to see him         Sherif Rae- [phone]

## 2022-09-07 NOTE — TELEPHONE ENCOUNTER
Patient called and asked about lab work and if he needed a slip  Patient also asked about addresses for labs

## 2022-09-07 NOTE — TELEPHONE ENCOUNTER
Patient came into the office stating he had a missed call from the office regarding an open appt with provider same day at 3:30pm  He accepted the appt and writer scheduled him

## 2022-09-07 NOTE — TELEPHONE ENCOUNTER
Lathan Sicard, MD  You 28 minutes ago (9:52 AM)         Thank you Cary Moody, that was very helpful  I will lower Clonazepam to 0 5 mg tid, I need him to finally do his Tegretol level before adjusting that  I had ordered it twice before and he never did it  Order level again today  I don't think we need to give him the 3:30 appointment then    Message text         Spoke with Eda Hodges and relayed directions to decrease the Clonazepam to 0 5 mg tid  Sent to pharmacy  He verbalized understanding with getting the Tegretol level

## 2022-09-07 NOTE — TELEPHONE ENCOUNTER
Santo Males on nursing line requesting someone call him asap   He said he has been experiencing "major side effects" that have been hindering his school -work for the last week and a half-    [phone]

## 2022-09-08 ENCOUNTER — TELEPHONE (OUTPATIENT)
Dept: PSYCHIATRY | Facility: CLINIC | Age: 42
End: 2022-09-08

## 2022-09-08 NOTE — TELEPHONE ENCOUNTER
Pt called in in regards to med changes from appointment yest  9/7  Pt stated that meds were adjusted and lowered and he was going through withdrawal and changes  Forwarded pt to nurses line for clarification

## 2022-09-09 ENCOUNTER — SOCIAL WORK (OUTPATIENT)
Dept: BEHAVIORAL/MENTAL HEALTH CLINIC | Facility: CLINIC | Age: 42
End: 2022-09-09
Payer: MEDICARE

## 2022-09-09 DIAGNOSIS — F43.10 POSTTRAUMATIC STRESS DISORDER: ICD-10-CM

## 2022-09-09 DIAGNOSIS — F12.91 MARIJUANA USE IN REMISSION: ICD-10-CM

## 2022-09-09 DIAGNOSIS — F40.01 PANIC DISORDER WITH AGORAPHOBIA: ICD-10-CM

## 2022-09-09 DIAGNOSIS — F19.21 COMBINED DRUG DEPENDENCE, EXCLUDING OPIOID, IN REMISSION (HCC): ICD-10-CM

## 2022-09-09 DIAGNOSIS — F31.32 BIPOLAR 1 DISORDER, DEPRESSED, MODERATE (HCC): Primary | ICD-10-CM

## 2022-09-09 PROCEDURE — 90834 PSYTX W PT 45 MINUTES: CPT | Performed by: SOCIAL WORKER

## 2022-09-09 NOTE — TELEPHONE ENCOUNTER
Spoke with Kalee  He was concerned about starting Lamictal while still taking Tegretol and asked when to start Lamictal  Reviewed office note and he does not need to completely stop Tegretol before starting Lamictal 25 mg  The nursing number was given so Kalee can call directly  Kalee also said he feels he needs to take next week off to take care of himself and asked if Dr Severa Mart could write him a note for the disability's office  Reviewed a ECHO would need to be completed to forward the note to the college  Ronks does not use MyChart  He is willing to come to the office to  a note  Will call him Monday after review

## 2022-09-09 NOTE — TELEPHONE ENCOUNTER
JAMAL for [address] a return call for specific information   Provided nursing number

## 2022-09-09 NOTE — TELEPHONE ENCOUNTER
Patient is the office for an appt an and asked if a nurse can call him back  He states eversince he is started taking the adjusted medication ( klonopin and Tegretol) he feels like his body is going through withdrawals  He also states he have the Lamictal but doesn't know when to start taking it         Please give him a call when you have a chance

## 2022-09-09 NOTE — TELEPHONE ENCOUNTER
Pt left a message on  mail box requesting a call back from nursing regarding medication did not specify anything further

## 2022-09-09 NOTE — PSYCH
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 and all they did was want to party  So he shared he is trying to work with the college to see what he can do  I still went to class but my medications made me feel like a zombie  Since his klonopin was cut he is irritable,edgy, tired, my head is spacey and my brain and body are acting like they are missing something  John Jones shared he spoke with the staff downstairs because he did not hear from nursing  He shared he can handle the kids and the college apartment as long as he was stable on his medications  Assessment: John Laroses denies suicidal/homicidal ideation, plan or intent  He feels like he is going thru withdrawal  I provided support and encouragement  His anxiety is elevated  We discussed mindfulness and CBT  Plan: Continue to help him thru this difficult time  Behavioral Health Treatment Plan [facility]: Diagnosis and Treatment Plan explained to Ender Dillon relates understanding diagnosis and is agreeable to Treatment Plan   Yes

## 2022-09-12 NOTE — TELEPHONE ENCOUNTER
Patient was on office for an appt on 9/9/222 and requested a letter for school since he is planning to take a week off from 9/12 to 9/16 due to not feeling well from medication changes  An ECHO was sign and will be scanned in this encounter

## 2022-09-12 NOTE — TELEPHONE ENCOUNTER
Rigoberto Mclean called and left an additional message asking if Dr Tora Hodgkin had a chance to write his note for the [employer]         Rigoberto Mclean- [phone]

## 2022-09-14 ENCOUNTER — TELEPHONE (OUTPATIENT)
Dept: PSYCHIATRY | Facility: CLINIC | Age: 42
End: 2022-09-14

## 2022-09-14 NOTE — TELEPHONE ENCOUNTER
Danilo Flank on nursing line and just wanted a message relayed to Dr Oseguera Amen  He said he filled out the paperwork for a leave of absence for the Semester  He stated he feels he needs more than a week       FYI

## 2022-09-23 ENCOUNTER — SOCIAL WORK (OUTPATIENT)
Dept: BEHAVIORAL/MENTAL HEALTH CLINIC | Facility: CLINIC | Age: 42
End: 2022-09-23
Payer: MEDICARE

## 2022-09-23 DIAGNOSIS — F40.01 PANIC DISORDER WITH AGORAPHOBIA: ICD-10-CM

## 2022-09-23 DIAGNOSIS — F43.10 POSTTRAUMATIC STRESS DISORDER: ICD-10-CM

## 2022-09-23 DIAGNOSIS — F31.32 BIPOLAR 1 DISORDER, DEPRESSED, MODERATE (HCC): Primary | ICD-10-CM

## 2022-09-23 PROCEDURE — 90834 PSYTX W PT 45 MINUTES: CPT | Performed by: SOCIAL WORKER

## 2022-09-23 NOTE — PSYCH
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  He is trying to manage his anxiety and he is happy he took a leave from college  He is drawing and reading  Assessment: Ingris Ortega denies suicidal/homicidal ideation, plan or intent  However he continues to deal with anxiety  However he is now off Klonopin and he is currently taking a leave of abscense  We continue to work on mindfulness and CBT  Plan: Continue to skill build in distress tolerance  Behavioral Health Treatment Plan [facility]: Diagnosis and Treatment Plan explained to Tom Rutledge relates understanding diagnosis and is agreeable to Treatment Plan   Yes

## 2022-09-28 DIAGNOSIS — F31.32 BIPOLAR 1 DISORDER, DEPRESSED, MODERATE (HCC): ICD-10-CM

## 2022-09-28 RX ORDER — AMLODIPINE BESYLATE 10 MG/1
10 TABLET ORAL DAILY
Qty: 90 TABLET | Refills: 0 | Status: SHIPPED | OUTPATIENT
Start: 2022-09-28

## 2022-09-28 RX ORDER — LISINOPRIL 10 MG/1
10 TABLET ORAL EVERY EVENING
Qty: 90 TABLET | Refills: 0 | Status: SHIPPED | OUTPATIENT
Start: 2022-09-28

## 2022-09-28 NOTE — TELEPHONE ENCOUNTER
Medication Refill Request     Name of Medication Amlodipine  Dose/Frequency 10 Mg 1 x daily   Quantity 30  Verified pharmacy   [x]  Verified ordering Provider   [x]  Does patient have enough for the next 3 days? Yes [x] No []  Does patient have a follow-up appointment scheduled? Yes [x] No []   If so when is appointment: 10/6/22    Medication Refill Request     Name of Medication lisinopril   Dose/Frequency 10 mg 1 x daily   Quantity 30  Verified pharmacy   [x]  Verified ordering Provider   [x]  Does patient have enough for the next 3 days? Yes [x] No []  Does patient have a follow-up appointment scheduled?  Yes [x] No []   If so when is appointment: 10/6/22

## 2022-10-05 DIAGNOSIS — F31.64 BIPOLAR DISORDER, CURR EPISODE MIXED, SEVERE, WITH PSYCHOTIC FEATURES (HCC): Primary | ICD-10-CM

## 2022-10-05 RX ORDER — LAMOTRIGINE 25 MG/1
25 TABLET, ORALLY DISINTEGRATING ORAL 2 TIMES DAILY
Qty: 60 TABLET | Refills: 0 | Status: SHIPPED | OUTPATIENT
Start: 2022-10-05 | End: 2022-11-02

## 2022-10-06 ENCOUNTER — OFFICE VISIT (OUTPATIENT)
Dept: PSYCHIATRY | Facility: CLINIC | Age: 42
End: 2022-10-06
Payer: MEDICARE

## 2022-10-06 DIAGNOSIS — F31.32 BIPOLAR 1 DISORDER, DEPRESSED, MODERATE (HCC): Primary | ICD-10-CM

## 2022-10-06 DIAGNOSIS — F31.9 BIPOLAR 1 DISORDER (HCC): ICD-10-CM

## 2022-10-06 DIAGNOSIS — F43.10 POSTTRAUMATIC STRESS DISORDER: ICD-10-CM

## 2022-10-06 DIAGNOSIS — F40.01 PANIC DISORDER WITH AGORAPHOBIA: ICD-10-CM

## 2022-10-06 DIAGNOSIS — F41.1 GAD (GENERALIZED ANXIETY DISORDER): ICD-10-CM

## 2022-10-06 PROCEDURE — 90833 PSYTX W PT W E/M 30 MIN: CPT | Performed by: PSYCHIATRY & NEUROLOGY

## 2022-10-06 PROCEDURE — 99213 OFFICE O/P EST LOW 20 MIN: CPT | Performed by: PSYCHIATRY & NEUROLOGY

## 2022-10-06 RX ORDER — CLONAZEPAM 0.5 MG/1
0.5 TABLET ORAL 3 TIMES DAILY
Qty: 90 TABLET | Refills: 2 | Status: SHIPPED | OUTPATIENT
Start: 2022-10-06

## 2022-10-06 RX ORDER — OLANZAPINE 7.5 MG/1
7.5 TABLET ORAL
Qty: 90 TABLET | Refills: 0 | Status: SHIPPED | OUTPATIENT
Start: 2022-10-06

## 2022-10-06 NOTE — PSYCH
Subjective: Medication Management      Patient ID: Jese Gaviria is a 43 y o  male with Bipolar disorder     HPI ROS Appetite Changes and Sleep: normal appetite, decreased energy, no weight change and normal number of sleep hours     Patient remains compliant with his medications  He denies recent health changes or new medications   Since last seen patient stated that reducing his dose of Clonazepam to 0 5 mg po tid and he experienced withdrawal symptoms for about 2 weeks but he is now feeling better  He also taper off Tegretol and that did not caused withdrawal  He stated he started Lamictal and gradually increased his dose 25 mg po bid  He is reporting sexual side effects including reduced libido and delayed ejaculation  He stated he feels he is doing better otherwise and the sedation is gone and he stated he will deal with the sexual side effects and continue the medication  He took a medical leave of absence from school and he will be returning to school in January  He stated he is focusing now on self care and he is not upset as much anymore about the recent breakup with GF          Review Of Systems:     Mood Anxiety, Depression and Emotional Lability   Behavior Impulsive Behavior   Thought Content Disturbing Thoughts, Feelings   General Emotional Problems and Decreased Functioning   Personality Normal   Other Psych Symptoms Normal   Constitutional Negative   ENT Negative   Cardiovascular Negative   Respiratory Negative   Gastrointestinal Negative   Genitourinary Negative   Musculoskeletal Negative   Integumentary Negative   Neurological Negative   Endocrine Normal    Other Symptoms Normal              Laboratory Results: No results found for this or any previous visit      Substance Abuse History:  Social History     Substance and Sexual Activity   Drug Use No       Family Psychiatric History:   Family History   Problem Relation Age of Onset    Schizophrenia Father     Alcohol abuse Father     Drug abuse Brother        The following portions of the patient's history were reviewed and updated as appropriate: allergies, current medications, past family history, past medical history, past social history, past surgical history and problem list     Social History     Socioeconomic History    Marital status: Single     Spouse name: Not on file    Number of children: Not on file    Years of education: Not on file    Highest education level: Not on file   Occupational History    Not on file   Tobacco Use    Smoking status: Current Some Day Smoker    Smokeless tobacco: Never Used    Tobacco comment: he smoke only 1 cigarette a day, quit 2 months ago   Substance and Sexual Activity    Alcohol use: No     Comment: last use 2012, took 3 of alcohol to help with panic attack    Drug use: No    Sexual activity: Not Currently   Other Topics Concern    Not on file   Social History Narrative    Not on file     Social Determinants of Health     Financial Resource Strain: Not on file   Food Insecurity: Not on file   Transportation Needs: Not on file   Physical Activity: Not on file   Stress: Not on file   Social Connections: Not on file   Intimate Partner Violence: Not on file   Housing Stability: Not on file     Social History     Social History Narrative    Not on file       Objective:       Mental status:  Appearance calm and cooperative , adequate hygiene and grooming and good eye contact    Mood dysphoric   Affect affect was constricted   Speech a normal rate and fluent   Thought Processes coherent/organized and normal thought processes   Hallucinations no hallucinations present    Thought Content no delusions   Abnormal Thoughts no suicidal thoughts  and no homicidal thoughts    Orientation  oriented to person and place and time   Remote Memory short term memory intact and long term memory intact   Attention Span concentration impaired   Intellect Appears to be of Average Intelligence Insight Limited insight   Judgement judgment was limited   Muscle Strength Muscle strength and tone were normal and Normal gait    Language no difficulty naming common objects and no difficulty repeating a phrase    Fund of Knowledge displays adequate knowledge of current events, adequate fund of knowledge regarding past history and adequate fund of knowledge regarding vocabulary                Assessment/Plan:       Diagnoses and all orders for this visit:    Bipolar 1 disorder, depressed, moderate (HCC)    Panic disorder with agoraphobia  -     clonazePAM (KlonoPIN) 0 5 mg tablet; Take 1 tablet (0 5 mg total) by mouth 3 (three) times a day    Posttraumatic stress disorder    RADHA (generalized anxiety disorder)  -     clonazePAM (KlonoPIN) 0 5 mg tablet; Take 1 tablet (0 5 mg total) by mouth 3 (three) times a day    Bipolar 1 disorder (HCC)  -     OLANZapine (ZyPREXA) 7 5 mg tablet; Take 1 tablet (7 5 mg total) by mouth daily at bedtime            Treatment Recommendations- Risks Benefits      Immediate Medical/Psychiatric/Psychotherapy Treatments and Any Precautions: continue current treatment     Risks, Benefits And Possible Side Effects Of Medications:  {PSYCH RISK, BENEFITS AND POSSIBLE SIDE EFFECTS (Optional):85440    Controlled Medication Discussion: Discussed with patient Black Box warning on concurrent use of benzodiazepines and opioid medications including sedation, respiratory depression, coma and death  Patient understands the risk of treatment with benzodiazepines in addition to opioids and wants to continue taking those medications  , Discussed with patient the risks of sedation, respiratory depression, impairment of ability to drive and potential for abuse and addiction related to treatment with benzodiazepine medications  The patient understands risk of treatment with benzodiazepine medications, agrees to not drive if feels impaired and agrees to take medications as prescribed   and The patient has been filling controlled prescriptions on time as prescribed to [facility]       Psychotherapy Provided: Individual psychotherapy provided  Individual psychotherapy provided: Counseling was provided during the session today for 16 minutes  Medications, treatment progress and treatment plan reviewed with Marco Antonio  Medication changes discussed with Marco Antonio  Medication education provided [place]  Goals discussed during in session: continue improvement in mood stability  Coping strategies including compliance with medications, contacting a therapist, engaging in previously avoided activities, exercising, getting into a good routine, improving self-esteem, increasing interest in usual activities, increasing motivation, increasing social interaction, maintain healthy diet, maintain heathy sleeping hygiene and maintain positive attitude reviewed with Stevan Pinedo  Importance of medication and treatment compliance reviewed with Marco Antonio    Supportive therapy provided

## 2022-10-07 ENCOUNTER — SOCIAL WORK (OUTPATIENT)
Dept: BEHAVIORAL/MENTAL HEALTH CLINIC | Facility: CLINIC | Age: 42
End: 2022-10-07
Payer: MEDICARE

## 2022-10-07 DIAGNOSIS — F43.10 POSTTRAUMATIC STRESS DISORDER: ICD-10-CM

## 2022-10-07 DIAGNOSIS — F40.01 PANIC DISORDER WITH AGORAPHOBIA: ICD-10-CM

## 2022-10-07 DIAGNOSIS — F31.32 BIPOLAR 1 DISORDER, DEPRESSED, MODERATE (HCC): Primary | ICD-10-CM

## 2022-10-07 PROCEDURE — 90834 PSYTX W PT 45 MINUTES: CPT | Performed by: SOCIAL WORKER

## 2022-10-07 NOTE — PSYCH
Psychotherapy Provided: Individual Psychotherapy 45 minutes   1pm till 1:45pm  Length of time in session: 45 minutes, follow up in 2 week    Bipolar I depressed, Panic disorder,PTSD    Goals addressed in session: Goal 1 and Goal 2     Pain:      moderate to severe    0    Current suicide risk : Low     Data: Yaritza Callahan arrived for his session  Emotionally he is doing in his words great but he now has sexual side effects he believes are from his medications  He will see how he does the next month and then speak further with Dr Elena Isaac  He discussed how he is managing his anxiety and he returns to his apartment in October  He will then restart classes in January  Assessment: Yaritza Callahan denies suicidal/homicidal ideation, plan or intent  He however is feeling great psychiatrically but now is upset about the sexual side effects  We continue to work on mindfulness and CBT  Plan: Continue to help him with distress tolerance  Behavioral Health Treatment Plan [facility]: Diagnosis and Treatment Plan explained to Forrest Horton relates understanding diagnosis and is agreeable to Treatment Plan   Yes

## 2022-10-18 ENCOUNTER — TELEPHONE (OUTPATIENT)
Dept: PSYCHIATRY | Facility: CLINIC | Age: 42
End: 2022-10-18

## 2022-10-18 NOTE — TELEPHONE ENCOUNTER
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

## 2022-11-02 DIAGNOSIS — F31.64 BIPOLAR DISORDER, CURR EPISODE MIXED, SEVERE, WITH PSYCHOTIC FEATURES (HCC): ICD-10-CM

## 2022-11-02 RX ORDER — LAMOTRIGINE 25 MG/1
TABLET, ORALLY DISINTEGRATING ORAL
Qty: 60 TABLET | Refills: 0 | Status: SHIPPED | OUTPATIENT
Start: 2022-11-02 | End: 2022-11-08 | Stop reason: SDUPTHER

## 2022-11-08 DIAGNOSIS — F31.32 BIPOLAR 1 DISORDER, DEPRESSED, MODERATE (HCC): ICD-10-CM

## 2022-11-08 DIAGNOSIS — F31.64 BIPOLAR DISORDER, CURR EPISODE MIXED, SEVERE, WITH PSYCHOTIC FEATURES (HCC): ICD-10-CM

## 2022-11-08 RX ORDER — LISINOPRIL 10 MG/1
TABLET ORAL
Qty: 90 TABLET | Refills: 0 | Status: SHIPPED | OUTPATIENT
Start: 2022-11-08

## 2022-11-08 RX ORDER — AMLODIPINE BESYLATE 10 MG/1
TABLET ORAL
Qty: 90 TABLET | Refills: 0 | Status: SHIPPED | OUTPATIENT
Start: 2022-11-08

## 2022-11-08 RX ORDER — LAMOTRIGINE 25 MG/1
TABLET, ORALLY DISINTEGRATING ORAL
Qty: 49 TABLET | OUTPATIENT
Start: 2022-11-08

## 2022-11-08 RX ORDER — LAMOTRIGINE 25 MG/1
25 TABLET, ORALLY DISINTEGRATING ORAL 2 TIMES DAILY
Qty: 60 TABLET | Refills: 2 | Status: SHIPPED | OUTPATIENT
Start: 2022-11-08 | End: 2023-02-10 | Stop reason: SDUPTHER

## 2022-11-16 ENCOUNTER — OFFICE VISIT (OUTPATIENT)
Dept: PSYCHIATRY | Facility: CLINIC | Age: 42
End: 2022-11-16

## 2022-11-16 DIAGNOSIS — F31.64 BIPOLAR DISORDER, CURR EPISODE MIXED, SEVERE, WITH PSYCHOTIC FEATURES (HCC): Primary | ICD-10-CM

## 2022-11-16 DIAGNOSIS — F40.01 PANIC DISORDER WITH AGORAPHOBIA: ICD-10-CM

## 2022-11-16 NOTE — PSYCH
Visit Time    Visit Start Time: 11:00  Visit Stop Time: 11:30  Total Visit Duration: 30 minutes    Subjective: medication Management      Patient ID: Alicia Zimmerman is a 43 y o  male with Bipolar do and RADHA, Panic do  HPI ROS Appetite Changes and Sleep: normal appetite, normal energy level, no weight change and normal number of sleep hours   He remains compliant with medications and denies side effects  He stated he enrolled for her college courses starting in January   He is contemplating to change his housing arrangement and maybe rent a studio by himself  He stated his mood has been stable and denies manic symptoms  He still has anxiety and had a panic attack recently at the grocery store  He continues to apply his coping skills  He also mentioned he is moving on after recent broke and met several girls on [employer] web site  He stated last girl he talked to to had too many "red flags" ad he decided to stop communicating  Overall he is doing well after last medication changes and he agrees continue current treatment and will schedule follow up in 6 weeks      Review Of Systems:     Mood Anxiety, Depression and Emotional Lability   Behavior Impulsive Behavior   Thought Content Disturbing Thoughts, Feelings   General Emotional Problems and Decreased Functioning   Personality Normal   Other Psych Symptoms Normal   Constitutional Negative   ENT Negative   Cardiovascular Negative   Respiratory Negative   Gastrointestinal Negative   Genitourinary Negative   Musculoskeletal Negative   Integumentary Negative   Neurological Negative   Endocrine Normal    Other Symptoms Normal        Laboratory Results:   Most Recent Labs:   Lab Results   Component Value Date    WBC 6 59 01/10/2019    RBC 5 32 01/10/2019    HGB 16 3 01/10/2019    HCT 45 3 01/10/2019     01/10/2019    RDW 13 9 01/10/2019    NEUTROABS 4 24 01/10/2019    K 4 4 01/21/2019     01/21/2019    CO2 29 01/21/2019    BUN 16 01/21/2019 CREATININE 1 13 01/21/2019    CALCIUM 9 9 01/21/2019    AST 32 01/10/2019    ALT 69 01/10/2019    ALKPHOS 126 (H) 01/10/2019    CHOLESTEROL 152 07/25/2018    TRIG 316 (H) 07/25/2018    HDL 40 07/25/2018    LDLCALC 49 07/25/2018    [place] 112 07/25/2018    CARBAMAZEPIN 6 2 09/07/2022    ILP6YMXCMMXN 3 150 01/12/2019    FREET4 1 04 07/25/2018       Substance Abuse History:  Social History     Substance and Sexual Activity   Drug Use No       Family Psychiatric History:   Family History   Problem Relation Age of Onset   • Schizophrenia Father    • Alcohol abuse Father    • Drug abuse Brother        The following portions of the patient's history were reviewed and updated as appropriate: allergies, current medications, past family history, past medical history, past social history, past surgical history and problem list     Social History     Socioeconomic History   • Marital status: Single     Spouse name: Not on file   • Number of children: Not on file   • Years of education: Not on file   • Highest education level: Not on file   Occupational History   • Not on file   Tobacco Use   • Smoking status: Some Days   • Smokeless tobacco: Never   • Tobacco comments:     he smoke only 1 cigarette a day, quit 2 months ago   Substance and Sexual Activity   • Alcohol use: No     Comment: last use 2012, took 3 of alcohol to help with panic attack   • Drug use: No   • Sexual activity: Not Currently   Other Topics Concern   • Not on file   Social History Narrative   • Not on file     Social Determinants of Health     Financial Resource Strain: Not on file   Food Insecurity: Not on file   Transportation Needs: Not on file   Physical Activity: Not on file   Stress: Not on file   Social Connections: Not on file   Intimate Partner Violence: Not on file   Housing Stability: Not on file     Social History     Social History Narrative   • Not on file       Objective:       Mental status:  Appearance calm and cooperative , adequate hygiene and grooming and good eye contact    Mood dysphoric   Affect affect was constricted   Speech a normal rate and fluent   Thought Processes coherent/organized and normal thought processes   Hallucinations no hallucinations present    Thought Content no delusions   Abnormal Thoughts no suicidal thoughts  and no homicidal thoughts    Orientation  oriented to person and place and time   Remote Memory short term memory intact and long term memory intact   Attention Span concentration impaired   Intellect Appears to be of Average Intelligence   Insight Limited insight   Judgement judgment was limited   Muscle Strength Muscle strength and tone were normal and Normal gait    Language no difficulty naming common objects and no difficulty repeating a phrase    Fund of Knowledge displays adequate knowledge of current events, adequate fund of knowledge regarding past history and adequate fund of knowledge regarding vocabulary                Assessment/Plan:       Diagnoses and all orders for this visit:    Bipolar disorder, curr episode mixed, severe, with psychotic features ([facility] [address] )    Panic disorder with agoraphobia            Treatment Recommendations- Risks Benefits      Immediate Medical/Psychiatric/Psychotherapy Treatments and Any Precautions: continue current medications     Risks, Benefits And Possible Side Effects Of Medications:  {PSYCH RISK, BENEFITS AND POSSIBLE SIDE EFFECTS (Optional):33594    Controlled Medication Discussion: Discussed with patient Black Box warning on concurrent use of benzodiazepines and opioid medications including sedation, respiratory depression, coma and death  Patient understands the risk of treatment with benzodiazepines in addition to opioids and wants to continue taking those medications  , Discussed with patient the risks of sedation, respiratory depression, impairment of ability to drive and potential for abuse and addiction related to treatment with benzodiazepine medications   The patient understands risk of treatment with benzodiazepine medications, agrees to not drive if feels impaired and agrees to take medications as prescribed  and The patient has been filling controlled prescriptions on time as prescribed to [facility]       Psychotherapy Provided: Individual psychotherapy provided  Individual psychotherapy provided: Counseling was provided during the session today for 16 minutes  Medications, treatment progress and treatment plan reviewed with Marco Antonio  Medication changes discussed with Marco Antonio  Medication education provided [place]  Goals discussed during in session: continue improvement in mood stability  Coping strategies including compliance with medications, contacting a therapist, engaging in previously avoided activities, exercising, getting into a good routine, improving self-esteem, increasing interest in usual activities, increasing motivation, increasing social interaction, maintain healthy diet, maintain heathy sleeping hygiene and maintain positive attitude reviewed with Pamela Oneil  Importance of medication and treatment compliance reviewed with Marco Antonio    Supportive therapy provided

## 2022-11-21 ENCOUNTER — TELEPHONE (OUTPATIENT)
Dept: PSYCHIATRY | Facility: CLINIC | Age: 42
End: 2022-11-21

## 2022-11-21 NOTE — TELEPHONE ENCOUNTER
Spoke to pt to cx the appt on 11/25/22 due to provider is out of the ofice pt needed to be seen so pt took an appt that was open 11/23/22 at 10am thank you

## 2022-11-23 ENCOUNTER — SOCIAL WORK (OUTPATIENT)
Dept: BEHAVIORAL/MENTAL HEALTH CLINIC | Facility: CLINIC | Age: 42
End: 2022-11-23

## 2022-11-23 DIAGNOSIS — F43.10 POSTTRAUMATIC STRESS DISORDER: ICD-10-CM

## 2022-11-23 DIAGNOSIS — F40.01 PANIC DISORDER WITH AGORAPHOBIA: ICD-10-CM

## 2022-11-23 DIAGNOSIS — F31.32 BIPOLAR 1 DISORDER, DEPRESSED, MODERATE (HCC): Primary | ICD-10-CM

## 2022-11-23 NOTE — PSYCH
Psychotherapy Provided: Individual Psychotherapy 50 minutes     Length of time in session: 50 minutes, follow up in 2 week    Encounter Diagnosis     ICD-10-CM    1  Bipolar 1 disorder, depressed, moderate ([facility] [address] )  F31 32       2  Panic disorder with agoraphobia  F40 01       3  Posttraumatic stress disorder  F43 10           Goals addressed in session: Goal 1, Goal 2 and Goal 3      Pain:      moderate to severe    0    Current suicide risk : Low     Data: Lexie Tim resides in college /community housing at [facility] where a person from the community recently  from a shooting in [place] [facility]  This has elevated Marco Antonio's anxiety but his focus is still on finishing college  We discussed the problems he has encountered at school and in his particular building  Assessment: Lexie Tim denies suicidal/homicidal ideation, plan or intent  However his anxiety is elevated since one person  in his building and another  in town  We discussed mindfulness and CBT strategies  Plan: Continue to skill build in distress tolerance  Behavioral Health Treatment Plan [facility]: Diagnosis and Treatment Plan explained to Macr Thompson relates understanding diagnosis and is agreeable to Treatment Plan   Yes     Visit start and stop times:    22  Start Time: 1005  Stop Time: 1055  Total Visit Time: 50 minutes

## 2022-12-21 ENCOUNTER — TELEPHONE (OUTPATIENT)
Dept: PSYCHIATRY | Facility: CLINIC | Age: 42
End: 2022-12-21

## 2022-12-21 ENCOUNTER — SOCIAL WORK (OUTPATIENT)
Dept: BEHAVIORAL/MENTAL HEALTH CLINIC | Facility: CLINIC | Age: 42
End: 2022-12-21

## 2022-12-21 DIAGNOSIS — F43.10 POSTTRAUMATIC STRESS DISORDER: ICD-10-CM

## 2022-12-21 DIAGNOSIS — F40.01 PANIC DISORDER WITH AGORAPHOBIA: ICD-10-CM

## 2022-12-21 DIAGNOSIS — F31.32 BIPOLAR 1 DISORDER, DEPRESSED, MODERATE (HCC): Primary | ICD-10-CM

## 2022-12-21 NOTE — PSYCH
Psychotherapy Provided: Individual Psychotherapy 50 minutes     Length of time in session: 50 minutes, follow up in 2 week    Encounter Diagnosis     ICD-10-CM    1  Bipolar 1 disorder, depressed, moderate ([facility] [address] )  F31 32       2  Panic disorder with agoraphobia  F40 01       3  Posttraumatic stress disorder  F43 10           Goals addressed in session: Goal 1, Goal 2 and Goal 3      Pain:      moderate to severe    0    Current suicide risk : Low     Data:Marco Antonio arrived for his session  [place] sent him an email that he was on academic probation  First off he has straight A's and this semester he was on a approved medical leave  I advised him to demand a letter of retraction  He then shared he stopped drinking three weeks ago and he now has a sponsor  [employer] email increased his anxiety  He is proud he is not drinking  He then was contacted by his University that they are making too much noise  The fact is Chana Tsang has not even been there this semester  Assessment: Chana Tsang denies suicidal/homicidal ideation, plan or intent  However he is anxious about the issues at his college  We continue to work on mindfulness and CBT and distress tolerance  Plan: Continue to skill build in distress tolerance  Behavioral Health Treatment Plan [facility]: Diagnosis and Treatment Plan explained to Jovita Lab relates understanding diagnosis and is agreeable to Treatment Plan   Yes     Visit start and stop times:    12/21/22  Start Time: 0310  Stop Time: 0400  Total Visit Time: 50 minutes

## 2022-12-21 NOTE — TELEPHONE ENCOUNTER
Pt called in regards to a missed call from the office  After verifying previous encounters, writer informed pt that there is no encounter note about someone trying to contact him  Pt has an appt today at 3:00 p,m and stated that he will be on his way to the appt

## 2022-12-28 ENCOUNTER — OFFICE VISIT (OUTPATIENT)
Dept: PSYCHIATRY | Facility: CLINIC | Age: 42
End: 2022-12-28

## 2022-12-28 DIAGNOSIS — F31.64 BIPOLAR DISORDER, CURR EPISODE MIXED, SEVERE, WITH PSYCHOTIC FEATURES (HCC): Primary | ICD-10-CM

## 2022-12-28 DIAGNOSIS — F40.01 PANIC DISORDER WITH AGORAPHOBIA: ICD-10-CM

## 2022-12-28 DIAGNOSIS — F41.1 GAD (GENERALIZED ANXIETY DISORDER): ICD-10-CM

## 2022-12-28 DIAGNOSIS — I10 PRIMARY HYPERTENSION: ICD-10-CM

## 2022-12-28 DIAGNOSIS — F43.10 POSTTRAUMATIC STRESS DISORDER: ICD-10-CM

## 2022-12-28 RX ORDER — AMLODIPINE BESYLATE 10 MG/1
10 TABLET ORAL DAILY
Qty: 90 TABLET | Refills: 0 | Status: SHIPPED | OUTPATIENT
Start: 2022-12-28

## 2022-12-28 RX ORDER — OLANZAPINE 7.5 MG/1
7.5 TABLET ORAL
Qty: 90 TABLET | Refills: 0 | Status: SHIPPED | OUTPATIENT
Start: 2022-12-28

## 2022-12-28 RX ORDER — OLANZAPINE 7.5 MG/1
7.5 TABLET ORAL
Qty: 90 TABLET | Refills: 0 | Status: SHIPPED | OUTPATIENT
Start: 2022-12-28 | End: 2022-12-28 | Stop reason: SDUPTHER

## 2022-12-28 RX ORDER — AMLODIPINE BESYLATE 10 MG/1
10 TABLET ORAL DAILY
Qty: 90 TABLET | Refills: 0 | Status: SHIPPED | OUTPATIENT
Start: 2022-12-28 | End: 2022-12-28 | Stop reason: SDUPTHER

## 2022-12-28 RX ORDER — CLONAZEPAM 0.5 MG/1
0.5 TABLET ORAL 3 TIMES DAILY
Qty: 90 TABLET | Refills: 2 | Status: SHIPPED | OUTPATIENT
Start: 2022-12-28

## 2022-12-28 NOTE — PSYCH
Visit Time    Visit Start Time: 3:30  Visit Stop Time: 4:00   Total Visit Duration: 30 minutes    Subjective: Medication Management       Patient ID: Feli Morales is a 43 y o  male with Bipolar do  HPI ROS Appetite Changes and Sleep: normal appetite, normal energy level, no weight change and normal number of sleep hours     He remains compliant with medications and denies side effects  He stated he enrolled for her college courses starting in January   He is contemplating to change his housing arrangement and maybe rent a studio by himself  He stated his mood has been stable and denies manic symptoms  He still has anxiety and worries about being able to adjust back to college life once the semester starts  He continues to apply his coping skills  Overall he is doing well after last medication changes and he agrees continue current treatment and will schedule follow up in 6 weeks   He will communicate with office if he experiences any difficulties once classes start in Jan 23,2023       Review Of Systems:     Mood Anxiety, Depression and Emotional Lability   Behavior Impulsive Behavior   Thought Content Disturbing Thoughts, Feelings   General Emotional Problems and Decreased Functioning   Personality Normal   Other Psych Symptoms Normal   Constitutional Negative   ENT Negative   Cardiovascular Negative   Respiratory Negative   Gastrointestinal Negative   Genitourinary Negative   Musculoskeletal Negative   Integumentary Negative   Neurological Negative   Endocrine Normal    Other Symptoms Normal        Laboratory Results:   Most Recent Labs:   Lab Results   Component Value Date    WBC 6 59 01/10/2019    RBC 5 32 01/10/2019    HGB 16 3 01/10/2019    HCT 45 3 01/10/2019     01/10/2019    RDW 13 9 01/10/2019    NEUTROABS 4 24 01/10/2019    K 4 4 01/21/2019     01/21/2019    CO2 29 01/21/2019    BUN 16 01/21/2019    CREATININE 1 13 01/21/2019    CALCIUM 9 9 01/21/2019    AST 32 01/10/2019    ALT 69 01/10/2019    ALKPHOS 126 (H) 01/10/2019    CHOLESTEROL 152 07/25/2018    TRIG 316 (H) 07/25/2018    HDL 40 07/25/2018    LDLCALC 49 07/25/2018    [place] 112 07/25/2018    CARBAMAZEPIN 6 2 09/07/2022    PWX9YPYEJYEO 3 150 01/12/2019    FREET4 1 04 07/25/2018       Substance Abuse History:  Social History     Substance and Sexual Activity   Drug Use No       Family Psychiatric History:   Family History   Problem Relation Age of Onset   • Schizophrenia Father    • Alcohol abuse Father    • Drug abuse Brother        The following portions of the patient's history were reviewed and updated as appropriate: allergies, current medications, past family history, past medical history, past social history, past surgical history and problem list     Social History     Socioeconomic History   • Marital status: Single     Spouse name: Not on file   • Number of children: Not on file   • Years of education: Not on file   • Highest education level: Not on file   Occupational History   • Not on file   Tobacco Use   • Smoking status: Some Days   • Smokeless tobacco: Never   • Tobacco comments:     he smoke only 1 cigarette a day, quit 2 months ago   Substance and Sexual Activity   • Alcohol use: No     Comment: last use 2012, took 3 of alcohol to help with panic attack   • Drug use: No   • Sexual activity: Not Currently   Other Topics Concern   • Not on file   Social History Narrative   • Not on file     Social Determinants of Health     Financial Resource Strain: Not on file   Food Insecurity: Not on file   Transportation Needs: Not on file   Physical Activity: Not on file   Stress: Not on file   Social Connections: Not on file   Intimate Partner Violence: Not on file   Housing Stability: Not on file     Social History     Social History Narrative   • Not on file       Objective:       Mental status:  Appearance calm and cooperative , adequate hygiene and grooming and good eye contact    Mood dysphoric   Affect affect was constricted   Speech a normal rate and fluent   Thought Processes coherent/organized and normal thought processes   Hallucinations no hallucinations present    Thought Content no delusions   Abnormal Thoughts no suicidal thoughts  and no homicidal thoughts    Orientation  oriented to person and place and time   Remote Memory short term memory intact and long term memory intact   Attention Span concentration impaired   Intellect Appears to be of Average Intelligence   Insight Limited insight   Judgement judgment was limited   Muscle Strength Muscle strength and tone were normal and Normal gait    Language no difficulty naming common objects and no difficulty repeating a phrase    Fund of Knowledge displays adequate knowledge of current events, adequate fund of knowledge regarding past history and adequate fund of knowledge regarding vocabulary                Assessment/Plan:       Diagnoses and all orders for this visit:    Bipolar disorder, curr episode mixed, severe, with psychotic features ([facility] )  -     OLANZapine (ZyPREXA) 7 5 mg tablet; Take 1 tablet (7 5 mg total) by mouth daily at bedtime    Panic disorder with agoraphobia  -     clonazePAM (KlonoPIN) 0 5 mg tablet; Take 1 tablet (0 5 mg total) by mouth 3 (three) times a day    Posttraumatic stress disorder    RADHA (generalized anxiety disorder)  -     clonazePAM (KlonoPIN) 0 5 mg tablet; Take 1 tablet (0 5 mg total) by mouth 3 (three) times a day    Primary hypertension  -     amLODIPine (NORVASC) 10 mg tablet; Take 1 tablet (10 mg total) by mouth daily    Other orders  -     Discontinue: amLODIPine (NORVASC) 10 mg tablet; Take 1 tablet (10 mg total) by mouth daily  -     Discontinue: OLANZapine (ZyPREXA) 7 5 mg tablet;  Take 1 tablet (7 5 mg total) by mouth daily at bedtime            Treatment Recommendations- Risks Benefits      Immediate Medical/Psychiatric/Psychotherapy Treatments and Any Precautions: continue current medications     Risks, Benefits And Possible Side Effects Of Medications:  {PSYCH RISK, BENEFITS AND POSSIBLE SIDE EFFECTS (Optional):63791    Controlled Medication Discussion: Discussed with patient Black Box warning on concurrent use of benzodiazepines and opioid medications including sedation, respiratory depression, coma and death  Patient understands the risk of treatment with benzodiazepines in addition to opioids and wants to continue taking those medications  , Discussed with patient the risks of sedation, respiratory depression, impairment of ability to drive and potential for abuse and addiction related to treatment with benzodiazepine medications  The patient understands risk of treatment with benzodiazepine medications, agrees to not drive if feels impaired and agrees to take medications as prescribed  and The patient has been filling controlled prescriptions on time as prescribed to [facility]  program       Psychotherapy Provided: Individual psychotherapy provided  Individual psychotherapy provided: Counseling was provided during the session today for 16 minutes  Medications, treatment progress and treatment plan reviewed with Marco Antonio  Medication changes discussed with Marco Antonio  Medication education provided [place]  Goals discussed during in session: continue improvement in mood stability  Coping strategies including compliance with medications, contacting a therapist, engaging in previously avoided activities, exercising, getting into a good routine, improving self-esteem, increasing interest in usual activities, increasing motivation, increasing social interaction, maintain healthy diet, maintain heathy sleeping hygiene and maintain positive attitude reviewed with Hari Sinclair  Importance of medication and treatment compliance reviewed with Marco Antonio    Supportive therapy provided

## 2023-01-06 ENCOUNTER — TELEPHONE (OUTPATIENT)
Dept: PSYCHIATRY | Facility: CLINIC | Age: 43
End: 2023-01-06

## 2023-01-06 DIAGNOSIS — F40.01 PANIC DISORDER WITH AGORAPHOBIA: ICD-10-CM

## 2023-01-06 DIAGNOSIS — F41.1 GAD (GENERALIZED ANXIETY DISORDER): ICD-10-CM

## 2023-01-06 NOTE — TELEPHONE ENCOUNTER
Pt called to request a medication refill-clonazepam  Writer informed pt that he has a refill at the pharmacy

## 2023-02-01 ENCOUNTER — TELEPHONE (OUTPATIENT)
Dept: PSYCHIATRY | Facility: CLINIC | Age: 43
End: 2023-02-01

## 2023-02-01 NOTE — TELEPHONE ENCOUNTER
Pt cx appt 2/3/23 with Jude Haddad during confirmation calls due to pt will be at [address], follow-up 2/17/23, thank you

## 2023-02-03 NOTE — TELEPHONE ENCOUNTER
History of present illness  This is a   81-year-old patient with history of low back pain for the last several years    but worse during the last months.  The pain is band like pressure sensation.  The patient denies numbness, tingling sensation, sphincter compromises.  The patient refers no radicular pain.  The patient refers the pain is aggravated by sitting, standing, walking and alleviated by rest.  The visual analog pain score is  5/10 .         PROCEDURE: MR Spine Lumbar w/o Contrast     COMPARISON: None     CLINICAL INDICATION: L B P     TECHNIQUES: The MR scan was performed using departmental protocol for  MR Spine Lumbar w/o Contrast.     FINDINGS: Straightening of lumbar spine and gray-white anterolisthesis  of L3 over L4 are nonspecific.  Vertebral body height is normal.   There is no acute fracture, subluxation.  Heterogeneous bone marrow  signal may reflect underlying generalized osteopenia.  There is  advanced osteophytic changes throughout the lumbar spine, greater at  L4-L5 with disc space narrowing, marginal osteophytes formation,  endplate reactive changes, and facet arthropathy.  The spinal cord  ends normally.      At L1-L2 level, there is minimal bilateral foraminal disc bulging.      At L2-L3 level, small bilateral foraminal and lateral seen.        At L3-L4 level, noted is moderate spinal canal stenosis resulting  from grade 1 anterolisthesis, small contrast posterior disc extrusion,  facet arthropathy, and hypertrophy of ligamentum flavum.  There is  also mildly compromised bilateral exiting neuroforamen, greater on the  left.      At L4-L5 level, mild spinal canal stenosis results from a small to  moderate central/right paracentral disc herniation/osteophytes complex  with compromised right exiting neuroforamen.  Mild bulging disc is  also present on the left foraminal aspect.      At L5-S1 level, there is mild generalized posterior disc bulging.     Incidentally noted are multiple right  PT called wishing to speak with provider regarding a potential medication adjustment  Pt stated that since being at school, their medication has not been helping them  Pt was asking to schedule an appt with provider today, 9/7/22 if possible  Writer informed pt of provider's full schedule  Pt wished to be transferred to the nursing line  renal cysts, partially  visualized.     IMPRESSION: Advanced osteophytic changes of lumbar spine with  multilevel disc abnormalities, spinal canal stenosis as described.       Final      Dictated by:  Gaby Romero MD  Electronically Signed By:  Gaby Romero MD  on  09/12/2018 08:47  Technologist Initials:  DEMETRIO        Past Medical History:   History - past medical           Past Medical History:   Diagnosis Date   • Carotid stenosis     • Essential (primary) hypertension              Past Surgical History:   History - past surgical         Past Surgical History:   Procedure Laterality Date   • Appendectomy       • Reoper, carotid endartec>1 mon Left 12/01/2020     carotid endartectomy            Medications:  Current medications               Current Outpatient Medications   Medication Sig Dispense Refill   • terazosin (HYTRIN) 2 MG capsule Take 2 mg by mouth nightly.       • fenofibrate 160 MG tablet Take 160 mg by mouth every evening.       • lovastatin (MEVACOR) 20 MG tablet Take 20 mg by mouth nightly.       • aspirin 81 MG chewable tablet Chew 81 mg by mouth every evening. MD instructed to stay on          No current facility-administered medications for this encounter.         Medications               Current Outpatient Medications   Medication Sig Dispense Refill   • terazosin (HYTRIN) 2 MG capsule Take 2 mg by mouth nightly.       • fenofibrate 160 MG tablet Take 160 mg by mouth every evening.       • lovastatin (MEVACOR) 20 MG tablet Take 20 mg by mouth nightly.       • aspirin 81 MG chewable tablet Chew 81 mg by mouth every evening. MD instructed to stay on          No current facility-administered medications for this encounter.            Allergies:  ALLERGIES:  Patient has no known allergies.     Social History:   Social History                Tobacco Use   • Smoking status: Light Tobacco Smoker       Years: 30.00       Types: Cigarettes   • Smokeless tobacco: Never Used   • Tobacco comment: 1-2 cigs a  day   Substance Use Topics   • Alcohol use: Yes       Comment: occasional   • Drug use: Never         Family History:   History - family    No family history on file.         Labs   Recent results   No results found for this or any previous visit (from the past 24 hour(s)).       Imaging  No orders to display         Cultures        Microbiology Results      None                Physical exam  This is an alert and well-nourished patient in no acute distress at the moment of exam.     Vital signs:  Blood pressure 137/65, pulse 83, temperature 96.4 °F (35.8 °C), temperature source Temporal, resp. rate 16, height 5' 8\" (1.727 m), weight 78 kg (172 lb), SpO2 99 %.  Body mass index is 26.15 kg/m².          HEENT: No icterus no nystagmus.     Neck: Normal on inspection, palpation and  auscultation     Lungs: Good air entry bilateral.       Heart: S1, S2, no murmurs, no gallops, no added sounds.     Abdomen: Soft, no rebound tenderness.     Neurologic: Cranial nerves II through XII are grossly intact.  Deep tendon reflexes are normal 2/4.  Strength of the upper and lower extremities is normal 5/5.  No signs of allodynia or trophic changes.     Musculoskeletal:     Range of motion of the lumbar spine is diminished due to pain.  Straight leg raising test is negative bilateral.  There is no pain to palpation of the lumbar spinous processes.  There is  pain to palpation of the bilateral lumbar facet joints with reproduction of the patient pain.  There is no pain on palpation of bilateral paraspinal muscles.  Nixon maneuver is negative bilateral.  There is no pain to palpation of bilateral sacroiliac joints.  Gaelen's maneuver is negative bilaterally.     Extremities: Peripheral pulses are present.  There is no edema.        Assessment and plan  This is a  81 -year-old patient with history of low back pain for the last several years but worse during the last months.  The pain is band like pressure sensation.  The patient  denies numbness, tingling sensation, sphincter compromises.  The patient refers no radicular pain.  The patient refers the pain is aggravated by sitting, standing, walking and alleviated by rest.  The visual analog pain score is 5/10.  The patient had an MRI of the lumbar spine that shows facet joint arthropathy, spondylolysis.   He underwent bilateral lumbar facet joint medial branch radiofrequency neurolysi  with excellent relief of his pain and functional capacity improvement.  He states pain radiating to bilateral lower extremities for what I going to order an MRI of lumbar spine and have further recommendation with the result of the MRI.    The patient will follow up in the pain management clinic with the results of the MRI.  Thank you very much.           PQRS :  The Patient refer osteoarthritis over knees, hips and lumbar spine.  OA functioning assesed patient able to ambulate without assisting devices .  Pain assesed and documented in the chart, visual analog pain score is 5/10.  Current medications in then chart.  Radiology exposure is documented in operative reports  Quality of life with primary headache disorder , the patient does not have a primary headache disorder.  Falls plan of care METS >4. Sitting test performed.  Risk assesment for falls completed.  Osteoporosis treatment is being assesed and treated by primary doctor.  Tobacco prevention performed patient is a non smoker.  Screening blood pressure is normal   . The patient will follow with  primary doctor  BMI normal  , no follow up needed with primary doctor .   Advance care plan is wife.       Steve Zamora MD  INTERVENTIONAL PAIN MANAGEMENT SPECIALIST  2:08 PM  02/03/23

## 2023-02-10 ENCOUNTER — OFFICE VISIT (OUTPATIENT)
Dept: PSYCHIATRY | Facility: CLINIC | Age: 43
End: 2023-02-10

## 2023-02-10 DIAGNOSIS — F31.64 BIPOLAR DISORDER, CURR EPISODE MIXED, SEVERE, WITH PSYCHOTIC FEATURES (HCC): Primary | ICD-10-CM

## 2023-02-10 DIAGNOSIS — F40.01 PANIC DISORDER WITH AGORAPHOBIA: ICD-10-CM

## 2023-02-10 DIAGNOSIS — F43.10 POSTTRAUMATIC STRESS DISORDER: ICD-10-CM

## 2023-02-10 RX ORDER — LAMOTRIGINE 25 MG/1
25 TABLET, ORALLY DISINTEGRATING ORAL 2 TIMES DAILY
Qty: 60 TABLET | Refills: 2 | Status: SHIPPED | OUTPATIENT
Start: 2023-02-10

## 2023-02-10 NOTE — PSYCH
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  He stated that he decided to take a JOSE ALFREDO from school to focus on his sobriety  He stated he continues to attend AA meetings, two meetings per day  He has been sober 2 months already and he feels that since he is sober he has less anxiety and his mood and energy level has improved  He stated he remains motivated on returning to college and he expects to start next Fall and graduate next Spring  Overall he is doing well after last medication changes and he agrees continue current treatment  Will schedule follow up in 3 months  He continues to meet with his counselor on a regular basis     Review Of Systems:     Mood Anxiety, Depression and Emotional Lability   Behavior Impulsive Behavior   Thought Content Disturbing Thoughts, Feelings   General Emotional Problems and Decreased Functioning   Personality Normal   Other Psych Symptoms Normal   Constitutional Negative   ENT Negative   Cardiovascular Negative   Respiratory Negative   Gastrointestinal Negative   Genitourinary Negative   Musculoskeletal Negative   Integumentary Negative   Neurological Negative   Endocrine Normal    Other Symptoms Normal        Laboratory Results:   Recent Labs (last 12 months):   Appointment on 09/07/2022   Component Date Value   • Carbamazepine Lvl 09/07/2022 6 2        Substance Abuse History:  Social History Substance and Sexual Activity   Drug Use No       Family Psychiatric History:   Family History   Problem Relation Age of Onset   • Schizophrenia Father    • Alcohol abuse Father    • Drug abuse Brother        The following portions of the patient's history were reviewed and updated as appropriate: allergies, current medications, past family history, past medical history, past social history, past surgical history and problem list     Social History     Socioeconomic History   • Marital status: Single     Spouse name: Not on file   • Number of children: Not on file   • Years of education: Not on file   • Highest education level: Not on file   Occupational History   • Not on file   Tobacco Use   • Smoking status: Some Days   • Smokeless tobacco: Never   • Tobacco comments:     he smoke only 1 cigarette a day, quit 2 months ago   Substance and Sexual Activity   • Alcohol use: No     Comment: last use 2012, took 3 of alcohol to help with panic attack   • Drug use: No   • Sexual activity: Not Currently   Other Topics Concern   • Not on file   Social History Narrative   • Not on file     Social Determinants of Health     Financial Resource Strain: Not on file   Food Insecurity: Not on file   Transportation Needs: Not on file   Physical Activity: Not on file   Stress: Not on file   Social Connections: Not on file   Intimate Partner Violence: Not on file   Housing Stability: Not on file     Social History     Social History Narrative   • Not on file       Objective:       Mental status:  Appearance calm and cooperative , adequate hygiene and grooming and good eye contact    Mood dysphoric   Affect affect was constricted   Speech a normal rate and fluent   Thought Processes coherent/organized and normal thought processes   Hallucinations no hallucinations present    Thought Content no delusions   Abnormal Thoughts no suicidal thoughts  and no homicidal thoughts    Orientation  oriented to person and place and time Remote Memory short term memory intact and long term memory intact   Attention Span concentration intact   Intellect Appears to be of Average Intelligence   Insight Limited insight   Judgement judgment was limited   Muscle Strength Muscle strength and tone were normal and Normal gait    Language no difficulty naming common objects and no difficulty repeating a phrase    Fund of Knowledge displays adequate knowledge of current events, adequate fund of knowledge regarding past history and adequate fund of knowledge regarding vocabulary                Assessment/Plan:       Diagnoses and all orders for this visit:    Bipolar disorder, curr episode mixed, severe, with psychotic features [facility]  -     lamoTRIgine (LaMICtal) 25 MG disintegrating tablet; Take 1 tablet (25 mg total) by mouth 2 (two) times a day    Posttraumatic stress disorder    Panic disorder with agoraphobia            Treatment Recommendations- Risks Benefits      Immediate Medical/Psychiatric/Psychotherapy Treatments and Any Precautions: continue current medications     Risks, Benefits And Possible Side Effects Of Medications:  {PSYCH RISK, BENEFITS AND POSSIBLE SIDE EFFECTS (Optional):25381    Controlled Medication Discussion: Discussed with patient Black Box warning on concurrent use of benzodiazepines and opioid medications including sedation, respiratory depression, coma and death  Patient understands the risk of treatment with benzodiazepines in addition to opioids and wants to continue taking those medications  , Discussed with patient the risks of sedation, respiratory depression, impairment of ability to drive and potential for abuse and addiction related to treatment with benzodiazepine medications  The patient understands risk of treatment with benzodiazepine medications, agrees to not drive if feels impaired and agrees to take medications as prescribed   and The patient has been filling controlled prescriptions on time as prescribed to [place] [facility] Monitoring program       Psychotherapy Provided: Individual psychotherapy provided  Individual psychotherapy provided: Counseling was provided during the session today for 16 minutes  Medications, treatment progress and treatment plan reviewed with Marco Antonio  Medication changes discussed with Marco Antonio  Medication education provided [place]  Goals discussed during in session: continue improvement in mood stability  Coping strategies including compliance with medications, contacting a therapist, engaging in previously avoided activities, exercising, getting into a good routine, improving self-esteem, increasing interest in usual activities, increasing motivation, increasing social interaction, maintain healthy diet, maintain heathy sleeping hygiene and maintain positive attitude reviewed with Pierre Camacho  Importance of medication and treatment compliance reviewed with Marco Antonio    Supportive therapy provided

## 2023-02-17 ENCOUNTER — SOCIAL WORK (OUTPATIENT)
Dept: BEHAVIORAL/MENTAL HEALTH CLINIC | Facility: CLINIC | Age: 43
End: 2023-02-17

## 2023-02-17 DIAGNOSIS — F31.32 BIPOLAR 1 DISORDER, DEPRESSED, MODERATE (HCC): Primary | ICD-10-CM

## 2023-02-17 DIAGNOSIS — F43.10 POSTTRAUMATIC STRESS DISORDER: ICD-10-CM

## 2023-02-17 DIAGNOSIS — F40.01 PANIC DISORDER WITH AGORAPHOBIA: ICD-10-CM

## 2023-02-17 NOTE — BH TREATMENT PLAN
Outpatient Behavioral Health Psychotherapy Treatment Plan                          Treatment Plan Tracking: Russ Ann plan update is due 1/18/22 however he was last here 12/21/22 and today 2/17/23 is the first time back since 12/21/22 so we did it today  Vikram Hinds  1980     Date of Initial Psychotherapy Assessment: 09/08/2020   Date of Current Treatment Plan: 02/17/23  Treatment Plan Target Date: 08/10/2023  Treatment Plan Expiration Date: 08/10/2023    Diagnosis:   1  Bipolar 1 disorder, depressed, moderate ([address] )        2  Panic disorder with agoraphobia        3  Posttraumatic stress disorder            Area(s) of Need: Please see below    Long Term Goal 1 (in the client's own words): I still need to manage my anxiety  Stage of Change: Action    Target Date for completion: 08/10/2023     Anticipated therapeutic modalities: Mindfulness, CBT and supportive therapy     People identified to complete this goal: Yuridia Richter with the help of his therapist       Objective 1: (identify the means of measuring success in meeting the objective): My anxiety will be at a tolerable level  Objective 2: (identify the means of measuring success in meeting the objective): n/a      Long Term Goal 2 (in the client's own words): I need top focus on my recovery    Stage of Change: Action    Target Date for completion: 08/10/2023     Anticipated therapeutic modalities: Mindfulness, CBT     People identified to complete this goal: Yuridia Richter with the help of his therapist       Objective 1: (identify the means of measuring success in meeting the objective):  I will remain sober      Objective 2: (identify the means of measuring success in meeting the objective): n/a     Long Term Goal 3 (in the client's own words): n/a    Stage of Change: n/a    Target Date for completion: n/a     Anticipated therapeutic modalities: n/a   People identified to complete this goal: n/a      Objective 1: (identify the means of measuring success in meeting the objective): n/a      Objective 2: (identify the means of measuring success in meeting the objective): n/a     I am currently under the care of a [facility] psychiatric provider: yes    My [facility]'s psychiatric provider is: An Chicas    I am currently taking psychiatric medications: Yes, as prescribed    I feel that I will be ready for discharge from mental health care when I reach the following (measurable goal/objective): when my anxiety is at a minimum and I remain sober    For children and adults who have a legal guardian:   Has there been any change to custody orders and/or guardianship status? NA  If yes, attach updated documentation  I have not developed my Crisis Plan and have been offered a copy of this plan    [address]: Diagnosis and Treatment Plan explained to [address] [place] [address] acknowledges an understanding of their diagnosis  Maye Dumont agrees to this treatment plan      I have been offered a copy of this Treatment Plan  yes

## 2023-02-17 NOTE — PSYCH
Behavioral Health Psychotherapy Progress Note    Psychotherapy Provided: Individual Psychotherapy     1  Bipolar 1 disorder, depressed, moderate ([address] )        2  Panic disorder with agoraphobia        3  Posttraumatic stress disorder            Goals addressed in session: Goal 1, Goal 2 and Goal 3      DATA: Myriam Oconnor discussed how he is in recovery and has taken the semester off to work on his recovery  He has a sponsor and is working the steps  His depression and anxiety are less now that he is not drinking  During this session, this clinician used the following therapeutic modalities: Client-centered Therapy, Cognitive Behavioral Therapy, Mindfulness-based Strategies and Supportive Psychotherapy    Substance Abuse was addressed during this session  If the client is diagnosed with a co-occurring substance use disorder, please indicate any changes in the frequency or amount of use: yMriam Oconnor relapsed so he took the semester off , is going to meetings and has a sponsor  He has not drank for 2 months and stopped smoking    Stage of change for addressing substance use diagnoses: Action    ASSESSMENT:  Makenzie Sprague presents with a Anxious mood  his affect is Normal range and intensity, which is congruent, with his mood and the content of the session  The client has made progress on their goals  Myriam Oconnor is not drinking or using  Makenzie Sprague presents with a none risk of suicide, none risk of self-harm, and none risk of harm to others  For any risk assessment that surpasses a "low" rating, a safety plan must be developed  A safety plan was indicated: no  If yes, describe in detail n/a    PLAN: Between sessions, Makenzie Sprague will use mindfulness and relapse prevention strategies to help him with his issues    At the next session, the therapist will use Client-centered Therapy, Cognitive Behavioral Therapy, Mindfulness-based Strategies and Supportive Psychotherapy to address his symptoms  Behavioral Health Treatment Plan and Discharge Planning: Selina Kwong is aware of and agrees to continue to work on their treatment plan  They have identified and are working toward their discharge goals   yes    Visit start and stop times:    02/17/23  Start Time: 0210  Stop Time: 0300  Total Visit Time: 50 minutes

## 2023-02-23 ENCOUNTER — SOCIAL WORK (OUTPATIENT)
Dept: BEHAVIORAL/MENTAL HEALTH CLINIC | Facility: CLINIC | Age: 43
End: 2023-02-23

## 2023-02-23 DIAGNOSIS — F31.32 BIPOLAR 1 DISORDER, DEPRESSED, MODERATE (HCC): Primary | ICD-10-CM

## 2023-02-23 DIAGNOSIS — F40.01 PANIC DISORDER WITH AGORAPHOBIA: ICD-10-CM

## 2023-02-23 DIAGNOSIS — F43.10 POSTTRAUMATIC STRESS DISORDER: ICD-10-CM

## 2023-02-23 NOTE — PSYCH
Behavioral Health Psychotherapy Progress Note    Psychotherapy Provided: Individual Psychotherapy     1  Bipolar 1 disorder, depressed, moderate ([address] )        2  Panic disorder with agoraphobia        3  Posttraumatic stress disorder            Goals addressed in session: Goal 1, Goal 2 and Goal 3      DATA: Rigoberto Mclean remains in recovery  His anxiety is less  Rigoberto Mclean stopped drinking and smoking  He attends AA meetings and has a sponsor  He currently is working on step 4  During this session, this clinician used the following therapeutic modalities: Client-centered Therapy, Cognitive Behavioral Therapy, Mindfulness-based Strategies and Supportive Psychotherapy    Substance Abuse was not addressed during this session  If the client is diagnosed with a co-occurring substance use disorder, please indicate any changes in the frequency or amount of use: He remains in sobriety  Stage of change for addressing substance use diagnoses: Action and maintance  ASSESSMENT:  Crispin Ovalle presents with a Euthymic/ normal mood  his affect is Normal range and intensity, which is congruent, with his mood and the content of the session  The client has made progress on their goals  He is sober and less anxious and depressed  Crispin Ovalle presents with a none risk of suicide, none risk of self-harm, and none risk of harm to others  For any risk assessment that surpasses a "low" rating, a safety plan must be developed  A safety plan was indicated: no  If yes, describe in detail n/a    PLAN: Between sessions, Crispin Ovalle will use mindfulness and CBT to manage symptoms    At the next session, the therapist will use Client-centered Therapy, Cognitive Behavioral Therapy, Mindfulness-based Strategies and Supportive Psychotherapy to address symptoms as they arrive        Behavioral Health Treatment Plan and Discharge Planning: Crispin Ovalle is aware of and agrees to continue to work on their treatment plan  They have identified and are working toward their discharge goals   yes    Visit start and stop times:    02/23/23  Start Time: 1410  Stop Time: 1500  Total Visit Time: 50 minutes

## 2023-03-03 ENCOUNTER — SOCIAL WORK (OUTPATIENT)
Dept: BEHAVIORAL/MENTAL HEALTH CLINIC | Facility: CLINIC | Age: 43
End: 2023-03-03

## 2023-03-03 DIAGNOSIS — F31.32 BIPOLAR 1 DISORDER, DEPRESSED, MODERATE (HCC): Primary | ICD-10-CM

## 2023-03-03 DIAGNOSIS — F43.10 POSTTRAUMATIC STRESS DISORDER: ICD-10-CM

## 2023-03-03 DIAGNOSIS — F40.01 PANIC DISORDER WITH AGORAPHOBIA: ICD-10-CM

## 2023-03-03 NOTE — PSYCH
Behavioral Health Psychotherapy Progress Note    Psychotherapy Provided: Individual Psychotherapy     1  Bipolar 1 disorder, depressed, moderate ([address] )        2  Panic disorder with agoraphobia        3  Posttraumatic stress disorder            Goals addressed in session: Goal 1 and Goal 2     DATA: Timur Bloom is doing better and remains in recovery  He is anxious but not like he was  We continue to work on mindfulness and CBT  During this session, this clinician used the following therapeutic modalities: Cognitive Behavioral Therapy, Mindfulness-based Strategies and Supportive Psychotherapy    Substance Abuse was addressed during this session  If the client is diagnosed with a co-occurring substance use disorder, please indicate any changes in the frequency or amount of use: He remains in recovery  Stage of change for addressing substance use diagnoses: Maintenance    ASSESSMENT:  Heavenly Graham presents with a Anxious mood  his affect is anxious, which is congruent, with his mood and the content of the session  The client has made progress on their goals  Timur Bloom is progessing on his goals  Heavenly Graham presents with a none risk of suicide, none risk of self-harm, and none risk of harm to others  For any risk assessment that surpasses a "low" rating, a safety plan must be developed  A safety plan was indicated: no  If yes, describe in detail n/a    PLAN: Between sessions, Heavenly Graham will use mindfulness and CBT  At the next session, the therapist will use Cognitive Behavioral Therapy, Mindfulness-based Strategies and Supportive Psychotherapy to address issues as they arise       Behavioral Health Treatment Plan and Discharge Planning: Heavenly Graham is aware of and agrees to continue to work on their treatment plan  They have identified and are working toward their discharge goals   yes    Visit start and stop times:    03/03/23  Start Time: 1410  Stop Time: 1500  Total Visit Time: 50 minutes

## 2023-03-07 DIAGNOSIS — F31.32 BIPOLAR 1 DISORDER, DEPRESSED, MODERATE (HCC): ICD-10-CM

## 2023-03-07 DIAGNOSIS — F31.64 BIPOLAR DISORDER, CURR EPISODE MIXED, SEVERE, WITH PSYCHOTIC FEATURES (HCC): ICD-10-CM

## 2023-03-08 RX ORDER — OLANZAPINE 7.5 MG/1
7.5 TABLET ORAL
Qty: 90 TABLET | Refills: 0 | Status: SHIPPED | OUTPATIENT
Start: 2023-03-08

## 2023-03-08 RX ORDER — LISINOPRIL 10 MG/1
TABLET ORAL
Qty: 90 TABLET | Refills: 0 | Status: SHIPPED | OUTPATIENT
Start: 2023-03-08

## 2023-03-09 ENCOUNTER — SOCIAL WORK (OUTPATIENT)
Dept: BEHAVIORAL/MENTAL HEALTH CLINIC | Facility: CLINIC | Age: 43
End: 2023-03-09

## 2023-03-09 DIAGNOSIS — F31.32 BIPOLAR 1 DISORDER, DEPRESSED, MODERATE (HCC): Primary | ICD-10-CM

## 2023-03-09 DIAGNOSIS — F40.01 PANIC DISORDER WITH AGORAPHOBIA: ICD-10-CM

## 2023-03-09 DIAGNOSIS — F43.10 POSTTRAUMATIC STRESS DISORDER: ICD-10-CM

## 2023-03-09 NOTE — PSYCH
Behavioral Health Psychotherapy Progress Note    Psychotherapy Provided: Individual Psychotherapy     1  Bipolar 1 disorder, depressed, moderate ([address] )        2  Panic disorder with agoraphobia        3  Posttraumatic stress disorder            Goals addressed in session: Goal 1 and Goal 2     DATA: Db Staley discussed he is closer and closer to  finishing school  He is less anxious and he remains in recovery  We worked on strategies to help him manage anxiety as it arises and relapse prevention to stay in recovery  During this session, this clinician used the following therapeutic modalities: Client-centered Therapy, Cognitive Behavioral Therapy, Mindfulness-based Strategies and Supportive Psychotherapy    Substance Abuse was not addressed during this session  If the client is diagnosed with a co-occurring substance use disorder, please indicate any changes in the frequency or amount of use: n/a  Stage of change for addressing substance use diagnoses: No substance use/Not applicable  He remains in recovery  ASSESSMENT:  Elda Perez presents with a Euthymic/ normal mood  his affect is Normal range and intensity, which is congruent, with his mood and the content of the session  The client has made progress on their goals  Db Staley will be done  Elda Perez presents with a none risk of suicide, none risk of self-harm, and none risk of harm to others  For any risk assessment that surpasses a "low" rating, a safety plan must be developed  A safety plan was indicated: no  If yes, describe in detail n/a    PLAN: Between sessions, Elda Perez will use mindfulness and CBT  Mercedes Riojas At the next session, the therapist will use Cognitive Behavioral Therapy, Mindfulness-based Strategies and Supportive Psychotherapy to address issues and symptoms as they arise        Behavioral Health Treatment Plan and Discharge Planning: Elda Perez is aware of and agrees to continue to work on their treatment plan  They have identified and are working toward their discharge goals   yes    Visit start and stop times:    03/09/23  Start Time: 1110  Stop Time: 1200  Total Visit Time: 50 minutes

## 2023-03-20 ENCOUNTER — SOCIAL WORK (OUTPATIENT)
Dept: BEHAVIORAL/MENTAL HEALTH CLINIC | Facility: CLINIC | Age: 43
End: 2023-03-20

## 2023-03-20 DIAGNOSIS — F31.32 BIPOLAR 1 DISORDER, DEPRESSED, MODERATE (HCC): Primary | ICD-10-CM

## 2023-03-20 DIAGNOSIS — F40.01 PANIC DISORDER WITH AGORAPHOBIA: ICD-10-CM

## 2023-03-20 DIAGNOSIS — F43.10 POSTTRAUMATIC STRESS DISORDER: ICD-10-CM

## 2023-03-20 NOTE — BH CRISIS PLAN
Client Name: Daniel Wilson       Client YOB: 1980  : 1980    Treatment Team (include name and contact information):     Psychotherapist: Virginia Zamorano LCSW    Psychiatrist: Dr Priscilla Fernandez of information completed: n/a    " n/a   Release of information completed: n/a    Other (Specify Role): n/a    Release of information completed: n/a    Other (Specify Role): n/a   Release of information completed: [facility] Provider  Shira Mccoy MD  [phone], - [address] [facility] [address]      Type of Plan   * Child plans (children 15 yo and younger) must be completed and signed by the child's legal guardian   * Plans for all individuals 15 yo and above must be signed by the client  Plan Type: adolescent/adult (14 and over) Initial      My Personal Strengths are (in the client's own words):  Perseverance, ambition, and drive    The stressors and triggers that may put me at risk are:  other (describe) places and events if there are too many people, crowds and noise  Coping skills I can use to keep myself calm and safe: Other (describe)praying, music and calling someone  Coping skills/supports I can use to maintain abstinence from substance use:   Speak to a sponsor, goes to [employer]    The people that provide me with help and support: (Include name, contact, and how they can help)   Support person #1: my brother Lobito Verdugo    * Phone number: [phone]    * How can they help me? Support and advice   Support person #2:Baldo Ribeiro therapist    * Phone number: [phone]    * How can they help me?  Support and advice and strategies     Support person #3: n/a    * Phone number: n/a    * How can they help me? n/a    In the past, the following has helped me in times of crisis:    Other:  Talking to a professional , praying, sponsor      If it is an emergency and you need immediate help, call     If there is a possibility of danger to yourself or others, call the following crisis hotline resources:     Adult Crisis Numbers  Suicide Prevention Hotline - Dial 9-8-8  [facility]: [address]: [address]: [address]: [phone]  [address] [facility] ([facility]): [phone]  [facility]: [address] [place] [address] Avenue: [address] [place] [address] St: [phone] (daytime)  [phone] (after hours, weekends, holidays)     Child/Adolescent Crisis Numbers   [facility]: [address]: [phone]   [facility]: [phone]   [address] [facility] ([facility]): [phone]    Please note: Some [facility] do not have a separate number for Child/Adolescent specific crisis  If your county is not listed under Child/Adolescent, please call the adult number for your county     National Talk to Text Line   [employer] [place] - [phone]    In the event your feelings become unmanageable, and you cannot reach your support system, you will call 911 immediately or go to the nearest hospital emergency room

## 2023-03-20 NOTE — PSYCH
Behavioral Health Psychotherapy Progress Note    Psychotherapy Provided: Individual Psychotherapy     1  Bipolar 1 disorder, depressed, moderate ([address] )        2  Panic disorder with agoraphobia        3  Posttraumatic stress disorder            Goals addressed in session: Goal 1 and Goal 2     DATA: Eda Hodges discussed the latest interaction he had with his mother who can be very negative  He remains in recovery and is working the program  He will be finishing college in fall  He is more positive and feeling good about life  We continue to work on strategies to help him maintain his progress  During this session, this clinician used the following therapeutic modalities: Client-centered Therapy, Cognitive Behavioral Therapy, Mindfulness-based Strategies and Supportive Psychotherapy    Substance Abuse was addressed during this session  If the client is diagnosed with a co-occurring substance use disorder, please indicate any changes in the frequency or amount of use: He remains in recovery  Stage of change for addressing substance use diagnoses: Maintenance    ASSESSMENT:  Mackenzie Greene presents with a Euthymic/ normal mood  his affect is Normal range and intensity, which is congruent, with his mood and the content of the session  The client has made progress on their goals  Eda Hodges will be starting college in the fall  Mackenzie Greene presents with a none risk of suicide, none risk of self-harm, and none risk of harm to others  For any risk assessment that surpasses a "low" rating, a safety plan must be developed  A safety plan was indicated: no  If yes, describe in detail n/a    PLAN: Between sessions, Mackenzie Greene will use mindfulness and CBT  Yojana Cordova At the next session, the therapist will use Client-centered Therapy, Cognitive Behavioral Therapy, Mindfulness-based Strategies and Supportive Psychotherapy to address issues and symptoms as they arise        [address] and Discharge Planning: Cleveland Page is aware of and agrees to continue to work on their treatment plan  They have identified and are working toward their discharge goals   yes    Visit start and stop times:    03/20/23  Start Time: 0910  Stop Time: 1000  Total Visit Time: 50 minutes

## 2023-03-25 DIAGNOSIS — I10 PRIMARY HYPERTENSION: ICD-10-CM

## 2023-03-27 RX ORDER — AMLODIPINE BESYLATE 10 MG/1
TABLET ORAL
Qty: 90 TABLET | Refills: 0 | Status: SHIPPED | OUTPATIENT
Start: 2023-03-27

## 2023-03-31 ENCOUNTER — SOCIAL WORK (OUTPATIENT)
Dept: BEHAVIORAL/MENTAL HEALTH CLINIC | Facility: CLINIC | Age: 43
End: 2023-03-31

## 2023-03-31 DIAGNOSIS — F31.32 BIPOLAR 1 DISORDER, DEPRESSED, MODERATE (HCC): Primary | ICD-10-CM

## 2023-03-31 DIAGNOSIS — F43.10 POSTTRAUMATIC STRESS DISORDER: ICD-10-CM

## 2023-03-31 DIAGNOSIS — F40.01 PANIC DISORDER WITH AGORAPHOBIA: ICD-10-CM

## 2023-03-31 NOTE — BH CRISIS PLAN
"Client Name: Morelia Cortez       Client YOB: 1980  : 1980    Treatment Team (include name and contact information):     Psychotherapist: Shayy Loera LCSW  [phone]    Psychiatrist: Dr Bryant Molina [phone]   Release of information completed: n/a    \" n/a   Release of information completed: no    Other (Specify Role): n/a    Release of information completed: no    Other (Specify Role): n/a   Release of information completed: no    Healthcare Provider  Shanika Cornell MD  [phone], [place] [address]      Type of Plan   * Child plans (children 15 yo and younger) must be completed and signed by the child's legal guardian   * Plans for all individuals 15 yo and above must be signed by the client  Plan Type: adolescent/adult (15 and over) Initial      My Personal Strengths are (in the client's own words):  Relationship with God, very determined, in recovery, perseverance    The stressors and triggers that may put me at risk are:  other (describe) I hate being treated unfairly, crowds, people who play games and who are manipulative    Coping skills I can use to keep myself calm and safe:  Listen to music, Physical activity, Call a friend or family member, Call my sponsor or sober support and Increased contact with professional supports    Coping skills/supports I can use to maintain abstinence from substance use:   N/a in recovery not using    The people that provide me with help and support: (Include name, contact, and how they can help)   Support person #1:my brother Azalea Decker    * Phone number: [phone]    * How can they help me? Support and guidance   Support person #2:[place] my sponsor    * Phone number: [phone]    * How can they help me? Support, guidance etc     Support person #3: Shayy Loera my therapist    * Phone number: [phone]    * How can they help me?  Strategies, support and guidance    In the past, the following has " helped me in times of crisis:    Being with other people, Taking medications, Talking to a professional on the telephone, Calling my sponsor, Calling a friend, Calling a family member, Taking a walk or exercising, Breathing exercises (or other mindfulness-based activities), Praying or meditating, Using de-escalation tools that I have learned and Listening to music      If it is an emergency and you need immediate help, call 9-1-1    If there is a possibility of danger to yourself or others, call the following crisis hotline resources:     Adult Crisis Numbers  Suicide Prevention Hotline - Dial 9-8-8  [facility]: [address]: [address]: [address]: [phone]  [facility] [address] [facility] ([facility]): [address]: [address] [place] [address] Avenue: [address] [place] [address] St: [phone] (daytime)  [phone] (after hours, weekends, holidays)     Child/Adolescent Crisis Numbers   [facility] \[facility]\"": [address]: [phone]   Margy Ahr: [phone]   [address] ([facility]): [phone]    Please note: Some [facility] do not have a separate number for Child/Adolescent specific crisis  If your county is not listed under Child/Adolescent, please call the adult number for your county     National Talk to Text Line   All Ages - [phone]    In the event your feelings become unmanageable, and you cannot reach your support system, you will call 911 immediately or go to the nearest hospital emergency room

## 2023-03-31 NOTE — PSYCH
"Behavioral Health Psychotherapy Progress Note    Psychotherapy Provided: Individual Psychotherapy     1  Bipolar 1 disorder, depressed, moderate ([address] )        2  Panic disorder with agoraphobia        3  Posttraumatic stress disorder            Goals addressed in session: Goal 1 and Goal 2     DATA: Sagar Barone remains in sobriety and he stopped cigarettes  He is still working on his degree at [employer]  He is less anxious and is working out at home  He still attends 2 meetings a day and he talks to a sponsor  During this session, this clinician used the following therapeutic modalities: Client-centered Therapy, Cognitive Behavioral Therapy, Mindfulness-based Strategies and Supportive Psychotherapy    Substance Abuse was not addressed during this session  If the client is diagnosed with a co-occurring substance use disorder, please indicate any changes in the frequency or amount of use: n/a  Stage of change for addressing substance use diagnoses: No substance use/Not applicable    ASSESSMENT:  Markus Lockhart presents with a Euthymic/ normal mood  his affect is Normal range and intensity, which is congruent, with his mood and the content of the session  The client has made progress on their goals  Sagar Barone is doing very well  Markus Lockhart presents with a none risk of suicide, none risk of self-harm, and none risk of harm to others  For any risk assessment that surpasses a \"low\" rating, a safety plan must be developed  A safety plan was indicated: no  If yes, describe in detail n/a    PLAN: Between sessions, Markus Lockhart will continue to use mindfulness and CBT  Nabila Maiers At the next session, the therapist will use Client-centered Therapy, Cognitive Behavioral Therapy, Mindfulness-based Strategies and Supportive Psychotherapy to address issues and symptoms as they may arise        Behavioral Health Treatment Plan and Discharge Planning: Markus Lockhart is aware of and agrees to continue to " work on their treatment plan  They have identified and are working toward their discharge goals   yes    Visit start and stop times:    03/31/23  Start Time: 0910  Stop Time: 1000  Total Visit Time: 50 minutes

## 2023-04-04 ENCOUNTER — SOCIAL WORK (OUTPATIENT)
Dept: BEHAVIORAL/MENTAL HEALTH CLINIC | Facility: CLINIC | Age: 43
End: 2023-04-04

## 2023-04-04 DIAGNOSIS — F31.32 BIPOLAR 1 DISORDER, DEPRESSED, MODERATE (HCC): Primary | ICD-10-CM

## 2023-04-04 DIAGNOSIS — F43.10 POSTTRAUMATIC STRESS DISORDER: ICD-10-CM

## 2023-04-04 DIAGNOSIS — F40.01 PANIC DISORDER WITH AGORAPHOBIA: ICD-10-CM

## 2023-04-04 NOTE — PSYCH
"Behavioral Health Psychotherapy Progress Note    Psychotherapy Provided: Individual Psychotherapy     Bipolar 1 depressed moderate, panic disorder, PTSD    Goals addressed in session: Goal 1 and Goal 2     DATA: Benoit Corea arrived for his session  He is in recovery, will finish college, is more motivated and is doing well  He remains anxious that every day he is not with someone due to past mistakes  He has to keep reminding himself he is doing good things now and the future will be bright  He remains in recovery  During this session, this clinician used the following therapeutic modalities: Client-centered Therapy, Cognitive Behavioral Therapy, Mindfulness-based Strategies and Supportive Psychotherapy    Substance Abuse was not addressed during this session  If the client is diagnosed with a co-occurring substance use disorder, please indicate any changes in the frequency or amount of use: n/a  Stage of change for addressing substance use diagnoses: No substance use/Not applicable    He is in recovery  ASSESSMENT:  Verna Hull presents with an anxious mood  his affect is anxious, which is congruent, with his mood and the content of the session  The client has made progress on their goals  Benoit Corea is in recovery  Verna Hull presents with a none risk of suicide, none risk of self-harm, and none risk of harm to others  For any risk assessment that surpasses a \"low\" rating, a safety plan must be developed  A safety plan was indicated: no  If yes, describe in detail n/a    PLAN: Between sessions, Verna Hull will use mindfulness/CBT  At the next session, the therapist will use Client-centered Therapy, Cognitive Behavioral Therapy, Mindfulness-based Strategies and Supportive Psychotherapy to address issues and symptoms as they may arise        Behavioral Health Treatment Plan and Discharge Planning: Verna Hull is aware of and agrees to continue to work on their treatment " plan  They have identified and are working toward their discharge goals   yes    Visit start and stop times: 2:10pm till 3:00pm  50 minutes    04/04/23

## 2023-04-06 DIAGNOSIS — F40.01 PANIC DISORDER WITH AGORAPHOBIA: ICD-10-CM

## 2023-04-06 DIAGNOSIS — F41.1 GAD (GENERALIZED ANXIETY DISORDER): ICD-10-CM

## 2023-04-06 RX ORDER — CLONAZEPAM 0.5 MG/1
0.5 TABLET ORAL 3 TIMES DAILY
Qty: 90 TABLET | Refills: 2 | Status: SHIPPED | OUTPATIENT
Start: 2023-04-06

## 2023-04-06 NOTE — TELEPHONE ENCOUNTER
Medication Refill Request     Name of Medication Clonazepam  Dose/Frequency 0 5mg 3 daily  Quantity 90 tablets  Verified pharmacy   [x]  Verified ordering Provider   [x]  Does patient have enough for the next 3 days? Yes [] No [x]  Does patient have a follow-up appointment scheduled?  Yes [x] No []   If so when is appointment: 5/11/23

## 2023-04-26 ENCOUNTER — SOCIAL WORK (OUTPATIENT)
Dept: BEHAVIORAL/MENTAL HEALTH CLINIC | Facility: CLINIC | Age: 43
End: 2023-04-26

## 2023-04-26 DIAGNOSIS — F40.01 PANIC DISORDER WITH AGORAPHOBIA: ICD-10-CM

## 2023-04-26 DIAGNOSIS — F43.10 POSTTRAUMATIC STRESS DISORDER: ICD-10-CM

## 2023-04-26 DIAGNOSIS — F31.32 BIPOLAR 1 DISORDER, DEPRESSED, MODERATE (HCC): Primary | ICD-10-CM

## 2023-04-26 NOTE — PSYCH
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  He remains in recovery and he is not smoking either  He is socializing more with family  I provided support and encouragement  During this session, this clinician used the following therapeutic modalities: Client-centered Therapy, Cognitive Behavioral Therapy, Mindfulness-based Strategies and Supportive Psychotherapy    Substance Abuse was not addressed during this session  If the client is diagnosed with a co-occurring substance use disorder, please indicate any changes in the frequency or amount of use: n/a  Stage of change for addressing substance use diagnoses: No substance use/Not applicable    ASSESSMENT:  Amanda Castro presents with a Anxious mood  his affect is anxious, which is congruent, with his mood and the content of the session  The client has made progress on their goals  Merna Hicks is anxious about his apartment and other issues in his life  Amanda Castro presents with a none risk of suicide, none risk of self-harm, and none risk of harm to others  For any risk assessment that surpasses a \"low\" rating, a safety plan must be developed  A safety plan was indicated: no  If yes, describe in detail n/a    PLAN: Between sessions, Amanda Castro will use mindfulness and CBT  At the next session, the therapist will use Client-centered Therapy, Cognitive Behavioral Therapy, Mindfulness-based Strategies and Supportive Psychotherapy to address issues and symptoms as they may arise       Behavioral Health Treatment Plan and Discharge Planning: Amanda Castro is aware of and agrees to continue to work on their treatment plan  They have identified and are working toward their discharge goals   yes    Visit start and stop times: " 3:10pm till 4:00pm 50 minutes    04/26/23

## 2023-05-09 ENCOUNTER — SOCIAL WORK (OUTPATIENT)
Dept: BEHAVIORAL/MENTAL HEALTH CLINIC | Facility: CLINIC | Age: 43
End: 2023-05-09

## 2023-05-09 DIAGNOSIS — F40.01 PANIC DISORDER WITH AGORAPHOBIA: ICD-10-CM

## 2023-05-09 DIAGNOSIS — F31.32 BIPOLAR 1 DISORDER, DEPRESSED, MODERATE (HCC): Primary | ICD-10-CM

## 2023-05-09 DIAGNOSIS — F43.10 POSTTRAUMATIC STRESS DISORDER: ICD-10-CM

## 2023-05-09 NOTE — PSYCH
"Behavioral Health Psychotherapy Progress Note    Psychotherapy Provided: Individual Psychotherapy     1  Bipolar 1 disorder, depressed, moderate ([address] )        2  Panic disorder with agoraphobia        3  Posttraumatic stress disorder            Goals addressed in session: Goal 1 and Goal 2     DATA: Irais Chance reported several of the people he has met online  He shared stories of the dysfunction he encountered  His anxiety gets elevated because of these situations  He is exercising, not drinking, eating better, not smoking  I provided support and encouragement  During this session, this clinician used the following therapeutic modalities: Client-centered Therapy, Cognitive Behavioral Therapy, Mindfulness-based Strategies and Supportive Psychotherapy    Substance Abuse was not addressed during this session  If the client is diagnosed with a co-occurring substance use disorder, please indicate any changes in the frequency or amount of use: n/a  Stage of change for addressing substance use diagnoses: No substance use/Not applicable    ASSESSMENT:  Kit Burns presents with a Euthymic/ normal mood  his affect is full range which is congruent, with his mood and the content of the session  The client has made progress on their goals  Kit Burns presents with a none risk of suicide, none risk of self-harm, and none risk of harm to others  For any risk assessment that surpasses a \"low\" rating, a safety plan must be developed  A safety plan was indicated: no  If yes, describe in detail n/a    PLAN: Between sessions, Kit Burns will use mindfulness and CBT  At the next session, the therapist will use Client-centered Therapy, Cognitive Behavioral Therapy, Mindfulness-based Strategies and Supportive Psychotherapy to address issues and symptoms as they may arise        Behavioral Health Treatment Plan and Discharge Planning: Kit Burns is aware of and agrees to continue to work " on their treatment plan  They have identified and are working toward their discharge goals   yes    Visit start and stop times:    05/09/23  Start Time: 1510  Stop Time: 1600  Total Visit Time: 50 minutes

## 2023-05-11 ENCOUNTER — OFFICE VISIT (OUTPATIENT)
Dept: PSYCHIATRY | Facility: CLINIC | Age: 43
End: 2023-05-11

## 2023-05-11 DIAGNOSIS — F19.21 COMBINED DRUG DEPENDENCE, EXCLUDING OPIOID, IN REMISSION (HCC): ICD-10-CM

## 2023-05-11 DIAGNOSIS — F43.10 POSTTRAUMATIC STRESS DISORDER: ICD-10-CM

## 2023-05-11 DIAGNOSIS — F40.01 PANIC DISORDER WITH AGORAPHOBIA: ICD-10-CM

## 2023-05-11 DIAGNOSIS — F31.64 BIPOLAR DISORDER, CURR EPISODE MIXED, SEVERE, WITH PSYCHOTIC FEATURES (HCC): Primary | ICD-10-CM

## 2023-05-11 RX ORDER — LAMOTRIGINE 25 MG/1
25 TABLET, ORALLY DISINTEGRATING ORAL 2 TIMES DAILY
Qty: 60 TABLET | Refills: 2 | Status: SHIPPED | OUTPATIENT
Start: 2023-05-11

## 2023-05-11 NOTE — LETTER
May 11, 2023     Patient: Precious Riggs  YOB: 1980  Date of Visit: 5/11/2023      To Whom it May Concern:    Precious Riggs is under my professional care  Fanny Momin was seen in my office on 5/11/2023  His current diagnoses are:     Patient Active Problem List   Diagnosis   • Posttraumatic stress disorder   • Panic disorder with agoraphobia   • Bipolar disorder, curr episode mixed, severe, with psychotic features ([address] )     His current medications are:  Current Outpatient Medications on File Prior to Visit   Medication Sig Dispense Refill   • clonazePAM (KlonoPIN) 0 5 mg tablet Take 1 tablet (0 5 mg total) by mouth 3 (three) times a day 90 tablet 2   • lamoTRIgine (LaMICtal) 25 MG disintegrating tablet Take 1 tablet (25 mg total) by mouth 2 (two) times a day 60 tablet 2   • OLANZapine (ZyPREXA) 7 5 mg tablet TAKE 1 TABLET (7 5 MG TOTAL) BY MOUTH DAILY AT BEDTIME 90 tablet 0     If you have any questions or concerns, please don't hesitate to call           Sincerely,      Lamar Garsia MD

## 2023-05-11 NOTE — PSYCH
Visit Time    Visit Start Time: 2:00  Visit Stop Time: 2:30  Total Visit Duration: 30 minutes    Subjective:Medication management    Patient ID: Vansea Lewis is a 43 y o  male with Bipolar do     HPI ROS Appetite Changes and Sleep: normal appetite, normal energy level, no weight change and normal number of sleep hours     He remains compliant with medications and denies side effects  He stated his mood has been stable and denies manic or depressed symptoms       Recently relapsed on drinking alcohol after being sober for a long time  He stated that he decided to take a JOSE ALFREDO from school to focus on his sobriety  He stated he continues to attend AA meetings, two meetings per day  He feels that since he is sober he has less anxiety and his mood and energy level has improved  He has been sober 5 months and 4 months since he quit smoking  He stated he gets ocasional cravings to smoke, but he has managed to not give in to his cravings  He stated he remains motivated on returning to college and he expects to start this Fall and graduate December  He already decided to commute to and from school and save money  Overall he is doing well after last medication changes and he agrees continue current treatment      Will schedule follow up in 3 months  He continues to meet with his counselor on a regular basis       Review Of Systems:     Mood Anxiety, Depression and Emotional Lability   Behavior Impulsive Behavior   Thought Content Disturbing Thoughts, Feelings   General Emotional Problems and Decreased Functioning   Personality Normal   Other Psych Symptoms Normal   Constitutional Negative   ENT Negative   Cardiovascular Negative   Respiratory Negative   Gastrointestinal Negative   Genitourinary Negative   Musculoskeletal Negative   Integumentary Negative   Neurological Negative   Endocrine Normal    Other Symptoms Normal        Laboratory Results:   Recent Labs (last 12 months):   Appointment on 09/07/2022 Component Date Value   • Carbamazepine Lvl 09/07/2022 6 2        Substance Abuse History:  Social History     Substance and Sexual Activity   Drug Use No       Family Psychiatric History:   Family History   Problem Relation Age of Onset   • Schizophrenia Father    • Alcohol abuse Father    • Drug abuse Brother        The following portions of the patient's history were reviewed and updated as appropriate: allergies, current medications, past family history, past medical history, past social history, past surgical history and problem list     Social History     Socioeconomic History   • Marital status: Single     Spouse name: Not on file   • Number of children: Not on file   • Years of education: Not on file   • Highest education level: Not on file   Occupational History   • Not on file   Tobacco Use   • Smoking status: Some Days   • Smokeless tobacco: Never   • Tobacco comments:     he smoke only 1 cigarette a day, quit 2 months ago   Substance and Sexual Activity   • Alcohol use: No     Comment: last use 2012, took 3 of alcohol to help with panic attack   • Drug use: No   • Sexual activity: Not Currently   Other Topics Concern   • Not on file   Social History Narrative   • Not on file     Social Determinants of Health     Financial Resource Strain: Not on file   Food Insecurity: Not on file   Transportation Needs: Not on file   Physical Activity: Not on file   Stress: Not on file   Social Connections: Not on file   Intimate Partner Violence: Not on file   Housing Stability: Not on file     Social History     Social History Narrative   • Not on file       Objective:       Mental status:  Appearance calm and cooperative , adequate hygiene and grooming and good eye contact    Mood dysphoric   Affect affect was constricted   Speech a normal rate and fluent   Thought Processes coherent/organized and normal thought processes   Hallucinations no hallucinations present    Thought Content no delusions   Abnormal Thoughts no suicidal thoughts  and no homicidal thoughts    Orientation  oriented to person and place and time   Remote Memory short term memory intact and long term memory intact   Attention Span concentration intact   Intellect Appears to be of Average Intelligence   Insight Limited insight   Judgement judgment was limited   Muscle Strength Muscle strength and tone were normal and Normal gait    Language no difficulty naming common objects and no difficulty repeating a phrase    Fund of Knowledge displays adequate knowledge of current events, adequate fund of knowledge regarding past history and adequate fund of knowledge regarding vocabulary                Assessment/Plan:       Diagnoses and all orders for this visit:    Bipolar disorder, curr episode mixed, severe, with psychotic features ([facility] [address] )  -     lamoTRIgine (LaMICtal) 25 MG disintegrating tablet; Take 1 tablet (25 mg total) by mouth 2 (two) times a day    Combined drug dependence, excluding opioid, in remission (HCC)    Posttraumatic stress disorder    Panic disorder with agoraphobia            Treatment Recommendations- Risks Benefits      Immediate Medical/Psychiatric/Psychotherapy Treatments and Any Precautions: continue current treatment     Risks, Benefits And Possible Side Effects Of Medications:  {PSYCH RISK, BENEFITS AND POSSIBLE SIDE EFFECTS (Optional):10139    Controlled Medication Discussion: Discussed with patient Black Box warning on concurrent use of benzodiazepines and opioid medications including sedation, respiratory depression, coma and death  Patient understands the risk of treatment with benzodiazepines in addition to opioids and wants to continue taking those medications  , Discussed with patient the risks of sedation, respiratory depression, impairment of ability to drive and potential for abuse and addiction related to treatment with benzodiazepine medications   The patient understands risk of treatment with benzodiazepine medications, agrees to not drive if feels impaired and agrees to take medications as prescribed  and The patient has been filling controlled prescriptions on time as prescribed to [facility] program       Psychotherapy Provided: Individual psychotherapy provided  Individual psychotherapy provided: Counseling was provided during the session today for 16 minutes  Medications, treatment progress and treatment plan reviewed with Marco Antonio  Medication changes discussed with Marco Antonio  Medication education provided [place]  Goals discussed during in session: continue improvement in mood stability  Coping strategies including compliance with medications, contacting a therapist, engaging in previously avoided activities, exercising, getting into a good routine, improving self-esteem, increasing interest in usual activities, increasing motivation, increasing social interaction, maintain healthy diet, maintain heathy sleeping hygiene and maintain positive attitude reviewed with Caroline Monroe  Importance of medication and treatment compliance reviewed with Marco Antonio    Supportive therapy provided

## 2023-05-22 ENCOUNTER — SOCIAL WORK (OUTPATIENT)
Dept: BEHAVIORAL/MENTAL HEALTH CLINIC | Facility: CLINIC | Age: 43
End: 2023-05-22

## 2023-05-22 DIAGNOSIS — F43.10 POSTTRAUMATIC STRESS DISORDER: ICD-10-CM

## 2023-05-22 DIAGNOSIS — F40.01 PANIC DISORDER WITH AGORAPHOBIA: ICD-10-CM

## 2023-05-22 DIAGNOSIS — F31.32 BIPOLAR 1 DISORDER, DEPRESSED, MODERATE (HCC): Primary | ICD-10-CM

## 2023-05-22 NOTE — PSYCH
"Behavioral Health Psychotherapy Progress Note    Psychotherapy Provided: Individual Psychotherapy     1  Bipolar 1 disorder, depressed, moderate ([address] )        2  Panic disorder with agoraphobia        3  Posttraumatic stress disorder            Goals addressed in session: Goal 1 and Goal 2     DATA: Mahnaz Leslie arrived for his session  He feels his anxiety is under better control  He also remains in recovery  The main discussion today was how he is meeting so many dysfunctional people in his life online which is frustrating him  We discussed him sticking to his boundaries and deleting the dysfunctional people he encounters  During this session, this clinician used the following therapeutic modalities: Client-centered Therapy, Cognitive Behavioral Therapy, Mindfulness-based Strategies and Supportive Psychotherapy    Substance Abuse was not addressed during this session  If the client is diagnosed with a co-occurring substance use disorder, please indicate any changes in the frequency or amount of use: n/a  Stage of change for addressing substance use diagnoses: No substance use/Not applicable    ASSESSMENT:  Allen Templeton presents with a Euthymic/ normal mood  his affect is Normal range and intensity, which is congruent, with his mood and the content of the session  The client has made progress on their goals  Allen Templeton presents with a none risk of suicide, none risk of self-harm, and none risk of harm to others  For any risk assessment that surpasses a \"low\" rating, a safety plan must be developed  A safety plan was indicated: no  If yes, describe in detail n/a    PLAN: Between sessions, Allen Templeton will use mindfulness and CBT  At the next session, the therapist will use CBT and Mindfulness to address issues and symptoms as they may arise        Behavioral Health Treatment Plan and Discharge Planning: Allen Templeton is aware of and agrees to continue to work on their " treatment plan  They have identified and are working toward their discharge goals   yes    Visit start and stop times:    05/22/23  Start Time: 1410  Stop Time: 1500  Total Visit Time: 50 minutes

## 2023-06-04 DIAGNOSIS — F31.32 BIPOLAR 1 DISORDER, DEPRESSED, MODERATE (HCC): ICD-10-CM

## 2023-06-04 RX ORDER — LISINOPRIL 10 MG/1
TABLET ORAL
Qty: 90 TABLET | Refills: 0 | Status: SHIPPED | OUTPATIENT
Start: 2023-06-04

## 2023-06-05 ENCOUNTER — SOCIAL WORK (OUTPATIENT)
Dept: BEHAVIORAL/MENTAL HEALTH CLINIC | Facility: CLINIC | Age: 43
End: 2023-06-05

## 2023-06-05 DIAGNOSIS — F43.10 POSTTRAUMATIC STRESS DISORDER: ICD-10-CM

## 2023-06-05 DIAGNOSIS — F40.01 PANIC DISORDER WITH AGORAPHOBIA: ICD-10-CM

## 2023-06-05 DIAGNOSIS — F31.32 BIPOLAR 1 DISORDER, DEPRESSED, MODERATE (HCC): Primary | ICD-10-CM

## 2023-06-05 NOTE — PSYCH
"Behavioral Health Psychotherapy Progress Note    Psychotherapy Provided: Individual Psychotherapy     1  Bipolar 1 disorder, depressed, moderate ([address] )        2  Panic disorder with agoraphobia        3  Posttraumatic stress disorder            Goals addressed in session: Goal 1 and Goal 2     DATA:   During this session, this clinician used the following therapeutic modalities: Client-centered Therapy, Cognitive Behavioral Therapy, Mindfulness-based Strategies and Supportive Psychotherapy    Substance Abuse was not addressed during this session  If the client is diagnosed with a co-occurring substance use disorder, please indicate any changes in the frequency or amount of use: n/a  Stage of change for addressing substance use diagnoses: No substance use/Not applicable    ASSESSMENT:  Nolan Martinez presents with a Euthymic/ normal mood  his affect was within normal range which is congruent, with his mood and the content of the session  The client has made progress on their goals  Marella Kocher still remains in recovery  Nolan Martinez presents with a none risk of suicide, none risk of self-harm, and none risk of harm to others  For any risk assessment that surpasses a \"low\" rating, a safety plan must be developed  A safety plan was indicated: no  If yes, describe in detail n/a    PLAN: Between sessions, Nolan Martinez will use mindfulness, CBT and relapse prevention strategies    At the next session, the therapist will use Client-centered Therapy, Cognitive Behavioral Therapy, Mindfulness-based Strategies and Supportive Psychotherapy to address issues and symptoms as they may arise       Behavioral Health Treatment Plan and Discharge Planning: Nolan Martinez is aware of and agrees to continue to work on their treatment plan  They have identified and are working toward their discharge goals   yes    Visit start and stop times:    06/05/23  Start Time: 1410  Stop Time: 1500  Total Visit " Time: 50 minutes

## 2023-06-12 ENCOUNTER — SOCIAL WORK (OUTPATIENT)
Dept: BEHAVIORAL/MENTAL HEALTH CLINIC | Facility: CLINIC | Age: 43
End: 2023-06-12

## 2023-06-12 DIAGNOSIS — F40.01 PANIC DISORDER WITH AGORAPHOBIA: ICD-10-CM

## 2023-06-12 DIAGNOSIS — F43.10 POSTTRAUMATIC STRESS DISORDER: ICD-10-CM

## 2023-06-12 DIAGNOSIS — F31.32 BIPOLAR 1 DISORDER, DEPRESSED, MODERATE (HCC): Primary | ICD-10-CM

## 2023-06-12 NOTE — PSYCH
"Behavioral Health Psychotherapy Progress Note    Psychotherapy Provided: Individual Psychotherapy     Depression, anxiety    Goals addressed in session: Goal 1 and Goal 2     DATA: Margaret Adhikari is less anxious and he is in recovery  He swore dating sites for now  He has been very disappointed to say the least  However he remains sober and less anxious  During this session, this clinician used the following therapeutic modalities: Client-centered Therapy, Cognitive Behavioral Therapy, Mindfulness-based Strategies and Supportive Psychotherapy    Substance Abuse was not addressed during this session  If the client is diagnosed with a co-occurring substance use disorder, please indicate any changes in the frequency or amount of use: He remains in recovery    Stage of change for addressing substance use diagnoses: Action    ASSESSMENT:  Christian Davenport presents with a Euthymic/ normal mood  his affect is Normal range and intensity, which is congruent, with his mood and the content of the session  The client has made progress on their goals  Christian Davenport presents with a none risk of suicide, none risk of self-harm, and none risk of harm to others  For any risk assessment that surpasses a \"low\" rating, a safety plan must be developed  A safety plan was indicated: no  If yes, describe in detail n/a    PLAN: Between sessions, Christian Davenport will use mindfulness and CBT  At the next session, the therapist will use Client-centered Therapy, Cognitive Behavioral Therapy, Mindfulness-based Strategies and Supportive Psychotherapy to address issues and symptoms as they may arise       Behavioral Health Treatment Plan and Discharge Planning: Christian Davenport is aware of and agrees to continue to work on their treatment plan  They have identified and are working toward their discharge goals   yes    Visit start and stop times:    06/12/23     "

## 2023-07-07 DIAGNOSIS — F31.64 BIPOLAR DISORDER, CURR EPISODE MIXED, SEVERE, WITH PSYCHOTIC FEATURES (HCC): ICD-10-CM

## 2023-07-07 DIAGNOSIS — F40.01 PANIC DISORDER WITH AGORAPHOBIA: ICD-10-CM

## 2023-07-07 DIAGNOSIS — F41.1 GAD (GENERALIZED ANXIETY DISORDER): ICD-10-CM

## 2023-07-07 RX ORDER — LAMOTRIGINE 25 MG/1
25 TABLET, ORALLY DISINTEGRATING ORAL 2 TIMES DAILY
Qty: 60 TABLET | Refills: 2 | Status: SHIPPED | OUTPATIENT
Start: 2023-07-07

## 2023-07-07 RX ORDER — CLONAZEPAM 0.5 MG/1
0.5 TABLET ORAL 3 TIMES DAILY
Qty: 90 TABLET | Refills: 2 | Status: SHIPPED | OUTPATIENT
Start: 2023-07-07

## 2023-07-07 NOTE — TELEPHONE ENCOUNTER
Medication Refill Request     Name of Medication KlonoPIN  Dose/Frequency 0.5 mg/1 tablet 3 times a day  Quantity 90  Verified pharmacy   [x]  Verified ordering Provider   [x]  Does patient have enough for the next 3 days? Yes [] No [x]  Does patient have a follow-up appointment scheduled? Yes [x] No []   If so when is appointment: 8/11    Medication Refill Request     Name of Medication LaMICtal  Dose/Frequency 25mg/1 tablet 2 times a day  Quantity 60  Verified pharmacy   [x]  Verified ordering Provider   [x]  Does patient have enough for the next 3 days? Yes [] No [x]  Does patient have a follow-up appointment scheduled?  Yes [x] No []   If so when is appointment: 8/11

## 2023-07-09 DIAGNOSIS — F31.64 BIPOLAR DISORDER, CURR EPISODE MIXED, SEVERE, WITH PSYCHOTIC FEATURES (HCC): ICD-10-CM

## 2023-07-09 DIAGNOSIS — I10 PRIMARY HYPERTENSION: ICD-10-CM

## 2023-07-09 DIAGNOSIS — F31.32 BIPOLAR 1 DISORDER, DEPRESSED, MODERATE (HCC): ICD-10-CM

## 2023-07-10 RX ORDER — OLANZAPINE 7.5 MG/1
7.5 TABLET ORAL
Qty: 90 TABLET | Refills: 0 | Status: SHIPPED | OUTPATIENT
Start: 2023-07-10

## 2023-07-10 RX ORDER — LISINOPRIL 10 MG/1
TABLET ORAL
Qty: 90 TABLET | Refills: 0 | Status: SHIPPED | OUTPATIENT
Start: 2023-07-10

## 2023-07-10 RX ORDER — AMLODIPINE BESYLATE 10 MG/1
TABLET ORAL
Qty: 90 TABLET | Refills: 0 | Status: SHIPPED | OUTPATIENT
Start: 2023-07-10

## 2023-07-24 ENCOUNTER — TELEPHONE (OUTPATIENT)
Dept: PSYCHIATRY | Facility: CLINIC | Age: 43
End: 2023-07-24

## 2023-07-24 NOTE — TELEPHONE ENCOUNTER
Pt accidentally cancelled his appt for 7/25/2023  At 3 p, via automated reminder. Writer was able to put the pt back in that time slot.

## 2023-07-25 ENCOUNTER — SOCIAL WORK (OUTPATIENT)
Dept: BEHAVIORAL/MENTAL HEALTH CLINIC | Facility: CLINIC | Age: 43
End: 2023-07-25

## 2023-07-25 DIAGNOSIS — F31.32 BIPOLAR 1 DISORDER, DEPRESSED, MODERATE (HCC): Primary | ICD-10-CM

## 2023-07-25 DIAGNOSIS — F43.10 POSTTRAUMATIC STRESS DISORDER: ICD-10-CM

## 2023-07-25 DIAGNOSIS — F40.01 PANIC DISORDER WITH AGORAPHOBIA: ICD-10-CM

## 2023-07-25 NOTE — PSYCH
Behavioral Health Psychotherapy Progress Note    Psychotherapy Provided: Individual Psychotherapy     1. Bipolar 1 disorder, depressed, moderate ([address])        2. Panic disorder with agoraphobia        3. Posttraumatic stress disorder            Goals addressed in session: Goal 1 and Goal 2     DATA: Gregg Lang arrived for his session. He discussed recent family of origin issues concerning his dysfunctional sister and his mother who enables her. He then discussed his anxiety about school starting. He discussed his OCD. We discussed strategies to cope and deal with his anxiety and his OCD. He remains in recovery. During this session, this clinician used the following therapeutic modalities: Client-centered Therapy, Cognitive Behavioral Therapy, Mindfulness-based Strategies and Supportive Psychotherapy    Substance Abuse was not addressed during this session. If the client is diagnosed with a co-occurring substance use disorder, please indicate any changes in the frequency or amount of use: n/a. Stage of change for addressing substance use diagnoses: N/a He is in recovery. ASSESSMENT:  Ministerio Deng presents with a Anxious mood. his affect is anxious which is congruent, with his mood and the content of the session. The client has made progress on their goals. Ministerio Deng presents with a none risk of suicide, none risk of self-harm, and none risk of harm to others. For any risk assessment that surpasses a "low" rating, a safety plan must be developed. A safety plan was indicated: no  If yes, describe in detail n/a    PLAN: Between sessions, Ministerio Deng will use mindfulness and CBT. At the next session, the therapist will use Client-centered Therapy, Cognitive Behavioral Therapy, Mindfulness-based Strategies and Supportive Psychotherapy to address issues and symptoms as they may arise.  .    Behavioral Health Treatment Plan and Discharge Planning: Ministerio Deng is aware of and agrees to continue to work on their treatment plan. They have identified and are working toward their discharge goals.  yes    Visit start and stop times:    07/25/23  Start Time: 1510  Stop Time: 1600  Total Visit Time: 50 minutes

## 2023-08-09 ENCOUNTER — SOCIAL WORK (OUTPATIENT)
Dept: BEHAVIORAL/MENTAL HEALTH CLINIC | Facility: CLINIC | Age: 43
End: 2023-08-09

## 2023-08-09 DIAGNOSIS — F31.32 BIPOLAR 1 DISORDER, DEPRESSED, MODERATE (HCC): Primary | ICD-10-CM

## 2023-08-09 DIAGNOSIS — F40.01 PANIC DISORDER WITH AGORAPHOBIA: ICD-10-CM

## 2023-08-09 DIAGNOSIS — F43.10 POSTTRAUMATIC STRESS DISORDER: ICD-10-CM
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
## 2023-08-09 NOTE — BH TREATMENT PLAN
[address]  1980     Date of Initial Psychotherapy Assessment: 09/08/2020  Date of Current Treatment Plan: 08/09/23  Treatment Plan Target Date: 02/04/2023  Treatment Plan Expiration Date: 02/04/2023    Diagnosis:   1. Bipolar 1 disorder, depressed, moderate ([address])        2. Panic disorder with agoraphobia        3. Posttraumatic stress disorder            Area(s) of Need: please see below    Long Term Goal 1 (in the client's own words): I still need to more effectively manage my anxiety, panic and agoraphobia. Stage of Change: Action    Target Date for completion: 02/04/2024     Anticipated therapeutic modalities: Mindfulness, CBT     People identified to complete this goal: Bernard Boss with the help of his therapist.       Objective 1: (identify the means of measuring success in meeting the objective): My symptoms of anxiety will be at a minimum.        Objective 2: (identify the means of measuring success in meeting the objective): n/a      Long Term Goal 2 (in the client's own words): n/a    Stage of Change: n/a    Target Date for completion: n/a     Anticipated therapeutic modalities: n/a     People identified to complete this goal: n/a      Objective 1: (identify the means of measuring success in meeting the objective): n/a      Objective 2: (identify the means of measuring success in meeting the objective): n/a     Long Term Goal 3 (in the client's own words): n/a  Stage of Change: n/a    Target Date for completion: n/a     Anticipated therapeutic modalities: n/a     People identified to complete this goal: n/a      Objective 1: (identify the means of measuring success in meeting the objective): n/a      Objective 2: (identify the means of measuring success in meeting the objective): n/a     I am currently under the care of a [facility] psychiatric provider: yes    My [facility] psychiatric provider is: Dr James Patel    I am currently taking psychiatric medications: Yes, as prescribed    I feel that I will be ready for discharge from mental health care when I reach the following (measurable goal/objective): when my anxiety is under control and at a minimum level. For children and adults who have a legal guardian:   Has there been any change to custody orders and/or guardianship status? NA. If yes, attach updated documentation. I have created my Crisis Plan and have been offered a copy of this plan    [id number]: Diagnosis and Treatment Plan explained to [address] acknowledges an understanding of their diagnosis. Petar Davis agrees to this treatment plan.     I have been offered a copy of this Treatment Plan. yes

## 2023-08-23 ENCOUNTER — SOCIAL WORK (OUTPATIENT)
Dept: BEHAVIORAL/MENTAL HEALTH CLINIC | Facility: CLINIC | Age: 43
End: 2023-08-23
Payer: MEDICARE

## 2023-08-23 DIAGNOSIS — F43.10 POSTTRAUMATIC STRESS DISORDER: ICD-10-CM

## 2023-08-23 DIAGNOSIS — F31.32 BIPOLAR 1 DISORDER, DEPRESSED, MODERATE (HCC): Primary | ICD-10-CM

## 2023-08-23 DIAGNOSIS — F40.01 PANIC DISORDER WITH AGORAPHOBIA: ICD-10-CM

## 2023-08-23 PROCEDURE — 90834 PSYTX W PT 45 MINUTES: CPT | Performed by: SOCIAL WORKER

## 2023-08-23 NOTE — PSYCH
Behavioral Health Psychotherapy Progress Note    Psychotherapy Provided: Individual Psychotherapy     1. Bipolar 1 disorder, depressed, moderate ([address])        2. Panic disorder with agoraphobia        3. Posttraumatic stress disorder            Goals addressed in session: Goal 1     DATA: Caitlin Hill arrived for his session. He is a bit anxious about school starting. He has 4 more classes before he graduates. We discussed his upcoming schedule and the work he already started doing. I provided support and I provided strategies to cope. During this session, this clinician used the following therapeutic modalities: Client-centered Therapy, Cognitive Behavioral Therapy, Mindfulness-based Strategies and Supportive Psychotherapy    Substance Abuse was not addressed during this session. If the client is diagnosed with a co-occurring substance use disorder, please indicate any changes in the frequency or amount of use: n/a. Stage of change for addressing substance use diagnoses: No substance use/Not applicable    ASSESSMENT:  Leanne Sanz presents with a Anxious mood. his affect is anxious, which is congruent, with his mood and the content of the session. The client has made progress on their goals. Leanne Sanz presents with a none risk of suicide, none risk of self-harm, and none risk of harm to others. For any risk assessment that surpasses a "low" rating, a safety plan must be developed. A safety plan was indicated: no  If yes, describe in detail n/a    PLAN: Between sessions, Leanne Sanz will use mindfulness and CBT. At the next session, the therapist will use Client-centered Therapy, Cognitive Behavioral Therapy, Mindfulness-based Strategies and Supportive Psychotherapy to address issues and symptoms as they may arise. .    Behavioral Health Treatment Plan and Discharge Planning: Leanne Sanz is aware of and agrees to continue to work on their treatment plan.  They have identified and are working toward their discharge goals.  yes    Visit start and stop times:    08/23/23  Start Time: 1310  Stop Time: 1400  Total Visit Time: 50 minutes

## 2023-09-07 DIAGNOSIS — F31.64 BIPOLAR DISORDER, CURR EPISODE MIXED, SEVERE, WITH PSYCHOTIC FEATURES (HCC): ICD-10-CM

## 2023-09-07 DIAGNOSIS — F31.32 BIPOLAR 1 DISORDER, DEPRESSED, MODERATE (HCC): ICD-10-CM

## 2023-09-07 RX ORDER — OLANZAPINE 7.5 MG/1
7.5 TABLET ORAL
Qty: 90 TABLET | Refills: 0 | Status: SHIPPED | OUTPATIENT
Start: 2023-09-07

## 2023-09-07 RX ORDER — LISINOPRIL 10 MG/1
TABLET ORAL
Qty: 90 TABLET | Refills: 0 | Status: SHIPPED | OUTPATIENT
Start: 2023-09-07

## 2023-09-14 ENCOUNTER — TELEPHONE (OUTPATIENT)
Dept: PSYCHIATRY | Facility: CLINIC | Age: 43
End: 2023-09-14

## 2023-09-14 NOTE — TELEPHONE ENCOUNTER
Patient is calling regarding cancelling an appointment.     Date/Time: 9/15/2023 8am    Reason:     Patient was rescheduled: YES [] NO [x]  If yes, when was Patient reschedule for:     Patient requesting call back to reschedule: YES [] NO [x]

## 2023-09-28 ENCOUNTER — SOCIAL WORK (OUTPATIENT)
Dept: BEHAVIORAL/MENTAL HEALTH CLINIC | Facility: CLINIC | Age: 43
End: 2023-09-28
Payer: MEDICARE

## 2023-09-28 DIAGNOSIS — F43.10 POSTTRAUMATIC STRESS DISORDER: ICD-10-CM

## 2023-09-28 DIAGNOSIS — F31.32 BIPOLAR 1 DISORDER, DEPRESSED, MODERATE (HCC): Primary | ICD-10-CM

## 2023-09-28 DIAGNOSIS — F40.01 PANIC DISORDER WITH AGORAPHOBIA: ICD-10-CM

## 2023-09-28 PROCEDURE — 90834 PSYTX W PT 45 MINUTES: CPT | Performed by: SOCIAL WORKER
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
## 2023-10-03 DIAGNOSIS — F31.64 BIPOLAR DISORDER, CURR EPISODE MIXED, SEVERE, WITH PSYCHOTIC FEATURES (HCC): ICD-10-CM

## 2023-10-03 RX ORDER — LAMOTRIGINE 25 MG/1
25 TABLET, ORALLY DISINTEGRATING ORAL 2 TIMES DAILY
Qty: 60 TABLET | Refills: 2 | Status: SHIPPED | OUTPATIENT
Start: 2023-10-03

## 2023-10-06 DIAGNOSIS — F40.01 PANIC DISORDER WITH AGORAPHOBIA: ICD-10-CM

## 2023-10-06 DIAGNOSIS — F41.1 GAD (GENERALIZED ANXIETY DISORDER): ICD-10-CM

## 2023-10-06 RX ORDER — CLONAZEPAM 0.5 MG/1
0.5 TABLET ORAL 3 TIMES DAILY
Qty: 90 TABLET | Refills: 2 | Status: SHIPPED | OUTPATIENT
Start: 2023-10-06

## 2023-10-06 NOTE — TELEPHONE ENCOUNTER
Medication Refill Request     Name of Medication Clonazepam  Dose/Frequency0.5 mg/ Take 1 tablet by mouth 3 times a day. Quantity 90  Verified pharmacy   [x]  Verified ordering Provider   [x]  Does patient have enough for the next 3 days? Yes [] No [x]  Does patient have a follow-up appointment scheduled?  Yes [x] No []   If so when is appointment: 11/9/2023

## 2023-10-19 ENCOUNTER — SOCIAL WORK (OUTPATIENT)
Dept: BEHAVIORAL/MENTAL HEALTH CLINIC | Facility: CLINIC | Age: 43
End: 2023-10-19

## 2023-10-19 DIAGNOSIS — F40.01 PANIC DISORDER WITH AGORAPHOBIA: ICD-10-CM

## 2023-10-19 DIAGNOSIS — F31.32 BIPOLAR 1 DISORDER, DEPRESSED, MODERATE (HCC): Primary | ICD-10-CM

## 2023-10-19 DIAGNOSIS — F43.10 POSTTRAUMATIC STRESS DISORDER: ICD-10-CM

## 2023-10-19 NOTE — PSYCH
Behavioral Health Psychotherapy Progress Note    Psychotherapy Provided: Individual Psychotherapy     1. Bipolar 1 disorder, depressed, moderate ([address])        2. Panic disorder with agoraphobia        3. Posttraumatic stress disorder            Goals addressed in session: Goal 1     DATA: Hernandez Kim is doing well in college. He is on his way to accomplishing 10 months of sobriety. He discussed his positive support system. He discussed the good and healthy things he is doing in life. He is doing well. I provided support, encouragement and strategies to cope  During this session, this clinician used the following therapeutic modalities: Client-centered Therapy, Cognitive Behavioral Therapy, Mindfulness-based Strategies, and Supportive Psychotherapy    Substance Abuse was addressed during this session. If the client is diagnosed with a co-occurring substance use disorder, please indicate any changes in the frequency or amount of use: He is approaching 10 months of sobriety. Stage of change for addressing substance use diagnoses: No substance use/Not applicable    ASSESSMENT:  Darshan Reich presents with a Euthymic/ normal mood. his affect is Normal range and intensity, which is congruent, with his mood and the content of the session. The client has made progress on their goals. Darshan Reich presents with a none risk of suicide, none risk of self-harm, and none risk of harm to others. For any risk assessment that surpasses a "low" rating, a safety plan must be developed. A safety plan was indicated: no  If yes, describe in detail n/a    PLAN: Between sessions, Darshan Reich will use mindfulness and CBT. At the next session, the therapist will use Client-centered Therapy, Cognitive Behavioral Therapy, Mindfulness-based Strategies, and Supportive Psychotherapy to address issues and symptoms as they may arise.  .    Behavioral Health Treatment Plan and Discharge Planning: Danelle Rice Shorty is aware of and agrees to continue to work on their treatment plan. They have identified and are working toward their discharge goals.  yes    Visit start and stop times:10:10am till 11:00am    10/19/23  Start Time: 1010  Stop Time: 1100  Total Visit Time: 50 minutes

## 2023-11-02 ENCOUNTER — TELEPHONE (OUTPATIENT)
Dept: PSYCHIATRY | Facility: CLINIC | Age: 43
End: 2023-11-02

## 2023-11-02 NOTE — TELEPHONE ENCOUNTER
Writer LUCIANO to offer 2pm appointment for today, 11/02. Left office number to call back if able to schedule. Please assist patient upon return call.  Thank you

## 2023-11-02 NOTE — TELEPHONE ENCOUNTER
Patient is calling regarding cancelling an appointment. Date/Time: 11/2/23 at 9:00 am    Reason: The patient overslept. Patient was rescheduled: YES [] NO [x]  If yes, when was Patient reschedule for:     Patient requesting call back to reschedule: YES [] NO [x]    Writer suggested switching the appt to a virtual visit. The patient declined the virtual visit.

## 2023-11-07 ENCOUNTER — TELEPHONE (OUTPATIENT)
Dept: PSYCHIATRY | Facility: CLINIC | Age: 43
End: 2023-11-07

## 2023-11-07 NOTE — TELEPHONE ENCOUNTER
Writer rec a call from pt regarding possibly seeing provider this week. Writer offered appt on wed and pt could not do. Pt also relayed days he was available that would be all day Thursday and after 2pm on Friday. Rocio Recio

## 2023-11-08 DIAGNOSIS — I10 PRIMARY HYPERTENSION: ICD-10-CM

## 2023-11-08 RX ORDER — AMLODIPINE BESYLATE 10 MG/1
TABLET ORAL
Qty: 90 TABLET | Refills: 0 | Status: SHIPPED | OUTPATIENT
Start: 2023-11-08

## 2023-11-09 ENCOUNTER — SOCIAL WORK (OUTPATIENT)
Dept: BEHAVIORAL/MENTAL HEALTH CLINIC | Facility: CLINIC | Age: 43
End: 2023-11-09
Payer: MEDICARE

## 2023-11-09 ENCOUNTER — TELEPHONE (OUTPATIENT)
Dept: PSYCHIATRY | Facility: CLINIC | Age: 43
End: 2023-11-09

## 2023-11-09 DIAGNOSIS — F43.10 POSTTRAUMATIC STRESS DISORDER: ICD-10-CM

## 2023-11-09 DIAGNOSIS — F40.01 PANIC DISORDER WITH AGORAPHOBIA: ICD-10-CM

## 2023-11-09 DIAGNOSIS — F31.32 BIPOLAR 1 DISORDER, DEPRESSED, MODERATE (HCC): Primary | ICD-10-CM

## 2023-11-09 PROCEDURE — 90834 PSYTX W PT 45 MINUTES: CPT | Performed by: SOCIAL WORKER

## 2023-11-09 NOTE — TELEPHONE ENCOUNTER
Writer contacted patient and patient has been scheduled for [email or web address] am today in office.

## 2023-11-09 NOTE — PSYCH
Behavioral Health Psychotherapy Progress Note    Psychotherapy Provided: Individual Psychotherapy     1. Bipolar 1 disorder, depressed, moderate ([address])        2. Panic disorder with agoraphobia        3. Posttraumatic stress disorder            Goals addressed in session: Goal 1     DATA: Kelvin Hodges arrived for his session. He discussed the dysfunctional people he has met on the internet. He discussed the stress of his final 5 weeks of college. He discussed his frustrations with people he is encountering. He is anxious about the next few weeks of college. He is trying to manage his panic and his anger concerning frustrating issues he has encountered. I provided support, encouragement and strategies to cope. During this session, this clinician used the following therapeutic modalities: Client-centered Therapy, Cognitive Behavioral Therapy, Mindfulness-based Strategies, and Supportive Psychotherapy    Substance Abuse was not addressed during this session. If the client is diagnosed with a co-occurring substance use disorder, please indicate any changes in the frequency or amount of use: he is sober. Stage of change for addressing substance use diagnoses: No substance use/Not applicable    ASSESSMENT:  Ksenia Vicente presents with a Anxious mood. his affect is anxious which is congruent, with his mood and the content of the session. The client has made progress on their goals. Ksenia Vicente presents with a none risk of suicide, none risk of self-harm, and none risk of harm to others. For any risk assessment that surpasses a "low" rating, a safety plan must be developed. A safety plan was indicated: no  If yes, describe in detail n/a    PLAN: Between sessions, Ksenia Vicente will use mindfulness and CBT.  At the next session, the therapist will use Client-centered Therapy, Cognitive Behavioral Therapy, Mindfulness-based Strategies, and Supportive Psychotherapy to address issues and symptoms as they may arise. .    Behavioral Health Treatment Plan and Discharge Planning: Ligia Gaxiola is aware of and agrees to continue to work on their treatment plan. They have identified and are working toward their discharge goals.  yes    Visit start and stop times:    11/09/23  Start Time: 1010  Stop Time: 1100  Total Visit Time: 50 minutes

## 2023-11-14 ENCOUNTER — TELEPHONE (OUTPATIENT)
Dept: PSYCHIATRY | Facility: CLINIC | Age: 43
End: 2023-11-14

## 2023-11-14 NOTE — TELEPHONE ENCOUNTER
April 6, 2022     Patient: Betty Estevez   YOB: 2013   Date of Visit: 4/6/2022       To Whom it May Concern:    Betty Estevez was seen in my clinic on 4/6/2022 at 9:40 am.     Please excuse Betty for her absence from school on the date listed above to be able to make her appointment.    Sincerely,          Jerardo Mistry, DO    Medical information is confidential and cannot be disclosed without the written consent of the patient or her representative.       Writer contacted patient to inform him his 11/24 apt has been cancelled and patient has been added to cancellation list since next apt is not until 12/21.

## 2023-11-17 ENCOUNTER — TELEPHONE (OUTPATIENT)
Dept: PSYCHIATRY | Facility: CLINIC | Age: 43
End: 2023-11-17

## 2023-12-21 ENCOUNTER — SOCIAL WORK (OUTPATIENT)
Dept: BEHAVIORAL/MENTAL HEALTH CLINIC | Facility: CLINIC | Age: 43
End: 2023-12-21
Payer: MEDICARE

## 2023-12-21 DIAGNOSIS — F43.10 POSTTRAUMATIC STRESS DISORDER: ICD-10-CM

## 2023-12-21 DIAGNOSIS — F31.32 BIPOLAR 1 DISORDER, DEPRESSED, MODERATE (HCC): Primary | ICD-10-CM

## 2023-12-21 DIAGNOSIS — F40.01 PANIC DISORDER WITH AGORAPHOBIA: ICD-10-CM

## 2023-12-21 PROCEDURE — 90834 PSYTX W PT 45 MINUTES: CPT | Performed by: SOCIAL WORKER
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
## 2024-01-04 DIAGNOSIS — F31.64 BIPOLAR DISORDER, CURR EPISODE MIXED, SEVERE, WITH PSYCHOTIC FEATURES (HCC): ICD-10-CM

## 2024-01-04 RX ORDER — LAMOTRIGINE 25 MG/1
25 TABLET, ORALLY DISINTEGRATING ORAL 2 TIMES DAILY
Qty: 60 TABLET | Refills: 2 | Status: SHIPPED | OUTPATIENT
Start: 2024-01-04

## 2024-01-08 ENCOUNTER — TELEPHONE (OUTPATIENT)
Dept: PSYCHIATRY | Facility: CLINIC | Age: 44
End: 2024-01-08

## 2024-01-08 DIAGNOSIS — F40.01 PANIC DISORDER WITH AGORAPHOBIA: ICD-10-CM

## 2024-01-08 DIAGNOSIS — F41.1 GAD (GENERALIZED ANXIETY DISORDER): ICD-10-CM

## 2024-01-08 RX ORDER — CLONAZEPAM 0.5 MG/1
0.5 TABLET ORAL 3 TIMES DAILY
Qty: 90 TABLET | Refills: 2 | Status: SHIPPED | OUTPATIENT
Start: 2024-01-08

## 2024-01-08 NOTE — TELEPHONE ENCOUNTER
Patient called regarding medication refill request. Writer informed pt that provider just sent refill script over to the pharmacy for Klonopin 0.5 mg tablet.

## 2024-01-08 NOTE — TELEPHONE ENCOUNTER
Patient called in regards to refill on Klonopin 0.5mg. This writer called patient and left voicemail to call office to get more information about medication

## 2024-01-11 ENCOUNTER — SOCIAL WORK (OUTPATIENT)
Dept: BEHAVIORAL/MENTAL HEALTH CLINIC | Facility: CLINIC | Age: 44
End: 2024-01-11
Payer: MEDICARE

## 2024-01-11 DIAGNOSIS — F40.01 PANIC DISORDER WITH AGORAPHOBIA: ICD-10-CM

## 2024-01-11 DIAGNOSIS — F43.10 POSTTRAUMATIC STRESS DISORDER: ICD-10-CM

## 2024-01-11 DIAGNOSIS — F31.32 BIPOLAR 1 DISORDER, DEPRESSED, MODERATE (HCC): Primary | ICD-10-CM

## 2024-01-11 PROCEDURE — 90834 PSYTX W PT 45 MINUTES: CPT | Performed by: SOCIAL WORKER

## 2024-01-11 NOTE — PSYCH
"Behavioral Health Psychotherapy Progress Note    Psychotherapy Provided: Individual Psychotherapy     1. Bipolar 1 disorder, depressed, moderate (HCC)        2. Posttraumatic stress disorder        3. Panic disorder with agoraphobia            Goals addressed in session: Goal 1     DATA: Marco Antonio discussed he is having more panic attacks and his anxiety is increased. He admits he is having very vivid and in his words crazy dreams. We discussed he needs to discuss his symptoms and review his medications with his provider. He discussed possible starting to date again. We worked on strategies to help him manage his anxiety, panic and agoraphobia. I provided support, encouragement to push himself to do the things he wants to avoid due to panic and strategies to cope.   During this session, this clinician used the following therapeutic modalities: Client-centered Therapy, Cognitive Behavioral Therapy, Mindfulness-based Strategies, and Supportive Psychotherapy    Substance Abuse was not addressed during this session. If the client is diagnosed with a co-occurring substance use disorder, please indicate any changes in the frequency or amount of use: n/a. Stage of change for addressing substance use diagnoses: No substance use/Not applicable    ASSESSMENT:  Marco Antonio Oro presents with a Anxious mood.     his affect is anxious which is congruent, with his mood and the content of the session. The client has made progress on their goals.     Marco Antonio Oro presents with a none risk of suicide, none risk of self-harm, and none risk of harm to others.    For any risk assessment that surpasses a \"low\" rating, a safety plan must be developed.    A safety plan was indicated: no  If yes, describe in detail n/a    PLAN: Between sessions, Marco Antonio Oro will use mindfulness and CBT. At the next session, the therapist will use Client-centered Therapy, Cognitive Behavioral Therapy, Mindfulness-based Strategies, and " Supportive Psychotherapy to address issues and symptoms as they may arise. .    Behavioral Health Treatment Plan and Discharge Planning: Marco Antonio Oro is aware of and agrees to continue to work on their treatment plan. They have identified and are working toward their discharge goals. yes    Visit start and stop times:    01/11/24  Start Time: 0910  Stop Time: 1000  Total Visit Time: 50 minutes

## 2024-01-23 ENCOUNTER — OFFICE VISIT (OUTPATIENT)
Dept: PSYCHIATRY | Facility: CLINIC | Age: 44
End: 2024-01-23
Payer: MEDICARE

## 2024-01-23 DIAGNOSIS — F43.10 POSTTRAUMATIC STRESS DISORDER: ICD-10-CM

## 2024-01-23 DIAGNOSIS — F19.21 COMBINED DRUG DEPENDENCE, EXCLUDING OPIOID, IN REMISSION (HCC): ICD-10-CM

## 2024-01-23 DIAGNOSIS — F40.01 PANIC DISORDER WITH AGORAPHOBIA: Primary | ICD-10-CM

## 2024-01-23 DIAGNOSIS — F31.64 BIPOLAR DISORDER, CURR EPISODE MIXED, SEVERE, WITH PSYCHOTIC FEATURES (HCC): ICD-10-CM

## 2024-01-23 PROCEDURE — 90833 PSYTX W PT W E/M 30 MIN: CPT | Performed by: PSYCHIATRY & NEUROLOGY

## 2024-01-23 PROCEDURE — 99213 OFFICE O/P EST LOW 20 MIN: CPT | Performed by: PSYCHIATRY & NEUROLOGY

## 2024-01-23 RX ORDER — OLANZAPINE 10 MG/1
10 TABLET ORAL
Qty: 90 TABLET | Refills: 0 | Status: SHIPPED | OUTPATIENT
Start: 2024-01-23

## 2024-01-23 RX ORDER — LAMOTRIGINE 25 MG/1
25 TABLET, ORALLY DISINTEGRATING ORAL 2 TIMES DAILY
Qty: 180 TABLET | Refills: 0 | Status: SHIPPED | OUTPATIENT
Start: 2024-01-23 | End: 2024-02-04

## 2024-01-23 RX ORDER — HYDROQUINONE 40 MG/G
CREAM TOPICAL
COMMUNITY

## 2024-01-23 NOTE — PSYCH
Visit Time    Visit Start Time: 10:30  Visit Stop Time: 11:00  Total Visit Duration:  30 minutes    Subjective:Medication management    Patient ID: Marco Antonio Oro is a 43 y.o. male with Bipolar do     HPI ROS Appetite Changes and Sleep: normal appetite, normal energy level, no weight change and normal number of sleep hours     He remains compliant with medications and denies side effects.       Since last seen he stated he graduate with Honors and has a bachelors in [employer] design   He has been sober for 14 months and he continues AA.   He stated he has been experiencing frequent panic attacks, raising thoughts and insomnia.  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  He is interested in working part time.  He does not want to lose his SSI benefits.   Will schedule follow up in 6-8 weeks or sooner if needed.    Review Of Systems:     Mood Anxiety, Depression and Emotional Lability   Behavior Impulsive Behavior   Thought Content Disturbing Thoughts, Feelings   General Emotional Problems and Decreased Functioning   Personality Normal   Other Psych Symptoms Normal   Constitutional Negative   ENT Negative   Cardiovascular Negative   Respiratory Negative   Gastrointestinal Negative   Genitourinary Negative   Musculoskeletal Negative   Integumentary Negative   Neurological Negative   Endocrine Normal    Other Symptoms Normal        Laboratory Results:   Recent Labs (last 12 months):   No visits with results within 12 Month(s) from this visit.   Latest known visit with results is:   Appointment on 09/07/2022   Component Date Value    Carbamazepine Lvl 09/07/2022 6.2        Substance Abuse History:  Social History     Substance and Sexual Activity   Drug Use No       Family Psychiatric History:   Family History   Problem Relation Age of Onset    Schizophrenia Father     Alcohol abuse  Father     Drug abuse Brother        The following portions of the patient's history were reviewed and updated as appropriate: allergies, current medications, past family history, past medical history, past social history, past surgical history and problem list.    Social History     Socioeconomic History    Marital status: Single     Spouse name: Not on file    Number of children: Not on file    Years of education: Not on file    Highest education level: Not on file   Occupational History    Not on file   Tobacco Use    Smoking status: Some Days    Smokeless tobacco: Never    Tobacco comments:     he smoke only 1 cigarette a day, quit 2 months ago   Substance and Sexual Activity    Alcohol use: No     Comment: last use 2012, took 3 of alcohol to help with panic attack    Drug use: No    Sexual activity: Not Currently   Other Topics Concern    Not on file   Social History Narrative    Not on file     Social Determinants of Health     Financial Resource Strain: Not on file   Food Insecurity: Not on file   Transportation Needs: Not on file   Physical Activity: Not on file   Stress: Not on file   Social Connections: Not on file   Intimate Partner Violence: Not on file   Housing Stability: Not on file     Social History     Social History Narrative    Not on file       Objective:       Mental status:  Appearance calm and cooperative , adequate hygiene and grooming and good eye contact    Mood dysphoric   Affect affect was constricted   Speech a normal rate and fluent   Thought Processes coherent/organized and normal thought processes   Hallucinations no hallucinations present    Thought Content no delusions   Abnormal Thoughts no suicidal thoughts  and no homicidal thoughts    Orientation  oriented to person and place and time   Remote Memory short term memory intact and long term memory intact   Attention Span concentration intact   Intellect Appears to be of Average Intelligence   Insight Limited insight   Judgement  judgment was limited   Muscle Strength Muscle strength and tone were normal and Normal gait    Language no difficulty naming common objects and no difficulty repeating a phrase    Fund of Knowledge displays adequate knowledge of current events, adequate fund of knowledge regarding past history and adequate fund of knowledge regarding vocabulary                Assessment/Plan:       Diagnoses and all orders for this visit:    Panic disorder with agoraphobia    Bipolar disorder, curr episode mixed, severe, with psychotic features (HCC)  -     OLANZapine (ZyPREXA) 10 mg tablet; Take 1 tablet (10 mg total) by mouth daily at bedtime  -     lamoTRIgine (LaMICtal) 25 MG disintegrating tablet; Take 1 tablet (25 mg total) by mouth 2 (two) times a day    Posttraumatic stress disorder    Combined drug dependence, excluding opioid, in remission (HCC)    Other orders  -     hydroquinone 4 % cream; APPLY TO DARK SPOTS TWICE A DAY FOR 2 MONTHS, THEN HOLD FOR 2 WEEKS, THEN REPEAT AS NEEDED.              Treatment Recommendations- Risks Benefits      Immediate Medical/Psychiatric/Psychotherapy Treatments and Any Precautions: continue current treatment     Risks, Benefits And Possible Side Effects Of Medications:  {PSYCH RISK, BENEFITS AND POSSIBLE SIDE EFFECTS (Optional):03756    Controlled Medication Discussion: Discussed with patient Black Box warning on concurrent use of benzodiazepines and opioid medications including sedation, respiratory depression, coma and death. Patient understands the risk of treatment with benzodiazepines in addition to opioids and wants to continue taking those medications. , Discussed with patient the risks of sedation, respiratory depression, impairment of ability to drive and potential for abuse and addiction related to treatment with benzodiazepine medications. The patient understands risk of treatment with benzodiazepine medications, agrees to not drive if feels impaired and agrees to take  medications as prescribed. and The patient has been filling controlled prescriptions on time as prescribed to [place] Prescription Drug Monitoring program.      Psychotherapy Provided: Individual psychotherapy provided.   Individual psychotherapy provided: Counseling was provided during the session today for 16 minutes.  Medications, treatment progress and treatment plan reviewed with Marco Antonio.  Medication changes discussed with Marco Antonio.  Medication education provided to Marco Antonio.  Goals discussed during in session: continue improvement in mood stability.   Coping strategies including compliance with medications, contacting a therapist, engaging in previously avoided activities, exercising, getting into a good routine, improving self-esteem, increasing interest in usual activities, increasing motivation, increasing social interaction, maintain healthy diet, maintain heathy sleeping hygiene and maintain positive attitude reviewed with Marco Antonio.   Importance of medication and treatment compliance reviewed with Marco Antoino.  Supportive therapy provided.

## 2024-02-04 DIAGNOSIS — F31.64 BIPOLAR DISORDER, CURR EPISODE MIXED, SEVERE, WITH PSYCHOTIC FEATURES (HCC): ICD-10-CM

## 2024-02-04 RX ORDER — LAMOTRIGINE 25 MG/1
25 TABLET, ORALLY DISINTEGRATING ORAL 2 TIMES DAILY
Qty: 180 TABLET | Refills: 0 | Status: SHIPPED | OUTPATIENT
Start: 2024-02-04 | End: 2024-02-06

## 2024-02-06 DIAGNOSIS — F31.32 BIPOLAR 1 DISORDER, DEPRESSED, MODERATE (HCC): Primary | ICD-10-CM

## 2024-02-06 RX ORDER — LAMOTRIGINE 25 MG/1
25 TABLET ORAL 2 TIMES DAILY
Qty: 180 TABLET | Refills: 0 | Status: SHIPPED | OUTPATIENT
Start: 2024-02-06

## 2024-02-07 ENCOUNTER — TELEPHONE (OUTPATIENT)
Dept: PSYCHIATRY | Facility: CLINIC | Age: 44
End: 2024-02-07

## 2024-02-07 NOTE — TELEPHONE ENCOUNTER
A letter was left for nursing regarding coverage of lamotrigine ODT dated 1/5/24. It notes a temporary supply was given:  medication is not on the plan's Drug List.    Lamotrigine 25 mg tab prescribed 2/6/24.

## 2024-02-08 NOTE — TELEPHONE ENCOUNTER
Patient called and LVM stating they had a medication question.    Writer called and spoke with patient.    Patient stated they received a letter about 3 weeks ago stating a prior authorization was needed for him to stay on the original medication that Dr. Gonzalez discussed him saying on rather then insurance finding an alternative. Patient stated they dropped of paperwork at the beginning of he week for provider to fill out. Writer informed them that the doctor would not be back in the office till 2/12 and the information would be relayed to the nurses and the covering providers.     Patient stated they would be out of their medication as of tomorrow (2/9/24) The refill should be sent to the [facility] on [address]  Writer gave the nursing line direct number.

## 2024-02-08 NOTE — TELEPHONE ENCOUNTER
Called [facility], was informed the lamotrigine ODT wolud require a PA but since the provider changed to the regular lamotrigine no PA is required and patient picked it up today.     No further action required.

## 2024-02-08 NOTE — TELEPHONE ENCOUNTER
Patient called in to the office returning a call from the office. Writer stated the nurses could have reached out and transferred the patient to the nurses line.

## 2024-02-08 NOTE — TELEPHONE ENCOUNTER
Follow up call made and spoke with Marco Antonio. Reviewed the change to medication. He verbalized understanding and was glad he got the medication today.

## 2024-02-21 ENCOUNTER — SOCIAL WORK (OUTPATIENT)
Dept: BEHAVIORAL/MENTAL HEALTH CLINIC | Facility: CLINIC | Age: 44
End: 2024-02-21
Payer: MEDICARE

## 2024-02-21 DIAGNOSIS — F31.32 BIPOLAR 1 DISORDER, DEPRESSED, MODERATE (HCC): Primary | ICD-10-CM

## 2024-02-21 DIAGNOSIS — F40.01 PANIC DISORDER WITH AGORAPHOBIA: ICD-10-CM

## 2024-02-21 DIAGNOSIS — F43.10 POSTTRAUMATIC STRESS DISORDER: ICD-10-CM

## 2024-02-21 PROCEDURE — 90837 PSYTX W PT 60 MINUTES: CPT | Performed by: SOCIAL WORKER

## 2024-02-21 NOTE — BH TREATMENT PLAN
Outpatient Behavioral Health Psychotherapy Treatment Plan The plan update due 02/04/2024. However he was last here 1/11/24 and today 2/21 is the first time back since 1/11/2024 so we updated it today.     Marco Antonio LINK Shorty  1980     Date of Initial Psychotherapy Assessment: 09/08/2020  Date of Current Treatment Plan: 02/21/24  Treatment Plan Target Date: 08/18/2024  Treatment Plan Expiration Date: 08/18/2024    Diagnosis:   1. Bipolar 1 disorder, depressed, moderate (HCC)        2. Posttraumatic stress disorder        3. Panic disorder with agoraphobia            Area(s) of Need: I still deal with anxiety, and panic.    Long Term Goal 1 (in the client's own words): I still deal with anxiety and panic    Stage of Change: Action    Target Date for completion: 08/18/2024     Anticipated therapeutic modalities: mindfulness qand CBT     People identified to complete this goal: Myself with the help of my therapist.       Objective 1: (identify the means of measuring success in meeting the objective): My anxiety and panic will be at a minimal level.       Objective 2: (identify the means of measuring success in meeting the objective): n/a      Long Term Goal 2 (in the client's own words): n/a    Stage of Change: n/a    Target Date for completion: n/a     Anticipated therapeutic modalities: n/a     People identified to complete this goal: n/a      Objective 1: (identify the means of measuring success in meeting the objective): n/a      Objective 2: (identify the means of measuring success in meeting the objective): n/a     Long Term Goal 3 (in the client's own words): n/a    Stage of Change: n/a    Target Date for completion: n/a     Anticipated therapeutic modalities: n/a     People identified to complete this goal: n/a      Objective 1: (identify the means of measuring success in meeting the objective): n/a      Objective 2: (identify the means of measuring success in  meeting the objective): n/a     I am currently under the care of a [facility] psychiatric provider: yes    My [facility] psychiatric provider is: Dr. Gonzalez    I am currently taking psychiatric medications: Yes, as prescribed    I feel that I will be ready for discharge from mental health care when I reach the following (measurable goal/objective): when my symptoms are at a minimal    For children and adults who have a legal guardian:   Has there been any change to custody orders and/or guardianship status? NA. If yes, attach updated documentation.    I have created my Crisis Plan and have been offered a copy of this plan    Behavioral Health Treatment Plan  Luke: Diagnosis and Treatment Plan explained to Marco Antonio Oro acknowledges an understanding of their diagnosis. Marco Antonio Oro agrees to this treatment plan.    I have been offered a copy of this Treatment Plan. yes

## 2024-02-21 NOTE — PSYCH
"Behavioral Health Psychotherapy Progress Note    Psychotherapy Provided: Individual Psychotherapy     1. Bipolar 1 disorder, depressed, moderate (HCC)        2. Posttraumatic stress disorder        3. Panic disorder with agoraphobia            Goals addressed in session: Goal 1     DATA: Marco Antonio arrived for his session. He discussed a new person he met on line and he discussed the date. He went into the reasons why he will never see her again. He then discussed a person who needed a sponsor but who he had to fire himself from due to the client's uncooperativeness. He met another woman he will be going on a date with. His anxiety and panic are in his words better. He remains in recovery, has completed his bachelors and will be going out on a date. I provided support, encouragement and ongoing strategies to keep his anxiety low.   During this session, this clinician used the following therapeutic modalities: Client-centered Therapy, Cognitive Behavioral Therapy, Mindfulness-based Strategies, and Supportive Psychotherapy    Substance Abuse was addressed during this session. If the client is diagnosed with a co-occurring substance use disorder, please indicate any changes in the frequency or amount of use: Because he discussed his ongoing recovery. . Stage of change for addressing substance use diagnoses: No substance use/Not applicable    ASSESSMENT:  Marco Antonio Oro presents with a Euthymic/ normal mood.     his affect is Normal range and intensity, which is congruent, with his mood and the content of the session. The client has made progress on their goals.     Marco Antonio Oro presents with a none risk of suicide, none risk of self-harm, and none risk of harm to others.    For any risk assessment that surpasses a \"low\" rating, a safety plan must be developed.    A safety plan was indicated: no  If yes, describe in detail n/a    PLAN: Between sessions, Marco Antonio Oro will use mindfulness and CBT. At " the next session, the therapist will use Client-centered Therapy, Cognitive Behavioral Therapy, Mindfulness-based Strategies, and Supportive Psychotherapy to address issues and symptoms as they may arise. .    Behavioral Health Treatment Plan and Discharge Planning: Marco Antonio Oro is aware of and agrees to continue to work on their treatment plan. They have identified and are working toward their discharge goals. yes    Visit start and stop times:    02/21/24  Start Time: 1600  Stop Time: 1700  Total Visit Time: 60 minutes

## 2024-02-29 ENCOUNTER — SOCIAL WORK (OUTPATIENT)
Dept: BEHAVIORAL/MENTAL HEALTH CLINIC | Facility: CLINIC | Age: 44
End: 2024-02-29

## 2024-02-29 DIAGNOSIS — F40.01 PANIC DISORDER WITH AGORAPHOBIA: ICD-10-CM

## 2024-02-29 DIAGNOSIS — F43.10 POSTTRAUMATIC STRESS DISORDER: ICD-10-CM

## 2024-02-29 DIAGNOSIS — F31.32 BIPOLAR 1 DISORDER, DEPRESSED, MODERATE (HCC): Primary | ICD-10-CM

## 2024-02-29 NOTE — PSYCH
"Behavioral Health Psychotherapy Progress Note    Psychotherapy Provided: Individual Psychotherapy     1. Bipolar 1 disorder, depressed, moderate (HCC)        2. Posttraumatic stress disorder        3. Panic disorder with agoraphobia            Goals addressed in session: Goal 1     DATA: Marco Antonio arrived for his session. He has drawn boundaries with dysfunctional women he has met. He then discussed his niece's upcoming 16 year celebration party. He discussed he is continuing working his recovery and he appreciates his self worth and will not sign on to people who are a project. I provided support , encouragement and strategies to cope. He is doing service work for AA.  During this session, this clinician used the following therapeutic modalities: Client-centered Therapy, Cognitive Behavioral Therapy, Mindfulness-based Strategies, and Supportive Psychotherapy    Substance Abuse was not addressed during this session. If the client is diagnosed with a co-occurring substance use disorder, please indicate any changes in the frequency or amount of use: He remains in recovery. . Stage of change for addressing substance use diagnoses: No substance use/Not applicable    ASSESSMENT:  Marco Antonio Oro presents with a Euthymic/ normal mood.     his affect is Normal range and intensity, which is congruent, with his mood and the content of the session. The client has made progress on their goals.     Marco Antonio Oro presents with a none risk of suicide, none risk of self-harm, and none risk of harm to others.    For any risk assessment that surpasses a \"low\" rating, a safety plan must be developed.    A safety plan was indicated: no  If yes, describe in detail n/a    PLAN: Between sessions, Marco Antonio Oro will use mindfulness. At the next session, the therapist will use Client-centered Therapy, Cognitive Behavioral Therapy, Mindfulness-based Strategies, and Supportive Psychotherapy to address issues and symptoms as " they may arise. .    Behavioral Health Treatment Plan and Discharge Planning: Marco Antonio Oro is aware of and agrees to continue to work on their treatment plan. They have identified and are working toward their discharge goals. yes    Visit start and stop times:    02/29/24  Start Time: 1500  Stop Time: 1600  Total Visit Time: 60 minutes

## 2024-03-01 DIAGNOSIS — F31.32 BIPOLAR 1 DISORDER, DEPRESSED, MODERATE (HCC): ICD-10-CM

## 2024-03-01 RX ORDER — LISINOPRIL 10 MG/1
TABLET ORAL
Qty: 90 TABLET | Refills: 0 | Status: SHIPPED | OUTPATIENT
Start: 2024-03-01

## 2024-03-06 ENCOUNTER — SOCIAL WORK (OUTPATIENT)
Dept: BEHAVIORAL/MENTAL HEALTH CLINIC | Facility: CLINIC | Age: 44
End: 2024-03-06
Payer: MEDICARE

## 2024-03-06 DIAGNOSIS — I10 PRIMARY HYPERTENSION: ICD-10-CM

## 2024-03-06 DIAGNOSIS — F31.64 BIPOLAR DISORDER, CURR EPISODE MIXED, SEVERE, WITH PSYCHOTIC FEATURES (HCC): ICD-10-CM

## 2024-03-06 DIAGNOSIS — F43.10 POSTTRAUMATIC STRESS DISORDER: ICD-10-CM

## 2024-03-06 DIAGNOSIS — F40.01 PANIC DISORDER WITH AGORAPHOBIA: ICD-10-CM

## 2024-03-06 DIAGNOSIS — F31.32 BIPOLAR 1 DISORDER, DEPRESSED, MODERATE (HCC): Primary | ICD-10-CM

## 2024-03-06 PROCEDURE — 90837 PSYTX W PT 60 MINUTES: CPT | Performed by: SOCIAL WORKER

## 2024-03-06 RX ORDER — AMLODIPINE BESYLATE 10 MG/1
TABLET ORAL
Qty: 90 TABLET | Refills: 0 | Status: SHIPPED | OUTPATIENT
Start: 2024-03-06

## 2024-03-06 RX ORDER — OLANZAPINE 10 MG/1
10 TABLET ORAL
Qty: 90 TABLET | Refills: 0 | Status: SHIPPED | OUTPATIENT
Start: 2024-03-06

## 2024-03-06 NOTE — PSYCH
"Behavioral Health Psychotherapy Progress Note    Psychotherapy Provided: Individual Psychotherapy     1. Bipolar 1 disorder, depressed, moderate (HCC)        2. Posttraumatic stress disorder        3. Panic disorder with agoraphobia            Goals addressed in session: Goal 1     DATA: Marco Antonio continues to draw boundaries with a dysfunctional woman whom he had recently met. He continues to stay in sobriety and he continues to draw boundaries with dysfunctional people. I provided support, encouragement, strategies to cope and relapse prevention strategies.   During this session, this clinician used the following therapeutic modalities: Client-centered Therapy, Cognitive Behavioral Therapy, Mindfulness-based Strategies, and Supportive Psychotherapy along with relapse prevention strategies.    Substance Abuse was addressed during this session. If the client is diagnosed with a co-occurring substance use disorder, please indicate any changes in the frequency or amount of use: He remains sober.. Stage of change for addressing substance use diagnoses: No substance use/Not applicable    ASSESSMENT:  Marco Antonio Oro presents with a Euthymic/ normal mood.     his affect is Normal range and intensity, which is congruent, with his mood and the content of the session. The client has made progress on their goals.     Marco Antonio Oro presents with a none risk of suicide, none risk of self-harm, and none risk of harm to others.    For any risk assessment that surpasses a \"low\" rating, a safety plan must be developed.    A safety plan was indicated: no  If yes, describe in detail n/a    PLAN: Between sessions, Marco Antonio Oro will use mindfulness, CBT and relapse prevention strategies. . At the next session, the therapist will use Client-centered Therapy, Cognitive Behavioral Therapy, Mindfulness-based Strategies, and Supportive Psychotherapy ,relapse prevention strategies to address issues and symptoms as they may " arise.     Behavioral Health Treatment Plan and Discharge Planning: Marco Antonio Oro is aware of and agrees to continue to work on their treatment plan. They have identified and are working toward their discharge goals. yes    Visit start and stop times:    03/06/24  Start Time: 1000  Stop Time: 1100  Total Visit Time: 60 minutes

## 2024-04-01 ENCOUNTER — SOCIAL WORK (OUTPATIENT)
Dept: BEHAVIORAL/MENTAL HEALTH CLINIC | Facility: CLINIC | Age: 44
End: 2024-04-01

## 2024-04-01 DIAGNOSIS — F31.32 BIPOLAR 1 DISORDER, DEPRESSED, MODERATE (HCC): Primary | ICD-10-CM

## 2024-04-01 DIAGNOSIS — F43.10 POSTTRAUMATIC STRESS DISORDER: ICD-10-CM

## 2024-04-01 DIAGNOSIS — F40.01 PANIC DISORDER WITH AGORAPHOBIA: ICD-10-CM

## 2024-04-01 NOTE — PSYCH
"Behavioral Health Psychotherapy Progress Note    Psychotherapy Provided: Individual Psychotherapy     1. Bipolar 1 disorder, depressed, moderate (HCC)        2. Posttraumatic stress disorder        3. Panic disorder with agoraphobia            Goals addressed in session: Goal 1     DATA: Marco Antonio discussed his anxiety and his panic.He missed his niece's 16th due to getting sick. He is still in recovery and he is going to AA. He is starting to go out with a female friend and he is not sure where it stands ie just friends or dating. Marco Antonio of course is very anxious about this. He asked for a lot a assurances and we discussed him to take things slow.I provided support, encouragement and strategies to cope.   During this session, this clinician used the following therapeutic modalities: Client-centered Therapy, Cognitive Behavioral Therapy, Mindfulness-based Strategies, and Supportive Psychotherapy    Substance Abuse was not addressed during this session. If the client is diagnosed with a co-occurring substance use disorder, please indicate any changes in the frequency or amount of use: n/a. Stage of change for addressing substance use diagnoses: No substance use/Not applicable    ASSESSMENT:  Marco Antonio Oro presents with a Anxious mood.     his affect is anxious which is congruent, with his mood and the content of the session. The client has made progress on their goals.     Marco Antonio Oro presents with a none risk of suicide, none risk of self-harm, and none risk of harm to others.    For any risk assessment that surpasses a \"low\" rating, a safety plan must be developed.    A safety plan was indicated: no  If yes, describe in detail n/a    PLAN: Between sessions, Marco Antonio Oro will use mindfulness and CBT. At the next session, the therapist will use Client-centered Therapy, Cognitive Behavioral Therapy, Mindfulness-based Strategies, and Supportive Psychotherapy to address issues and symptoms as they " may arise. .    Behavioral Health Treatment Plan and Discharge Planning: Marco Antonio Oro is aware of and agrees to continue to work on their treatment plan. They have identified and are working toward their discharge goals. yes    Visit start and stop times:    04/01/24  Start Time: 1600  Stop Time: 1700  Total Visit Time: 60 minutes

## 2024-04-08 ENCOUNTER — TELEPHONE (OUTPATIENT)
Dept: PSYCHIATRY | Facility: CLINIC | Age: 44
End: 2024-04-08

## 2024-04-08 DIAGNOSIS — I10 PRIMARY HYPERTENSION: ICD-10-CM

## 2024-04-08 DIAGNOSIS — F31.32 BIPOLAR 1 DISORDER, DEPRESSED, MODERATE (HCC): ICD-10-CM

## 2024-04-08 DIAGNOSIS — F41.1 GAD (GENERALIZED ANXIETY DISORDER): ICD-10-CM

## 2024-04-08 DIAGNOSIS — F40.01 PANIC DISORDER WITH AGORAPHOBIA: ICD-10-CM

## 2024-04-08 NOTE — TELEPHONE ENCOUNTER
Medication Refill Request     Name of Medication klonopin  Dose/Frequency 0.5mg take 1 tablet by mouth 3 times a day  Quantity 90  Verified pharmacy   [x]  Verified ordering Provider   [x]  Does patient have enough for the next 3 days? Yes [x] No []  Does patient have a follow-up appointment scheduled? Yes [x] No []   If so when is appointment: 4/23/2024 10:30am

## 2024-04-08 NOTE — TELEPHONE ENCOUNTER
Patient called for medication refill request for Klonopin .5mg. Writer checked chart and noted 2 refills on file. Patient was not aware and will check with pharmacy. Any issues, patient will call back to re-order.

## 2024-04-09 RX ORDER — AMLODIPINE BESYLATE 10 MG/1
TABLET ORAL
Qty: 90 TABLET | Refills: 0 | Status: SHIPPED | OUTPATIENT
Start: 2024-04-09

## 2024-04-09 RX ORDER — LISINOPRIL 10 MG/1
TABLET ORAL
Qty: 90 TABLET | Refills: 0 | Status: SHIPPED | OUTPATIENT
Start: 2024-04-09

## 2024-04-09 RX ORDER — LAMOTRIGINE 25 MG/1
25 TABLET ORAL 2 TIMES DAILY
Qty: 180 TABLET | Refills: 0 | Status: SHIPPED | OUTPATIENT
Start: 2024-04-09

## 2024-04-09 RX ORDER — CLONAZEPAM 0.5 MG/1
0.5 TABLET ORAL 3 TIMES DAILY
Qty: 90 TABLET | Refills: 2 | Status: SHIPPED | OUTPATIENT
Start: 2024-04-09

## 2024-04-16 ENCOUNTER — SOCIAL WORK (OUTPATIENT)
Dept: BEHAVIORAL/MENTAL HEALTH CLINIC | Facility: CLINIC | Age: 44
End: 2024-04-16

## 2024-04-16 DIAGNOSIS — F43.10 POSTTRAUMATIC STRESS DISORDER: ICD-10-CM

## 2024-04-16 DIAGNOSIS — F31.32 BIPOLAR 1 DISORDER, DEPRESSED, MODERATE (HCC): Primary | ICD-10-CM

## 2024-04-16 DIAGNOSIS — F40.01 PANIC DISORDER WITH AGORAPHOBIA: ICD-10-CM

## 2024-04-16 NOTE — PSYCH
Behavioral Health Psychotherapy Progress Note    Psychotherapy Provided: Individual Psychotherapy     1. Bipolar 1 disorder, depressed, moderate (HCC)        2. Posttraumatic stress disorder        3. Panic disorder with agoraphobia            Goals addressed in session: Goal 1     DATA: Marco Antonio discussed his interaction with someone he thought perhaps he could have started a relationship with but he found out in his words she only thinks of him as a friend and she relapsed on alcohol and he realizes she is not healthy. Marco Antonio has his final art exhibit at [employer] May 4th to the 6th. He then discussed his sister who lived as an adult for 14 years with their parents rent free. She left her room in shambles when she moved out of their parents home. Marco Antonio told his dad I tried to tell you about her. He shared his parents complain to him but they wont do anything about it. Marco Antonio tried to tell his parents about her about the way she treated him and the fact he did not like how she treated them and they didn't want to hear it but yet they want to complain to him. Then again they the parents have done things to his brother, to Marco Antonio and Marco Antonio feels they are getting what they have sown. Marco Antonio also shared his nephew is messing up and Marco Antonio's brother enables him. He then discussed an additional nephew who is screwing up in his words. I provided support, encouragement, and strategies to cope.   During this session, this clinician used the following therapeutic modalities: Client-centered Therapy, Cognitive Behavioral Therapy, Mindfulness-based Strategies, and Supportive Psychotherapy    Substance Abuse was not addressed during this session. If the client is diagnosed with a co-occurring substance use disorder, please indicate any changes in the frequency or amount of use: n/a. Stage of change for addressing substance use diagnoses: No substance use/Not applicable    ASSESSMENT:  Marco Antonio Oro presents with a Angry mood.     his  "affect is angry, which is congruent, with his mood and the content of the session. The client has made progress on their goals.He is angry about how his sister treats his parents, and now how is nephew is totally messing up school and doing drugs.      Marco Antonio Oro presents with a none risk of suicide, none risk of self-harm, and none risk of harm to others.    For any risk assessment that surpasses a \"low\" rating, a safety plan must be developed.    A safety plan was indicated: no  If yes, describe in detail n/a    PLAN: Between sessions, Marco Antonio Oro will use mindfulness and CBT. At the next session, the therapist will use Client-centered Therapy, Cognitive Behavioral Therapy, Mindfulness-based Strategies, and Supportive Psychotherapy to address issues and symptoms as they may arise. .    Behavioral Health Treatment Plan and Discharge Planning: Marco Antonio Oro is aware of and agrees to continue to work on their treatment plan. They have identified and are working toward their discharge goals. yes    Visit start and stop times:    04/16/24  Start Time: 1500  Stop Time: 1600  Total Visit Time: 60 minutes  "

## 2024-04-23 ENCOUNTER — TELEPHONE (OUTPATIENT)
Dept: PSYCHIATRY | Facility: CLINIC | Age: 44
End: 2024-04-23

## 2024-04-23 NOTE — TELEPHONE ENCOUNTER
Patient is calling regarding cancelling an appointment.    Date/Time: 4/23 @10:30    Reason: N/A    Patient was rescheduled: YES [x] NO []  If yes, when was Patient reschedule for: 5/30 @2:30    Patient requesting call back to reschedule: YES [] NO [x]

## 2024-05-09 ENCOUNTER — SOCIAL WORK (OUTPATIENT)
Dept: BEHAVIORAL/MENTAL HEALTH CLINIC | Facility: CLINIC | Age: 44
End: 2024-05-09

## 2024-05-09 DIAGNOSIS — F31.32 BIPOLAR 1 DISORDER, DEPRESSED, MODERATE (HCC): Primary | ICD-10-CM

## 2024-05-09 DIAGNOSIS — F40.01 PANIC DISORDER WITH AGORAPHOBIA: ICD-10-CM

## 2024-05-09 DIAGNOSIS — F43.10 POSTTRAUMATIC STRESS DISORDER: ICD-10-CM

## 2024-05-09 NOTE — PSYCH
"Behavioral Health Psychotherapy Progress Note    Psychotherapy Provided: Individual Psychotherapy         Goals addressed in session: Goal 1     DATA: Marco Antonio arrived for his session. Marco Antonio received his Bachelors degree. He is coming up on 16 months. He discussed the dysfunction of his 2 brothers. He discussed recent interactions I still get panic mostly in stores and places I am new to. He can't explain it. His overall anxiety is low but he panics in stores, crowds, car washes and big open spaces. Yet he can go to movie theaters, restaurants, but stores panic him. He showed me some of his art pieces from [employer]. I provided support, encouragement and strategies to cope.   During this session, this clinician used the following therapeutic modalities: Client-centered Therapy, Cognitive Behavioral Therapy, Mindfulness-based Strategies, and Supportive Psychotherapy    Substance Abuse was not addressed during this session. If the client is diagnosed with a co-occurring substance use disorder, please indicate any changes in the frequency or amount of use:n/a Stage of change for addressing substance use diagnoses: No substance use/Not applicable    ASSESSMENT:  Marco Antonio Oro presents with a Euthymic/ normal mood.     his affect is Normal range and intensity, which is congruent, with his mood and the content of the session. The client has made progress on their goals.     Marco Antonio Oro presents with a none risk of suicide, none risk of self-harm, and none risk of harm to others.    For any risk assessment that surpasses a \"low\" rating, a safety plan must be developed.    A safety plan was indicated: no  If yes, describe in detail n/a    PLAN: Between sessions, Marco Antonio Oro will use mindfulness and CBT. At the next session, the therapist will use Client-centered Therapy, Cognitive Behavioral Therapy, Mindfulness-based Strategies, and Supportive Psychotherapy to address issues and symptoms as they may " arise. .    Behavioral Health Treatment Plan and Discharge Planning: Marco Antonio Oro is aware of and agrees to continue to work on their treatment plan. They have identified and are working toward their discharge goals. yes    Visit start and stop times:4pm till 5pm 53 plus    05/09/24

## 2024-05-23 ENCOUNTER — SOCIAL WORK (OUTPATIENT)
Dept: BEHAVIORAL/MENTAL HEALTH CLINIC | Facility: CLINIC | Age: 44
End: 2024-05-23

## 2024-05-23 DIAGNOSIS — F31.32 BIPOLAR 1 DISORDER, DEPRESSED, MODERATE (HCC): Primary | ICD-10-CM

## 2024-05-23 DIAGNOSIS — F43.10 POSTTRAUMATIC STRESS DISORDER: ICD-10-CM

## 2024-05-23 DIAGNOSIS — F40.01 PANIC DISORDER WITH AGORAPHOBIA: ICD-10-CM

## 2024-05-23 NOTE — PSYCH
"Behavioral Health Psychotherapy Progress Note    Psychotherapy Provided: Individual Psychotherapy     1. Bipolar 1 disorder, depressed, moderate (HCC)        2. Posttraumatic stress disorder        3. Panic disorder with agoraphobia            Goals addressed in session: Goal 1     DATA: Marco Antonio shared his one brother still has not apologized for a recent conflict they had. Marco Antonio shared he still gets anxiety and panic when he in his words goes into big box stores.He discussed he quit smoking again. Marco Antonio is seeing his parents more. However he shared his father at 79 still drinks a lot. Marco Antonio is active in AA and he is chairing one of the meetings. I try to encourage Marco Antonio to do more socially.   During this session, this clinician used the following therapeutic modalities: Client-centered Therapy, Cognitive Behavioral Therapy, Mindfulness-based Strategies, and Supportive Psychotherapy    Substance Abuse was addressed during this session. If the client is diagnosed with a co-occurring substance use disorder, please indicate any changes in the frequency or amount of use: He remains in recovery. Stage of change for addressing substance use diagnoses: Action    ASSESSMENT:  Marco Antonio Oro presents with a Anxious mood.     his affect is anxious which is congruent, with his mood and the content of the session. The client has made progress on their goals.     Marco Antonio Oro presents with a none risk of suicide, none risk of self-harm, and none risk of harm to others.    For any risk assessment that surpasses a \"low\" rating, a safety plan must be developed.    A safety plan was indicated: no  If yes, describe in detail n/a    PLAN: Between sessions, Marco Antonio Oro will use mindfulness and CBT. At the next session, the therapist will use Client-centered Therapy, Cognitive Behavioral Therapy, Mindfulness-based Strategies, and Supportive Psychotherapy to address .    Behavioral Health Treatment Plan and Discharge " Planning: Marco Antonio Oro is aware of and agrees to continue to work on their treatment plan. They have identified and are working toward their discharge goals. yes    Visit start and stop times:    05/23/24  Start Time: 1100  Stop Time: 1200  Total Visit Time: 60 minutes

## 2024-05-30 ENCOUNTER — OFFICE VISIT (OUTPATIENT)
Dept: PSYCHIATRY | Facility: CLINIC | Age: 44
End: 2024-05-30

## 2024-05-30 DIAGNOSIS — F43.10 POSTTRAUMATIC STRESS DISORDER: ICD-10-CM

## 2024-05-30 DIAGNOSIS — Z79.899 LONG TERM CURRENT USE OF ANTIPSYCHOTIC MEDICATION: ICD-10-CM

## 2024-05-30 DIAGNOSIS — F31.64 BIPOLAR DISORDER, CURR EPISODE MIXED, SEVERE, WITH PSYCHOTIC FEATURES (HCC): Primary | ICD-10-CM

## 2024-05-30 DIAGNOSIS — F40.01 PANIC DISORDER WITH AGORAPHOBIA: ICD-10-CM

## 2024-05-30 PROBLEM — L85.3 ASTEATOSIS CUTIS: Status: ACTIVE | Noted: 2024-01-26

## 2024-05-30 PROBLEM — B35.3 TINEA PEDIS: Status: ACTIVE | Noted: 2024-01-26

## 2024-05-30 PROBLEM — B35.1 ONYCHOMYCOSIS: Status: ACTIVE | Noted: 2024-01-26

## 2024-05-30 PROBLEM — M79.676 PAIN IN TOE: Status: ACTIVE | Noted: 2024-01-26

## 2024-05-30 NOTE — PSYCH
Visit Time    Visit Start Time: 2:30  Visit Stop Time: 3:00  Total Visit Duration:  30 minutes    Subjective: Medication Management      Patient ID: Marco Antonio Oro is a 43 y.o. male with Bipolar do and Panic Do and PTSD    HPI ROS Appetite Changes and Sleep: normal appetite, normal energy level, no weight change, and normal number of sleep hours    He remains compliant with medications and denies side effects.       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   He stated he has been experiencing frequent panic attacks, triggered outside in open spaces like grocery store or [employer] or [employer].   He is working on exposing to open spaces and avoid taking anxiety medication every time his anxiety is triggered.   He mentioned decreased libido, and delayed orgasm, not so much erectile dysfunction. He stated that has caused an anxiety about dating and will communicate if he feels he needs a medication to counteract the sexual side effects.   He is interested in working part time.  He does not want to lose his SSI benefits.   Will schedule follow up in  3 months  or sooner if needed.       Review Of Systems:     Mood Emotional Lability   Behavior Impulsive Behavior   Thought Content Disturbing Thoughts, Feelings   General Emotional Problems and Decreased Functioning   Personality Normal   Other Psych Symptoms Normal   Constitutional Negative   ENT Negative   Cardiovascular Negative   Respiratory Negative   Gastrointestinal Negative   Genitourinary Negative   Musculoskeletal Negative   Integumentary Negative   Neurological Negative   Endocrine Normal    Other Symptoms Normal        Laboratory Results: Recent Labs (last 12 months):   No visits with results within 12 Month(s) from this visit.   Latest known visit with results is:   Appointment on 09/07/2022   Component Date Value    Carbamazepine Lvl  09/07/2022 6.2          Substance Abuse History:  Social History     Substance and Sexual Activity   Drug Use No       Family Psychiatric History:   Family History   Problem Relation Age of Onset    Schizophrenia Father     Alcohol abuse Father     Drug abuse Brother        The following portions of the patient's history were reviewed and updated as appropriate: allergies, current medications, past family history, past medical history, past social history, past surgical history, and problem list.    Social History     Socioeconomic History    Marital status: Single     Spouse name: Not on file    Number of children: Not on file    Years of education: Not on file    Highest education level: Not on file   Occupational History    Not on file   Tobacco Use    Smoking status: Some Days    Smokeless tobacco: Never    Tobacco comments:     he smoke only 1 cigarette a day, quit 2 months ago   Substance and Sexual Activity    Alcohol use: No     Comment: last use 2012, took 3 of alcohol to help with panic attack    Drug use: No    Sexual activity: Not Currently   Other Topics Concern    Not on file   Social History Narrative    Not on file     Social Determinants of Health     Financial Resource Strain: Not on file   Food Insecurity: Not on file   Transportation Needs: Not on file   Physical Activity: Not on file   Stress: Not on file   Social Connections: Not on file   Intimate Partner Violence: Not on file   Housing Stability: Not on file     Social History     Social History Narrative    Not on file       Objective:       Mental status:  Appearance calm and cooperative , adequate hygiene and grooming, and good eye contact    Mood dysphoric   Affect affect was constricted   Speech a normal rate and fluent   Thought Processes coherent/organized and normal thought processes   Hallucinations no hallucinations present    Thought Content no delusions   Abnormal Thoughts no suicidal thoughts  and no homicidal thoughts     Orientation  oriented to person and place and time   Remote Memory short term memory intact and long term memory intact   Attention Span concentration intact   Intellect Appears to be of Average Intelligence   Insight Limited insight   Judgement judgment was limited   Muscle Strength Muscle strength and tone were normal and Normal gait    Language no difficulty naming common objects and no difficulty repeating a phrase    Fund of Knowledge displays adequate knowledge of current events, adequate fund of knowledge regarding past history, and adequate fund of knowledge regarding vocabulary                Assessment/Plan:       Diagnoses and all orders for this visit:    Bipolar disorder, curr episode mixed, severe, with psychotic features (HCC)    Panic disorder with agoraphobia    Posttraumatic stress disorder    Long term current use of antipsychotic medication  -     CBC and differential; Future  -     Comprehensive metabolic panel; Future  -     Lipid Panel with Direct LDL reflex; Future            Treatment Recommendations- Risks Benefits      Immediate Medical/Psychiatric/Psychotherapy Treatments and Any Precautions: continue current treatment     Risks, Benefits And Possible Side Effects Of Medications:  {PSYCH RISK, BENEFITS AND POSSIBLE SIDE EFFECTS (Optional):69295    Controlled Medication Discussion: Discussed with patient Black Box warning on concurrent use of benzodiazepines and opioid medications including sedation, respiratory depression, coma and death. Patient understands the risk of treatment with benzodiazepines in addition to opioids and wants to continue taking those medications. , Discussed with patient the risks of sedation, respiratory depression, impairment of ability to drive and potential for abuse and addiction related to treatment with benzodiazepine medications. The patient understands risk of treatment with benzodiazepine medications, agrees to not drive if feels impaired and agrees to  take medications as prescribed., and The patient has been filling controlled prescriptions on time as prescribed to [place] Prescription Drug Monitoring program.      Psychotherapy Provided: Individual psychotherapy provided.       Individual psychotherapy provided: Yes  Counseling was provided during the session today for 16 minutes.  Medications, treatment progress and treatment plan reviewed with Marco Antonio.  Medication education provided to Marco Antonio.  Coping strategies including compliance with medications, deep/slow breathing, eliminating avoidance, engaging in previously avoided activities, exercising, getting into a good routine, increasing energy, increasing interest in usual activities, increasing motivation, increasing social interaction, maintain healthy diet, maintain heathy sleeping hygiene, and maintain positive attitude reviewed with Marco Antonio.   Supportive therapy provided.

## 2024-06-04 ENCOUNTER — OFFICE VISIT (OUTPATIENT)
Dept: URGENT CARE | Age: 44
End: 2024-06-04
Payer: MEDICARE

## 2024-06-04 VITALS
RESPIRATION RATE: 16 BRPM | WEIGHT: 183 LBS | OXYGEN SATURATION: 93 % | SYSTOLIC BLOOD PRESSURE: 139 MMHG | DIASTOLIC BLOOD PRESSURE: 109 MMHG | HEIGHT: 66 IN | HEART RATE: 112 BPM | TEMPERATURE: 98.3 F | BODY MASS INDEX: 29.41 KG/M2

## 2024-06-04 DIAGNOSIS — R30.0 DYSURIA: Primary | ICD-10-CM

## 2024-06-04 DIAGNOSIS — R39.9 UTI SYMPTOMS: ICD-10-CM

## 2024-06-04 LAB
SL AMB  POCT GLUCOSE, UA: 100
SL AMB LEUKOCYTE ESTERASE,UA: ABNORMAL
SL AMB POCT BILIRUBIN,UA: ABNORMAL
SL AMB POCT BLOOD,UA: ABNORMAL
SL AMB POCT CLARITY,UA: CLEAR
SL AMB POCT COLOR,UA: ABNORMAL
SL AMB POCT KETONES,UA: ABNORMAL
SL AMB POCT NITRITE,UA: ABNORMAL
SL AMB POCT PH,UA: 6.5
SL AMB POCT SPECIFIC GRAVITY,UA: 1
SL AMB POCT URINE PROTEIN: ABNORMAL
SL AMB POCT UROBILINOGEN: ABNORMAL

## 2024-06-04 PROCEDURE — 87086 URINE CULTURE/COLONY COUNT: CPT

## 2024-06-04 PROCEDURE — 99214 OFFICE O/P EST MOD 30 MIN: CPT

## 2024-06-04 PROCEDURE — 81002 URINALYSIS NONAUTO W/O SCOPE: CPT

## 2024-06-04 PROCEDURE — G0463 HOSPITAL OUTPT CLINIC VISIT: HCPCS

## 2024-06-04 RX ORDER — SULFAMETHOXAZOLE AND TRIMETHOPRIM 800; 160 MG/1; MG/1
1 TABLET ORAL EVERY 12 HOURS SCHEDULED
Qty: 14 TABLET | Refills: 0 | Status: SHIPPED | OUTPATIENT
Start: 2024-06-04 | End: 2024-06-11

## 2024-06-05 NOTE — PATIENT INSTRUCTIONS
Urine dip unremarkable in office (influenced by orange discoloration from Azo)-urine culture results pending.   GC/Chlamydia results pending.   Please begin antibiotics as directed, culture results may influence antibiotic choice.   Ensure good hydration.   Follow up with PCP if no relief within one week.   Go to ED for fever, flank pain, worsening symptoms.

## 2024-06-05 NOTE — PROGRESS NOTES
[facility]        NAME: Marco Antonio Oro is a 43 y.o. male  : 1980    MRN: [id number]  DATE: 2024  TIME: 9:09 PM    Assessment and Plan   Dysuria [R30.0]  1. Dysuria  sulfamethoxazole-trimethoprim (BACTRIM DS) 800-160 mg per tablet    Chlamydia/GC amplified DNA by PCR      2. UTI symptoms  POCT urine dip    Urine culture    Urine culture      Urine dip unremarkable in office (influenced by orange discoloration from Azo)-urine culture results pending.   GC/Chlamydia results pending.   Please begin antibiotics as directed, culture results may influence antibiotic choice.   Ensure good hydration.   Follow up with PCP if no relief within one week.   Go to ED for fever, flank pain, worsening symptoms.       Patient Instructions     Dysuria   WHAT YOU NEED TO KNOW:   Dysuria is difficulty urinating, or pain, burning, or discomfort with urination. Dysuria is usually a symptom of another problem.   DISCHARGE INSTRUCTIONS:   Return to the emergency department if:   You have severe back, side, or abdominal pain.      You have fever and shaking chills.      You vomit several times in a row.     Contact your healthcare provider if:   Your symptoms do not go away, even after treatment.      You have questions or concerns about your condition or care.     Medicines:   Medicines  may be given to help treat a bacterial infection or help decrease bladder spasms.     Take your medicine as directed.  Contact your healthcare provider if you think your medicine is not helping or if you have side effects. Tell your provider if you are allergic to any medicine. Keep a list of the medicines, vitamins, and herbs you take. Include the amounts, and when and why you take them. Bring the list or the pill bottles to follow-up visits. Carry your medicine list with you in case of an emergency.     Follow up with your healthcare provider as directed:  Your healthcare provider may also refer you to a urologist or  nephrologist to have additional testing. Write down your questions so you remember to ask them during your visits.   Manage your dysuria:   Drink more liquids.  Liquids help flush out bacteria that may be causing an infection. Ask your healthcare provider how much liquid to drink each day and which liquids are best for you.      Take sitz baths as directed.  Fill a bathtub with 4 to 6 inches of warm water. You may also use a sitz bath pan that fits over a toilet. Sit in the sitz bath for 20 minutes. Do this 2 to 3 times a day, or as directed. The warm water can help decrease pain and swelling.      © Copyright [employer] 2023 Information is for End User's use only and may not be sold, redistributed or otherwise used for commercial purposes.  The above information is an  only. It is not intended as medical advice for individual conditions or treatments. Talk to your doctor, nurse or pharmacist before following any medical regimen to see if it is safe and effective for you.          Follow up with PCP in 3-5 days.  Proceed to  ER if symptoms worsen.    Chief Complaint     Chief Complaint   Patient presents with    Possible UTI     Per patient, his symptoms started 2 days ago. He did an OTC urine test, which showed + UTI, then he took Azo per the pharmacists recommendation.  Burning with urination.          History of Present Illness       43 year old male with PMH significant for anxiety/depression, bipolar disorder, presents for evaluation of dysuria which began today. He called his pharmacy and was advised to begin taking Azo for his symptoms, which he has with some relief. He also reports improved hydration today. He believes that his symptoms are related to not showering for 2 months due to depression related to his bipolar disorder. He is circumcised. He denies fever, flank pain, abdominal pain, penile discharge, concern for STI.        Review of Systems   Review of Systems   Constitutional:   Negative for fatigue and fever.   HENT:  Negative for congestion, ear discharge, ear pain, postnasal drip, rhinorrhea, sinus pressure, sinus pain, sneezing and sore throat.    Eyes: Negative.  Negative for pain, discharge, redness and itching.   Respiratory: Negative.  Negative for apnea, cough, choking, chest tightness, shortness of breath, wheezing and stridor.    Cardiovascular: Negative.  Negative for chest pain and palpitations.   Gastrointestinal: Negative.  Negative for diarrhea, nausea and vomiting.   Endocrine: Negative.  Negative for polydipsia, polyphagia and polyuria.   Genitourinary:  Positive for dysuria. Negative for decreased urine volume, difficulty urinating, enuresis, flank pain, frequency, genital sores, hematuria, penile discharge, penile pain, penile swelling, scrotal swelling, testicular pain and urgency.   Musculoskeletal: Negative.  Negative for arthralgias, back pain, myalgias, neck pain and neck stiffness.   Skin: Negative.  Negative for color change and rash.   Allergic/Immunologic: Negative.  Negative for environmental allergies.   Neurological: Negative.  Negative for dizziness, facial asymmetry, light-headedness, numbness and headaches.   Hematological: Negative.  Negative for adenopathy.   Psychiatric/Behavioral: Negative.           Current Medications       Current Outpatient Medications:     amLODIPine (NORVASC) 10 mg tablet, TAKE 1 TABLET BY MOUTH EVERY DAY, Disp: 90 tablet, Rfl: 0    clonazePAM (KlonoPIN) 0.5 mg tablet, Take 1 tablet (0.5 mg total) by mouth 3 (three) times a day, Disp: 90 tablet, Rfl: 2    lamoTRIgine (LaMICtal) 25 mg tablet, TAKE 1 TABLET BY MOUTH TWICE A DAY, Disp: 180 tablet, Rfl: 0    lisinopril (ZESTRIL) 10 mg tablet, TAKE 1 TABLET BY MOUTH EVERY DAY IN THE EVENING, Disp: 90 tablet, Rfl: 0    OLANZapine (ZyPREXA) 10 mg tablet, TAKE 1 TABLET BY MOUTH DAILY AT BEDTIME, Disp: 90 tablet, Rfl: 0    sulfamethoxazole-trimethoprim (BACTRIM DS) 800-160 mg per tablet,  "Take 1 tablet by mouth every 12 (twelve) hours for 7 days, Disp: 14 tablet, Rfl: 0    hydroquinone 4 % cream, APPLY TO DARK SPOTS TWICE A DAY FOR 2 MONTHS, THEN HOLD FOR 2 WEEKS, THEN REPEAT AS NEEDED. (Patient not taking: Reported on 6/4/2024), Disp: , Rfl:     Current Allergies     Allergies as of 06/04/2024 - Reviewed 06/04/2024   Allergen Reaction Noted    Infliximab Other (See Comments) 07/15/2009    Other Other (See Comments) 01/23/2024    Penicillin g Other (See Comments) 01/23/2024    Penicillins Other (See Comments) 07/15/2009    Penicillin v Rash 11/01/2016            The following portions of the patient's history were reviewed and updated as appropriate: allergies, current medications, past family history, past medical history, past social history, past surgical history and problem list.     Past Medical History:   Diagnosis Date    Anxiety     Bipolar disorder (HCC)     Crohn's colitis (HCC)     Depression     Panic attack     Panic disorder     Psychiatric disorder     PTSD (post-traumatic stress disorder)     Sleep difficulties        Past Surgical History:   Procedure Laterality Date    HEMICOLECTOMY         Family History   Problem Relation Age of Onset    Schizophrenia Father     Alcohol abuse Father     Drug abuse Brother          Medications have been verified.        Objective   BP (!) 139/109 (BP Location: Left arm, Patient Position: Sitting)   Pulse (!) 112   Temp 98.3 °F (36.8 °C) (Tympanic)   Resp 16   Ht 5' 6\" (1.676 m)   Wt 83 kg (183 lb)   SpO2 93%   BMI 29.54 kg/m²        Physical Exam     Physical Exam  Vitals reviewed.   Constitutional:       General: He is not in acute distress.     Appearance: Normal appearance. He is not ill-appearing, toxic-appearing or diaphoretic.      Interventions: He is not intubated.  HENT:      Head: Normocephalic and atraumatic.      Right Ear: Tympanic membrane, ear canal and external ear normal. There is no impacted cerumen.      Left Ear: Tympanic " membrane, ear canal and external ear normal. There is no impacted cerumen.      Nose: Nose normal. No congestion or rhinorrhea.      Mouth/Throat:      Mouth: Mucous membranes are moist.      Pharynx: Oropharynx is clear. Uvula midline. No pharyngeal swelling, oropharyngeal exudate, posterior oropharyngeal erythema or uvula swelling.      Tonsils: No tonsillar exudate or tonsillar abscesses. 1+ on the right. 1+ on the left.   Eyes:      Extraocular Movements: Extraocular movements intact.      Conjunctiva/sclera: Conjunctivae normal.      Pupils: Pupils are equal, round, and reactive to light.   Cardiovascular:      Rate and Rhythm: Normal rate and regular rhythm.      Pulses: Normal pulses.      Heart sounds: Normal heart sounds, S1 normal and S2 normal. Heart sounds not distant. No murmur heard.     No friction rub. No gallop.   Pulmonary:      Effort: Pulmonary effort is normal. No tachypnea, bradypnea, accessory muscle usage, prolonged expiration, respiratory distress or retractions. He is not intubated.      Breath sounds: Normal breath sounds. No stridor, decreased air movement or transmitted upper airway sounds. No decreased breath sounds, wheezing, rhonchi or rales.   Chest:      Chest wall: No tenderness.   Abdominal:      General: Abdomen is flat.      Palpations: Abdomen is soft.      Tenderness: There is no abdominal tenderness. There is no right CVA tenderness or left CVA tenderness.   Musculoskeletal:         General: Normal range of motion.      Cervical back: Normal range of motion and neck supple. No rigidity or tenderness.   Lymphadenopathy:      Cervical: No cervical adenopathy.   Skin:     General: Skin is warm and dry.      Capillary Refill: Capillary refill takes less than 2 seconds.      Findings: No erythema.   Neurological:      General: No focal deficit present.      Mental Status: He is alert.   Psychiatric:         Mood and Affect: Mood normal.

## 2024-06-06 ENCOUNTER — SOCIAL WORK (OUTPATIENT)
Dept: BEHAVIORAL/MENTAL HEALTH CLINIC | Facility: CLINIC | Age: 44
End: 2024-06-06
Payer: MEDICARE

## 2024-06-06 DIAGNOSIS — F31.32 BIPOLAR 1 DISORDER, DEPRESSED, MODERATE (HCC): Primary | ICD-10-CM

## 2024-06-06 DIAGNOSIS — F43.10 POSTTRAUMATIC STRESS DISORDER: ICD-10-CM

## 2024-06-06 DIAGNOSIS — F40.01 PANIC DISORDER WITH AGORAPHOBIA: ICD-10-CM

## 2024-06-06 LAB — BACTERIA UR CULT: NORMAL

## 2024-06-06 PROCEDURE — 90837 PSYTX W PT 60 MINUTES: CPT | Performed by: SOCIAL WORKER

## 2024-06-06 NOTE — PSYCH
"Behavioral Health Psychotherapy Progress Note    Psychotherapy Provided: Individual Psychotherapy     1. Bipolar 1 disorder, depressed, moderate (HCC)        2. Posttraumatic stress disorder        3. Panic disorder with agoraphobia            Goals addressed in session: Goal 1     DATA: Marco Antonio arrived for his session. He discussed how his one brother still has not apologized for a past conflict. He is keeping in touch with friends. He shared he was recently diagnosed with a UTI. He claims his anxiety and panic are under control. He walks with his mom, still attends AA and is sober for 18 months. I provided support , encouragement and strategies to cope.He shared he still meets dysfunctional woman on the internet  During this session, this clinician used the following therapeutic modalities: Client-centered Therapy, Cognitive Behavioral Therapy, Mindfulness-based Strategies, and Supportive Psychotherapy    Substance Abuse was addressed during this session. If the client is diagnosed with a co-occurring substance use disorder, please indicate any changes in the frequency or amount of use: He is sober now 18 months.. Stage of change for addressing substance use diagnoses: No substance use/Not applicable    ASSESSMENT:  Marco Antonio Oro presents with a Euthymic/ normal mood.     his affect is Normal range and intensity, which is congruent, with his mood and the content of the session. The client has made progress on their goals.     Marco Antonio Oro presents with a none risk of suicide, none risk of self-harm, and none risk of harm to others.    For any risk assessment that surpasses a \"low\" rating, a safety plan must be developed.    A safety plan was indicated: no  If yes, describe in detail n/a    PLAN: Between sessions, Marco Antonio Oro will . At the next session, the therapist will use Client-centered Therapy, Cognitive Behavioral Therapy, Mindfulness-based Strategies, and Supportive Psychotherapy to " address issues and symptoms as they may arise.     Behavioral Health Treatment Plan and Discharge Planning: Marco Antonio Oro is aware of and agrees to continue to work on their treatment plan. They have identified and are working toward their discharge goals. yes    Visit start and stop times:    06/06/24  Start Time: 1000  Stop Time: 1100  Total Visit Time: 60 minutes

## 2024-06-08 DIAGNOSIS — F31.32 BIPOLAR 1 DISORDER, DEPRESSED, MODERATE (HCC): ICD-10-CM

## 2024-06-08 DIAGNOSIS — I10 PRIMARY HYPERTENSION: ICD-10-CM

## 2024-06-09 RX ORDER — LAMOTRIGINE 25 MG/1
25 TABLET ORAL 2 TIMES DAILY
Qty: 180 TABLET | Refills: 0 | Status: SHIPPED | OUTPATIENT
Start: 2024-06-09

## 2024-06-09 RX ORDER — LISINOPRIL 10 MG/1
TABLET ORAL
Qty: 90 TABLET | Refills: 0 | Status: SHIPPED | OUTPATIENT
Start: 2024-06-09

## 2024-06-09 RX ORDER — AMLODIPINE BESYLATE 10 MG/1
TABLET ORAL
Qty: 90 TABLET | Refills: 0 | Status: SHIPPED | OUTPATIENT
Start: 2024-06-09

## 2024-06-18 ENCOUNTER — TELEPHONE (OUTPATIENT)
Dept: PSYCHIATRY | Facility: CLINIC | Age: 44
End: 2024-06-18

## 2024-06-18 NOTE — TELEPHONE ENCOUNTER
Patient is calling regarding cancelling an appointment.    Date/Time: 8/30/2024 2pm    Reason: needs to see provider sooner regarding panic attack    Patient was rescheduled: YES [x] NO []  If yes, when was Patient reschedule for: 8/6/2024 2:30pm    Patient requesting call back to reschedule: YES [] NO [x]

## 2024-06-19 ENCOUNTER — TELEPHONE (OUTPATIENT)
Dept: PSYCHIATRY | Facility: CLINIC | Age: 44
End: 2024-06-19

## 2024-06-19 NOTE — TELEPHONE ENCOUNTER
Patient called and stated that he is agoraphobic and that lately he has been having more panic attacks and he is having a hard time functioning on his own and often needs someone with him when he goes out, patient stated he needs more help than he is getting thru therapy and would like to do IOP but needs a referral for the program and would like to know if provider can make the referral

## 2024-06-20 ENCOUNTER — SOCIAL WORK (OUTPATIENT)
Dept: BEHAVIORAL/MENTAL HEALTH CLINIC | Facility: CLINIC | Age: 44
End: 2024-06-20
Payer: MEDICARE

## 2024-06-20 ENCOUNTER — TELEPHONE (OUTPATIENT)
Dept: PSYCHOLOGY | Facility: CLINIC | Age: 44
End: 2024-06-20

## 2024-06-20 DIAGNOSIS — F40.01 PANIC DISORDER WITH AGORAPHOBIA: ICD-10-CM

## 2024-06-20 DIAGNOSIS — F31.32 BIPOLAR 1 DISORDER, DEPRESSED, MODERATE (HCC): Primary | ICD-10-CM

## 2024-06-20 DIAGNOSIS — F43.10 POSTTRAUMATIC STRESS DISORDER: ICD-10-CM

## 2024-06-20 PROCEDURE — 90837 PSYTX W PT 60 MINUTES: CPT | Performed by: SOCIAL WORKER

## 2024-06-20 NOTE — PSYCH
[facility] Insurance Authorization for Treatment       Subjective:      Patient ID: Marco Antonio Oro is a 43 y.o. male.    Marco Antonio Oro has Medicare as his Primary insurance. No precert required. Marco Antonio is aware of medicare requirements for billing.     Therapist: BONNY Chau   Tx Plan Objective: 1.0

## 2024-06-20 NOTE — PSYCH
Behavioral Health Psychotherapy Progress Note    Psychotherapy Provided: Individual Psychotherapy     1. Bipolar 1 disorder, depressed, moderate (HCC)        2. Posttraumatic stress disorder        3. Panic disorder with agoraphobia            Goals addressed in session: Goal 1     DATA: Marco Antonio arrived for his session. Last week at [facility] he reports he had such a bad panic attack he ran out of the store without making his purchase and there was another time at [facility] where he felt he was going to faint. Then he had panic attacks 5 times at [employer] and recently at an AA meeting. He made coffee for AA and he once spoke but now he had a panic attack at a recent meeting. He is back to smoking up to two to three a day due to the stress. He starts [facility] tomorrow at 8:15am. Marco Antonio called here for Dr. Harding and was told by staff that nothing could be done until his appointment in August. He shared he was having an emergency and he shared no options were offered. I provided support, encouragement and strategies to cope. Based what he told me today I would have recommended partial today. The staff should have told him to call crisis intervention, go to the ER, or to call the undersigned when he called here. He is upset no options were offered to him. I went on record telling him again that if he ever has a crisis and can't reach someone here he is to call crisis or go to the nearest emergency room.   During this session, this clinician used the following therapeutic modalities: Client-centered Therapy, Cognitive Behavioral Therapy, Mindfulness-based Strategies, and Supportive Psychotherapy    Substance Abuse was not addressed during this session. If the client is diagnosed with a co-occurring substance use disorder, please indicate any changes in the frequency or amount of use: He is in recovery. . Stage of change for addressing substance use diagnoses: No substance use/Not applicable    ASSESSMENT:  Marco Antonio LINK  "Shorty presents with a Anxious and Depressed mood.     his affect is highly anxious which is congruent, with his mood and the content of the session. The client recently is regressing and his panic attacks are worsening.      Marco Antonio Oro presents with a none risk of suicide, none risk of self-harm, and none risk of harm to others.However he is very upset that when he called here in crisis nothing was recommended to him. Because he was in partial years ago he knew enough to call and will be admitted 6/21/24.     For any risk assessment that surpasses a \"low\" rating, a safety plan must be developed.    A safety plan was indicated: no  If yes, describe in detail n/a    PLAN: Between sessions, Marco Antonio Oro will use mindfulness and CBT. At the next session, the therapist will use Client-centered Therapy, Cognitive Behavioral Therapy, Mindfulness-based Strategies, and Supportive Psychotherapy to address issues and symptoms as they may arise. .    Behavioral Health Treatment Plan and Discharge Planning: Marco Antonio Oro is aware of and agrees to continue to work on their treatment plan. They have identified and are working toward their discharge goals. yes    Visit start and stop times:    06/20/24  Start Time: 1500  Stop Time: 1600  Total Visit Time: 60 minutes  "

## 2024-06-20 NOTE — PSYCH
Subjective:     Patient ID: Marco Antonio Oro is a 43 y.o. male     [facility] Clinical Progress Notes      Specialized Services Documentation  Therapist must complete separate progress note for each specific clinical activity in which the individual participated during the day.     Case Management Note    BONNY Chau     Current suicide risk : Low    ([id number]) Met with Marco Antonio Oro at [facility]. Reviewed program, initial paperwork reviewed: Consent for Treatment, PHP handbook, HIPPA, General Consent, Client Bill of Rights, and Smoking/Drug and Alcohol Policy. Release of Information obtained for emergency contact - [employer] - [phone]  and PCP and Health Care Coordination Form. Marco Antonio Oro declined hard copies of all paperwork and verbally gave consent. Reviewed and given on call number. PCP notified of admission and health care coordination form sent. Completed initial psycho-social evaluation and initial treatment goals discussed.    ([phone]) met with Marco Antonio LINK Leeal to review and sign tx plan. He is agreeable. Declined hard copy.     Medications changes/added/denied? No - See Dr. Rosa Lea's Note     Treatment session number: 1    Individual Case Management Visit provided today? yes    [facility] follow up physician's orders: Admit to PHP - See Dr. Rosa Bhatt's note

## 2024-06-20 NOTE — PSYCH
Assessment/Plan:      There are no diagnoses linked to this encounter.        1. Bipolar 1 disorder, depressed, moderate (HCC)        2. Posttraumatic stress disorder        3. Panic disorder with agoraphobia             Subjective:     Patient ID: Marco Antonio Oro 43 y.o. male     HPI:     Per Dr. Rosa Lea MD: Marco Antonio Oro is a 43 y.o. male with Bipolar disorder, panic disorder with agoraphobia, posttraumatic stress disorder, asthma, hyper triglyceridemia  and Crohn's disease. referred by Dr. Gonzalez because he has increasing panic attacks, agoraphobia, having hard time functioning on his own and sometimes needs someone with him when he goes out. Onset of symptoms was  a few weeks ago with gradually worsening course since that time. Psychosocial Stressors: Mental health. He states that he has been developing panic episodes more often, without any new stressor. He states that has been trying to go in the stores but is not able to pass the entrance when he developed a panic attack. He states this is affecting his life because he is scare to go out by himself. He stay home all the time only goes to his medical appointments. He goes to the store if his brother accompany him. .Stay he feels safe at home. He takes his medications as prescribe, has support from his brother, talks to some friends in the phone. TodayMarco Antonio Oro feels anxious, hopeless, decreased energy, decreased concentration. He denies any suicidal or homicidal ideation, plan or intent.  He denies any hallucinations or paranoid thinking.  He is looking forward to do the partial program and learn coping skills.     Per this writer: Marco Antonio Oro is a 43 y.o. male referred by his psychiatrist, Dr. Gonzalez due to increased panic attacks, having difficulty functioning and leaving his house. When he has to go out he has to have someone go with him. Marco Antonio Oro is domiciled at home with his best friend,  "Shane whom he considers a brother. They currently do not work and receives SSDI. He recently graduated with a Bachelor's degree from [employer] with honors in Graphic Design.  Marco Antonio Oro reports symptoms began a few weeks ago when he had a panic attack in [facility]. He went to purchase Pembe Panjur wiper fluid and had to run out. He was unable to purchase the bottle. He stated that when he went into the store the open space overwhelmed him. He was able to do breathing techniques in the car and drive safely home. He is unable to identify any precipitating events or major stressors. He reports officially his agoraphobia began about 4 years ago.  Marco Antonio Oro reports the following associated symptoms, including depressed mood, hopelessness, low energy, low motivation, suicidal behavior, ruminations, negative thoughts and daily anxiety symptoms, panic attacks, agoraphobia Marco Antonio Oro reports his anxiety today an 8 out of 10. Marco Antonio reports his family is not supportive now. They used to be, but they \"went their separate ways,\" and they do not  fully believe in mental health. Marco Antonio Oro denies any suicidal ideation without plan or intent. Marco Antonio Oro denies homicidal ideation without plan or intent. Marco Antonio would like to work on understanding his panic and learning coping skills. His PHQ-9 is a 15       Marco Antonio Oro's psychosocial stressors: social difficulties, chronic anxiety, and financial as he is not working.    Per Marco Antonio Oro: \"My mind goes different ways each day,\" \"I don't go out of my house alone,\" \"I'm not really living life,\" \"it cost me so much.\"    Strengths: \"I'm a very nice berry,\" \"I'm a very caring individual,\" \"I have strong ana,\" \"I'm outgoing and humble.\"     Reason for evaluation and partial hospitalization as an alternative to inpatient hospitalization PHP is medically necessary to prevent hospitalization as outpatient care has " "been unable to stabilize Marco Antonio Oro and a greater intensity of treatment is indicated. Milieu therapy to monitor for medication needs, provide wellness tools education and offer opportunity to share and connect to others. Group therapy, case management, psychiatric medication management, family contact and UR as indicated. ELOS 10 treatment days.      Previous Psychiatric/psychological treatment/year:     Past Inpatient Psychiatric Treatment:   In Patient he had a prior inpatient psych admission at [facility], he also was in [facility] on 2019.  He was in [facility] in the past  and [facility] partial program on 2017,2018 and 2019.   Past Outpatient Psychiatric Treatment:    He follows with [facility] has a psychiatrist and a therapist  Past Suicide Attempts:              no  Past Violent Behavior:              no  Past Psychiatric Medication Trials:              Prozac, Paxil, Trazodone, Depakote, Tegretol, Lamictal, Lithium, Zyprexa, Latuda, Atarax, Klonopin, Xanax, and Ativan      PSYCHIATRIST:  [address] [address]   PH: [phone]   F: [phone]    THERAPIST:  [address] [address]   PH: [phone]   F: [phone]    Problem Assessment:     SOCIAL/VOCATION:  Family Constellation (include parents, relationship with each and pertinent Psych/Medical History):     Family History   Problem Relation Age of Onset    Schizophrenia Father     Alcohol abuse Father     Psychiatric Illness Sister     Drug abuse Brother     Completed Suicide  Neg Hx       Marco Antonio does not have any children or a spouse or partner.     Mother: Deanne Linares - Reports she is not supportive. She constantly asks if \"he has taken his medication.\"   Father: Not supportive    Sibling: Getachew   Sibling: Osorio  Sibling: Heavenly, shaina Bernard is the second youngest " "sibling        Who is the person you relate to best \"people with disabilities and who are smart\".  Marco Antonio Oro lives with his friend Shane .   Legal Guardian (for individuals under 18): n/a  Family Factors impacting discharge planning (for individuals under 18): n/a    Trauma/Abuse/ Domestic Violence History:  From previous note from last PHP Stay:.\"No past history of domestic violence and There is no history of child abuse reported to this writer.  Marco Antonio has experienced significant health issues related to Crohn's disease requiring a slew of surgeries and a colostomy bag.  Marco Antonio states that recovering from and experiencing all of the health issues was a difficult experience for him at the age of [age]\"         Additional Comments related to family/relationships/peer support: healthy and supportive / good support system, poor relationship with parents, and limited support with siblings     Work History (strengths/limitations/needs): disability     Their highest grade level achieved was: graduate with Honors and has a bachelors in graphic design      history includes: n/a    Financial status includes low income    LEISURE ASSESSMENT (Include past and present hobbies/interests and level of involvement (Ex: Group/Club Affiliations): Likes music/going to concerts, collects graphic novels an comics.     Their primary language is English. Preferred language is English.Ethnic considerations are . Religions affiliations and level of involvement Muslim .     FUNCTIONAL STATUS: There has been a recent change in the patient's ability to do the following: does not need van service    Level of Assistance Needed/By Whom?: n/a    Marco Antonio Oro learns best by  reading, listening, and demonstration    SUBSTANCE ABUSE ASSESSMENT: Per today's encounter no substance abuse reported to this writer. HOWEVER, per last note from last PHP stay in 2019 mentioned recreationally using cocaine in his past and " chart indicates an opioid addiction in remission.       Do you currently smoke cigarettes/vape nicotine?  Yes  - Marco Antonio has stints of quitting and will begin again. He recently started smoking cigarettes again due to stress. Will smoke 1 - 3 cigarettes / day.     Substance/Route/Age/Amount/Frequency/Last Use: cigarettes     Previous detox/rehab treatment: Attends AA has been sober for 18 months      HEALTH ASSESSMENT: no referral to PCP needed -  Marco Antonio reported a significant health history including surgeries to alleviate Crohn's disease when he was 28 years old.  Currently reports no symptoms due to a healthy eating habit and physical activity.        Primary Care Physician:   Durga Mccarthy MD  [address] [address]  [phone]    If None on file providers offered: On file   Date of Last Physical: Within a year if not within the last year, one has been recommended: N/A    NUTRITION SCREENING:  Do you have any food allergies: No   Weight loss or gain of 10 pounds or more in the last 3 months: No  Decrease in appetite and/or food intake: No  Dental issues impacting nutrition: No  Binging or restricting patterns: No  Past treatment for an eating disorder: No  Level of nutrition needs: Yes = 1 point; No = 0   Total: 0  none (0)- low (1-3) - moderate (4) - severe (5)   Action plan if moderate to severe: Referral to:N\A      LEGAL: No Mental Health Advance Directive or Power of  on file    Risk Assessment:   The following ratings are based on my interview(s) with Marco Antonio over the last 45 minutes     Risk of Harm to Self:   Demographic risk factors include male  Historical Risk Factors include  previous psychiatric treatments  Recent Specific Risk Factors include worries about finances or work and agoraphobia/panic attacks    Risk of Harm to Others:   Demographic Risk Factors include male and unemployed  Historical Risk Factors include  none  Recent Specific Risk Factors  include multiple stressors    Access to Weapons:   Marco Antonio Oro has access to the following weapons: NONE. The following steps have been taken to ensure weapons are properly secured: n/a    Based on the above information, the client presents the following risk of harm to self or others: minimal    The following interventions are recommended:   no intervention changes    Notes regarding this Risk Assessment: Provided local Crisis Number to [facility] and Peer/Warm line to [facility]. 988 Crisis Hotline number also provided.     Review Of Systems:     Mood Anxiety and Depression   Behavior Normal    Thought Content Normal   General Decreased Functioning   Personality Normal   Other Psych Symptoms Normal   Constitutional Negative   ENT Negative   Cardiovascular Negative   Respiratory Negative   Gastrointestinal Negative   Genitourinary Negative   Musculoskeletal Negative   Integumentary Negative   Neurological Negative   Endocrine Normal    Other Symptoms Normal        Mental status:  Appearance calm and cooperative , adequate hygiene and grooming, and good eye contact    Mood depressed and anxious   Affect affect appropriate    Speech a normal rate   Thought Processes coherent/organized and normal thought processes   Hallucinations no hallucinations present    Thought Content no delusions   Abnormal Thoughts no suicidal thoughts  and no homicidal thoughts    Orientation  oriented to person and place and time   Remote Memory short term memory intact and long term memory intact   Attention Span concentration intact   Intellect Appears to be of Average Intelligence   Fund of Knowledge displays adequate knowledge of current events, adequate fund of knowledge regarding past history, and adequate fund of knowledge regarding vocabulary    Insight Insight fair   Judgement judgment was fair   Muscle Strength Muscle strength and tone were normal and Normal gait    Language no difficulty naming common objects, no  difficulty repeating a phrase , and no difficulty writing a sentence    Pain none   Pain Scale 0     DSM:     1. Bipolar 1 disorder, depressed, moderate (HCC)        2. Posttraumatic stress disorder        3. Panic disorder with agoraphobia              Plan:  Admit to PHP. Group Therapy, Case Management, Med Management, UR and family contact as indicated. ELOS 10 treatment Days. Refer to OP psychiatry and therapy, if needed.     Anticipated aftercare plan: OP Care - continue with providers.

## 2024-06-20 NOTE — TELEPHONE ENCOUNTER
Pt is confirmed for tomorrows PHP appt. Pt is aware of the dept address and number. Also stated to bring Ins card and photo ID to appt. Pt has been told to call back by 4pm if they need to cx appt

## 2024-06-20 NOTE — BH TREATMENT PLAN
"Subjective:      Patient ID: Marco Antonio Oro  is a 43 y.o. male    Assessment/Plan:     Diagnoses and all orders for this visit:    Bipolar 1 disorder, depressed, moderate (HCC)    Posttraumatic stress disorder    Panic disorder with agoraphobia      [facility] Treatment Plan     AREAS OF NEED: Marco Antonio Oro is experiencing symptoms related to their diagnoses of Bipolar 1 disorder, PTSD, and Panic Disorder with agoraphobia as evidenced by anxiousness, isolation, hopelessness, decreased energy, decreased concentration, and sleep disturbances due to mental health stressors.    Date Initiated: 06/21/24    Strengths: \"I'm a very nice berry,\" \"I'm a very caring individual,\" \"I have strong ana,\" \"I'm outgoing and humble.\"     LONG TERM GOAL:   Date Initiated: 06/21/24  1.0 While at [facility], I will engage in psychoeducational groups and practice skills that are aimed at improving my mental health, ability to cope and challenge myself, gain insights and skills to increase my quality of life  Target Date: 07/19/2024   Completion Date:       SHORT TERM OBJECTIVES:     Date Initiated: 06/21/24  1.1 I will explore learning about my triggers through: noticing signs from my body (stop to consider what just happened and the response), trace the roots (what was happening right before?), and get curious through befriending my emotions and notice ongoing patterns in order to increase my communication and decrease feelings of panic.  Revision Date:   Target Date: 07/02/2024   Completion Date:     Date Initiated: 06/21/24  1.2 I will identify the 5 pathways to hope and do 1 thing daily to work on increasing hopefulness. (Connection, goal setting, spirituality, nurturing, and doing routine things)   Revision Date:   Target Date: 07/02/2024  Completion Date:    Date Initiated: 06/21/24  1.3 I will take medications as prescribed and share questions and concerns if arise.    Revision Date:  Target Date: " 07/02/2024  Completion Date:     Date Initiated: 06/21/24  1.4 I will identify 3 ways my supports can assist in my wellness journey and use them if/when needed.    Revision Date:  Target Date: 07/02/2024  Completion Date:          8 DAY REVISION:    Date Initiated:  Revision Date:   Target Date:   Completion Date:      PSYCHIATRY:  Date Initiated:  06/21/24  Medication Management and Education      Revision Date:       The person(s) responsible for carrying out the plan is Dr. Rosa Lea MD & JAMES Painting     NURSING/SYMPTOM EDUCATION:  Date Initiated: 06/21/24       1.1, 1.2. 1.3, 1.4 Provide wellness/symptoms and skill education groups three to five days weekly to educate Marco Antonio Oro on signs and symptoms of diagnoses, skills to manage stressors, and medication questions that will be addressed by the treatment team.        Revision date:       The person(s) responsible for carrying out the plan is Jc Jose MS & Soumya Allen     PSYCHOLOGY:   Date Initiated: 06/21/24       1.1, 1.2, 1.4 Provide psychotherapy group 5 times per week to allow opportunity for Marco Antonio Oro  to explore stressors and ways of coping.   Revision Date:   The person(s) responsible for carrying out the plan is BONNY Leahy     ALLIED THERAPY:   Date Initiated: 06/21/24  1.1,1.2 Engage Marco Antonio Oro in AT group 5 times daily to encourage development and use of wellness tools to decrease symptoms and promote recovery through meaningful activity.  Revision Date:       The person(s) responsible for carrying out the plan is BALDEMAR Linares, BALDEMAR Hutson and FRANCY Chaudhari    CASE MANAGEMENT:   Date Initiated: 06/21/24      1.0 This  will meet with Henrique Shorty  3-4 times weekly to assess treatment progress, discharge planning, connection to community    supports and UR as indicated.  Revision Date:   The person(s) responsible for carrying out  the plan is BONNY Chau     TREATMENT REVIEW/COMMENTS:     DISCHARGE CRITERIA: Identify 3 signs of progress and complete relapse prevention plan.    DISCHARGE PLAN: Connect with identified outpatient providers.   Estimated Length of Stay: 10 treatment days      CLIENT COMMENTS / Please share your thoughts, feelings, need and/or experiences regarding your treatment plan with Staff.  Please see follow up note with comments.      Signatures can be found on [facility] Treatment plan consent form.

## 2024-06-21 ENCOUNTER — OFFICE VISIT (OUTPATIENT)
Dept: PSYCHIATRY | Facility: CLINIC | Age: 44
End: 2024-06-21
Payer: MEDICARE

## 2024-06-21 ENCOUNTER — OFFICE VISIT (OUTPATIENT)
Dept: PSYCHOLOGY | Facility: CLINIC | Age: 44
End: 2024-06-21
Payer: MEDICARE

## 2024-06-21 VITALS
BODY MASS INDEX: 29.7 KG/M2 | DIASTOLIC BLOOD PRESSURE: 74 MMHG | HEIGHT: 66 IN | WEIGHT: 184.8 LBS | SYSTOLIC BLOOD PRESSURE: 117 MMHG | RESPIRATION RATE: 16 BRPM | HEART RATE: 65 BPM

## 2024-06-21 DIAGNOSIS — F43.10 POSTTRAUMATIC STRESS DISORDER: ICD-10-CM

## 2024-06-21 DIAGNOSIS — F40.01 PANIC DISORDER WITH AGORAPHOBIA: Primary | ICD-10-CM

## 2024-06-21 DIAGNOSIS — F40.01 PANIC DISORDER WITH AGORAPHOBIA: ICD-10-CM

## 2024-06-21 DIAGNOSIS — F31.32 BIPOLAR 1 DISORDER, DEPRESSED, MODERATE (HCC): ICD-10-CM

## 2024-06-21 DIAGNOSIS — F31.32 BIPOLAR 1 DISORDER, DEPRESSED, MODERATE (HCC): Primary | ICD-10-CM

## 2024-06-21 PROCEDURE — G0176 OPPS/PHP;ACTIVITY THERAPY: HCPCS

## 2024-06-21 PROCEDURE — 90792 PSYCH DIAG EVAL W/MED SRVCS: CPT | Performed by: PSYCHIATRY & NEUROLOGY

## 2024-06-21 PROCEDURE — G0410 GRP PSYCH PARTIAL HOSP 45-50: HCPCS

## 2024-06-21 PROCEDURE — G0177 OPPS/PHP; TRAIN & EDUC SERV: HCPCS

## 2024-06-21 RX ORDER — PRENATAL VIT 91/IRON/FOLIC/DHA 28-975-200
COMBINATION PACKAGE (EA) ORAL 2 TIMES DAILY
COMMUNITY
Start: 2024-06-16 | End: 2024-06-30

## 2024-06-21 RX ORDER — TAMSULOSIN HYDROCHLORIDE 0.4 MG/1
0.4 CAPSULE ORAL
COMMUNITY
Start: 2024-06-16 | End: 2024-06-26

## 2024-06-21 NOTE — PSYCH
Group Psychotherapy        Group Therapy    Subjective:     Patient ID: Marco Antonio Oro 43 y.o.       This group was facilitated in a private office.  ([id number])Marco Antonio Oro actively engaged in psychoeducational group about self affirmations. The skill helps to maintain and regulate positive self affirmations and positive self image. Group discovered strategies and statements that help them to manage positive well being on a short term and long term basis. The group talked about understanding the purpose, and meaning of self affirmation in intellectual and emotional expression, regulation , and recognition, and how it affects themselves and others. Teaching on the emphasis of positive self affirmation and self-care, who the group can go to for help was brought up as well. Group was encouraged to ask questions in an open forum at the end of group. Positive progress displayed through engagement in topic, Marco Antonio spoke of how he should be more mindful that he has value to other people and he should appreciate his friends more and communicate more with them.  Marco Antonio Oro will continue to engage in psychotherapy to encourage positive self realization.  Treatment Plan Objective 1.1, 1.2, 1.3, 1.4 Therapist: Edu ESPINOZA Ed.

## 2024-06-21 NOTE — PSYCH
Subjective:    Patient ID: Marco Antonio Oro is a 43 y.o. male      [facility] Clinical Progress Notes      Specialized Services Documentation  Therapist must complete separate progress note for each specific clinical activity in which the individual participated during the day.       Allied Therapy ([id number]) Marco Antonio Oro was present for second half of group after completing intake process. The group engaged in the wellness assessment, which evaluates progress on several different areas of wellness/wellbeing: physical, emotional, cognitive, personal development, social, spiritual, and activities of daily living. Group members were asked to rate their progress and discuss areas that need work. By completing and discussing areas of progress and challenges, members are connected and reminded that, in their mental health struggle, they are not alone. Topics of discussion revolved around positive experiences within each area of wellness as well as the challenging aspects to wellness within their past week. Marco Antonio Oro was observed completing his wellness inventory and presented as receptive to readiness to learn. Positive interactions with group members observed. Continue psychotherapy and skills groups to encourage further exploration of needs, personal awareness, and wellness tools. Tx Plan Objective: 1.1, 1.2, 1.4, Therapist:  FRANCY Chaudhari    Education Therapy   [id number] Marco Antonio Oro was excused from morning assessment due to intake with .    [id number] Marco Antonio Oro engaged throughout the treatment day. Was engaged in learning related to Illness, Medication, and Wellness Tools. Staff utilized Verbal, Written, and Demonstration teaching methods.  Marco Antonio Oro shared area of learning and set a goal for outside of program to get out of his house by taking a car ride.      Tx Plan Objective: 1.1, 1.2, 1.4, Therapist:  FRANCY Chaudhari
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
## 2024-06-21 NOTE — PSYCH
Subjective:    Patient ID: Marco Antonio Oro is a 43 y.o. male      [facility] Clinical Progress Notes      Specialized Services Documentation  Therapist must complete separate progress note for each specific clinical activity in which the individual participated during the day.     Group Psychotherapy     [id number] Marco Antonio Oro participated actively in a psychotherapy group focused on Hope. The group discussed the difference between being hopeful and hopeless and that being hopeful is a state of mind that can be learned.  explained the steps for implementing hope in their lives- creating a goal, practicing agency/willpower and following through with a pathway.  engaged the group in discussion on identifying one hope they have and how they plan on properly following through with it using the three steps discussed. Marco Antonio stated their hope is to be adventurous as he used to be before he started to experience panic disorder. Great efforts towards progress goals which were displayed through note taking, participation in discussion, and engagement in topic. Continue to run daily group psychotherapy to meet treatment needs and to practice skills outside of program.        Tx Plan Objective: 1.1,1.2,1.4, Therapist:  KHARI Navarrete

## 2024-06-24 ENCOUNTER — OFFICE VISIT (OUTPATIENT)
Dept: PSYCHOLOGY | Facility: CLINIC | Age: 44
End: 2024-06-24
Payer: MEDICARE

## 2024-06-24 DIAGNOSIS — F31.64 BIPOLAR DISORDER, CURR EPISODE MIXED, SEVERE, WITH PSYCHOTIC FEATURES (HCC): Primary | ICD-10-CM

## 2024-06-24 DIAGNOSIS — F43.10 POSTTRAUMATIC STRESS DISORDER: ICD-10-CM

## 2024-06-24 DIAGNOSIS — F31.32 BIPOLAR 1 DISORDER, DEPRESSED, MODERATE (HCC): ICD-10-CM

## 2024-06-24 DIAGNOSIS — F40.01 PANIC DISORDER WITH AGORAPHOBIA: ICD-10-CM

## 2024-06-24 PROCEDURE — G0177 OPPS/PHP; TRAIN & EDUC SERV: HCPCS

## 2024-06-24 PROCEDURE — G0176 OPPS/PHP;ACTIVITY THERAPY: HCPCS

## 2024-06-24 PROCEDURE — G0410 GRP PSYCH PARTIAL HOSP 45-50: HCPCS

## 2024-06-24 NOTE — PSYCH
"Subjective:     Patient ID: Marco Antonio Oro 43 y.o. Male    [facility] Clinical Progress Notes      Specialized Services Documentation  Therapist must complete separate progress note for each specific clinical activity in which the individual participated during the day.     Group Psychotherapy - Open Process    [id number] Marco Antonio Oro actively participated in an open processing group on increasing empowerment and having an opportunity to be heard when one might feel isolated in sharing their experiences. This particular group focused on acceptance and vulnerability.  spent time engaging group participants with open ended questions, reflective questions, and encouragement from other group members. In addition, it is important for the group to be able to receive multiple perspectives and feedback from other group members in a safe environment.  provided space for members to share as they felt comfortable, active listening, encouragement, and offered support to group when warranted. This structure helped support the group in feeling cathartic, gain some interpersonal learning, group cohesiveness, altruism, and instilling hope. Marco Antonio Oro contributed to discussion by sharing about the topic discussed, relating to group members, and allowing self to be open/vulnerable. Marco Antonio Oro related to another group member and self reflected. He talked about ways in which he has accepted things and how he felt \"lighter.\"  positive progress noted towards goal. Continue with open processing therapy to provide space to support individuals in building trust, gain corrective emotional experiences, and cultivate healthier inter-dependency with others.    Tx Plan Objective 1.1, 1.2, 1.4 Therapist: BONNY Chau and JEANINE Navarrete.       Case Management Note    BONNY Chau     Current suicide risk : Low     A case management session is not scheduled " today with Marco Antonio Oro ; additionally, they did not request a CM meeting.  Marco Antonio Oro is aware of next scheduled 1:1.    Medications changes/added/denied? Medication check scheduled for Marco Antonio Oro on Friday 6/28/2024 with JAMES Painting .       Treatment session number: 3    Individual Case Management Visit provided today? No    [facility] follow up physician's orders: None at this time

## 2024-06-24 NOTE — PSYCH
Subjective:    Patient ID: Marco Antonio Oro is a 43 y.o. male      [facility] Clinical Progress Notes      Specialized Services Documentation  Therapist must complete separate progress note for each specific clinical activity in which the individual participated during the day.     Group Psychotherapy     0930-1030 Marco Antonio Oro participated actively in a psychotherapy group focused on loneliness. The group discussed what it feels like and the negative effects of it. The group engaged in a “Cup of Lonely Brian” activity in which each person pulled a question from a cup and answered them. The questions are geared to help the group reflect on loneliness and share their thoughts/experiences on it. The group also discussed practical activities they can engage in when they are feeling. Marco Antonio stated they would engage in something creative- specifically drawing or journaling while sitting in a cafe. Great efforts towards progress goals which were displayed through note taking, participation in discussion, and engagement in topic. Continue to run daily group psychotherapy to meet treatment needs and to practice skills outside of program.        Tx Plan Objective: 1.1,1.2,1.4, Therapist:  KHARI Navarrete     Education Therapy   [id number] Marco Antonio Oro actively shared in check in and goal review. Presented as Receptive related to readiness to learn.  Marco Antonio Oro  did complete goal from last treatment day identifying gaining education and supports. did not present with any barriers to learning.     Tx Plan Objective: 1.1, 1.2, 1.4, Therapist:  KHARI Navarrete

## 2024-06-24 NOTE — PSYCH
"Subjective:     Patient ID: Marco Antonio Oro is a 43 y.o. y.o. male.    [facility] Clinical Progress Notes      Specialized Services Documentation  Therapist must complete separate progress note for each specific clinical activity in which the individual participated during the day.     Psychotherapy Therapy  ([id number]) Actively, receptively, and passively participated in MTH group focused on gratitude. Marco Antonio Oro was observed to be engaged in therapist led discussion on gratitude, the importance/benefits of gratitude, and people/places/things that they were grateful for. Marco Antonio actively participated in the \"Pass the Card\" experience, where they group took a few minutes to write things they were grateful for on cards. Marco Antonio Oro practiced writing their own gratitude statements and listed \"I am grateful for my siblings\" as one of them. Marco Antonio participated in a therapist led Gratitude Journaling experience. Group engaged in a italia discussion on \"This\" by Pawel Mack and \"Gratitude\" by Naveen Tenorio, discussing the different perspectives of gratitude. Positive beginning effort noted toward treatment goal. The group concluded with writing gratitude cards to people who they care about. Marco Antonio shared his story about losing his sibling in 2016, but states that it gave him perspective and gratitude for the people/supports that he currently has in his life. Overall positive effort noted by therapist. Continue Psychotherapy to encourage development and practice of gratitude.      Tx Plan Objective: 1.1, 1.2, 1.4 Therapist: BALDEMAR Linares    "

## 2024-06-24 NOTE — PSYCH
Subjective:    Patient ID: Marco Antonio Oro is a 43 y.o. male      [facility] Clinical Progress Notes      Specialized Services Documentation  Therapist must complete separate progress note for each specific clinical activity in which the individual participated during the day.       Allied Therapy ([id number]) Marco Antonio Oro was actively involved in group focused on effective communication during disagreements.   introduced 9 Fair Fighting Rules which described boundaries, warning signs, and techniques for handling disagreements. Group then transitioned into an open discussion on how the fair fighting rules could apply to experiences and future conflicts. Marco Antonio shared area of improvement to include no yelling or using degrading language. Positive initial effort noted towards treatment plan goals through attentiveness and active engagement in topic. Continue to involve in group to explore wellness tools to handle disagreements safely. Tx Plan Objective: 1.1, 1.2, 1.4, Therapist:  FRANCY Chaudhari    Education Therapy     [id number] Marco Antonio Oro engaged throughout the treatment day. Was engaged in learning related to Wellness Tools. Staff utilized Verbal, Written, A/V, and Demonstration teaching methods.  Marco Antonio Oro shared area of learning and set a goal for outside of program to do his laundry.      Tx Plan Objective: 1.1, 1.2, 1.4, Therapist:  FRANCY Chaudhari

## 2024-06-25 ENCOUNTER — OFFICE VISIT (OUTPATIENT)
Dept: PSYCHOLOGY | Facility: CLINIC | Age: 44
End: 2024-06-25
Payer: MEDICARE

## 2024-06-25 DIAGNOSIS — F43.10 POSTTRAUMATIC STRESS DISORDER: ICD-10-CM

## 2024-06-25 DIAGNOSIS — F31.32 BIPOLAR 1 DISORDER, DEPRESSED, MODERATE (HCC): Primary | ICD-10-CM

## 2024-06-25 DIAGNOSIS — F40.01 PANIC DISORDER WITH AGORAPHOBIA: ICD-10-CM

## 2024-06-25 PROCEDURE — G0176 OPPS/PHP;ACTIVITY THERAPY: HCPCS

## 2024-06-25 PROCEDURE — G0177 OPPS/PHP; TRAIN & EDUC SERV: HCPCS

## 2024-06-25 PROCEDURE — G0410 GRP PSYCH PARTIAL HOSP 45-50: HCPCS

## 2024-06-25 NOTE — PSYCH
"Subjective:    Patient ID: Marco Antonio Oro is a 43 y.o. male      [facility] Clinical Progress Notes      Specialized Services Documentation  Therapist must complete separate progress note for each specific clinical activity in which the individual participated during the day.     Group Psychotherapy     [id number] Marco Antonio Oro participated actively in a psychotherapy group focused on Self-Forgiveness. The group discussed what self-forgiveness meant to them, the harm and benefits of it and ways they can achieve it. The group shared their individual experiences with self-forgiveness. The group worked on “Moving Toward Self-Forgiveness” sheet that outlined the necessary steps to take.  shared self-forgiveness affirmations to aide in this process. Marco Antonio stated they identified with the \" I trust myself to learn from this and build a better future\", \" I forgive myself for my past decisions and actions\" and \"I can forgive myself, as I would forgive others\"affirmations. Great efforts towards progress goals which were displayed through note taking, participation in discussion, and engagement in topic. Continue to run daily group psychotherapy to meet treatment needs and to practice skills outside of program.        Tx Plan Objective: 1.1,1.2,1.4, Therapist:  KHARI Navarrete, MS     Education Therapy     [id number] Marco Antonio Oro engaged throughout the treatment day. Was engaged in learning related to Illness and Wellness Tools. Staff utilized Verbal, Written, A/V, and Demonstration teaching methods.  Marco Antonio Oro shared area of learning and set a goal for outside of program to take a twenty minute power nap, organize his books and cook dinner.      Tx Plan Objective: 1.1, 1.2, 1.4, Therapist:  KHARI Navarrete   "

## 2024-06-25 NOTE — PSYCH
Subjective:    Patient ID: Marco Antonio Oro is a 43 y.o. male      [facility] Clinical Progress Notes      Specialized Services Documentation  Therapist must complete separate progress note for each specific clinical activity in which the individual participated during the day.       Allied Therapy (0930-1030) Marco Antonio Oro actively shared in group focused on increasing awareness to emotions. Group started by understanding the difference between an emotion and a feeling. Emotion sensation wheel was utilized to practice identifying and expressing primary emotions, secondary emotions, and feelings we experience. Marco Antonio was able to identify emotional reactions and physical responses associated with anger. Group learned four emotional regulation skills that could be used to change emotions: opposite action, check the facts, PLEASE, and pay attention to positive events. Marco Antonio shared an example of when he recently used opposite action. Positive beginning effort noted towards progress of treatment plan goals. Continue with AT to explore healthy ways to practice expressing emotions.  Tx Plan Objective: 1.1, 1.2, 1.4, Therapist:  FRANCY Chaudhari    Education Therapy   [id number] Marco Antonio Oro actively shared in check in and goal review. Presented as Receptive related to readiness to learn.  Marco Antonio Oro  did complete goal from last treatment day identifying gaining responsibility, advocacy, and hope. did not present with any barriers to learning.     Tx Plan Objective: 1.1, 1.2, 1.4, Therapist:  ERNESTINE Chaudhari/SIRISHA

## 2024-06-25 NOTE — PSYCH
Subjective:     Patient ID: Marco Antonio Oro 43 y.o. Male    [facility] Clinical Progress Notes      Specialized Services Documentation  Therapist must complete separate progress note for each specific clinical activity in which the individual participated during the day.     Case Management    ([id number])Spoke with Marco Antonio Oro for case management. Marco Antonio reported that he had an okay weekend. He had some anxieties and he reached out to his siblings. To his surprise, his siblings were supportive and it made him feel better. He did notice he struggled with getting out of bed and wanted to isolate, but he was able to be productive and do things to help himself. He also expressed nervousness upon coming to program as he drove by himself. This writer praised Marco Antonio and challenged him to catch his negative thinking, celebrating small successes, one day at a time.  Marco Antonio Oro is aware of next scheduled 1:1 meeting.     Current suicide risk : Low      Medications changes/added/denied? n/a     Treatment session number: 2     Individual Case Management Visit provided today? Yes      [facility] follow up physician's orders: Continue to follow orders.     Therapist: BONNY Chau

## 2024-06-26 ENCOUNTER — DOCUMENTATION (OUTPATIENT)
Dept: PSYCHOLOGY | Facility: CLINIC | Age: 44
End: 2024-06-26

## 2024-06-26 ENCOUNTER — APPOINTMENT (OUTPATIENT)
Dept: PSYCHOLOGY | Facility: CLINIC | Age: 44
End: 2024-06-26
Payer: MEDICARE

## 2024-06-26 NOTE — PSYCH
Subjective:    Patient ID: Marco Antonio Oro is a 43 y.o. male      [facility] Clinical Progress Notes      Specialized Services Documentation  Therapist must complete separate progress note for each specific clinical activity in which the individual participated during the day.       Allied Therapy (***) *** was verbally engaged and interacted in today's group focused on the DBT distress tolerance skill TIP. The group broke down the TIP acronym which stands for:   T: Tip the Temperature  I: Intense Exercise  P: Paced Breathing & Paired Muscle Relaxation  *** engaged in practicing the TIP skill by utilizing an ice pack, 4-7-8 breathing technique, and guided paired muscle relaxation. Due to limitations in a group setting, group members discussed specific ways to expend the body's stored up physical energy. Group members were encouraged to utilize the TIP skill to manage extreme emotions in and out of program. *** work displayed towards treatment plan goals. Continue to involve AT to encourage development and use of wellness tools to decrease symptoms and promote recovery through meaningful activity. Tx Plan Objective: ***, Therapist:  {THERAPIST:11921}    Education Therapy   [id number] Marco Antonio Oro {SL AMB PSYCH ACTIVELY:96430} shared in check in and goal review. Presented as {Readiness to Learin} related to readiness to learn.  Marco Antonio Oro  {DID/DID NOT:84808} complete goal from last treatment day identifying gaining ***. {DID/DID NOT:99181} present with any barriers to learning.     [id number] Marco Antonio Oro engaged throughout the treatment day. Was engaged in learning related to {Education Completed:86042}. Staff utilized {Teaching Method:02341} teaching methods.  Marco Antonio Oro shared area of learning and set a goal for outside of program to ***.      Tx Plan Objective: ***, Therapist:  {THERAPIST:03972}

## 2024-06-26 NOTE — PSYCH
"Subjective:     Patient ID: Marco Antonio Oro 43 y.o. Male    [facility] Clinical Progress Notes      Specialized Services Documentation  Therapist must complete separate progress note for each specific clinical activity in which the individual participated during the day.     Case Management    ([id number]) Spoke with Marco Antonio Oro for case management. Marco Antonio shared that he noticed that he had a \"off\" day yesterday not coming into program and he is realizing how much program is helping him. He said the day before he had something to eat that upset his stomach and was up all night. He still continues to express anxiety about going places. This writer asked about a place he feels he is okay going and he mentioned the [employer] [employer] store in [place]. This writer encouraged Marco Antonio to try and go to the [facility] store over the weekend as that is a safe place for him. Encouraged him to start slow and practice mindfulness. He liked this idea and he is going to work towards that goal. Marco Antonio Oro is aware of next scheduled 1:1 meeting.     Current suicide risk : Low      Medications changes/added/denied? Medication check scheduled for Marco Antonio Oro on Friday, 6/28/2024 with JAMES Painting .       Treatment session number: 4     Individual Case Management Visit provided today? Yes      [facility] follow up physician's orders: Continue to follow orders.     Therapist: BONNY Chau   "

## 2024-06-26 NOTE — PROGRESS NOTES
Subjective:     Patient ID: Marco Antonio Oro 43 y.o. Male    [facility] Clinical Progress Notes      Specialized Services Documentation  Therapist must complete separate progress note for each specific clinical activity in which the individual participated during the day.     Case Management     Marco Antonio Oro called out of program 06/26/24 due to not feeling well. Marco Antonio Oro is schedule to return on Thursday 6/27/204    Current suicide risk: unable to assess      Medications changes/added/denied? No     Treatment session number: 4     Individual Case Management Visit provided today? No      [facility] follow up physician's orders: Continue to follow orders.

## 2024-06-27 ENCOUNTER — OFFICE VISIT (OUTPATIENT)
Dept: PSYCHOLOGY | Facility: CLINIC | Age: 44
End: 2024-06-27
Payer: MEDICARE

## 2024-06-27 DIAGNOSIS — F31.32 BIPOLAR 1 DISORDER, DEPRESSED, MODERATE (HCC): Primary | ICD-10-CM

## 2024-06-27 DIAGNOSIS — F43.10 POSTTRAUMATIC STRESS DISORDER: ICD-10-CM

## 2024-06-27 DIAGNOSIS — F40.01 PANIC DISORDER WITH AGORAPHOBIA: ICD-10-CM

## 2024-06-27 PROCEDURE — G0177 OPPS/PHP; TRAIN & EDUC SERV: HCPCS

## 2024-06-27 PROCEDURE — G0176 OPPS/PHP;ACTIVITY THERAPY: HCPCS

## 2024-06-27 PROCEDURE — G0410 GRP PSYCH PARTIAL HOSP 45-50: HCPCS

## 2024-06-27 NOTE — PSYCH
Subjective:    Patient ID: Marco Antonio Oro is a 43 y.o. male      [facility] Clinical Progress Notes      Specialized Services Documentation  Therapist must complete separate progress note for each specific clinical activity in which the individual participated during the day.       Group Psychotherapy ([id number]) Marco Antonio Oro quietly attended group on Part 1 of Wellness Recovery Action Plan (WRAP). Group members were educated on the background of the WRAP.  explained the benefits of utilizing the WRAP prior to members initiating it. Members then focused developing the following portions of WRAP:   The wellness toolbox  Daily maintenance plan   Writer encouraged the members of group to continue utilizing the packet to develop plans inside and outside of program. Some effort towards progress of treatment goals. Marco Antonio was a quieter participant however was observed taking notes and shared with direct prompts. Marco Antonio was excused during the end of group to meet with . Continue with AT and psychotherapy to encourage development and use of wellness tools to decrease symptoms and promote recovery through meaningful activity. Tx Plan Objective: 1.1, 1.2, 1.3, 1.4, Therapist:  FRANCY Chaudhari

## 2024-06-27 NOTE — PSYCH
Visit Time    Visit Start Time: 0930   Visit Stop Time: 1030    Total Visit Duration: 60 minutes    Subjective:     Patient ID: Marco Antonio Oro is a 43 y.o. y.o. male.    [facility] Clinical Progress Notes      Specialized Services Documentation  Therapist must complete separate progress note for each specific clinical activity in which the individual participated during the day.     Allied Therapy  Marco Antonio Oro actively shared in allied therapy group focused on DBT skill arenas mind.  Marco Antonio engaged in tasks exploring differences between reasonable and emotion mind and ways to get to wise mind. Group explored the benefits of mindfulness and practiced ways to slow down to begin to develop wise mind.  Group reinforced role of “participating with wise mind” in order to prevent getting stuck in the past or fears of the future.  Offered appropriate feedback. Some beginning effort toward treatment goal noted.  Continue AT group to encourage healthy skill development and practice of explored strategies.  Tx Plan Objective: 1.1Therapist:  Lynda MCDERMOTT

## 2024-06-27 NOTE — PSYCH
Subjective:      Patient ID:Marco Antonio Oro 43 y.o. male     [facility] Clinical Progress Notes       Specialized Services Documentation  Therapist must complete separate progress note for each specific clinical activity in which the individual participated during the day.      Education Group     [id number] - Marco Antonio Oro attended the psychoeducation group on Medication Management . Group members were educated by this writer on the medication management, medication tips, strategies to help them remember to take their medications and developed ideas to make it easier in the future. Information was given on how to prepare for a doctor appointment and how to advocate for self and work as a team with their doctor to promote positive outcomes and better understanding of medications and uses.  Provided education on classes of  psychotropic medications, mechanisms of action, common side effects, and expectations of treatment with psychotropic medications.  Group was encouraged to ask questions in an open forum.    Marco Antonio Oro displayed understanding through engagement in the topic with the group . For Marco Antonio Oro,  good progress toward goals was noted, through their engagement in group. Continue psychotherapy to encourage self-awareness and home practice of skills to support wellness.    Treatment Plan Objective 1.1, 1.2, 1.3, 1.4  led by JAMES Painting

## 2024-06-27 NOTE — PSYCH
Subjective:    Patient ID: Marco Antonio Oro is a 43 y.o. male      [facility] Clinical Progress Notes      Specialized Services Documentation  Therapist must complete separate progress note for each specific clinical activity in which the individual participated during the day.     Education Therapy   [id number] Marco Antonio Oro actively shared in check in and goal review. Presented as Receptive related to readiness to learn.  Marco Anotnio Oro  did complete goal from last treatment day identifying gaining responsibility. did present with a potential barrier to learning. Marco Antonio reported that he did not sleep well the night prior so his goal for program will be to remain awake by actively participating.     [id number] Marco Antonio Oro engaged throughout the treatment day. Was engaged in learning related to Illness and Wellness Tools. Staff utilized Verbal, Written, A/V, and Demonstration teaching methods.  Marco Antonio Oro shared area of learning and set a goal for outside of program to take a power nap, clean/organize his room and to read his book.      Tx Plan Objective: 1.1, 1.2, 1.4, Therapist:  KHARI Navarrete

## 2024-06-28 ENCOUNTER — OFFICE VISIT (OUTPATIENT)
Dept: PSYCHIATRY | Facility: CLINIC | Age: 44
End: 2024-06-28

## 2024-06-28 ENCOUNTER — OFFICE VISIT (OUTPATIENT)
Dept: PSYCHOLOGY | Facility: CLINIC | Age: 44
End: 2024-06-28
Payer: MEDICARE

## 2024-06-28 DIAGNOSIS — F31.64 BIPOLAR DISORDER, CURR EPISODE MIXED, SEVERE, WITH PSYCHOTIC FEATURES (HCC): ICD-10-CM

## 2024-06-28 DIAGNOSIS — F43.10 POSTTRAUMATIC STRESS DISORDER: ICD-10-CM

## 2024-06-28 DIAGNOSIS — F40.01 PANIC DISORDER WITH AGORAPHOBIA: ICD-10-CM

## 2024-06-28 DIAGNOSIS — F31.32 BIPOLAR 1 DISORDER, DEPRESSED, MODERATE (HCC): Primary | ICD-10-CM

## 2024-06-28 DIAGNOSIS — F40.01 PANIC DISORDER WITH AGORAPHOBIA: Primary | ICD-10-CM

## 2024-06-28 PROCEDURE — G0410 GRP PSYCH PARTIAL HOSP 45-50: HCPCS

## 2024-06-28 PROCEDURE — G0176 OPPS/PHP;ACTIVITY THERAPY: HCPCS

## 2024-06-28 PROCEDURE — G0177 OPPS/PHP; TRAIN & EDUC SERV: HCPCS

## 2024-06-28 RX ORDER — BUSPIRONE HYDROCHLORIDE 5 MG/1
5 TABLET ORAL 3 TIMES DAILY
Qty: 90 TABLET | Refills: 0 | Status: SHIPPED | OUTPATIENT
Start: 2024-06-28

## 2024-06-28 NOTE — PSYCH
Subjective:    Patient ID: Marco Antonio Oro is a 43 y.o. male      [facility] Clinical Progress Notes      Specialized Services Documentation  Therapist must complete separate progress note for each specific clinical activity in which the individual participated during the day.       Allied Therapy (0930-1030) Marco Antonio Oro attended group on Part II of Wellness Recovery Action Plan (WRAP). Today, members focused on WRAP's subsections and were encouraged to fill in coping skills from their toolbox for planned responses:   Identification of triggers and stressors  Early warning signs  When things are breaking down and or getting worse  Crisis Plan  Post crisis plan   Marco Antonio actively shared pieces of information from these sections and identified their importance.  encouraged the members of group to continue utilizing the packet to develop plans inside and outside of program. Positive effort towards progress of goals which were displayed through participation and engagement in topic. Marco Antonio was able to recognize that he isolates and pulls away from usual activities when he is not feeling well. Continue to involve in AT and psychotherapy to encourage development and use of wellness tools to decrease symptoms and promote recovery through meaningful activity. Tx Plan Objective: 1.1 ,1.2 ,1.3 ,1.4, Therapist:  FRANCY Chaudhari    Education Therapy   [id number] Marco Antonio Oro actively shared in check in and goal review. Presented as Receptive related to readiness to learn.  Marco Antonio Oro  did complete goal from last treatment day identifying gaining advocacy, responsibility, and support. did not present with any barriers to learning.     Tx Plan Objective: 1.1, 1.2 ,1.4, Therapist:  FRANCY Chaudhari

## 2024-06-28 NOTE — PSYCH
Subjective:     Patient ID: Marco Antonio Oro 43 y.o. Male    [facility] Clinical Progress Notes      Specialized Services Documentation  Therapist must complete separate progress note for each specific clinical activity in which the individual participated during the day.     GROUP PSYCHOTHERAPY - AMI     ([phone]) Certified Peer Specialist (CPS), Sandy shared her life story as she co-led this session. Marco Antonio Oro  attentively listened to CPS Sandy. This group encouraged power of learning about self, accepting illness and personal responsibility in recovery. Sandy also shared the acronym she uses to remind her about what tools she can use on a daily basis to maintain mental health wellness. Community resources reviewed in addition to personal resources like the affirmations. Sandy provided group with a AMI folder with local resources/support groups. Progress toward goals noted. Continue psychotherapy to encourage self -awareness and healthy engagement of supports.      TX Plan Objectives: 1.1, 1.2, 1.3, 1.4   Therapist: BONNY Chau (Documented)   Therapist: Jc Jose MS (Attended)    Case Management    ([phone]) Spoke with Marco Antonio Oro for case management. Marco Antonio reports overall he is doing well and continues to express his gratitude towards PHP. He is going to go to the [employer] store and Saturday and [employer] on Sunday. Talked of potential volunteer positions in the future to help him not isolate and improve upon himself. Marco Antonio Oro is aware of next scheduled 1:1 meeting.     Current suicide risk : Low      Medications changes/added/denied? See JAMES Painting 's note      Treatment session number: 5     Individual Case Management Visit provided today? Yes      [facility] follow up physician's orders: Continue to follow orders.     Therapist: BONNY Chau  Tx Plan Objective: 1.0

## 2024-06-28 NOTE — PSYCH
Subjective:    Patient ID: Marco Antonio Oro is a 43 y.o. male      [facility] Clinical Progress Notes      Specialized Services Documentation  Therapist must complete separate progress note for each specific clinical activity in which the individual participated during the day.     Group Psychotherapy     1330-0739 Marco Antonio Oro participated actively in a psychotherapy group focused on Self-Compassion. It's easy to show compassion to others in but it's harder to show the same compassion to oneself. Self-Compassion focuses on ways one can be kinder to themselves on a regular basis. The group participated in an activity- How Would You Treat A Friend?- designed so each member can compare how much they show compassion to others versus how much they give it to themselves. The group also engaged in an activity where they practiced reframing their negative self-talk to compassionate self-talk ie instead of saying “I never get anything right”, one can say “I do not have to be perfect. I will do my best”. Group also engaged in an activity where they reviewed Self-Compassionate Affirmations.  encouraged the group to read one affirmation with jaylen. The group also shared one way they would engage in self-compassion. Marco Antonio Oro stated they would acknowledge their small wins. Marco Antonio Oro made great efforts towards progress goals which were displayed through note taking, participation in discussion, and engagement in topic. Continue to run daily group psychotherapy to meet treatment needs and encourage Marco Antonio Oro to practice skills outside of program.        Tx Plan Objective: 1.1,1.2,1.4, Therapist:  KHARI Navarrete      Education Therapy     [id number] Marco Antonio Oro engaged throughout the treatment day. Was engaged in learning related to Illness and Wellness Tools. Staff utilized Verbal, Written, A/V, and Demonstration teaching methods.  Marco Antonio LINK  Shorty shared area of learning and set a goal for outside of program to drive to the [employer] and go to [employer].      Tx Plan Objective: 1.1, 1.2, 1.4, Therapist:  KHARI Navarrete

## 2024-06-28 NOTE — PSYCH
PHP MEDICATION MANAGEMENT NOTE        [facility]    Name and Date of Birth:  Marco Antonio Oro 43 y.o. 1980 MRN: [id number]    Date of Visit: June 28, 2024    Visit Time    Visit Start Time: 1030   Visit Stop Time: 1100  Total Visit Duration:  30 minutes     The total visit duration detailed above includes: patient engagement, medication management, psychotherapy/counseling, discussion regarding treatment goals, and coordination of care.      Note Share Disclaimer:     This note was not shared with the patient due to reasonable likelihood of causing patient harm    Allergies   Allergen Reactions    Infliximab Other (See Comments)    Other Other (See Comments)    Penicillin G Other (See Comments)    Penicillins Other (See Comments)     rash    Penicillin V Rash     SUBJECTIVE:    Marco Antonio is seen today for a follow up for Major Depressive Disorder, Generalized Anxiety Disorder, and Panic Disorder. He continues to experience on and off symptoms since beginning PHP. Continues with symptoms of anxiety/panic especially when going out in public.  He has been on clonazepam since 2016.  He is gaining insight and learning new skills here at partial. He recognizes he use to drink to mask his social anxiety disorder and since quitting alcohol, he has struggle with the anxiety, never learned effective and appropriate coping.  Depressive symptoms about the same.    He denies any side effects from current psychiatric medications.    PLAN:  Add Buspar 5 mg PO TID    Aware of 24 hour and weekend coverage for urgent situations accessed by calling [facility] main practice number  Continue partial hospitalization program    Diagnoses and all orders for this visit:    Panic disorder with agoraphobia  -     busPIRone (BUSPAR) 5 mg tablet; Take 1 tablet (5 mg total) by mouth 3 (three) times a day    Bipolar disorder, curr episode mixed, severe, with psychotic  features (HCC)    Posttraumatic stress disorder        Current Outpatient Medications on File Prior to Visit   Medication Sig Dispense Refill    amLODIPine (NORVASC) 10 mg tablet TAKE 1 TABLET BY MOUTH EVERY DAY 90 tablet 0    clonazePAM (KlonoPIN) 0.5 mg tablet Take 1 tablet (0.5 mg total) by mouth 3 (three) times a day 90 tablet 2    hydroquinone 4 % cream       lamoTRIgine (LaMICtal) 25 mg tablet TAKE 1 TABLET BY MOUTH TWICE A  tablet 0    lisinopril (ZESTRIL) 10 mg tablet TAKE 1 TABLET BY MOUTH EVERY DAY IN THE EVENING 90 tablet 0    OLANZapine (ZyPREXA) 10 mg tablet TAKE 1 TABLET BY MOUTH DAILY AT BEDTIME 90 tablet 0    tamsulosin (FLOMAX) 0.4 mg Take 0.4 mg by mouth daily with dinner      terbinafine (LamISIL) 1 % cream Apply topically 2 (two) times a day       No current facility-administered medications on file prior to visit.       HPI ROS Appetite Changes and Sleep:     He reports fluctuating sleep pattern, fluctuating appetite, fluctuating energy levels. Denies homicidal ideation, denies suicidal ideation    Review Of Systems:   no complaints, all other systems are negative         Mental Status Evaluation:    Appearance:  dressed inappropropriately   Behavior:  cooperative, calm   Speech:  normal rate, normal volume   Mood:  anxious   Affect:  brighter   Thought Process:  perseverative   Associations: concrete associations   Thought Content:  negative thoughts, ruminating thoughts   Perceptual Disturbances: none   Risk Potential: Suicidal ideation - None  Homicidal ideation - None  Potential for aggression - No   Sensorium:  oriented to person, place, and time/date   Memory:  recent and remote memory grossly intact   Consciousness:  alert and awake   Attention: decreased concentration and decreased attention span   Insight:  limited   Judgment: limited   Gait/Station: normal gait/station, normal balance   Motor Activity: no abnormal movements       History Review:The following portions of the  patient's history were reviewed and updated as appropriate: psychiatric history, trauma history allergies, current medications, past family history, past medical history, past social history, past surgical history, and problem list   OBJECTIVE:     Vital signs in last 24 hours:    There were no vitals filed for this visit.  Laboratory Results: I have personally reviewed all pertinent laboratory/tests results.    Medications Risks/Benefits:      Risks, Benefits And Possible Side Effects Of Medications:    Discussed risks and benefits of treatment with patient including risk of suicidality, serotonin syndrome, increased QTc interval and SIADH related to treatment with antidepressants; Risk of induction of manic symptoms in certain patient populations, risk of parkinsonian symptoms, metabolic syndrome, tardive dyskinesia and neuroleptic malignant syndrome related to treatment with antipsychotic medications, risks of misuse, abuse or dependence, sedation and respiratory depression related to treatment with benzodiazepine medications, and risk of rash related to treatment with Lamictal     Controlled Medication Discussion:     Marco Antonio has been filling controlled prescriptions on time as prescribed according to [place] Prescription Drug Monitoring Program    JAMES Painting 06/28/24

## 2024-07-01 ENCOUNTER — OFFICE VISIT (OUTPATIENT)
Dept: PSYCHOLOGY | Facility: CLINIC | Age: 44
End: 2024-07-01
Payer: MEDICARE

## 2024-07-01 DIAGNOSIS — F40.01 PANIC DISORDER WITH AGORAPHOBIA: ICD-10-CM

## 2024-07-01 DIAGNOSIS — F31.32 BIPOLAR 1 DISORDER, DEPRESSED, MODERATE (HCC): Primary | ICD-10-CM

## 2024-07-01 DIAGNOSIS — F43.10 POSTTRAUMATIC STRESS DISORDER: ICD-10-CM

## 2024-07-01 PROCEDURE — G0176 OPPS/PHP;ACTIVITY THERAPY: HCPCS

## 2024-07-01 PROCEDURE — G0177 OPPS/PHP; TRAIN & EDUC SERV: HCPCS

## 2024-07-01 NOTE — PSYCH
Group Psychotherapy    Subjective:     Patient ID: Marco Antonio Oro 43 y.o.     This group was facilitated in a private office.  ([phone])Marco Antonio Oro actively engaged in psychoeducational group about challenging automatic negative thoughts (ANTs) and cognitive bias. The skill helps to identify negative thoughts and cognitive biases. The outcome being that negative thoughts are challenged in the thought process and regulation of emotion. Group discovered strategies that help them to manage negative thoughts and rethink the negative thoughts when faced with a negative challenge wether the thought is perceived in the outside environment, or internally as negative self-talk. The group talked about understanding the purpose of challenging negative thoughts on a personal and social level and how it affects themselves and others..Teaching on the emphasis of self monitoring and self-care, the group focus is geared how to go for help when the negative thoughts become overly intrusive. Group was encouraged to ask questions in an open forum at the end of session. Positive progress displayed through engagement in topic, Marco Antonio stated that he found the session quite informational in regards of helping him to understand how hie thoughts and behaviors have helped to form his current thought process.  Marco Antonio Oro will continue to engage in psychotherapy to encourage positive self realization.  Treatment Plan Objective 1.1, 1.2, 1.3, 1.4 Therapist: Edu ESPINOZA Ed.

## 2024-07-01 NOTE — PSYCH
Subjective:    Patient ID: Marco Antonio Oro is a 43 y.o. male      [facility] Clinical Progress Notes      Specialized Services Documentation  Therapist must complete separate progress note for each specific clinical activity in which the individual participated during the day.     EDUCATION THERAPY    [id number]    Marco Antonio Oro actively shared in morning assessment and goal review. Presented as Receptive related to readiness to learn. Marco Antonio Oro  did complete goal from last treatment day identifying gaining hope, responsibility, and support. Marco Antonio Oro did not present with any barriers to learning. Throughout morning group, Marco Antonio Oro participated in mindfulness exercise and gratitude practice. Marco Antonio Oro made some progress toward goal. Continue education group to assess willingness to engage, assess transfer of knowledge, and participate in skill development.    Tx Plan Objective: 1.1,1.2,1.4, Therapist: KHARI Navarrete      Group Psychotherapy     0930-1030 Marco Antonio Oro participated actively in a psychotherapy group focused on Triggers and Warning Signs- the meaning, the physical symptoms, and ways they can work through it.  discussed State Shift- the practice of learning to consciously shift one's energy out of a triggered state and help the williams-cortex to re-establish control. This is achieved in four steps- name it, take space appropriately, shift your state and deal with the situation. For step three (shift your state), the group discussed several tools they can use- ie breathing, change physical environment, self-reflection and feel your feelings. Marco Antonio stated they would engage in breathing to shift their state. The group also worked on a “Think Ahead” worksheet that required them to list their triggers, early warning signs, steps to take during the moment and actions they can engage in that will make them feel better. The  worksheet also required identifying specific supports they can reach out to in that moment. Each member took turns sharing what they wrote.  recommended placing this sheet in a secure place that they themselves or a support can have access to when a trigger episode occurs. Marco Antonio Oro made great efforts towards progress goals which were displayed through note taking, participation in discussion, and engagement in topic. Continue to run daily group psychotherapy to meet treatment needs and encourage Marco Antonio Oro to practice skills outside of program.        Tx Plan Objective: 1.1,1.2,1.4, Therapist:  KHARI Navarrete       [id number]    Marco Antonio Oro participated actively in psychotherapy group.  This was observedConsistent throughout the treatment day. They were engaged in learning related to Illness and Wellness Tools. Staff utilized Verbal, Written, A/V, and Demonstration teaching methods.  Marco Antonio Oro shared area of learning and set a goal for outside of program to take a power nap, obtain a pill cutter and pay his bills.  Marco Antonio Oro was able to share items explored during the day and shared in adding to their WRAP.  Ended session with peer  led exercise of breathing.  Marco Antonio Oro demonstrated great progress toward goal.  Continue group psychotherapy to actively practice learned skills and encourage transfer of knowledge to unstructured time.      Tx Plan Objective: 1.1,1.2,1.4,  Therapist: KHARI Navarrete

## 2024-07-01 NOTE — PSYCH
"Subjective:     Patient ID: Marco Antonio Oro is a 43 y.o. y.o. male.    [facility] Clinical Progress Notes      Specialized Services Documentation  Therapist must complete separate progress note for each specific clinical activity in which the individual participated during the day.     Visit Time    Visit Start Time: 1025  Visit Stop Time: 1035   Total Visit Duration:  10 minutes      Case Management Note    Case Management Needs Addressed: Medication question - This writer spoke with Marco Antonio who shared that after he started his BuSpar he became disoriented and tired. Spoke with JAMES Painting and they agreed for Marco Antonio to take 1/2 a dose.     Current suicide risk : Low     Medications changes/added/denied? Yes    Treatment session number: 6    Individual Case Management Visit provided today? Yes     [facility] follow up physician's orders: Continue to Follow Orders       INDIVIDUAL PSYCHOTHERAPY     Met with Marco Antonio Oro for individual therapy. Marco Antonio shared that he had a good weekend despite feeling tired from his BuSpar. He stated that he has noticed he was feeling calmer. However, he was unable to make it to the [employer] book store because of feeling disoriented. He did though make it to [place] with his \"brother,\" Shane who drove him.  Marco Antonio Oro identified overall he has noticed an improvement in his mood and continues to express gratitude towards group.  This writer observed Marco Antonio appearing with a brighter affect and less fidgety. Positive progress toward goal identified. Next session follow up to include treatment plan review. Continue individual psychotherapy in order to increase empowerment.     Treatment Plan Objectives addressed: 1.2, 1.4   Therapist: BONNY Chau      "

## 2024-07-01 NOTE — PSYCH
Subjective:    Patient ID: Marco Antonio Oro is a 43 y.o. male      [facility] Clinical Progress Notes      Specialized Services Documentation  Therapist must complete separate progress note for each specific clinical activity in which the individual participated during the day.     ALLIED THERAPY ([id number]) Marco Antonio Oro actively engaged in a group that started with recalling tasks completed each day as part of a routine. After, the group discussed the importance of establishing a daily routine and reviewed both healthy and unhealthy habits. Group then completed “Planning Your Routine” worksheet to address wants, identify barriers, create a plan to overcome barriers, and ways to reward success. Marco Antonio shared limit distractions by plugging his phone in on the opposite side of his bedroom is something he would like to add to his routine and identified a plan to overcome any barriers. Some progress made towards treatment plan. Continue MH groups to explore healthy habits and wellness strategies. Tx Plan Objective: 1.1, 1.2 ,1.3, 1.4, Therapist:  FRANCY Chaudhari

## 2024-07-02 ENCOUNTER — OFFICE VISIT (OUTPATIENT)
Dept: PSYCHOLOGY | Facility: CLINIC | Age: 44
End: 2024-07-02
Payer: MEDICARE

## 2024-07-02 DIAGNOSIS — F31.32 BIPOLAR 1 DISORDER, DEPRESSED, MODERATE (HCC): Primary | ICD-10-CM

## 2024-07-02 DIAGNOSIS — F40.01 PANIC DISORDER WITH AGORAPHOBIA: ICD-10-CM

## 2024-07-02 DIAGNOSIS — F43.10 POSTTRAUMATIC STRESS DISORDER: ICD-10-CM

## 2024-07-02 PROCEDURE — G0176 OPPS/PHP;ACTIVITY THERAPY: HCPCS

## 2024-07-02 PROCEDURE — G0177 OPPS/PHP; TRAIN & EDUC SERV: HCPCS

## 2024-07-02 NOTE — PSYCH
Subjective:    Patient ID: Marco Antonio Oro is a 43 y.o. male      [facility] Clinical Progress Notes      Specialized Services Documentation  Therapist must complete separate progress note for each specific clinical activity in which the individual participated during the day.     EDUCATION THERAPY    [id number]    Marco Antonio Oro actively shared in morning assessment and goal review. Presented as Receptive related to readiness to learn. Marco Antonio Oro  did complete goal from last treatment day identifying gaining hope, education, advocacy, responsibility, and support. Marco Antonio Oro did present with a potential barrier to learning. Marco Antonio shared that the medication he took was making him feel drowsy but that he would participate in groups so he can remain alert and awake. Throughout morning group, Marco Antonio Oro participated in gratitude practice. Marco Antonio Oro made noticeable progress toward goal. Continue education group to assess willingness to engage, assess transfer of knowledge, and participate in skill development.    Tx Plan Objective: 1.1,1.2,1.4, Therapist: KHARI Navarrete      Group Psychotherapy     2531-5329 Marco Antonio Oro participated actively in a psychotherapy group focused on Conflict Resolution- feelings/emotions that arise during and beneficial techniques to handle it. There are five different styles of conflict- avoiding, accommodating, compromising, competing and collaborating. Each style has their advantages and disadvantages.  explained that one can have several different styles depending on the situation. Marco Antonio shared they identify with compromising.  discussed two different strategies that can be used when dealing with conflict- SNEA (Stop, Name, Explain and Ask) and DEAR MAN. Marco Antonio Oro stated they would use DEAR MAN. Marco Antonio Oro made great efforts towards progress goals which were displayed  through note taking, participation in discussion, and engagement in topic. Continue to run daily group psychotherapy to meet treatment needs and encourage Marco Antonio Oro to practice skills outside of program.        Tx Plan Objective: 1.1,1.2,1.4, Therapist:  KHARI Navarrete       [id number]    Marco Antonio Oro participated actively in psychotherapy group.  This was observedConsistent throughout the treatment day. They were engaged in learning related to Illness and Wellness Tools. Staff utilized Verbal, Written, A/V, and Demonstration teaching methods.  Marco Antonio Oro shared area of learning and set a goal for outside of program to send out mail and pay a bill over the phone.  Marco Antonio Oro was able to share items explored during the day and shared in adding to their WRAP.  Ended session with peer  led exercise of breathing.  Marco Antonio Oro demonstrated some progress toward goal.  Continue group psychotherapy to actively practice learned skills and encourage transfer of knowledge to unstructured time.      Tx Plan Objective: 1.1,1.2,1.4,  Therapist: KHARI Navarrete

## 2024-07-02 NOTE — PSYCH
Subjective:     Patient ID: Marco Antonio Oro is a 43 y.o. y.o. male.    [facility] Clinical Progress Notes      Specialized Services Documentation  Therapist must complete separate progress note for each specific clinical activity in which the individual participated during the day.     Visit Time    Visit Start Time: 0820  Visit Stop Time: 0845  Total Visit Duration:  25 minutes      Case Management Note    Case Management Needs Addressed: Discharge planning      Current suicide risk : Low    Medications changes/added/denied? Medication check scheduled for Marco Antonio Oro on Friday 7/5/2024.      Treatment session number: 7    Individual Case Management Visit provided today? Yes     [facility] follow up physician's orders: Continue to Follow Orders       INDIVIDUAL PSYCHOTHERAPY     Met with Marco Antonio Oro for individual therapy. Marco Antonio and therapist reviewed and updated his treatment plan. Marco Antonio was able to comment on progress for each goal. He is recognizing more of his triggers and wants to challenge himself. He also has been implementing coping skills and finding connections with the pathways to hope. He has been finding that his siblings are reaching out to him first and he is enjoying spending time talking with them. He is also engaging in self care and advocating. He had found that routine and setting goals have helped him with accountability. He wanted to add a new goal of challenging himself at least 2 -3 times a week to go out int he community. He stated that he would like to go to the [employer] book store today after program since he didn't get to over the weekend and maybe get a cup of coffee. Continued discussion of breaking things down into chunks to feel less overwhelming. He also shared that he took his full dose of BuSpar this morning because he was having trouble cutting it in half. While he did look tired, he is willing to keep taking his medication because he has noticed positive  effects.  Marco Antonio Oro also shared that he is waiting for his physical copy of his diploma, but he printed out a copy and felt really proud of himself.  The pair also discussed up coming discharge planning and what he feels he needs in order to discharge successfully. Discussed discharge on either day 10 or day 15. Due to Marco Antonio missing a day of PHP in order to keep track of medicare billing cycle this is Day 4 for Medicare purposes out of 5 for 5 day billing cycle. Pending discharge either 7/11 or 7/18. Marco Antonio is aware this writer is scheduled off on Friday 7/52024. Continue individual psychotherapy in order to improving intrapersonal and self-regulatory functioning and increasing empowerment.     Treatment Plan Objectives addressed: 1.1, 1.2, 1.3, 1.4, and 1.5  Therapist: BONNY Chau

## 2024-07-02 NOTE — BH TREATMENT PLAN
"Subjective:      Patient ID: Marco Antonio Oro  is a 43 y.o. male     Assessment/Plan:      Diagnoses and all orders for this visit:     Bipolar 1 disorder, depressed, moderate (HCC)     Posttraumatic stress disorder     Panic disorder with agoraphobia        [facility] Treatment Plan      AREAS OF NEED: Marco Antonio Oro is experiencing symptoms related to their diagnoses of Bipolar 1 disorder, PTSD, and Panic Disorder with agoraphobia as evidenced by anxiousness, isolation, hopelessness, decreased energy, decreased concentration, and sleep disturbances due to mental health stressors.     Date Initiated: 06/21/24     Strengths: \"I'm a very nice berry,\" \"I'm a very caring individual,\" \"I have strong ana,\" \"I'm outgoing and humble.\"      LONG TERM GOAL:   Date Initiated: 06/21/24  1.0 While at [employer] [facility] I will engage in psychoeducational groups and practice skills that are aimed at improving my mental health, ability to cope and challenge myself, gain insights and skills to increase my quality of life  Target Date: 07/19/2024   Completion Date:         SHORT TERM OBJECTIVES:      Date Initiated: 06/21/24  1.1 I will explore learning about my triggers through: noticing signs from my body (stop to consider what just happened and the response), trace the roots (what was happening right before?), and get curious through befriending my emotions and notice ongoing patterns in order to increase my communication and decrease feelings of panic.  Revision Date: 07/02/2024- continue to work on   Target Date: 07/02/2024; 07/12/2024    Completion Date:      Date Initiated: 06/21/24  1.2 I will identify the 5 pathways to hope and do 1 thing daily to work on increasing hopefulness. (Connection, goal setting, spirituality, nurturing, and doing routine things)   Revision Date: 07/02/2024 - continue to work on   Target Date: 07/02/2024; 07/12/2024   Completion Date:     Date Initiated: 06/21/24  1.3 I will take " medications as prescribed and share questions and concerns if arise.    Revision Date:07/02/2024 - ongoing   Target Date: 07/02/2024; 07/12/2024   Completion Date:      Date Initiated: 06/21/24  1.4 I will identify 3 ways my supports can assist in my wellness journey and use them if/when needed.    Revision Date:07/02/2024 - ongoing   Target Date: 07/02/2024; 07/12/2024   Completion Date:            8 DAY REVISION:  1.5: I will continue to challenge myself to go to places that heighten my anxiety and work through said anxiety at least 2 to 3 times week   Date Initiated:07/02/2024   Revision Date:   Target Date: 07/12/2024   Completion Date:        PSYCHIATRY:  Date Initiated:  06/21/24  Medication Management and Education      Revision Date: 07/02/2024       The person(s) responsible for carrying out the plan is Dr. Rosa eLa MD & JAMES Painting      NURSING/SYMPTOM EDUCATION:  Date Initiated: 06/21/24       1.1, 1.2. 1.3, 1.4 Provide wellness/symptoms and skill education groups three to five days weekly to educate Marco Antonio Oro on signs and symptoms of diagnoses, skills to manage stressors, and medication questions that will be addressed by the treatment team.        Revision date: 07/02/2024        The person(s) responsible for carrying out the plan is Jc Jose MS & Soumya Allen      PSYCHOLOGY:   Date Initiated: 06/21/24       1.1, 1.2, 1.4 Provide psychotherapy group 5 times per week to allow opportunity for Marco Antonio Oro  to explore stressors and ways of coping.   Revision Date: 07/02/2024   The person(s) responsible for carrying out the plan is BONNY Leahy      ALLIED THERAPY:   Date Initiated: 06/21/24  1.1,1.2 Engage Marco Antonio Oro in AT group 5 times daily to encourage development and use of wellness tools to decrease symptoms and promote recovery through meaningful activity.  Revision Date: 07/02/2024       The person(s) responsible for carrying  out the plan is BALDEMAR Linares, BALDEMAR Hutson and FRANCY Chaudhari     CASE MANAGEMENT:   Date Initiated: 06/21/24      1.0 This  will meet with Marco Antonio Oro  3-4 times weekly to assess treatment progress, discharge planning, connection to community    supports and UR as indicated.  Revision Date: 07/02/2024   The person(s) responsible for carrying out the plan is BONNY Chau      TREATMENT REVIEW/COMMENTS:     07/02/2024 -  and Marco Antonio Oro reviewed and updated tx plan. Marco Antonio Oro relates understanding to their overall diagnoses, tx plan goal and objectives. They are agreeable.       DISCHARGE CRITERIA: Identify 3 signs of progress and complete relapse prevention plan.    DISCHARGE PLAN: Connect with identified outpatient providers.   Estimated Length of Stay: 10 treatment days       CLIENT COMMENTS / Please share your thoughts, feelings, need and/or experiences regarding your treatment plan with Staff.  Please see follow up note with comments.

## 2024-07-02 NOTE — PSYCH
Group Psychotherapy    Subjective:     Patient ID: Marco Antonio Oro 43 y.o.    This group was facilitated in a private office.  ([id number])Marco Antonio Oro actively engaged in psychoeducational group about emotional regulation. The skill helps to maintain and regulate emotional expression/regulation. Group discovered strategies that help them to manage emotional well being on a short term and long term basis. The group talked about understanding the purpose, and meaning of emotional regulation in intellectual and emotional expression, regulation , and recognition, and how it affects themselves and others.. Teaching on the emphasis of self monitoring and self-care, who the group can go to for help was brought up as well. Group was encouraged to ask questions in an open forum at the end of group. Positive progress displayed through engagement in topic., Marco Antonio was an active participant in the group discussions.Marco Antonio Oro will continue to engage in psychotherapy to encourage positive self realization.  Treatment Plan Objective 1.1, 1.2, 1.3, 1.4 Therapist: Edu ESPINOZA Ed.

## 2024-07-02 NOTE — PSYCH
"Subjective:  Date: 7/2/2024     Patient ID: Marco Antonio Oro is a 43 y.o. male.     [facility] Clinical Progress Notes      Specialized Services Documentation  Therapist must complete separate progress note for each specific clinical activity in which the individual participated during the day.      Group Psychotherapy    [id number] Marco Antonio Oro participated with prompts in a psychotherapy group focused on creating a vision board using musical lyrics, inspirational quotes, sticklers, and photos cut from a magazine. Lyrics analyzed cam from songs, \"Unstoppable\" by SALMA, \"Titaniuim\" by Michael Murcia, \"Rise Up\" by Haley Alcantar,  \"Lego House\" by Himanshu Lindsay, \"Try\" by Cheryl, \"Stronger\" by Kusum Romero, and \"Never Give Up\" by SALMA.  led a discussion on what group members use as motivators or encouragers when they are struggling to meet their goals or make progress. During this portion of the session, SMART goals were reviewed. Group members reflected on their motivation levels and Marco Antonio stated \"I want to get better and go out in public more frequently. I want to feel less anxious about that.\" This writer discussed the use of vision boards for setting goals and led group members through the process of creating their own. Group members were encouraged to create vision boards using their goals and utilize the tool outside of program. Marco Antonio Oro made positive efforts towards treatment goals as displayed through participation in the group discussion and the creation of their vision board. When presenting his vision board, Marco Antonio  stated \"Family is something that really motivates me. They are my biggest support system and I want to continue to stay in touch with them always.\" Although he was quiet throughout this group, Marco Antonio put a lot of time, effort, and energy into the design of his vision board. Continue group psychotherapy to meet treatment needs and encourage Marco Antonio Oro to practice " skills outside of program.      Tx Plan Objective: 1.1,1.2,1.4 Therapist: BALDEMAR Linares

## 2024-07-03 ENCOUNTER — OFFICE VISIT (OUTPATIENT)
Dept: PSYCHOLOGY | Facility: CLINIC | Age: 44
End: 2024-07-03
Payer: MEDICARE

## 2024-07-03 DIAGNOSIS — F31.32 BIPOLAR 1 DISORDER, DEPRESSED, MODERATE (HCC): Primary | ICD-10-CM

## 2024-07-03 DIAGNOSIS — F40.01 PANIC DISORDER WITH AGORAPHOBIA: ICD-10-CM

## 2024-07-03 DIAGNOSIS — F43.10 POSTTRAUMATIC STRESS DISORDER: ICD-10-CM

## 2024-07-03 PROCEDURE — G0177 OPPS/PHP; TRAIN & EDUC SERV: HCPCS

## 2024-07-03 PROCEDURE — G0176 OPPS/PHP;ACTIVITY THERAPY: HCPCS

## 2024-07-03 NOTE — PSYCH
Subjective:     Patient ID: Marco Antonio Oro is a 43 y.o. y.o. male.    [facility] Clinical Progress Notes      Specialized Services Documentation  Therapist must complete separate progress note for each specific clinical activity in which the individual participated during the day.     Visit Time    Visit Start Time: 1217  Visit Stop Time: 1235  Total Visit Duration:  18 minutes      Case Management Note    Case Management Needs Addressed: continued discussion regarding discharge planning      Current suicide risk : Low    Medications changes/added/denied? Medication check scheduled for Marco Antonio Oro on Friday 7/5/2024.      Treatment session number: 7    Individual Case Management Visit provided today? Yes     [facility] follow up physician's orders: Continue to Follow Orders       INDIVIDUAL PSYCHOTHERAPY     Met with Marco Antonio Oro for individual therapy. Marco Antonio continues to gradually improve while attending [facility]. He did go to the [employer] yesterday, but they closed early. He did express that he was proud of himself for going. He is still taking his BuSpar as prescribed. He will meet with CRNP on Friday to continue to discuss. He has some tiredness and dizziness.  Marco Antonio Oro identified that he wants to continue to work on taking steps to be more integrated in the community. He is going to either go grocery shopping with his brother, Shane on Saturday or Monday. This writer encouraged him to think about what it would look like if he could go on his own. This writer suggested exposure interventions. He could try bringing a calming card with him or have pictures of his supports to help him. He stated he tried this and it didn't work. This writer continued to encourage small steps and maybe he wasn't ready then, but something he could work towards. He agreed. He is uncertain regarding discharge and this writer encouraged him to think about his needs and reminded him about the 5  day billing cycle for Medicare. Some progress toward goal identified. Continue individual psychotherapy in order to demonstrating proper techniques for stress management and increasing empowerment.     Treatment Plan Objectives addressed: 1.1, 1.2, and 1.5  Therapist: BONNY Chau

## 2024-07-03 NOTE — PSYCH
"Subjective:    Patient ID: Marco Antonio Oro is a 43 y.o. male      [facility] Clinical Progress Notes      Specialized Services Documentation  Therapist must complete separate progress note for each specific clinical activity in which the individual participated during the day.     ALLIED THERAPY ([id number]) Marco Antonio Oro was involved in life skills group to facilitate positive thinking as a coping mechanism or as a motivator.  provided an overview of self-talk explaining how it can be so powerful that we can talk ourselves into doing things and out of doing things. Marco Antonio actively participated in group activity by changing two negative thoughts into positive thoughts. Group was also encouraged to write down any negative aspects of self and “let them go” by ripping the paper and tossing it into the trash can. Marco Antonio shared they could put effort into changing the thought \"I can't believe this is happening again\". Positive work displayed towards treatment plan through personal disclosure in group activity. Continue with group therapy to identify and practice changing negative thoughts. Tx Plan Objective: 1.1, 1.2 ,1.4, Therapist:  FRANCY Chaudhari  "

## 2024-07-03 NOTE — PSYCH
Subjective:    Patient ID: Marco Antonio Oro is a 43 y.o. male      [facility] Clinical Progress Notes      Specialized Services Documentation  Therapist must complete separate progress note for each specific clinical activity in which the individual participated during the day.     EDUCATION THERAPY    [id number]    Marco Antonio Oro actively shared in morning assessment and goal review. Presented as Receptive related to readiness to learn. Marco Antonio Oro  did complete goal from last treatment day identifying gaining responsibility and support. Marco Antonio Oro did not present with any barriers to learning. Throughout morning group, Marco Antonio Oro participated in mindfulness exercise. Marco Antonio Oro made some progress toward goal. Continue education group to assess willingness to engage, assess transfer of knowledge, and participate in skill development.    Tx Plan Objective: 1.1,1.2,1.4,1.5 Therapist: KHARI Navarrete      [id number]    Marco Antonio Oro participated actively in psychotherapy group.  This was observedConsistent throughout the treatment day. They were engaged in learning related to Illness and Wellness Tools. Staff utilized Verbal, Written, A/V, and Demonstration teaching methods.  Marco Antonio Oro shared area of learning and set a goal for outside of program to go food shopping and do his laundry.  Marco Antonio Oro was able to share items explored during the day and shared in adding to their WRAP.  Ended session with staff led exercise of stretching.  Marco Antonio Oro demonstrated noticeable progress toward goal.  Continue group psychotherapy to actively practice learned skills and encourage transfer of knowledge to unstructured time.      Tx Plan Objective: 1.1,1.2,1.4,1.5  Therapist: KHARI Navarrete

## 2024-07-03 NOTE — PSYCH
Subjective:     Patient ID: Marco Antonio Oro is a 43 y.o. male.    [facility] Clinical Progress Notes      Specialized Services Documentation  Therapist must complete separate progress note for each specific clinical activity in which the individual participated during the day.     Group Psychotherapy 0930-1030     This group consisted of roughly 30 minutes which focused on the science/benefits of yoga/Miko Chi/Qigong (with a Qigong practice included) and 30 minutes of learning the science behind/practicing Emotional Freedom Tapping. The EFT discussion began with a conversation focusing around the understanding the science behind emotional freedom tapping and in which ways it can help improve lives (sleep, stress, urges, diet, etc). We then discussed the technique in detail and practiced it multiple times as a group.  Everyone was also provided with EFT packets which described the practice in great detail. Following reflecting on the EFT practice, the group then began discussion on the research-based benefits of yoga/Miko Chi/Qigong. We then practiced Qigong as a group. The Qigong practice was also followed by a period of reflection/discussion. Marco Antonio Oro participated by requesting additional direction with the EFT points, reflecting on how some of the points felt different, taking many notes, engaging fully in both practices, and sharing that he felt very relaxed and loose after each. Marco Antonio continues to work on treatment goals. Continue psychotherapy groups to encourage further exploration of needs, personal awareness, and skills.     Tx Plan Objective: 1.1,1.2, 1.4   Therapist: Jc Jose, MS & BALDEMAR Hutson

## 2024-07-03 NOTE — PSYCH
Group Psychotherapy      Subjective:     Patient ID: Marco Antonio Oro 43 y.o.     This group was facilitated in a private office.  ([id number])Marco Antonio Oro actively engaged in psychoeducational group about positive self soothing activities. These activities help to self-soothe an individual and decrease decrease feelings of loneliness,anxiety, and boredom. Positive behavior change is focused toward a specified goal. Group explored the identifying factors that create loneliness,anxiety, and boredom. This process can help them to take care of emotional and intellectual moments of anxiety on a short term and long term basis. The group talked about understanding the meaning of self-soothing in regards to physical, intellectual, and emotional expression, regulation , and recognition, and how it affects themselves and others. Teaching on the emphasis of self monitoring and relapse, the group explored who they can go to for help was brought up as well. Group was encouraged to ask questions in an open forum at the end of group. Positive progress displayed through engagement in topic, Marco Antonio spoke of how visual arts are a very effective self-soothing technique for him when he is feeling overwhelmed.Marco Antonio Oro will continue to engage in psychotherapy to encourage positive self realization.  Treatment Plan Objective 1.1, 1.2, 1.3, 1.4 Therapist: Edu ESPINOZA Ed.

## 2024-07-05 ENCOUNTER — OFFICE VISIT (OUTPATIENT)
Dept: PSYCHOLOGY | Facility: CLINIC | Age: 44
End: 2024-07-05
Payer: MEDICARE

## 2024-07-05 ENCOUNTER — OFFICE VISIT (OUTPATIENT)
Dept: PSYCHIATRY | Facility: CLINIC | Age: 44
End: 2024-07-05
Payer: MEDICARE

## 2024-07-05 DIAGNOSIS — F43.10 POSTTRAUMATIC STRESS DISORDER: ICD-10-CM

## 2024-07-05 DIAGNOSIS — F31.32 BIPOLAR 1 DISORDER, DEPRESSED, MODERATE (HCC): Primary | ICD-10-CM

## 2024-07-05 DIAGNOSIS — F40.01 PANIC DISORDER WITH AGORAPHOBIA: ICD-10-CM

## 2024-07-05 DIAGNOSIS — F41.1 GAD (GENERALIZED ANXIETY DISORDER): ICD-10-CM

## 2024-07-05 PROCEDURE — 99214 OFFICE O/P EST MOD 30 MIN: CPT | Performed by: NURSE PRACTITIONER

## 2024-07-05 PROCEDURE — G0177 OPPS/PHP; TRAIN & EDUC SERV: HCPCS

## 2024-07-05 PROCEDURE — G0176 OPPS/PHP;ACTIVITY THERAPY: HCPCS

## 2024-07-05 RX ORDER — CLONAZEPAM 0.5 MG/1
0.5 TABLET ORAL 3 TIMES DAILY
Qty: 90 TABLET | Refills: 2 | Status: SHIPPED | OUTPATIENT
Start: 2024-07-05

## 2024-07-05 NOTE — PSYCH
Subjective:     Patient ID: Marco Antonio Oro is a 43 y.o. male.    [facility] Clinical Progress Notes      Specialized Services Documentation  Therapist must complete separate progress note for each specific clinical activity in which the individual participated during the day.       Case Management Note    Lynda BELLOBC    Current suicide risk : Low     Not a case management day for Marco Antonio Oro today. Did not reach out with any additional questions and concerns and aware of next scheduled 1:1.      Medications changes/added/denied? No    Treatment session number: 8    Individual Case Management Visit provided today? No    [facility] follow up physician's orders: see JAMES Templeton note

## 2024-07-05 NOTE — PSYCH
Visit Time    Visit Start Time: 0930  Visit Stop Time: 1030  Total Visit Duration: 1 hour    Subjective:     Patient ID: Marco Antonio Oro is a 43 y.o. y.o. male.    [facility] Clinical Progress Notes      Specialized Services Documentation  Therapist must complete separate progress note for each specific clinical activity in which the individual participated during the day.     Allied Therapy Group   Marco Antonio Oro actively shared in Music Therapy Group exploring DBT distress tolerance “crisis survival strategies”.  Group explored crisis survival strategies “IMPROVE, TIP, Pros and Cons, Self Soothe, and STOP) reinforcing actions one could take to learn to tolerate stressful experiences, thoughts and urges.  Marco Antonio participated in STOP examples, introduced GLAD, Pros/Cons Chant, and Sensory Awareness exercise to explore several strategies.  He felt he could put effort into practicing STOP.  Some positive progress toward goal noted.  Continue ATgroup to encourage learning, practice, and home practice of skills to manage distress.   Tx Plan Objective: 1.1, Therapist:  Lynda MCDERMOTT

## 2024-07-05 NOTE — PSYCH
Visit Time    Visit Start Time: 1330  Visit Stop Time: 1430  Total Visit Duration: 1 hour    Subjective:     Patient ID: Marco Antonio Oro is a 43 y.o. male.    [facility] Clinical Progress Notes      Specialized Services Documentation  Therapist must complete separate progress note for each specific clinical activity in which the individual participated during the day.     GROUP PSYCHOTHERAPY    Marco Antonio Oro participated actively in psychotherapy group.  This was observedConsistently throughout the treatment day. They were engaged in learning related to Illness and Wellness Tools. Staff utilized Verbal, Written, A/V, and Demonstration teaching methods.  Marco Antonio Oro shared area of learning and set a goal for outside of program to complete laundry.  Marco Antonio Oro was able to share items explored during the day and shared in adding to their WRAP.  Ended session with peer  led exercise nostril breathing.  Marco Antonio Oro demonstrated positive progress toward goal.  Continue group psychotherapy to actively practice learned skills and encourage transfer of knowledge to unstructured time.    Tx Plan Objective: 1.2,1.5, Therapist: Lynda MCDERMOTT

## 2024-07-05 NOTE — PSYCH
PHP MEDICATION MANAGEMENT NOTE        [facility]    Name and Date of Birth:  Marco Antonio Oro 43 y.o. 1980 MRN: [id number]    Date of Visit: July 5, 2024    Visit Time    Visit Start Time: 0930   Visit Stop Time: 1000  Total Visit Duration:  30 minutes     The total visit duration detailed above includes: patient engagement, medication management, psychotherapy/counseling, discussion regarding treatment goals, and coordination of care.      Note Share Disclaimer:     This note was not shared with the patient due to reasonable likelihood of causing patient harm    Allergies   Allergen Reactions    Infliximab Other (See Comments)    Other Other (See Comments)    Penicillin G Other (See Comments)    Penicillins Other (See Comments)     rash    Penicillin V Rash     SUBJECTIVE:    Marco Antonio is seen today for a follow up for Bipolar Disorder, Generalized Anxiety Disorder, Panic Disorder, and Social Phobia. He continues to improve gradually since beginning PHP.  He has started on BuSpar 5 mg p.o. 3 times daily.  Initially felt overtired with this medication tried to break in half.  However, now able to tolerate a full 5 mg dose.  Starting to notice some improvement in his anxiety symptoms.  Also working hard in the groups here at the partial program was learned here.  He slowly and for short periods of time putting himself out into social situations and noticing he is tolerating better.  Continues to report improvement in his depression, today rating at a 5 out of 10, 10 being worse.  He requires refill of his clonazepam today.  No symptoms of pete noted, no overt psychosis noted.  Denying any suicidal thoughts or passive death wish.    He denies any side effects from current psychiatric medications.    PLAN:  Continue psychiatric medications the same as noted below  Aware of 24 hour and weekend coverage for urgent situations accessed by calling [facility] main practice number  Continue partial hospitalization program    Diagnoses and all orders for this visit:    Bipolar 1 disorder, depressed, moderate (HCC)    Panic disorder with agoraphobia  -     clonazePAM (KlonoPIN) 0.5 mg tablet; Take 1 tablet (0.5 mg total) by mouth 3 (three) times a day    RADHA (generalized anxiety disorder)  -     clonazePAM (KlonoPIN) 0.5 mg tablet; Take 1 tablet (0.5 mg total) by mouth 3 (three) times a day        Current Outpatient Medications on File Prior to Visit   Medication Sig Dispense Refill    amLODIPine (NORVASC) 10 mg tablet TAKE 1 TABLET BY MOUTH EVERY DAY 90 tablet 0    busPIRone (BUSPAR) 5 mg tablet Take 1 tablet (5 mg total) by mouth 3 (three) times a day 90 tablet 0    hydroquinone 4 % cream       lamoTRIgine (LaMICtal) 25 mg tablet TAKE 1 TABLET BY MOUTH TWICE A  tablet 0    lisinopril (ZESTRIL) 10 mg tablet TAKE 1 TABLET BY MOUTH EVERY DAY IN THE EVENING 90 tablet 0    OLANZapine (ZyPREXA) 10 mg tablet TAKE 1 TABLET BY MOUTH DAILY AT BEDTIME 90 tablet 0    tamsulosin (FLOMAX) 0.4 mg Take 0.4 mg by mouth daily with dinner      terbinafine (LamISIL) 1 % cream Apply topically 2 (two) times a day      [DISCONTINUED] clonazePAM (KlonoPIN) 0.5 mg tablet Take 1 tablet (0.5 mg total) by mouth 3 (three) times a day 90 tablet 2     No current facility-administered medications on file prior to visit.       HPI ROS Appetite Changes and Sleep:     He reports frequent awakenings, fluctuating appetite, fluctuating energy levels. Denies homicidal ideation, denies suicidal ideation    Review Of Systems:   no complaints, all other systems are negative         Mental Status Evaluation:    Appearance:  age appropriate   Behavior:  pleasant, cooperative   Speech:  slow   Mood:  depressed   Affect:  flat   Thought Process:  goal directed   Associations: intact associations   Thought Content:  no overt delusions   Perceptual Disturbances: none   Risk Potential:  Suicidal ideation - None  Homicidal ideation - None  Potential for aggression - No   Sensorium:  oriented to person, place, and time/date   Memory:  recent and remote memory grossly intact   Consciousness:  alert and awake   Attention: decreased concentration and decreased attention span   Insight:  improving   Judgment: improving   Gait/Station: normal gait/station, normal balance   Motor Activity: no abnormal movements       History Review:The following portions of the patient's history were reviewed and updated as appropriate: psychiatric history, trauma history allergies, current medications, past family history, past medical history, past social history, past surgical history, and problem list   OBJECTIVE:     Vital signs in last 24 hours:    There were no vitals filed for this visit.  Laboratory Results: I have personally reviewed all pertinent laboratory/tests results.    Medications Risks/Benefits:      Risks, Benefits And Possible Side Effects Of Medications:    Discussed risks and benefits of treatment with patient including risk of suicidality, serotonin syndrome, increased QTc interval and SIADH related to treatment with antidepressants; Risk of induction of manic symptoms in certain patient populations, risk of parkinsonian symptoms, metabolic syndrome, tardive dyskinesia and neuroleptic malignant syndrome related to treatment with antipsychotic medications, and risks of misuse, abuse or dependence, sedation and respiratory depression related to treatment with benzodiazepine medications     Controlled Medication Discussion:     Marco Antonio has been filling controlled prescriptions on time as prescribed according to [place] Prescription Drug Monitoring Program    JAMES Painting 07/05/24

## 2024-07-05 NOTE — PSYCH
Subjective:    Patient ID: Marco Antonio Oro is a 43 y.o. male      [facility] Clinical Progress Notes      Specialized Services Documentation  Therapist must complete separate progress note for each specific clinical activity in which the individual participated during the day.     EDUCATION THERAPY    [id number]    Marco Antonio Oro actively shared in morning assessment and goal review. Presented as Receptive related to readiness to learn. Marco Antonio Oro  did complete goal from last treatment day identifying gaining hope, advocacy, responsibility, and support. Marco Antonio Oro did not present with any barriers to learning. Throughout morning group, Marco Antonio Oro participated in gratitude practice. Marco Antonio Oro made some progress toward goal. Continue education group to assess willingness to engage, assess transfer of knowledge, and participate in skill development.    Tx Plan Objective: 1.1,1.2,1.4,1.5 Therapist: KHARI Navarrete      Group Psychotherapy     [id number] Marco Antonio Oro participated actively in a psychotherapy group focused on several different areas of wellness/wellbeing: physical, emotional, cognitive, personal development, social, spiritual, and activities of daily living. Clients rated their progress and discussed areas that need work. By completing and discussing areas of progress and challenges, members are connected and reminded that, in their mental health struggle, they are not alone. The  used audio/visuals to show resources online to find free activities that peaked their interests over the weekend. The group shared which activity they would like to engage in this weekend. Marco Antonio stated they would try to attend an activity from [employer]. Great efforts towards progress goals which were displayed through note taking, participation in discussion, and engagement in topic. Continue to run daily group psychotherapy to meet  treatment needs and to practice skills outside of program.        Tx Plan Objective: 1.1,1.2,1.4,1.5 Therapist:  KHARI Navarrete

## 2024-07-05 NOTE — PSYCH
Group Psychotherapy    Subjective:     Patient ID: Marco Antonio Oro 43 y.o.     This group was facilitated in a private office.  ([id number]) Marco Antonio Oro participated actively in a psychotherapy group focused on setting boundaries. The group focused on the interpersonal effectiveness pillar of DBT; specifically, looking at the TERRELL acronym. Participants followed a step by step breakdown of the TERRELL process and were encouraged to engage in open discussion throughout. Group members were given a TERRELL practice worksheet and time to practice. Marco Antonio Oro took notes and filled out his practice sheet. Continue to note progress towards goals. Continue with psychotherapy to encourage further development of interpersonal effectiveness skills.  Tx Plan Objective 1.1, 1.2, 1.4 Therapist: DIANE Gordon

## 2024-07-08 ENCOUNTER — APPOINTMENT (OUTPATIENT)
Dept: PSYCHOLOGY | Facility: CLINIC | Age: 44
End: 2024-07-08
Payer: MEDICARE

## 2024-07-08 ENCOUNTER — TELEPHONE (OUTPATIENT)
Dept: PSYCHIATRY | Facility: CLINIC | Age: 44
End: 2024-07-08

## 2024-07-08 ENCOUNTER — SOCIAL WORK (OUTPATIENT)
Dept: BEHAVIORAL/MENTAL HEALTH CLINIC | Facility: CLINIC | Age: 44
End: 2024-07-08
Payer: MEDICARE

## 2024-07-08 ENCOUNTER — DOCUMENTATION (OUTPATIENT)
Dept: PSYCHOLOGY | Facility: CLINIC | Age: 44
End: 2024-07-08

## 2024-07-08 DIAGNOSIS — F43.10 POSTTRAUMATIC STRESS DISORDER: ICD-10-CM

## 2024-07-08 DIAGNOSIS — F40.01 PANIC DISORDER WITH AGORAPHOBIA: ICD-10-CM

## 2024-07-08 DIAGNOSIS — F31.32 BIPOLAR 1 DISORDER, DEPRESSED, MODERATE (HCC): Primary | ICD-10-CM

## 2024-07-08 PROCEDURE — 90837 PSYTX W PT 60 MINUTES: CPT | Performed by: SOCIAL WORKER

## 2024-07-08 NOTE — TELEPHONE ENCOUNTER
Spoke with the patient in regards to when his appt was. Writer was able to provide the information he was seeking.

## 2024-07-08 NOTE — PROGRESS NOTES
Subjective:     Patient ID: Marco Antonio Oro 43 y.o. Male    [facility] Clinical Progress Notes      Specialized Services Documentation  Therapist must complete separate progress note for each specific clinical activity in which the individual participated during the day.     Case Management     Marco Antonio Oro called out of program 07/08/24 due to a doctor's appointment. Marco Antonio Oro is schedule to return on Tuesday 7/9/2024.    Current suicide risk: unable to assess      Medications changes/added/denied? No     Treatment session number: 9     Individual Case Management Visit provided today? No      [facility] follow up physician's orders: Continue to follow orders.

## 2024-07-08 NOTE — PSYCH
Subjective:     Patient ID: Marco Antonio Oro is a 43 y.o. y.o. male.    [facility] Clinical Progress Notes      Specialized Services Documentation  Therapist must complete separate progress note for each specific clinical activity in which the individual participated during the day.     Visit Time    Visit Start Time: 1200  Visit Stop Time: 1230  Total Visit Duration:  30 minutes      Case Management Note    Case Management Needs Addressed: Implementation of skills     Current suicide risk : Low    Medications changes/added/denied? Medication check scheduled for Marco Antonio Oro on Friday 7/12/2024.      Treatment session number: 9    Individual Case Management Visit provided today? Yes     [facility] follow up physician's orders: Continue to Follow Orders       INDIVIDUAL PSYCHOTHERAPY     Met with Marco Antonio Oro for individual therapy. Marco Antonio shared that he called out of program yesterday because his doctor's appointment was moved earlier and because it was his urologist appointment and he was anxious. He shared that he will have to have surgery for his one kidney stone because of the size. He talked some about his past medical trauma with his previous surgeries and PTSD, but is not nervous about surgery because it's a relatively in and out procedure. However, he did state he wants to get his heart checked prior to scheduling. This writer and Marco Antonio also discussed his anxieties and panic. He stated he had a panic attack at the grocery store with his brother. He cannot identify where his agoraphobia because yet remains hopeful. Therapist and Marco Antonio discussed different exposure practices he can do to help him reintegrate. Also discussed coping ahead practices and chunking goals.  This writer utilized DBT interventions.  Some progress toward goal identified. Continue individual psychotherapy in order to development of alternative thoughts, feelings and behavior, improving intrapersonal and  self-regulatory functioning, and increasing empowerment.     Treatment Plan Objectives addressed: 1.1, 1.2, and 1.5  Therapist: BONNY Chau

## 2024-07-08 NOTE — PSYCH
"Behavioral Health Psychotherapy Progress Note    Psychotherapy Provided: Individual Psychotherapy     1. Bipolar 1 disorder, depressed, moderate (HCC)        2. Posttraumatic stress disorder        3. Panic disorder with agoraphobia            Goals addressed in session: Goal 1     DATA: Marco Antonio arrived for his session. Marco Antonio shared he has been dealing with Kidney stones. He has to have them surgically removed. Marco Antonio is continuing treatment in partial. He has severe panic and agoraphobia. He continues to work on his anxiety and panic. Yesterday he had a panic attack going into a [employer]. He had difficulty breathing, his chest was tight and he wanted to run out. I provided support, encouragement and strategies to cope.   During this session, this clinician used the following therapeutic modalities: Client-centered Therapy, Cognitive Behavioral Therapy, Mindfulness-based Strategies, and Supportive Psychotherapy    Substance Abuse was not addressed during this session. If the client is diagnosed with a co-occurring substance use disorder, please indicate any changes in the frequency or amount of use: He remains in recovery. . Stage of change for addressing substance use diagnoses: No substance use/Not applicable    ASSESSMENT:  Marco Antonio Oro presents with a Anxious mood.     his affect is anxious, which is congruent, with his mood and the content of the session. The client has made progress on their goals.     Marco Antonio Oro presents with a none risk of suicide, none risk of self-harm, and none risk of harm to others.    For any risk assessment that surpasses a \"low\" rating, a safety plan must be developed.    A safety plan was indicated: no  If yes, describe in detail n/a    PLAN: Between sessions, Marco Antonio Oro will use mindfulness and CBT. At the next session, the therapist will use Client-centered Therapy, Cognitive Behavioral Therapy, Mindfulness-based Strategies, and Supportive " Psychotherapy to address issues and symptoms as they may arise..    Behavioral Health Treatment Plan and Discharge Planning: Marco Antonio Oro is aware of and agrees to continue to work on their treatment plan. They have identified and are working toward their discharge goals. no    Visit start and stop times:    07/08/24  Start Time: 1600  Stop Time: 1700  Total Visit Time: 60 minutes

## 2024-07-08 NOTE — PSYCH
Subjective:    Patient ID: Marco Antonio Oro is a 43 y.o. male      [facility] Clinical Progress Notes      Specialized Services Documentation  Therapist must complete separate progress note for each specific clinical activity in which the individual participated during the day.     EDUCATION THERAPY () Marco Antonio Oro {SL AMB PSYCH ACTIVELY:03909} shared in morning assessment and goal review. Presented as {Readiness to Learnin} related to readiness to learn. Marco Antonio Oro  {DID/DID NOT:74368} complete goal from last treatment day identifying gaining {HEARS:39964}. Marco Antonio Oro {DID/DID NOT:85206} present with any barriers to learning. Throughout morning group, Marco Antonio Oro participated in {morning assessmemt experiences:56741}. Marco Antonio Oro made *** progress toward goal. Continue education group to assess willingness to engage, assess transfer of knowledge, and participate in skill development. Tx Plan Objective: ***, Therapist: {THERAPIST:06985}      ALLIED THERAPY (***) *** verbally engaged and interacted in today's skills group focused on creating goals using the S.M.A.R.T. method. The acronym stands for S-specific, M-measurable, A-achievable, R-relevant, and T-time bound. The group discussed examples of each letter before writing down their own SMART goals related to personal, educational, and social goals. *** demonstrated *** insight regarding how they can use the SMART method to help identify if what they want to achieve is realistic and determine a deadline. *** progress made towards treatment plan. Continue to involve in AT to practice writing realistic and measurable goals to offer a sense of direction and control. Tx Plan Objective: ***, Therapist:  {THERAPIST:97164}      GROUP PSYCHOTHERAPY ([id number]) Marco Antonio Oro participated {SL AMB PSYCH ACTIVELY:33279} in psychotherapy group.  This was observed{consistent/inconsistent  pain:8442620852} throughout the treatment day. They were engaged in learning related to {Education Completed:82200}. Staff utilized {Teaching Method:72736} teaching methods.  Marco Antonio Oro shared area of learning and set a goal for outside of program to ***.  Henrique Shorty was able to share items explored during the day and shared in adding to their WRAP.  Ended session with {staff/peer led:43972} led exercise ***.  Marco Antonio Oro demonstrated *** progress toward goal.  Continue group psychotherapy to actively practice learned skills and encourage transfer of knowledge to unstructured time. Tx Plan Objective: ***, Therapist: {THERAPIST:79125}

## 2024-07-08 NOTE — PSYCH
Subjective:     Patient ID: Marco Antonio Oro is a 43 y.o. y.o. male.    [facility] Progress Notes      Specialized Services Documentation  Therapist must complete separate progress note for each specific clinical activity in which the individual participated during the day.     Visit Time    Visit Start Time: ***  Visit Stop Time: ***  Total Visit Duration: {Psych Total Visit Time:61842}      Case Management Note    Case Management Needs Addressed: ***     Current suicide risk : ***    Medications changes/added/denied? ***    Treatment session number: ***    Individual Case Management Visit provided today? {YES (DEF)/NO:76586}    [facility] follow up physician's orders: Continue to Follow Orders       INDIVIDUAL PSYCHOTHERAPY     Met with Marco Antonio Oro for individual therapy. Marco Antonio ***.  Marco Antonio Oro identified ***.  This writer utilized *** interventions.  This writer observed Marco Antonio ***. ***. *** progress toward goal identified. Next session follow up to include ***. Continue individual psychotherapy in order to {Goals; psychotherapy:88734}. ***.     Treatment Plan Objectives addressed: {LSOBJECTIVES:90196}  Therapist: BONNY Chau

## 2024-07-09 ENCOUNTER — OFFICE VISIT (OUTPATIENT)
Dept: PSYCHOLOGY | Facility: CLINIC | Age: 44
End: 2024-07-09
Payer: MEDICARE

## 2024-07-09 DIAGNOSIS — F40.01 PANIC DISORDER WITH AGORAPHOBIA: ICD-10-CM

## 2024-07-09 DIAGNOSIS — F31.32 BIPOLAR 1 DISORDER, DEPRESSED, MODERATE (HCC): Primary | ICD-10-CM

## 2024-07-09 DIAGNOSIS — F43.10 POSTTRAUMATIC STRESS DISORDER: ICD-10-CM

## 2024-07-09 PROCEDURE — G0176 OPPS/PHP;ACTIVITY THERAPY: HCPCS

## 2024-07-09 PROCEDURE — 90832 PSYTX W PT 30 MINUTES: CPT

## 2024-07-09 PROCEDURE — G0177 OPPS/PHP; TRAIN & EDUC SERV: HCPCS

## 2024-07-09 NOTE — PSYCH
Subjective:    Patient ID: Marco Antonio Oro is a 43 y.o. male      [facility] Progress Notes      Specialized Services Documentation  Therapist must complete separate progress note for each specific clinical activity in which the individual participated during the day.     EDUCATION THERAPY    [id number]    Marco Antonio Oro actively shared in morning assessment and goal review. Presented as Receptive related to readiness to learn. Marco Antonio Oro  did complete goal from last treatment day identifying gaining advocacy and responsibility. Marco Antonio Oro did not present with any barriers to learning. Throughout morning group, Marco Antonio Oro participated in mindfulness exercise and gratitude practice. Marco Antonio Oro made some progress toward goal. Continue education group to assess willingness to engage, assess transfer of knowledge, and participate in skill development.    Tx Plan Objective: 1.1,1.2,1.4,1.5 Therapist: KHARI Navarrete      [id number]    Marco Antonio Oro participated actively in psychotherapy group.  This was observedConsistent throughout the treatment day. They were engaged in learning related to Illness and Wellness Tools. Staff utilized Verbal, Written, A/V, and Demonstration teaching methods.  Marco Antonio Oro shared area of learning and set a goal for outside of program to complete his laundry,  medication at the pharmacy and clean his room.  Marco Antonio Oro was able to share items explored during the day and shared in adding to their WRAP.  Ended session with peer  led exercise of breathing.  Marco Antonio Oro demonstrated great progress toward goal.  Continue group psychotherapy to actively practice learned skills and encourage transfer of knowledge to unstructured time.      Tx Plan Objective: 1.1,1.2,1.4,1.5  Therapist: KHARI Navarrete

## 2024-07-09 NOTE — PSYCH
Group Psychotherapy    Subjective:     Patient ID: Marco Antonio Oro 43 y.o.    This group was facilitated in a private office.  ([id number])Marco Antonio Oor actively engaged in psychoeducational group about conflict resolution. The skill helps to develop skills to create resolution with self and others with whom one may interact with. Group discovered strategies that help them to develop positive assertiveness skills on a short term and long term basis. The group talked about understanding the purpose, and meaning of conflict resolution in intellectual and emotional expression, regulation , and recognition, and how it affects themselves and others.Teaching on the emphasis of self monitoring and resolution techniques, discussions on how to create positive resolutions through effective assertive tools were discussed. Group was encouraged to ask questions in an open forum at the end of group. Positive progress displayed through engagement in topic, Marco Antonio was an active listener during the group and was an active supporter of other group members.. Marco Antonio Oro will continue to engage in psychotherapy to encourage positive conflict resolution.  Treatment Plan Objective 1.1, 1.2, 1.3, 1.4 Therapist: Edu ESPINOZA Ed. With Jc Jose

## 2024-07-09 NOTE — PSYCH
Subjective:    Patient ID: Marco Antonio Oro is a 43 y.o. male      [facility] Progress Notes      Specialized Services Documentation  Therapist must complete separate progress note for each specific clinical activity in which the individual participated during the day.       Allied Therapy ([id number]) Marco Antonio Oro was actively involved in group focused on the importance of consistently practicing skills learned in program. The group opened with self-reflection by answering what skills have you been implementing? Together, group members took turns sharing successes and/or challenges with implementing skills outside of program. Peer and staff lead exercises today focused on advocacy, distress tolerance skills STOP and TIPP, utilizing the WRAP, and taking responsibility in recovery and overall wellness. Group wrapped up with guided gratitude affirmation. Positive work displayed towards treatment plan goals through participation and personal disclosure. Marco Antonio spoke about the importance of taking responsibility for his recovery by practicing one or two skills daily instead of falling into old habits or unhealthy routines. Continue to involve AT to encourage development and use of wellness tools to decrease symptoms and promote recovery through meaningful activity. Tx Plan Objective: 1.1, 1.2 ,1.4, Therapist:  FRANCY Chaudhari

## 2024-07-09 NOTE — PSYCH
"Visit Time    Visit Start Time: 1045  Visit Stop Time: 1145  Total Visit Duration: 60 minutes    Subjective:     Patient ID: Marco Antonio Oro is a 43 y.o. y.o. male.    [facility] Progress Notes      Specialized Services Documentation  Therapist must complete separate progress note for each specific clinical activity in which the individual participated during the day.     Psychotherapy  Marco Antonio Oro actively shared in MTH group focused on Letting Go. Group discussed various things that make them feel stuck or held back in life, and the things that they want to feel and explore. The topic was discussed and and alternative coping mechanisms were presented. The group then participated in a breathing/visualization exercise to practice acknowledgement of \"weights\" that hold us back. The feeling of \"releasing\" the balloon was discussed. The group then participated in a back-to-back ximena analysis of \"Letting Go\" by Garth Baldwin and \"Let it Go\" by Hernandez Crews. Themes were discussed. The final activity was a a worksheet designed to help visualize our \"weights\" vs. Our \"balloons.\" The group wrote down the things that were holding them back and ripped it up. The group wrote down the things that they wanted to experience in life, and put lists into balloons. The group activities were:  Back-to-back Ximena analysis of \"Letting Go\" by Garth Baldwin & \"Let it Go\" by Hernandez Crews  Breathing/Visualization of Balloons and weights  Discussion  Letting Go worksheet  Ripping paper  This group proved to be very healing for  Marco Antonio Oro. Marco Antonio reported that one of their balloons was \"[his] dreams and ambitions, and [his] family.\" Group ended with open discussion about the music exercise that happened in the group and reading of \"letting go statements.\" During this group, Marco Antonio shared a message about letting go that his mother said to him when he was young, and he stated, \"ever since then, I have just let things go " "that no longer serve me. It can be a life sentence not to.\" Marco Antonio wrote his own statement, ripped it up, and threw it in the trash. This proved to be easy for Marco Antonio. At the end of group, Marco Antonio, offered to read one of the quotes about letting go to the group. Continue with psychotherapy to practice mindfulness and continue to explore music making.      Tx Plan Objective: 1.1, 1.2, 1.4 Therapist: BALDEMAR Linares & EMMY Navarrete    "

## 2024-07-10 ENCOUNTER — OFFICE VISIT (OUTPATIENT)
Dept: PSYCHOLOGY | Facility: CLINIC | Age: 44
End: 2024-07-10
Payer: MEDICARE

## 2024-07-10 DIAGNOSIS — F31.32 BIPOLAR 1 DISORDER, DEPRESSED, MODERATE (HCC): Primary | ICD-10-CM

## 2024-07-10 DIAGNOSIS — F40.01 PANIC DISORDER WITH AGORAPHOBIA: ICD-10-CM

## 2024-07-10 DIAGNOSIS — F43.10 POSTTRAUMATIC STRESS DISORDER: ICD-10-CM

## 2024-07-10 PROCEDURE — G0176 OPPS/PHP;ACTIVITY THERAPY: HCPCS

## 2024-07-10 PROCEDURE — G0177 OPPS/PHP; TRAIN & EDUC SERV: HCPCS

## 2024-07-10 NOTE — PSYCH
Subjective:    Patient ID: Marco Antonio Oro is a 43 y.o. male      [facility] Progress Notes      Specialized Services Documentation  Therapist must complete separate progress note for each specific clinical activity in which the individual participated during the day.     EDUCATION THERAPY ([id number]) Marco Antonio Oro quietly shared in morning assessment and goal review. Presented as Receptive related to readiness to learn. Marco Antonio Oro  did complete goal from last treatment day identifying gaining responsibility. Marco Antonio Oro did not present with any barriers to learning. Throughout morning group, Marco Antonio Oro participated in mindfulness exercise. Marco Antonio Oro made some progress toward goal. Continue education group to assess willingness to engage, assess transfer of knowledge, and participate in skill development. Tx Plan Objective: 1.1, 1.2 ,1.4, Therapist: ERNESTINE Chaudhari/SIRISHA      ALLIED THERAPY ([id number]) Marco Antonio Oro actively participated in skills group focused on getting to know your anger.  opened by explaining that anger is a normal, human emotion and “getting to know your anger” and confronting it is the first step in effective anger management. Marco Antonio was engaged in self-reflection discussion to inventory physical, behavioral, and emotional anger symptoms. Marco Antonio identified he shuts down when angry. The group discussed benefits of increased awareness of personal anger symptoms. Positive effort noted towards treatment goals. Involve in follow-up life skills group geared towards identifying personal anger styles and how to manage anger. Tx Plan Objective: 1.1, 1.2, 1.4, Therapist:  FRANCY Chaudhari      GROUP PSYCHOTHERAPY ([id number]) Marco Antonio Oro participated actively in psychotherapy group.  This was observed Consistent throughout the treatment day. They were engaged in learning related to Illness and Wellness  Tools. Staff utilized Verbal, Written, and Demonstration teaching methods.  Marco Antonio Oro shared area of learning and set a goal for outside of program to challenge himself to go back to [employer] as he had a panic attack yesterday.  Marco Antonio Oro was able to share items explored during the day and shared in adding to their WRAP.  Ended session with peer  led exercise of EFT.  Marco Antonio Oro demonstrated some progress toward goal.  Continue group psychotherapy to actively practice learned skills and encourage transfer of knowledge to unstructured time. Tx Plan Objective: 1.1, 1.2 ,1.4, 1.5, Therapist: ERNESTINE Chaudhari/SIRISHA

## 2024-07-10 NOTE — PSYCH
Subjective:    Patient ID: Marco Antonio Oro is a 43 y.o. male      [facility] Progress Notes      Specialized Services Documentation  Therapist must complete separate progress note for each specific clinical activity in which the individual participated during the day.     Group Psychotherapy     [id number] Marco Antonio Oro participated actively in a psychotherapy group focused on fear. The group defined the meaning and how they feel when experiencing that emotion.  discussed the difference between fear and anxiety to better understand the distinction between the two.  discussed several coping skills one can use to address their fears (ie fear diary, facing the fear head on, affirmations, ana/spirituality and mindfulness breathing).  conducted an activity where each person wrote down their fear on a piece of paper, put it in a hat, another person read it out loud so the group can discuss how to address that fear. Marco Antonio stated they would use facing it head on and affirmations to address their fears. Marco Antonio Oro made great efforts towards progress goals which were displayed through note taking, participation in discussion, and engagement in topic. Continue to run daily group psychotherapy to meet treatment needs and encourage Marco Antonio Oro to practice skills outside of program.        Tx Plan Objective: 1.1,1.2,1.4, Therapist:  KHARI Navarrete

## 2024-07-10 NOTE — PSYCH
Subjective:     Patient ID: Marco Antonio Oro is a 43 y.o. y.o. male.    [facility] Progress Notes      Specialized Services Documentation  Therapist must complete separate progress note for each specific clinical activity in which the individual participated during the day.     Visit Time    Visit Start Time: 1145  Visit Stop Time: 1215  Total Visit Duration:  30 minutes    INDIVIDUAL PSYCHOTHERAPY & CASE MANAGEMENT      Case Management Needs Addressed: Discharge Plan for Thursday 7     Met with Marco Antonio Oro for individual therapy. Marco Antonio expressed some frustration in regards to his medical treatment and his kidney stones, however, he reported no anxiety about having potential surgery or next steps and consider a second opinion. He continues to express gratitude towards PHP and has made gains for himself.  Marco Antonio Oro identified continuing to take baby steps is important. He shared that he went to [employer] and there was no parking so instead of going home, he went to the [employer] store. He was very proud of himself and this writer expressed that she was happy for him as well. He still struggles with panic and cannot pinpoint a trigger. This writer suggested to him to continue to engage in coping ahead strategies before he decides to go to a store. Also encouraged him to try before going to a store, if the [employer] store is open, to go there first since it's a familiar place and a comfort to prime himself for going to another larger place. He stated he liked that idea.  This writer utilized DBT / exposure-idea interventions. Positive progress toward goal identified. Continue individual psychotherapy in order to development of alternative thoughts, feelings and behavior and increasing empowerment.     Treatment Plan Objectives addressed: 1.2 and 1.5    Current suicide risk : Low    Medications changes/added/denied? Medication check scheduled for Marco Antonio Oro on Friday 7/12/2024.       Treatment session number: 11    Individual Case Management Visit provided today? Yes     [facility] follow up physician's orders: Continue to Follow Orders     Therapist: BONNY Chau

## 2024-07-10 NOTE — PSYCH
Subjective:     Patient ID: Marco Antonio Oro 43 y.o. Male    [facility] Progress Notes      Specialized Services Documentation  Therapist must complete separate progress note for each specific clinical activity in which the individual participated during the day.     Group Psychotherapy - DBT House    [id number] Marco Antonio Oro actively participated in the creation of their own “DBT House” activity. The aim of this group is to give group members a visual tool to help Marco Antonio Oro learn and practice skills their taught in DBT. The DBT House visually represent an emotional landscape and progress so they can better understand their emotions, thoughts, and behaviors.  discussed two important goals of the DBT House:   Enhanced Self-Awareness   Improved Emotional Regulation and Coping skills.    After discussion, the group engaged in a “Color of the Frederic” guided meditation to center selves, guide them in giving them power to influence outcomes and build self-confidence. Next,  led them in the build of their DBT House. The DBT House consists of:  Foundation - core values, beliefs, traditions  The Walls - gives structure.  Basement (Level 1) - areas of one's life or behaviors they are trying to gain control over  Level 2 - what emotions and feelings would they like to cultivate more of?  Level 3 - things they feel happy about or want to feel happy about.   Level 4 - what a life worth living looks like  [place]ney - ways in which they blow off steam.   Billboard - accomplishments, skills or characteristics they want to share with the world/what they are most proud of.     Throughout each build, group facilitator asked group participants to share and discuss.  also discussed various aspects of the activity such as what was the purpose of this activity and what did they learn about themselves? Marco Antonio Oro participated in the guided visualization  exercise and the DBT house activity. He shared at the end what he learned about himself and realized he has more to give and supports than he realized and it motivates him to do well. Positive progress noted towards goals. Continue with integration of skills into Marco Antonio Oro's daily life to increase personal growth and mental well-being.     Therapist: BONNY Chau  Tx Plan Objective: 1.1, 1.2, 1.4     Case Management Note    BONNY Chau     Current suicide risk : Low     A case management session is not scheduled today with Marco Antonio Oro ; additionally, they did not request a CM meeting.  Marco Antonio LINK Shorty is aware of next scheduled 1:1.    Medications changes/added/denied? Medication check scheduled for Henrique Shorty on Friday 7/12/2024.       Treatment session number: 10    Individual Case Management Visit provided today? No    [facility] follow up physician's orders: None at this time

## 2024-07-11 ENCOUNTER — OFFICE VISIT (OUTPATIENT)
Dept: PSYCHOLOGY | Facility: CLINIC | Age: 44
End: 2024-07-11
Payer: MEDICARE

## 2024-07-11 DIAGNOSIS — F31.32 BIPOLAR 1 DISORDER, DEPRESSED, MODERATE (HCC): Primary | ICD-10-CM

## 2024-07-11 DIAGNOSIS — F40.01 PANIC DISORDER WITH AGORAPHOBIA: ICD-10-CM

## 2024-07-11 DIAGNOSIS — F43.10 POSTTRAUMATIC STRESS DISORDER: ICD-10-CM

## 2024-07-11 PROCEDURE — G0410 GRP PSYCH PARTIAL HOSP 45-50: HCPCS

## 2024-07-11 PROCEDURE — G0177 OPPS/PHP; TRAIN & EDUC SERV: HCPCS

## 2024-07-11 NOTE — PSYCH
Subjective:     Patient ID: Marco Antonio Oro is a 43 y.o. male.    [facility] Clinical Progress Notes      Specialized Services Documentation  Therapist must complete separate progress note for each specific clinical activity in which the individual participated during the day.     Psychotherapeutic Group ([id number])    Today's group was about personalities and how to better understand who we are (our characteristics). They learned about the different Patel-Golden personality types and assessed themselves to gain further understanding of who they are. The group identified their personality types and reviewed ways this knowledge could be used to promote positive change, self-reflection, and a more comfortable life. These goals were achieved through multiple discussions, an MBTI done by all of the clients, and review of MBTI related materials. Individuals were then  into smaller groups and provided guiding questions to explore the provided materials to gain insights about themselves and each other. Marco Antonio Oro actively participated by expressing excitement over the topic, asking clarifying questions, completing the MBTI, exploring the results, discussing the results with the group, encouraging conversation, and sharing what he learned. Continue psychotherapy groups to encourage further exploration of needs, personal awareness, and skills.     Treatment Objectives: 1.1, 1.2, 1.4 Therapist: Jc Jose MS

## 2024-07-11 NOTE — PSYCH
Subjective:    Patient ID: Marco Antonio Oro is a 43 y.o. male      [facility] Clinical Progress Notes      Specialized Services Documentation  Therapist must complete separate progress note for each specific clinical activity in which the individual participated during the day.     EDUCATION THERAPY    [id number]    Marco Antonio Oro actively shared in morning assessment and goal review. Presented as Receptive related to readiness to learn. Marco Antonio Oro  did complete goal from last treatment day identifying gaining hope, advocacy, responsibility, and support. Marco Antonio Oro did not present with any barriers to learning. Throughout morning group, Marco Antonio Oro participated in gratitude practice. Marco Antonio Oro made some progress toward goal. Continue education group to assess willingness to engage, assess transfer of knowledge, and participate in skill development.    Tx Plan Objective: 1.1,1.2,1.4,1.5 Therapist: KHARI Navarrete      Group Psychotherapy     [id number] Marco Antonio Oro participated actively in a psychotherapy group focused on Sleep Hygiene- the difference between a good and a bad night, the importance of developing a good routine and strategies they can use. The group focused a worksheet that listed different tips they can use to develop their sleep hygiene. The group shared which tip they plan on incorporating or maintaining in their routine. Group also shared unconventional ways they engage in sleep.  demonstrated a website (Linkurious) and application (Soothing Pod) that contained a plethora of videos/music that help induce sleep.  played a video for several minutes and asked how each person felt after. Marco Antonio stated they felt sleepy listening to the thunder.rain video and that they would incorporate taking a warm shower, smells (lavender & peppermint) and engaging in a calming activity (reading) into their sleep hygiene. Marco Antonio  stated that he needed this session and is looking forward to implementing these activities this evening.  Marco Antonio Oro made great efforts towards progress goals which were displayed through note taking, participation in discussion, and engagement in topic. Continue to run daily group psychotherapy to meet treatment needs and encourage Marco Antonio Oro to practice skills outside of program.        Tx Plan Objective: 1.1,1.2,1.4,1.5 Therapist:  KHARI Navarrete       [id number]    Marco Antonio Oro was not present for the last education group of the day due to an appointment.  was made aware. Prior to leaving, Marco Antonio shared area of learning and set a goal for outside of program to work on his sleep hygiene by lighting up a lavender candle, taking a warm shower and reading a book.     Tx Plan Objective: 1.1,1.2,1.4,1.5  Therapist: KHARI Navarrete

## 2024-07-12 ENCOUNTER — OFFICE VISIT (OUTPATIENT)
Dept: PSYCHOLOGY | Facility: CLINIC | Age: 44
End: 2024-07-12
Payer: MEDICARE

## 2024-07-12 ENCOUNTER — OFFICE VISIT (OUTPATIENT)
Dept: PSYCHIATRY | Facility: CLINIC | Age: 44
End: 2024-07-12
Payer: MEDICARE

## 2024-07-12 DIAGNOSIS — F31.32 BIPOLAR 1 DISORDER, DEPRESSED, MODERATE (HCC): Primary | ICD-10-CM

## 2024-07-12 DIAGNOSIS — F40.01 PANIC DISORDER WITH AGORAPHOBIA: ICD-10-CM

## 2024-07-12 DIAGNOSIS — F43.10 POSTTRAUMATIC STRESS DISORDER: ICD-10-CM

## 2024-07-12 PROCEDURE — 99214 OFFICE O/P EST MOD 30 MIN: CPT | Performed by: NURSE PRACTITIONER

## 2024-07-12 PROCEDURE — G0176 OPPS/PHP;ACTIVITY THERAPY: HCPCS

## 2024-07-12 PROCEDURE — G0177 OPPS/PHP; TRAIN & EDUC SERV: HCPCS

## 2024-07-12 RX ORDER — KETOROLAC TROMETHAMINE 10 MG/1
10 TABLET, FILM COATED ORAL EVERY 6 HOURS PRN
COMMUNITY
Start: 2024-07-08 | End: 2025-07-08

## 2024-07-12 NOTE — PSYCH
Group Psychotherapy    Subjective:     Patient ID: Marco Antonio Oro 43 y.o.      This group was facilitated in a private office.  ([id number])Marco Antonio Oro actively engaged in psychoeducational group about challenging automatic negative thoughts (ANTs). The skill helps to identify negative thoughts and cognitive distortions. The outcome being that negative thoughts are challenged in the thought process and regulation of emotion. Group discovered strategies that help them to manage negative thoughts and rethink the negative thoughts when faced with a negative challenge. The group talked about understanding the purpose of challenging negative thoughts on a personal and social level and how it affects themselves and others.. Teaching on the emphasis of self monitoring and self-care, the group focus is geared how togo to for help when the negative thoughts become overly intrusive. Group was encouraged to ask questions in an open forum at the end of session. Positive progress displayed through engagement in topic, Marco Antonio was responsive thorough the session and was actively processing how each thought process has affected him in past judgements.  Marco Antonio Oro will continue to engage in psychotherapy to encourage positive self realization.  Treatment Plan Objective 1.1, 1.2, 1.3, 1.4 Therapist: Edu ESPINOZA Ed.

## 2024-07-12 NOTE — PSYCH
Subjective:    Patient ID: Marco Antonio Oro is a 43 y.o. male      [facility] Clinical Progress Notes      Specialized Services Documentation  Therapist must complete separate progress note for each specific clinical activity in which the individual participated during the day.     EDUCATION THERAPY (0830-0930) Marco Antonio Oro actively shared in morning assessment and goal review. Presented as Receptive related to readiness to learn. Marco Antonio Oro  did complete goal from last treatment day identifying gaining education and responsibility. Marco Antonio Oro did not present with any barriers to learning. Throughout morning group, Marco Antonio Oro participated in gratitude practice. Marco Antonio Oro made good progress toward goal. Continue education group to assess willingness to engage, assess transfer of knowledge, and participate in skill development. Tx Plan Objective: 1.1, 1.2 ,1.4, Therapist: ERNESTINE Chaudhari/SIRISHA      ALLIED THERAPY ([id number]) Marco Antonio Oro was involved in follow-up skills group focused on effectively coping with anger situations.  reviewed keys points from previous group which included recognizing physical, behavioral, and emotional symptoms associated with anger as well as identifying personal anger styles. Marco Antonio identify they currently use both passive and aggressive styles of coping with anger. Group then transitioned into discussion, explanation, and demonstration of effective ways to manage anger in an assertive manner. Marco Antonio identified he could work on managing by utilizing the STOP skills. Good effort noted towards treatment plan goals. Continue to involve in psychotherapy and skills groups to increase wellness tools to manage overwhelming emotions. Tx Plan Objective: 1.1, 1.2, 1.4, Therapist:  FRANCY Chaudhari      GROUP PSYCHOTHERAPY ([id number]) Marco Antonio Oro participated actively in psychotherapy group.  This was  observed Consistent throughout the treatment day. They were engaged in learning related to Illness and Wellness Tools. Staff utilized Verbal, Written, A/V, and Demonstration teaching methods.  Marco Antonio Oro shared area of learning and set a goal for outside of program to attend [employer] on Sunday.  Marco Antonio Oro was able to share items explored during the day and shared in adding to their WRAP.  Ended session with peer  led exercise of box breathing.  Marco Antonio LINK Abelgena demonstrated steady progress toward goal.  Continue group psychotherapy to actively practice learned skills and encourage transfer of knowledge to unstructured time. Tx Plan Objective: 1.1, 1.2 ,1.4, 1.5, Therapist: ERNESTINE Chaudhari/SIRISHA

## 2024-07-12 NOTE — PSYCH
"Group Psychotherapy ([id number])    Wellness Inventory  The group engaged in the wellness assessment, which evaluates progress on several different areas of wellness/wellbeing: physical, emotional, cognitive, vocational, social and spiritual. Clients rated their progress and discussed areas that need work. By completing and discussing areas of progress and challenges, members are connected and reminded that, in their mental health struggle, they are not alone. Topics of discussion revolved around positive experiences within each area of wellness as well as the challenging aspects to wellness within their past week.  Marco Antonio Oro continues to make progress towards goals through participation in group activity and personal disclosures. During this activity, group members were asked to write down a song that they find motivational/inspiring. Continue psychotherapy groups to encourage further exploration of needs, personal awareness, and skills.   DICE activity  During the final portion of this session, Marco Antonio Oro actively, and receptively participated in throwing the dice, and answering a question corresponding with the number on the dice. The group answered questions about coping skills, mindfulness skills, gratitude, affirmations, distress tolerance skills, leisure activity, supports,  and movement. During this, Marco Antonio shared that he enjoys reading and spending time with his family as a form of leisure enjoyment. He made it a goal for himself to go out more this weekend, and possibly spend more time with his family. After everyone got a turn, the group rolled the dice for another round - odd numbers answer a question, even numbers share your motivational song with the group. Marco Antonio shared \"I am the Highway\" by [employer] and stated that it was a song that inspires him. Overall, consistent progress noted towards treatment goals. Continue psychotherapy groups to develop and build upon wellness tools and " resources.    Tx Plan Objective: 1.1,1.2, 1.4   Therapist: BALDEMAR Linares

## 2024-07-12 NOTE — PSYCH
PHP MEDICATION MANAGEMENT NOTE        [facility]    Name and Date of Birth:  Marco Antonio Oro 43 y.o. 1980 MRN: [id number]    Date of Visit: July 12, 2024    Visit Time    Visit Start Time: 1000   Visit Stop Time: 1030  Total Visit Duration:  30 minutes     The total visit duration detailed above includes: patient engagement, medication management, psychotherapy/counseling, discussion regarding treatment goals, and coordination of care.      Note Share Disclaimer:     This note was not shared with the patient due to reasonable likelihood of causing patient harm    Allergies   Allergen Reactions    Infliximab Other (See Comments)    Other Other (See Comments)    Penicillin G Other (See Comments)    Penicillins Other (See Comments)     rash    Penicillin V Rash     SUBJECTIVE:    Marco Antonio is seen today for a follow up for Bipolar Disorder, PTSD, Generalized Anxiety Disorder, and Panic Disorder. He continues to improve gradually since beginning PHP.  He is now tolerating the BuSpar without excessive tiredness.  Has been making progress with his agoraphobia and able to go on short trips to stores, etc.  Continues to participate more in the programming here.  Continues to state he is gaining great deal of insight and learning new skills that he is able to use for his anxiety here at the partial program.  Reports depression significantly improved.  Feeling more optimistic about his future.  Denying any suicidal thoughts or passive death wish.  No symptoms of pete.    He denies any side effects from current psychiatric medications.    PLAN:  Continue psychiatric medications the same as noted below  Aware of 24 hour and weekend coverage for urgent situations accessed by calling [facility] main practice number  Continue partial hospitalization program    Diagnoses and all orders for this visit:    Bipolar 1 disorder, depressed, moderate  (HCC)    Panic disorder with agoraphobia    Posttraumatic stress disorder    Other orders  -     ketorolac (TORADOL) 10 mg tablet; Take 10 mg by mouth every 6 (six) hours as needed        Current Outpatient Medications on File Prior to Visit   Medication Sig Dispense Refill    ketorolac (TORADOL) 10 mg tablet Take 10 mg by mouth every 6 (six) hours as needed      amLODIPine (NORVASC) 10 mg tablet TAKE 1 TABLET BY MOUTH EVERY DAY 90 tablet 0    busPIRone (BUSPAR) 5 mg tablet Take 1 tablet (5 mg total) by mouth 3 (three) times a day 90 tablet 0    clonazePAM (KlonoPIN) 0.5 mg tablet Take 1 tablet (0.5 mg total) by mouth 3 (three) times a day 90 tablet 2    hydroquinone 4 % cream       lamoTRIgine (LaMICtal) 25 mg tablet TAKE 1 TABLET BY MOUTH TWICE A  tablet 0    lisinopril (ZESTRIL) 10 mg tablet TAKE 1 TABLET BY MOUTH EVERY DAY IN THE EVENING 90 tablet 0    OLANZapine (ZyPREXA) 10 mg tablet TAKE 1 TABLET BY MOUTH DAILY AT BEDTIME 90 tablet 0    tamsulosin (FLOMAX) 0.4 mg Take 0.4 mg by mouth daily with dinner      terbinafine (LamISIL) 1 % cream Apply topically 2 (two) times a day       No current facility-administered medications on file prior to visit.       HPI ROS Appetite Changes and Sleep:     He reports fluctuating sleep pattern, fluctuating appetite, fluctuating energy levels. Denies homicidal ideation, denies suicidal ideation    Review Of Systems:   no complaints, all other systems are negative         Mental Status Evaluation:    Appearance:  age appropriate   Behavior:  pleasant, cooperative   Speech:  normal rate, normal volume   Mood:  improved   Affect:  brighter   Thought Process:  goal directed   Associations: perseverative   Thought Content:  obsessive thoughts, ruminating thoughts   Perceptual Disturbances: none   Risk Potential: Suicidal ideation - None  Homicidal ideation - None  Potential for aggression - No   Sensorium:  oriented to person, place, and time/date   Memory:  recent and  remote memory grossly intact   Consciousness:  alert and awake   Attention: decreased concentration and decreased attention span   Insight:  limited   Judgment: limited   Gait/Station: normal gait/station, normal balance   Motor Activity: no abnormal movements       History Review:The following portions of the patient's history were reviewed and updated as appropriate: psychiatric history, trauma history allergies, current medications, past family history, past medical history, past social history, past surgical history, and problem list   OBJECTIVE:     Vital signs in last 24 hours:    There were no vitals filed for this visit.  Laboratory Results: I have personally reviewed all pertinent laboratory/tests results.    Medications Risks/Benefits:      Risks, Benefits And Possible Side Effects Of Medications:    Discussed risks and benefits of treatment with patient including risk of suicidality, serotonin syndrome, increased QTc interval and SIADH related to treatment with antidepressants; Risk of induction of manic symptoms in certain patient populations, risk of parkinsonian symptoms, metabolic syndrome, tardive dyskinesia and neuroleptic malignant syndrome related to treatment with antipsychotic medications, and risks of misuse, abuse or dependence, sedation and respiratory depression related to treatment with benzodiazepine medications     Controlled Medication Discussion:     Marco Antonio has been filling controlled prescriptions on time as prescribed according to [place] Prescription Drug Monitoring Program    JAMES Painting 07/12/24

## 2024-07-12 NOTE — BH TREATMENT PLAN
"Subjective:     Patient ID: Marco Antonio Oro  is a 43 y.o. male     Assessment/Plan:      Diagnoses and all orders for this visit:     Bipolar 1 disorder, depressed, moderate (HCC)     Posttraumatic stress disorder     Panic disorder with agoraphobia        [facility] Treatment Plan      AREAS OF NEED: Marco Antonio Oro is experiencing symptoms related to their diagnoses of Bipolar 1 disorder, PTSD, and Panic Disorder with agoraphobia as evidenced by anxiousness, isolation, hopelessness, decreased energy, decreased concentration, and sleep disturbances due to mental health stressors.     Date Initiated: 06/21/24     Strengths: \"I'm a very nice berry,\" \"I'm a very caring individual,\" \"I have strong ana,\" \"I'm outgoing and humble.\"      LONG TERM GOAL:   Date Initiated: 06/21/24  1.0 While at [email or web address], I will engage in psychoeducational groups and practice skills that are aimed at improving my mental health, ability to cope and challenge myself, gain insights and skills to increase my quality of life  Target Date: 07/19/2024   Completion Date:         SHORT TERM OBJECTIVES:      Date Initiated: 06/21/24  1.1 I will explore learning about my triggers through: noticing signs from my body (stop to consider what just happened and the response), trace the roots (what was happening right before?), and get curious through befriending my emotions and notice ongoing patterns in order to increase my communication and decrease feelings of panic.  Revision Date: 07/02/2024- continue to work on; 07/12/2024 - continue with   Target Date: 07/02/2024; 07/12/2024; 07/23/2024    Completion Date:      Date Initiated: 06/21/24  1.2 I will identify the 5 pathways to hope and do 1 thing daily to work on increasing hopefulness. (Connection, goal setting, spirituality, nurturing, and doing routine things)   Revision Date: 07/02/2024 - continue to work on; 07/12/2024 - continue with   Target Date: 07/02/2024; 07/12/2024; " 07/23/2024    Completion Date:     Date Initiated: 06/21/24  1.3 I will take medications as prescribed and share questions and concerns if arise.    Revision Date:07/02/2024 - ongoing; 07/12/2024 - continue with   Target Date: 07/02/2024; 07/12/2024; 07/23/2024    Completion Date:      Date Initiated: 06/21/24  1.4 I will identify 3 ways my supports can assist in my wellness journey and use them if/when needed.    Revision Date:07/02/2024 - ongoing; 07/12/2024 - continue with   Target Date: 07/02/2024; 07/12/2024; 07/23/2024    Completion Date:            8 DAY REVISION:  1.5: I will continue to challenge myself to go to places that heighten my anxiety and work through said anxiety at least 2 to 3 times week   Date Initiated:07/02/2024   Revision Date: 07/12/2024 - continue with   Target Date: 07/12/2024; 07/23/2024    Completion Date:    1.6: I will continue to challenge myself with places I go to and break down the steps into small achievable goals and engage in positive self talk to work towards more exposure.    Date Initiated:07/02/2024   Revision Date: 07/12/2024 - continue with   Target Date: 07/12/2024; 07/23/2024    Completion Date:           PSYCHIATRY:  Date Initiated:  06/21/24  Medication Management and Education      Revision Date: 07/02/2024; 07/12/2024        The person(s) responsible for carrying out the plan is Dr. Rosa Lea MD & JAMES Painting      NURSING/SYMPTOM EDUCATION:  Date Initiated: 06/21/24       1.1, 1.2. 1.3, 1.4 Provide wellness/symptoms and skill education groups three to five days weekly to educate Marco Antonio LINK Shorty on signs and symptoms of diagnoses, skills to manage stressors, and medication questions that will be addressed by the treatment team.        Revision date: 07/02/2024; 07/12/2024        The person(s) responsible for carrying out the plan is Jc Jose MS & Pablo Allen.      PSYCHOLOGY:   Date Initiated: 06/21/24       1.1, 1.2, 1.4  Provide psychotherapy group 5 times per week to allow opportunity for Marco Antonio Oro  to explore stressors and ways of coping.   Revision Date: 07/02/2024; 07/12/2024   The person(s) responsible for carrying out the plan is BONNY Leahy      ALLIED THERAPY:   Date Initiated: 06/21/24  1.1,1.2 Engage Henrique Shorty in AT group 5 times daily to encourage development and use of wellness tools to decrease symptoms and promote recovery through meaningful activity.  Revision Date: 07/02/2024; 07/12/2024        The person(s) responsible for carrying out the plan is BALDEMAR Linares, BALDEMAR Hutson and FRANCY Chaudhari     CASE MANAGEMENT:   Date Initiated: 06/21/24      1.0 This  will meet with Marco Antonio Oro  3-4 times weekly to assess treatment progress, discharge planning, connection to community    supports and UR as indicated.  Revision Date: 07/02/2024; 07/12/2024    The person(s) responsible for carrying out the plan is BONNY Chau      TREATMENT REVIEW/COMMENTS:      07/02/2024 -  and Marco Antonio Oro reviewed and updated tx plan. Marco Antonio Oro relates understanding to their overall diagnoses, tx plan goal and objectives. They are agreeable.      07/12/2024 -  and Marco Antonio Oro reviewed and updated tx plan. Marco Antonio Oro relates understanding to their overall diagnoses, tx plan goal and objectives. They are agreeable.       DISCHARGE CRITERIA: Identify 3 signs of progress and complete relapse prevention plan.    DISCHARGE PLAN: Connect with identified outpatient providers.   Estimated Length of Stay: 10 treatment days       CLIENT COMMENTS / Please share your thoughts, feelings, need and/or experiences regarding your treatment plan with Staff.  Please see follow up note with comments.

## 2024-07-12 NOTE — PSYCH
Subjective:     Patient ID: Marco Antonio Oro is a 43 y.o. y.o. male.    [facility] Clinical Progress Notes      Specialized Services Documentation  Therapist must complete separate progress note for each specific clinical activity in which the individual participated during the day.     Visit Time    Visit Start Time: 1135  Visit Stop Time: 1150  Total Visit Duration:  20 minutes    INDIVIDUAL PSYCHOTHERAPY & CASE MANAGEMENT      Case Management Needs Addressed: Planning and implementation      Met with Marco Antonio Oro for individual therapy. Marco Antonio continues to gradually improve while attending [facility]. He is active in his treatment and continues to set small goals for himself.  Marco Antonio Oro identified that he wants to work on chunking goals into smaller achievable goals so he can set himself up for success when going out. He shared that he is going to the grocery store today and has a plan for how he can manage if any anxiety comes up. He shared that he can go to the wren where the restrooms are and breathe if needed instead of leaving the store.  This writer encouraged him to continue engaging and cope ahead. Positive progress toward goal identified. Next session follow up to include check in with goals. Continue individual psychotherapy in order to enhanced recognition and understanding of negative cognitions and behavior and increasing empowerment.     Treatment Plan Objectives addressed: 1.1, 1.2, 1.5, and 1.6    Current suicide risk : Low    Medications changes/added/denied? Medication check was completed on Friday 7/12/2024. See JAMES Painting's note.      Treatment session number: 12    Individual Case Management Visit provided today? Yes     [facility] follow up physician's orders: Continue to Follow Orders     Therapist: BONNY Chau       Airway patent, Nasal mucosa clear. Mouth with normal mucosa. Throat has no vesicles, no oropharyngeal exudates and uvula is midline.

## 2024-07-15 ENCOUNTER — OFFICE VISIT (OUTPATIENT)
Dept: PSYCHOLOGY | Facility: CLINIC | Age: 44
End: 2024-07-15
Payer: MEDICARE

## 2024-07-15 DIAGNOSIS — F31.32 BIPOLAR 1 DISORDER, DEPRESSED, MODERATE (HCC): Primary | ICD-10-CM

## 2024-07-15 DIAGNOSIS — F40.01 PANIC DISORDER WITH AGORAPHOBIA: ICD-10-CM

## 2024-07-15 DIAGNOSIS — F43.10 POSTTRAUMATIC STRESS DISORDER: ICD-10-CM

## 2024-07-15 NOTE — PSYCH
"Visit Time    Start time: 0930   End time: 1030   Total time:  60 minutes   Date: 7/15/2024     Subjective:    Patient ID: Marco Antonio Oro 43 y.o. Male    [facility] Clinical Progress Notes      Specialized Services Documentation  Therapist must complete separate progress note for each specific clinical activity in which the individual participated during the day.     Psychotherapy Groups  Marco Antonio Oro actively participated in music therapy group regarding writing letters to one's past/present/future self. The group discussed past events/points of concern in their lives they felt the need to address. The group listened to and discussed the song \"Letter to Me\" by Jonn Westbrook. Marco Antonio received a copy of different prompts to begin journaling about past, present, and future self. When writing a letter to past self, the group listened to and reflected on the song \"100 Years\" by [employer]. When writing letters to their current self, the group listened to and discussed, \"Man in the Mirror.\" When writing letters to their future self, the group listened to and discussed, \"This is Me\" from The Greatest Showman. Throughout the group, Marco Antonio actively, receptively, and passively participated and shared that the exercise was difficult for him. With that being said, he wrote all 3 letters throughout the session, and reflected on each of the songs. Marco Antonio reported that \"relying on supports like family and frieds,\" was one thing that they wanted to see in their future. Overall, positive effort noted. Continue psychotherapy to encourage healthy self reflection.    Objectives: 1.1, 1.2, 1.4     Therapist: BALDEMAR Baer    "

## 2024-07-15 NOTE — PSYCH
Subjective:     Patient ID: Marco Antonio Oro 43 y.o. Male    [facility] Clinical Progress Notes      Specialized Services Documentation  Therapist must complete separate progress note for each specific clinical activity in which the individual participated during the day.     OTHER: While this writer was meeting with another patient, there was communication that Marco Antonio was at the upfront office stating he wasn't feeling well.  Jc Jose, MS was able to speak with Marco Antonio as he asked to go home. Due to this writer meeting with Marco Antonio MONTOYA, came to confirm it was okay if Marco Antonio left for the day with this writer. She agreed. Later at 1225 this writer called Marco Antonio and LUCIANO.     Case Management Note    BONNY Chau     Current suicide risk : Low     A case management session is not scheduled today with Marco Antonio Oro ; additionally, they did not request a CM meeting.  Marco Antonio Oro is aware of next scheduled 1:1.    Medications changes/added/denied? Medication check scheduled for Marco Antonio Oro on Wednesday 7/17/2024.       Treatment session number: 13    Individual Case Management Visit provided today? No    [facility] follow up physician's orders: None at this time

## 2024-07-15 NOTE — PSYCH
Psychotherapy Group    Subjective:     Patient ID: Marco Antonio Oro 43 y.o.    This group was facilitated in a private office.  ([phone])Marco Antonio Oro  actively engaged in psychoeducational group about the PLEASE skill. The skill helps to maintain and regulate emotional expression/regulation. Group discovered strategies that help them to take care of physical and emotional well being on a short term and long term basis. The group talked about understanding the purpose, and meaning of self care in regards to physical, intellectual, and emotional expression, regulation , and recognition, and how it affects themselves and others.. Teaching on the emphasis of self monitoring and self-care, who the group can go to for help was brought up as well. Group was encouraged to ask questions in an open forum at the end of group. Positive progress displayed through engagement in topic, Marco Antonio was an active participant, but had asked to be excused due to feeling ill. Marco Antonio ended leaving for the day due to illness. Pt will continue to engage in psychotherapy to encourage self realization in treatment.  Treatment Plan Objective 1.1, 1.2, 1.3, 1.4 Therapist: Edu ESPINOZA Ed.

## 2024-07-15 NOTE — PSYCH
Subjective:    Patient ID: Marco Antonio Oro is a 43 y.o. male      [facility] Clinical Progress Notes      Specialized Services Documentation  Therapist must complete separate progress note for each specific clinical activity in which the individual participated during the day.     EDUCATION THERAPY    Start Time: 0830  End Time: 0930  Total Duration: 60 minutes  Date: 7.15.24    Marco Antonio Oro actively shared in morning assessment and goal review. Presented as Receptive related to readiness to learn. Marco Antonio Oro  did complete goal from last treatment day identifying gaining hope, responsibility, and support. Marco Antonio Oro did present with a potential barrier to learning. Marco Antonio reported feeling sleepy due to the medication he has to take but made a goal to participate in each group so he can remain focused and awake. Throughout morning group, Marco Antonio Oro participated in gratitude practice. Marco Antonio Oro made some progress toward goal. Continue education group to assess willingness to engage, assess transfer of knowledge, and participate in skill development.    Tx Plan Objective: 1.1,1.2,1.4,1.5,1.6 Therapist: KHARI Navarrete      Group Psychotherapy     Start Time: 1330  End Time: 1430  Total Duration: 0 minutes  Date: 7.15.24    Marco Antonio Oro was not present for the last group due to not feeling well. Marco Antonio reported that his stomach was not feeling well and requested to leave early. He hopes to return to program the next day.     Tx Plan Objective: 1.1,1.2,1.4,1.5,1.6  Therapist: KHARI Navarrete

## 2024-07-15 NOTE — PSYCH
Visit Time    Visit Start Time: 1215  Visit Stop Time: 1315  Total Visit Duration:  0 minutes    Subjective:     Patient ID: Marco Antonio Oro is a 43 y.o. male.    [facility] Clinical Progress Notes      Specialized Services Documentation  Therapist must complete separate progress note for each specific clinical activity in which the individual participated during the day.     Allied Therapy Marco Antonio Oro was not present for group due to being excused from program early. Tx Plan Objective: n/a, Therapist:  FRANCY Chaudhari

## 2024-07-16 ENCOUNTER — OFFICE VISIT (OUTPATIENT)
Dept: PSYCHOLOGY | Facility: CLINIC | Age: 44
End: 2024-07-16
Payer: MEDICARE

## 2024-07-16 DIAGNOSIS — F40.01 PANIC DISORDER WITH AGORAPHOBIA: ICD-10-CM

## 2024-07-16 DIAGNOSIS — F31.32 BIPOLAR 1 DISORDER, DEPRESSED, MODERATE (HCC): Primary | ICD-10-CM

## 2024-07-16 DIAGNOSIS — F43.10 POSTTRAUMATIC STRESS DISORDER: ICD-10-CM

## 2024-07-16 NOTE — PSYCH
Visit Time    Start Time: 1045   End Time: 1145  Total Duration: 60 minutes  Date: 7.16.24    Subjective:    Patient ID: Marco Antonio Oro is a 43 y.o. male    [facility] Progress Notes      Specialized Services Documentation  Therapist must complete separate progress note for each specific clinical activity in which the individual participated during the day.     Group Psychotherapy     Marco Antonio Oro participated actively in a psychotherapy group focused on the Jeremi-Brown Safety Plan- a form used for clients to complete with their warning signs, coping strategies, supports, how means of suicide can be removed from their environment and one reason worth living. This plan is created/updated for each client that is part of the Partial Hospitalization Program to serve as a “cheat sheet” so to speak for when they are experiencing suicidal ideation.  led a discussion that went over each aspect of the plan and provided examples that can be added.  informed them that this is a plan that can be revised at any time with new supports, coping strategies and reason for living. This plan is tailored to each person.  provided the clients with a blank hard copy of the plan to complete on their own and have it available for one of their supports to use in the event of an episode. The group also worked on a Crisis Card so they can provide to one of their supports to use in the event of an emergency.   also shared a list of coping strategies that they can add to their plans. Marco Antonio shared they use creating a gratitude list as a coping skill and that they would place their crisis card in their wallet. Marco Antonio Oro made great efforts towards progress goals which were displayed through note taking, participation in discussion, and engagement in topic. Continue to run daily group psychotherapy to meet treatment needs and encourage Marco Antonio Oro to  practice skills outside of program.        Tx Plan Objective: 1.1,1.2,1.4,1.5,1.6 Therapist:  KHARI Navarrete

## 2024-07-16 NOTE — PSYCH
"Visit Time    Visit Start Time: 1215  Visit Stop Time: 1315   Total Visit Duration: 60 minutes  Date: 7/16/2024    Subjective:     Patient ID: Marco Antonio Oro is a 43 y.o. y.o. male.    [facility] Progress Notes      Specialized Services Documentation  Therapist must complete separate progress note for each specific clinical activity in which the individual participated during the day.     Psychotherapy Group  Marco Antonio Oro actively, receptively, and passively participated in music therapy group regarding grief and loss. The group participated in a discussion about their own experiences with grief and loss. The group then participated in a italia discussion on the song \"Spoqaet Johnson\" by Himanshu Lindsay. The group read and discussed handouts regarding the stages of grief and general facts about grief. The group participated in a italia discussion on \"Because of You\" by Kusum Romero. Marco Antonio identified \"talking nicer to myself, and explaining to others exactly what I need from them in a moment\" as a coping skill they can use for managing grief outside of program. The stages of grief were discussed as a process rather than going through stages. The group discussed helpful vs unhelpful grieving and various phrases that can be seen as \"helpful\" or \"detrimental\" to grieving individuals. Marco Antonio shared his connections to grief when he lost his brother. He talked about how people reacted, and how people treated him/made him feel about it. Grief myths were presented and debunked, providing validation and comfort to those grieving a loss. The group ended with a italia analysis of \"Pink Skies\" by Price Sorensen. Positive effort noted towards treatment goal. At the end of the session, each group member was given a packet of Grief Journaling prompts to continue processing grief at home, and this writer encouraged Marco Antonio Oro to pick 1-2 journaling prompts to try outside of program. Continue psychotherapy " groups to encourage the development and proactive use of coping techniques.      Tx Plan Objective: 1.1, 1.2, 1.4 Therapist: BALDEMAR Linares

## 2024-07-16 NOTE — PSYCH
Subjective:    Patient ID: Marco Antonio Oro is a 43 y.o. male      [facility] Clinical Progress Notes      Specialized Services Documentation  Therapist must complete separate progress note for each specific clinical activity in which the individual participated during the day.     EDUCATION THERAPY  Visit Start Time: 0830  Visit Stop Time: 0930  Total Visit Duration:  60 minutes    Marco Antonio Oro actively shared in morning assessment and goal review. Presented as Receptive related to readiness to learn. Marco Antonio Oro  did complete goal from last treatment day identifying gaining education, advocacy, and support. Marco Antonio Oro did not present with any barriers to learning. Throughout morning group, Marco Antonio Oro participated in  review of PLEASE skill . Marco Antonio Oro made some progress toward goal. Continue education group to assess willingness to engage, assess transfer of knowledge, and participate in skill development.    Tx Plan Objective: 1.1, 1.2, 1.4, Therapist: FRANCY Chaudhari       GROUP PSYCHOTHERAPY  Visit Start Time: 1330  Visit Stop Time: 1430  Total Visit Duration:  60 minutes      Marco Antonio Oro participated actively in psychotherapy group.  This was observed Consistent throughout the treatment day. They were engaged in learning related to Illness, Aftercare, and Wellness Tools. Staff utilized Verbal, Written, A/V, and Demonstration teaching methods.  Marco Antonio Oro shared area of learning and set a goal for outside of program to relax by using his brother's hot tub.  Marco Antonio Oro was able to share items explored during the day and shared in adding to their WRAP.  Ended session with staff led exercise of GLAD technique.  Marco Antonio Oro demonstrated steady progress toward goal. Continue group psychotherapy to actively practice learned skills and encourage transfer of knowledge to unstructured time.    Tx Plan Objective:  1.1, 1.2, 1.4, Therapist: FRANCY Chaudhari

## 2024-07-16 NOTE — PSYCH
Subjective:    Patient ID: Marco Antonio Oro is a 43 y.o. y.o. male.    [facility] Clinical Progress Notes      Specialized Services Documentation  Therapist must complete separate progress note for each specific clinical activity in which the individual participated during the day.     Group Psychotherapy - Open Process; Current Events     Visit Time    Visit Start Time: 0930  Visit Stop Time: 1030  Total Visit Duration:  60 minutes    Marco Antonio Oro actively participated in an open processing group on increasing empowerment and having an opportunity to be heard when one might feel isolated in sharing their experiences. This group was focused on how Marco Antonio is coping with the events in the world - physically, emotionally, mentally, and spiritually. Focused on how we are part of history, the divisive nature in our world, and how the impact compounds their emotional landscape leading to overwhelm, fear, anxiety, lack of safety, and people please behaviors.  spent time engaging group participants with open ended questions, reflective questions, and encouragement from other group members.  discussed collective trauma and its impact on society. The group discussed how multiple realities exist and the influence of media in our day to day functioning. It is important for the group to be able to receive multiple perspectives and feedback from other group members in a safe environment.  provided space for members to share as they felt comfortable, active listening, encouragement, and offered support to group when warranted. This structure helped support the group in feeling cathartic, gain some interpersonal learning, group cohesiveness, altruism, and instilling hope. At the end of the group, facilitator introduced 6 ways to reduce the impact of Collective Trauma:    Raise your awareness  Notice your resilience  Take care of your body  Find your community  Find a trauma informed  therapist   Take part in community rebuilding     At the end,  briefly introduced the GLAD journaling technique that can help build a positive mindset and encouraged them to review the material, finding rose marie and balance in their lives. Marco Antonio Oro contributed to discussion by sharing about the topic discussed, relating to group members, and allowing self to be open/vulnerable. Marco Antonio Oro shared the most about the influence of social media on his life as well as the influence of the political climate. He was engaged throughout group and related to other group members.  positive progress noted towards goal. Continue with open processing therapy to provide space to support individuals in building trust, gain corrective emotional experiences, and cultivate healthier inter-dependency with others.    Tx Plan Objective 1.1, 1.2, 1.4 Therapist: BONNY Chau.       Visit Time    Visit Start Time: 1205  Visit Stop Time: 1235  Total Visit Duration:  30 minutes    INDIVIDUAL PSYCHOTHERAPY & CASE MANAGEMENT      Case Management Needs Addressed: Discharge planning - PHQ-9 provided.      Met with Henrique Shorty for individual therapy. Marco Antonio continues to gradually improve while attending program.  Marco Antonio Oro identified that he is nervous about discharge and knows staying longer would not help him in his process. He stated he needs to continue to implement skills outside of program and challenge himself. He shared some about his past surgery experiences and the PTSD in relation to his upcoming surgery for his kidney stones. However, he was in positive spirits and had a good outlook needing no redirection. He also shared how he is going to continue to work on his agoraphobia and challenge himself to go to places more and start with small goals to achieve. I.e. - go to [employer] and start at going through one part of the store and then continue to gradually add more parts  of the  store. This writer utilized CBT, DBT, and holistic interventions. Positive progress toward goal identified. Continue individual psychotherapy in order to demonstrating proper techniques for stress management, enhanced ability to identify unhelpful cognitions about traumatic events, and increasing empowerment.     Treatment Plan Objectives addressed: 1.1, 1.2, 1.4, 1.5, and 1.6    Current suicide risk : Low    Medications changes/added/denied? Medication check scheduled for Marco Antonio LINK Shorty on Wednesday 7/17/2024.        Treatment session number: 14    Individual Case Management Visit provided today? Yes     [facility] follow up physician's orders: Continue to Follow Orders     Therapist: BONNY Chau

## 2024-07-17 ENCOUNTER — OFFICE VISIT (OUTPATIENT)
Dept: PSYCHIATRY | Facility: CLINIC | Age: 44
End: 2024-07-17
Payer: MEDICARE

## 2024-07-17 ENCOUNTER — OFFICE VISIT (OUTPATIENT)
Dept: PSYCHOLOGY | Facility: CLINIC | Age: 44
End: 2024-07-17
Payer: MEDICARE

## 2024-07-17 DIAGNOSIS — F43.10 POSTTRAUMATIC STRESS DISORDER: ICD-10-CM

## 2024-07-17 DIAGNOSIS — F31.32 BIPOLAR 1 DISORDER, DEPRESSED, MODERATE (HCC): Primary | ICD-10-CM

## 2024-07-17 DIAGNOSIS — F40.01 PANIC DISORDER WITH AGORAPHOBIA: ICD-10-CM

## 2024-07-17 DIAGNOSIS — F31.64 BIPOLAR DISORDER, CURR EPISODE MIXED, SEVERE, WITH PSYCHOTIC FEATURES (HCC): ICD-10-CM

## 2024-07-17 PROCEDURE — 99214 OFFICE O/P EST MOD 30 MIN: CPT | Performed by: NURSE PRACTITIONER

## 2024-07-17 PROCEDURE — G0177 OPPS/PHP; TRAIN & EDUC SERV: HCPCS

## 2024-07-17 PROCEDURE — G0176 OPPS/PHP;ACTIVITY THERAPY: HCPCS

## 2024-07-17 RX ORDER — BUSPIRONE HYDROCHLORIDE 5 MG/1
5 TABLET ORAL 3 TIMES DAILY
Qty: 90 TABLET | Refills: 0 | Status: SHIPPED | OUTPATIENT
Start: 2024-07-17

## 2024-07-17 RX ORDER — OLANZAPINE 10 MG/1
10 TABLET ORAL
Qty: 90 TABLET | Refills: 0 | Status: SHIPPED | OUTPATIENT
Start: 2024-07-17

## 2024-07-17 NOTE — PSYCH
Visit Time    Start Time: 0830  End Time: 0930  Total Duration: 60 minutes   Date: 7.17.24    Subjective:    Patient ID: Marco Antonio Oro is a 43 y.o. male    [facility] Clinical Progress Notes      Specialized Services Documentation  Therapist must complete separate progress note for each specific clinical activity in which the individual participated during the day.     EDUCATION THERAPY    Marco Antonio Oro actively shared in morning assessment and goal review. Presented as Receptive related to readiness to learn. Marco Antonio Oro  did complete goal from last treatment day identifying gaining responsibility and support. Marco Antonio Oro did not present with any barriers to learning. Throughout morning group, Marco Antonio Oro participated in gratitude practice. Marco Antonio Oro made some progress toward goal. Continue education group to assess willingness to engage, assess transfer of knowledge, and participate in skill development.    Tx Plan Objective: 1.1,1.2,1.4,1.5,1.6 Therapist: KHARI Navarrete      Visit Time    Start Time: 0930  End Time: 1030  Total Duration: 60 minutes  Date: 7.17.24    Group Psychotherapy     Marco Antonio Oro participated actively in a psychotherapy group focused on Creative Journaling. Journaling is the process of documenting what happened throughout one's day. Sometimes this can be difficult to do because the clients do not know where to start. Through Creative Journaling, the clients are taught the benefits (creates a safe outlet, shifts mindset, promotes self-acceptance, reduces stress and offers clarity) and how to properly engage in it (setting a time of 15 to 20 minutes, being honest, not worrying about grammar/spelling mistakes and finding a private area).  had each client pick a journal prompt to work on while playing relaxation music. Marco Antonio reported feeling good after and that they would continue to commit to journaling  every other day. Marco Antonio stated that he enjoys journaling through writing and drawing. Marco Antonio Oro made great efforts towards progress goals which were displayed through note taking, participation in discussion, and engagement in topic. Continue to run daily group psychotherapy to meet treatment needs and encourage Marco Antonio Oro to practice skills outside of program.        Tx Plan Objective: 1.1,1.2,1.4,1.5,1.6 Therapist:  KHARI Navarrete       Visit Time    Start Time: 1330  End Time: 1430  Total Duration: 60 minutes  Date: 7.17.24    Group Psychotherapy     Marco Antonio Oro participated actively in psychotherapy group.  This was observed Consistent throughout the treatment day. They were engaged in learning related to Illness and Wellness Tools. Staff utilized Verbal, Written, A/V, and Demonstration teaching methods. Marco Antonio Oro shared area of learning and set a goal for outside of program to journal, pay bills and to go through his mail.  Marco Antonio Oro was able to share items explored during the day and shared in adding to their WRAP.  Ended session with peer  led exercise of stretching.  Marco Antonio Oro demonstrated some progress toward goal. Continue group psychotherapy to actively practice learned skills and encourage transfer of knowledge to unstructured time.      Tx Plan Objective: 1.1,1.2,1.4,1.5,1.6  Therapist: KHARI Navarrete

## 2024-07-17 NOTE — PSYCH
Subjective:     Patient ID: Marco Antonio Oro 43 y.o. Male    [facility] Progress Notes      Specialized Services Documentation  Therapist must complete separate progress note for each specific clinical activity in which the individual participated during the day.     Case Management Note    BONNY Chau     Current suicide risk : Low     A case management session is not scheduled today with Marco Antonio Oro ; additionally, they did not request a CM meeting. Marco Antonio Oro is aware of next scheduled 1:1.    Medications changes/added/denied? See JAMES Painting 's note     Treatment session number: 16    Individual Case Management Visit provided today? No    [facility] follow up physician's orders: None at this time

## 2024-07-17 NOTE — PSYCH
"Subjective:    Patient ID: Marco Antonio Oro is a 43 y.o. male      [facility] Clinical Progress Notes      Specialized Services Documentation  Therapist must complete separate progress note for each specific clinical activity in which the individual participated during the day.     ALLIED THERAPY   Visit Start Time: 1215  Visit Stop Time: 1315  Total Visit Duration:  60 minutes    Marco Antonio Oro actively engaged in group focused on managing stress. Group opened with self-reflection prompt of how are you currently managing stress? Group members watched a short video, \"Managing Stress.\" In this video, a psychiatrist from [employer], shares various stress management techniques that can help lessen the negative effects of stress. Reviewed key points discussed in the video. Encouraged group members to look at how stress can be utilized in a positive manner to help with motivation and focus. Discussed negative stress categories to include acute, episodic, and chronic stress. Explored activities/strategies one can implement to improve quality of life.  Healthier Mindset  Watch how you talk to yourself or others  Practice physical and mental self-care  Getting enough sleep  Eat a balanced diet  Consider getting help  Marco Antonio identified he could put more effort into managing stress by watching his self-talk and practicing self-care. Marco Antonio continues to make progress towards goals through verbal participation in group and note taking; to accomplish long term goals continue to utilize skills learned in programming. Continue with AT and psychotherapy to educate and encourage use of wellness tools.   Tx Plan Objective: 1.1,1.2 1.4, 1.6, Therapist:  FRANCY Chaudhari    "

## 2024-07-17 NOTE — PSYCH
Group Psychotherapy  Group Therapy    Subjective:     Patient ID: Marco Antonio Oro 43 y.o.     This group was facilitated in a private office.  ([phone])Marco Antonio Oro actively engaged in psychoeducational group about coping with loneliness. The skill helps to identify way to decrease feeling of loneliness and behavior change focused toward a specified goal. Group explored the identifying factors that create loneliness, this process can help them to take care of emotional and intellectual moments of loneliness on a short term and long term basis. The group talked about understanding the meaning of loneliness in regards to physical, intellectual, and emotional expression, regulation , and recognition, and how it affects themselves and others. Teaching on the emphasis of self monitoring and relapse, the group explored who they can go to for help was brought up as well. Group was encouraged to ask questions in an open forum at the end of group. Positive progress displayed through engagement in topic, Marco Antonio spoke of how he will go to events such as the movies by himself because his friends do not have the same interest. He expressed that sometimes he experiences loneliness duet to that, but realizes that he can use online social media to express his thought and feelings about such topics to fill his times of lack of communications.Marco Antonio Oro will continue to engage in psychotherapy to encourage positive self realization.  Treatment Plan Objective 1.1, 1.2, 1.3, 1.4 Therapist: Edu ESPINOZA Ed.

## 2024-07-17 NOTE — PSYCH
PHP MEDICATION MANAGEMENT NOTE        [facility]    Name and Date of Birth:  Marco Antonio Oro 43 y.o. 1980 MRN: [id number]    Date of Visit: July 17, 2024    Visit Time    Visit Start Time: 1130   Visit Stop Time: 1200  Total Visit Duration:  30 minutes     The total visit duration detailed above includes: patient engagement, medication management, psychotherapy/counseling, discussion regarding treatment goals, and coordination of care.      Note Share Disclaimer:     This note was not shared with the patient due to reasonable likelihood of causing patient harm    Allergies   Allergen Reactions    Infliximab Other (See Comments)    Other Other (See Comments)    Penicillin G Other (See Comments)    Penicillins Other (See Comments)     rash    Penicillin V Rash     SUBJECTIVE:    Marco Antonio is seen today for a follow up for Bipolar Disorder, PTSD, Generalized Anxiety Disorder, and Panic Disorder. He continues to improve gradually since beginning PHP.  He is anticipating discharge from the partial program this week.  He has made great strides in working with his anxiety and agoraphobia and panic attacks.  Has been doing more and more out of the house.  Feeling more optimistic about the future.  Also tell me how much better he is handling his medical issues recently, with more realistic expectations.  Continues to report BuSpar has helped with anxiety and now tolerating without noted adverse effects.  Denying any suicidal thoughts or passive death wish.  He will follow-up with his outpatient provider next month, did need a refill of his olanzapine.    He denies any side effects from current psychiatric medications.    PLAN:  Continue all psychiatric medications the same as noted below  Aware of 24 hour and weekend coverage for urgent situations accessed by calling [facility] main practice number  Continue partial hospitalization  program    Diagnoses and all orders for this visit:    Bipolar 1 disorder, depressed, moderate (HCC)    Generalized anxiety disorder    Panic disorder with agoraphobia  -     busPIRone (BUSPAR) 5 mg tablet; Take 1 tablet (5 mg total) by mouth 3 (three) times a day    Posttraumatic stress disorder      Current Outpatient Medications on File Prior to Visit   Medication Sig Dispense Refill    amLODIPine (NORVASC) 10 mg tablet TAKE 1 TABLET BY MOUTH EVERY DAY 90 tablet 0    clonazePAM (KlonoPIN) 0.5 mg tablet Take 1 tablet (0.5 mg total) by mouth 3 (three) times a day 90 tablet 2    hydroquinone 4 % cream       ketorolac (TORADOL) 10 mg tablet Take 10 mg by mouth every 6 (six) hours as needed      lamoTRIgine (LaMICtal) 25 mg tablet TAKE 1 TABLET BY MOUTH TWICE A  tablet 0    lisinopril (ZESTRIL) 10 mg tablet TAKE 1 TABLET BY MOUTH EVERY DAY IN THE EVENING 90 tablet 0    tamsulosin (FLOMAX) 0.4 mg Take 0.4 mg by mouth daily with dinner      terbinafine (LamISIL) 1 % cream Apply topically 2 (two) times a day      [DISCONTINUED] busPIRone (BUSPAR) 5 mg tablet Take 1 tablet (5 mg total) by mouth 3 (three) times a day 90 tablet 0    [DISCONTINUED] OLANZapine (ZyPREXA) 10 mg tablet TAKE 1 TABLET BY MOUTH DAILY AT BEDTIME 90 tablet 0     No current facility-administered medications on file prior to visit.       HPI ROS Appetite Changes and Sleep:     He reports fluctuating sleep pattern, fluctuating appetite, fluctuating energy levels. Denies homicidal ideation, denies suicidal ideation    Review Of Systems:   no complaints, all other systems are negative         Mental Status Evaluation:    Appearance:  age appropriate   Behavior:  pleasant, cooperative   Speech:  normal rate, normal volume   Mood:  improved   Affect:  brighter   Thought Process:  goal directed   Associations: intact associations   Thought Content:  no overt delusions   Perceptual Disturbances: none   Risk Potential: Suicidal ideation -  None  Homicidal ideation - None  Potential for aggression - No   Sensorium:  oriented to person, place, and time/date   Memory:  recent and remote memory grossly intact   Consciousness:  alert and awake   Attention: decreased concentration and decreased attention span   Insight:  improving   Judgment: improving   Gait/Station: normal gait/station, normal balance   Motor Activity: no abnormal movements       History Review:The following portions of the patient's history were reviewed and updated as appropriate: psychiatric history, trauma history allergies, current medications, past family history, past medical history, past social history, past surgical history, and problem list   OBJECTIVE:     Vital signs in last 24 hours:    There were no vitals filed for this visit.  Laboratory Results: I have personally reviewed all pertinent laboratory/tests results.    Medications Risks/Benefits:      Risks, Benefits And Possible Side Effects Of Medications:    Discussed risks and benefits of treatment with patient including risk of suicidality, serotonin syndrome, increased QTc interval and SIADH related to treatment with antidepressants; Risk of induction of manic symptoms in certain patient populations, risk of parkinsonian symptoms, metabolic syndrome, tardive dyskinesia and neuroleptic malignant syndrome related to treatment with antipsychotic medications, and risks of misuse, abuse or dependence, sedation and respiratory depression related to treatment with benzodiazepine medications     Controlled Medication Discussion:     Marco Antonio has been filling controlled prescriptions on time as prescribed according to [place] Prescription Drug Monitoring Program    JAMES Painting 07/17/24

## 2024-07-17 NOTE — PSYCH
[facility] Discharge Instructions:     Disposition: home  Address: [address] [place], [place] [address] [place]  [place] [address].   Diagnosis:    Diagnosis ICD-10-CM Associated Orders   1. Bipolar 1 disorder, depressed, moderate (HCC)  F31.32       2. Panic disorder with agoraphobia  F40.01       3. Posttraumatic stress disorder  F43.10         Allergies (Drug/Food):   Allergies   Allergen Reactions    Infliximab Other (See Comments)    Other Other (See Comments)    Penicillin G Other (See Comments)    Penicillins Other (See Comments)     rash    Penicillin V Rash       Activity:  No Activity Changes  Diet:no recommendations  Smoking Cessation:The best thing you can do to improve your health is to stop using tobacco  Diagnostic/Laboratory Orders: n/a  Vaccines: If you received a vaccine, please notify your family physician on your next visit. For more information, please call [phone].     Follow-up appointments/Referrals:     Continue with PCP as needed:   Durga Mccarthy MD  1014, - 101[address] [address]  [phone]    PSYCHIATRIST:  [address] [address]   PH: [phone]   F: [phone]  WHEN: 08/06/2024 - 2:30 PM      THERAPIST:  [address] [address]   PH: [phone]   F: [phone]  WHEN: 7/22/2024 - 4 PM     [facility] Support Groups     [facility]   3rd Monday of the month   7-8:30 p.m.   [facility], [address] [facility] [address]   Contact: [phone]   [facility] Family Support Group   4th Tuesday of the month   7 - 8:30 p.m. [address] [facility] [address]   Contact: [phone]   [facility] Family Support Group   1st Monday of the month   7 - 8:30 p.m.   [facility], [address] [place] [address]   Contact: [phone]   [facility] Connection  "Peer Recovery Support Group   3rd Monday of the month   7 - 8:30 p.m.   [address] [facility] [place] [place] [address]   Contact: [phone]   [facility] Connection Peer Recovery Support Group   1st Monday of the month   7-8:30 p.m.   [facility], [address] [place] [address]   Contact: [phone]       DBSA   Depression, Bipolar, Support [place]  [email or web address]  [facility], [address] [place] [address]  In Person:    Every Wednesday 7:00 PM to 8:30 PM  Inside the Sabianist: Rm. 115  Please contact our  & , Lizette Morgan,  at [email or web address] to be put on  email list to stay up to date about DBSA.      [facility]: [address] [place] [address]. [address] [address] / [phone]. - Your  was BONNY Chau and the  is Joyce Sosa     Intake/Referral/Evaluation (Non-Emergency) *NON INSURED FOR FUNDING:   [facility]: [phone],   [facility]: [phone],   [facility]: [phone],   [place]: [phone] and   [facility]: [phone].     Crisis Intervention (Emergency) [facility] Service:  [facility]: [phone],  [place]: [phone],   [place]: [phone],   [facility]): [phone],   [facility]: [phone],    [place]: [phone] and [facility]: [phone]. __________________________________________________________________    National Crisis Textline: text \"HOME\" to 951054  [facility] Helpline 1582.254.8459 (1-574-458-[facility]) or Text \"helpline\" to 33153  National Suicide Crisis Hotline: 988 Call or Text     I, the undersigned, have received and understand the above instructions.        Patient/Rep Signature: __________________________________       Date/Time: ______________       Physician Signature: ____________________________________      Date/Time: ______________             Signature: ________________________________ "       Date/Time: ______________

## 2024-07-18 ENCOUNTER — OFFICE VISIT (OUTPATIENT)
Dept: PSYCHOLOGY | Facility: CLINIC | Age: 44
End: 2024-07-18
Payer: MEDICARE

## 2024-07-18 DIAGNOSIS — F31.32 BIPOLAR 1 DISORDER, DEPRESSED, MODERATE (HCC): Primary | ICD-10-CM

## 2024-07-18 DIAGNOSIS — F40.01 PANIC DISORDER WITH AGORAPHOBIA: ICD-10-CM

## 2024-07-18 DIAGNOSIS — F43.10 POSTTRAUMATIC STRESS DISORDER: ICD-10-CM

## 2024-07-18 PROCEDURE — G0177 OPPS/PHP; TRAIN & EDUC SERV: HCPCS

## 2024-07-18 NOTE — PSYCH
Visit Time    Start time: 1045   End time: 1145   Total time: 60 minutes   Date: 7/18/2024     Subjective:     Patient ID: Marco Antonio Oro 43 y.o. Male    [facility] Clinical Progress Notes      Specialized Services Documentation  Therapist must complete separate progress note for each specific clinical activity in which the individual participated during the day.     Psychotherapy Group  Marco Antonio Oro actively and receptively participated in music therapy group regarding guided relaxation techniques and imagery. The group participated in a guided relaxation to music experience, and processed with the group. The group participated in a progressive muscle relaxation to music experience, its inclusion in the TIPP skill, and processed with the group. Group explored examples of relaxation as well as how to practice it. Marco Antonio participated in a guided imagery and music (BM-GIM) and processed their experience. The group ended with breathing techniques timed to music.   During this session, the group participated in activities such as:  Head to Toe Stretching  Progressive Muscle Relaxation  Guided Imagery in Music  Breathing Exercises  Ocean Breathing  Box Breathing  Timed Music Breathing  5 Senses Identification Activity  The Feeling Exercise   The Seeing Exercise  [place]ala Coloring   Music as a form of active relaxation (Song Singing to 'Let It Be' by The Beatles)  Marco Antonio noted finding Music Paced Breathing and Progressive Muscle Relaxation technique useful, and planned to use it outside of program. Consistent effort noted towards treatment goal. Its worth noting that this is Marco Antonio Oro's last day of sessions, as he is being discharged today, 7/18/2024. Continue psychotherapy groups to encourage the development and proactive use of relaxation and coping skills.    [place] Objective: 1.1, 1.2, 1.4 Therapist: BALDEMAR Linares

## 2024-07-18 NOTE — PSYCH
Subjective:     Patient ID: Marco Antonio Oro is a 43 y.o. male.    [facility] Discharge Summary:     Admission Date: 06/21/2024   Patient was referred by Psychiatrist, Dr. Gonzalez  Discharge Date: 07/18/2024   Was this a routine discharge? yes     Diagnosis: Axis I:   1. Bipolar 1 disorder, depressed, moderate (HCC)        2. Panic disorder with agoraphobia        3. Posttraumatic stress disorder             Treating Physician: Dr. Rosa Lea MD      Treatment Barrier: While Marco Antonio was a very active group participant and in individual therapy, an emerging barrier was at times, attendance. Due to his insurance and PHP, PHP requires attendance in full 5 day increments in order to be successfully billed, otherwise the patient may be billed. He was aware of this stipulation, however, on two occasions he called out interrupting 2 billing cycles and had to leave early 1 day due to not feeling well.     Presenting Need:        Per Dr. Rosa Lea MD: Marco Antonio Oro is a 43 y.o. male with Bipolar disorder, panic disorder with agoraphobia, posttraumatic stress disorder, asthma, hyper triglyceridemia  and Crohn's disease. referred by Dr. Gonzalez because he has increasing panic attacks, agoraphobia, having hard time functioning on his own and sometimes needs someone with him when he goes out. Onset of symptoms was  a few weeks ago with gradually worsening course since that time. Psychosocial Stressors: Mental health. He states that he has been developing panic episodes more often, without any new stressor. He states that has been trying to go in the stores but is not able to pass the entrance when he developed a panic attack. He states this is affecting his life because he is scare to go out by himself. He stay home all the time only goes to his medical appointments. He goes to the store if his brother accompany him. .Stay he feels safe at home. He takes his medications as prescribe, has  "support from his brother, talks to some friends in the phone. TodayMarco Antonio Oro feels anxious, hopeless, decreased energy, decreased concentration. He denies any suicidal or homicidal ideation, plan or intent.  He denies any hallucinations or paranoid thinking.  He is looking forward to do the partial program and learn coping skills.      Per this writer: Marco Antonio Oro is a 43 y.o. male referred by his psychiatrist, Dr. Gonzalez due to increased panic attacks, having difficulty functioning and leaving his house. When he has to go out he has to have someone go with him. Marco Antonio Oro is domiciled at home with his best friend, Shane whom he considers a brother. They currently do not work and receives SSDI. He recently graduated with a Bachelor's degree from [employer] with honors in Graphic Design.  Marco Antonio Oro reports symptoms began a few weeks ago when he had a panic attack in [facility]. He went to purchase Kasumi-souper fluid and had to run out. He was unable to purchase the bottle. He stated that when he went into the store the open space overwhelmed him. He was able to do breathing techniques in the car and drive safely home. He is unable to identify any precipitating events or major stressors. He reports officially his agoraphobia began about 4 years ago.  Marco Antonio Oro reports the following associated symptoms, including depressed mood, hopelessness, low energy, low motivation, suicidal behavior, ruminations, negative thoughts and daily anxiety symptoms, panic attacks, agoraphobia Marco Antonio Oro reports his anxiety today an 8 out of 10. Marco Antonio reports his family is not supportive now. They used to be, but they \"went their separate ways,\" and they do not  fully believe in mental health. Marco Antonio Oro denies any suicidal ideation without plan or intent. Marco Antonio Oro denies homicidal ideation without plan or intent. Marco Antonio would like to work " "on understanding his panic and learning coping skills. His PHQ-9 is a 15      Marco Antonio Oro's psychosocial stressors: social difficulties, chronic anxiety, and financial as he is not working.     Per Marco Antonio Oro: \"My mind goes different ways each day,\" \"I don't go out of my house alone,\" \"I'm not really living life,\" \"it cost me so much.\"     Strengths: \"I'm a very nice berry,\" \"I'm a very caring individual,\" \"I have strong ana,\" \"I'm outgoing and humble.\"    Course of treatment includes:    group counseling, medication management, individual case management, allied therapy, psychoeducation, and psychiatric evaluation    Treatment Progress: Marco Antonio Oro attended 16 days in PHP in which Marco Antonio challenged negative thoughts, engaging in healthy coping strategies and learned ways to manage with social situations when out in the community. During their time in PHP, Marco Antonio was an active participant in program and in individual work.  and Marco Antonio addressed the following treatment objectives in case management: I will explore learning about my triggers through: noticing signs from my body (stop to consider what just happened and the response), trace the roots (what was happening right before?), and get curious through befriending my emotions and notice ongoing patterns in order to increase my communication and decrease feelings of panic, I will identify the 5 pathways to hope and do 1 thing daily to work on increasing hopefulness. (Connection, goal setting, spirituality, nurturing, and doing routine things), I will continue to challenge myself to go to places that heighten my anxiety and work through said anxiety at least 2 to 3 times week, I will continue to challenge myself with places I go to and break down the steps into small achievable goals and engage in positive self talk to work towards more exposure, I will take medications as prescribed and share questions and concerns if arise, " and I will identify 3 ways my supports can assist in my wellness journey and use them if/when needed . Their response to treatment was positive as evidence by actively working on skills outside of program, checking in with , being open to being challenged and an active group participant. Marco Antonio was often observed sharing in groups, engaging in self-reflection and being supportive of others. In addition, he reconnected with his brothers. This was not something he thought he would have upon intake and expressed gratefulness. He even spoke to his mother about his upcoming kidney stone retrieval surgery and he was pleasantly surprised that she handled the news well and did not make him anxious when telling her. Throughout their time in PHP Marco Antonio Oro had groups on skills that supported the four pillars of DBT: Mindfulness, Distress Tolerance, Interpersonal Effectiveness, and Emotional Regulation. Particular groups were creating his crisis plan, grief and loss, open processing, PLEASE, writing letters to past/present/and future self, anger, challenging automatic negative thoughts, understanding personality through the Patel Golden-Personality test, sleep hygiene, Fear, DBT House, STOP, TIPP, Letting Go, conflict resolution, IMPROVE, pros/cons, facilitating positive thinking as an effective coping skills, creating a vision board, reviewing SMART goals, self-compassion, routine, triggers/warning signs, WRAP, wise mind, self-forgiveness, increased awareness to emotions, open processing, gratitude, communication and the 9 fair fighting rules, hope, loneliness, and anxiety coping skills.  Marco Antonio actively worked on suggestions and practiced coping skills. They presented as engaged to learn, be open to change, demonstrated a willingness to be challenged, and improve upon their insight/judgment. Marco Antonio also attended medication reviews. He has begun Buspar 5 mg TID regarding medication and has been tolerating it.  Marco Antonio shared improvement through “whenever I get negative thoughts or going out in public, dealing with my PTSD, people are noticing I'm looking at things in a different way.”, and “accepting the fact I'm okay.”  Upon intake Marco Antonio's PHQ-9 was a 15 and at discharge their PHQ-9 was a 1. Denied SI, HI, and psychosis. Aftercare providers to receive summary.      Aftercare recommendations include:    Continue with PCP as needed:  Durga Mccartyh MD  1014, - [address]339 [address] [address]  PH: [phone]  F: [phone]       PSYCHIATRIST:  MD [address] [address]   PH: [phone]   F: [phone]  WHEN: 08/06/2024 - 2:30 PM        THERAPIST:  [address] [address]   PH: [phone]   F: [phone]  WHEN: 7/22/2024 - 4 PM     Discharge Medications include:  Current Outpatient Medications:     amLODIPine (NORVASC) 10 mg tablet, TAKE 1 TABLET BY MOUTH EVERY DAY, Disp: 90 tablet, Rfl: 0    busPIRone (BUSPAR) 5 mg tablet, Take 1 tablet (5 mg total) by mouth 3 (three) times a day, Disp: 90 tablet, Rfl: 0    clonazePAM (KlonoPIN) 0.5 mg tablet, Take 1 tablet (0.5 mg total) by mouth 3 (three) times a day, Disp: 90 tablet, Rfl: 2    hydroquinone 4 % cream, , Disp: , Rfl:     ketorolac (TORADOL) 10 mg tablet, Take 10 mg by mouth every 6 (six) hours as needed, Disp: , Rfl:     lamoTRIgine (LaMICtal) 25 mg tablet, TAKE 1 TABLET BY MOUTH TWICE A DAY, Disp: 180 tablet, Rfl: 0    lisinopril (ZESTRIL) 10 mg tablet, TAKE 1 TABLET BY MOUTH EVERY DAY IN THE EVENING, Disp: 90 tablet, Rfl: 0    OLANZapine (ZyPREXA) 10 mg tablet, TAKE 1 TABLET BY MOUTH DAILY AT BEDTIME, Disp: 90 tablet, Rfl: 0    tamsulosin (FLOMAX) 0.4 mg, Take 0.4 mg by mouth daily with dinner, Disp: , Rfl:     terbinafine (LamISIL) 1 % cream, Apply topically 2 (two) times a day, Disp: , Rfl:     [facility]     [place] Support [facility]      [facility]   [facility] Family Support Group   3rd Monday of the month   7-8:30 p.m.   [facility], [address] [facility] [address]   Contact: [phone]   [facility] Family Support Group   4th Tuesday of the month   7 - 8:30 p.m. [address] [place] [address]   Contact: [phone]   [facility] Family Support Group   1st Monday of the month   7 - 8:30 p.m.   [facility], [address] [place] [address]   Contact: [phone]   [facility] Peer Recovery Support Group   3rd Monday of the month   7 - 8:30 p.m.   [address] [place] [address]   Contact: [phone]   [facility] Peer Recovery Support Group   1st Monday of the month   7-8:30 p.m.   [place] [address]   Contact: [phone]       DBSA   Depression, Bipolar, Support [place]  [email or web address]  [facility]A-[facility], [address] [place] [address]  In Person:    Every Wednesday 7:00 PM to 8:30 PM  Inside the Jehovah's witness: Rm. 115  Please contact our  & , Lizette Morgan,  at [email or web address] to be put on  email list to stay up to date about DBSA.      [facility]: [facility] [address] [address] / [phone]. - Your  was BONNY Chau and the  is Joyce Sosa     Intake/Referral/Evaluation (Non-Emergency) *NON INSURED FOR FUNDING:   [facility]: [phone],   [facility]: [phone],   [facility]: [phone],   [place]: [phone] and   [facility]: [phone].     Crisis Intervention (Emergency) County Service:  [facility]: [phone],  [place]: [phone],   [place]: [phone],   [place]): [phone],   [facility]: [phone],    [place]: [phone] and C/M/P: [phone].  "__________________________________________________________________    National Crisis Textline: text \"HOME\" to 119663  [facility] Helpline 8139 007 5150 (8-804-809-[facility]) or Text \"helpline\" to 26180  National Suicide Crisis Hotline: 982 Call or Text     I, the undersigned, have received and understand the above instructions.        Patient/Rep Signature: __________________________________       Date/Time: ______________       Physician Signature: ____________________________________      Date/Time: ______________             Signature: ________________________________       Date/Time: ______________       "

## 2024-07-18 NOTE — PSYCH
Subjective:     Patient ID: Marco Antonio Oro 43 y.o. Male    [facility] Clinical Progress Notes      Specialized Services Documentation  Therapist must complete separate progress note for each specific clinical activity in which the individual participated during the day.     Case Management    ([id number]) Met with Marco Antonio Oro. Reviewed relapse prevention plan, aftercare plan, and medication list (copies provided). Marco Antonio Oro shared improvement through “whenever I get negative thoughts or going out in public, dealing with my PTSD, people are noticing I'm looking at things in a different way.”, and “accepting the fact I'm okay.” Upon intake Marco Antonio's PHQ-9 was a 15 and at discharge their PHQ-9 was a 1 . Denied SI, HI, and psychosis. Aftercare providers to receive summary.     Current suicide risk : Low     Medications changes/added/denied? No     Treatment session number: 16     Individual Case Management Visit provided today? Yes     [facility] follow up physician's orders: Proceed with discharge

## 2024-07-18 NOTE — PSYCH
[facility] Clinical Progress Notes      Specialized Services Documentation  Therapist must complete separate progress note for each specific clinical activity in which the individual participated during the day.       [facility] follow up physician's orders:   Date: 7/18/2024  Time: 12:08  DISCHARGE TODAY  Rosa Lea MD   Quality 110: Preventive Care And Screening: Influenza Immunization: Influenza Immunization Administered during Influenza season Quality 226: Preventive Care And Screening: Tobacco Use: Screening And Cessation Intervention: Tobacco Screening not Performed for Medical Reasons Detail Level: Detailed Quality 130: Documentation Of Current Medications In The Medical Record: Current Medications Documented Quality 431: Preventive Care And Screening: Unhealthy Alcohol Use - Screening: Patient not identified as an unhealthy alcohol user when screened for unhealthy alcohol use using a systematic screening method

## 2024-07-18 NOTE — PSYCH
"Subjective:     Patient ID: Marco Antonio Oro  is a 43 y.o. male     Assessment/Plan:      Diagnoses and all orders for this visit:     Bipolar 1 disorder, depressed, moderate (HCC)     Posttraumatic stress disorder     Panic disorder with agoraphobia        [facility] Treatment Plan      AREAS OF NEED: Marco Antonio Oro is experiencing symptoms related to their diagnoses of Bipolar 1 disorder, PTSD, and Panic Disorder with agoraphobia as evidenced by anxiousness, isolation, hopelessness, decreased energy, decreased concentration, and sleep disturbances due to mental health stressors.     Date Initiated: 06/21/24     Strengths: \"I'm a very nice berry,\" \"I'm a very caring individual,\" \"I have strong ana,\" \"I'm outgoing and humble.\"      LONG TERM GOAL:   Date Initiated: 06/21/24  1.0 While at [facility], I will engage in psychoeducational groups and practice skills that are aimed at improving my mental health, ability to cope and challenge myself, gain insights and skills to increase my quality of life  Target Date: 07/19/2024   Completion Date: 07/18/2024        SHORT TERM OBJECTIVES:      Date Initiated: 06/21/24  1.1 I will explore learning about my triggers through: noticing signs from my body (stop to consider what just happened and the response), trace the roots (what was happening right before?), and get curious through befriending my emotions and notice ongoing patterns in order to increase my communication and decrease feelings of panic.  Revision Date: 07/02/2024- continue to work on; 07/12/2024 - continue with   Target Date: 07/02/2024; 07/12/2024; 07/23/2024    Completion Date: 07/18/2024     Date Initiated: 06/21/24  1.2 I will identify the 5 pathways to hope and do 1 thing daily to work on increasing hopefulness. (Connection, goal setting, spirituality, nurturing, and doing routine things)   Revision Date: 07/02/2024 - continue to work on; 07/12/2024 - continue with   Target Date: " 07/02/2024; 07/12/2024; 07/23/2024    Completion Date:07/18/2024     Date Initiated: 06/21/24  1.3 I will take medications as prescribed and share questions and concerns if arise.    Revision Date:07/02/2024 - ongoing; 07/12/2024 - continue with   Target Date: 07/02/2024; 07/12/2024; 07/23/2024    Completion Date: 07/18/2024     Date Initiated: 06/21/24  1.4 I will identify 3 ways my supports can assist in my wellness journey and use them if/when needed.    Revision Date:07/02/2024 - ongoing; 07/12/2024 - continue with   Target Date: 07/02/2024; 07/12/2024; 07/23/2024    Completion Date:07/18/2024            8 DAY REVISION:  1.5: I will continue to challenge myself to go to places that heighten my anxiety and work through said anxiety at least 2 to 3 times week   Date Initiated:07/02/2024   Revision Date: 07/12/2024 - continue with   Target Date: 07/12/2024; 07/23/2024    Completion Date:07/18/2024     1.6: I will continue to challenge myself with places I go to and break down the steps into small achievable goals and engage in positive self talk to work towards more exposure.    Date Initiated:07/02/2024   Revision Date: 07/12/2024 - continue with   Target Date: 07/12/2024; 07/23/2024    Completion Date:07/18/2024        PSYCHIATRY:  Date Initiated:  06/21/24  Medication Management and Education      Revision Date: 07/02/2024; 07/12/2024        The person(s) responsible for carrying out the plan is Dr. Rosa Lea MD & JAMES Painting      NURSING/SYMPTOM EDUCATION:  Date Initiated: 06/21/24       1.1, 1.2. 1.3, 1.4 Provide wellness/symptoms and skill education groups three to five days weekly to educate Marco Antonio LINK Shorty on signs and symptoms of diagnoses, skills to manage stressors, and medication questions that will be addressed by the treatment team.        Revision date: 07/02/2024; 07/12/2024        The person(s) responsible for carrying out the plan is Jc Jose MS & Edu Gordon,  Soumya      PSYCHOLOGY:   Date Initiated: 06/21/24       1.1, 1.2, 1.4 Provide psychotherapy group 5 times per week to allow opportunity for Henrique Leeal  to explore stressors and ways of coping.   Revision Date: 07/02/2024; 07/12/2024   The person(s) responsible for carrying out the plan is BONNY Leahy      ALLIED THERAPY:   Date Initiated: 06/21/24  1.1,1.2 Engage Marco Antonio Oro in AT group 5 times daily to encourage development and use of wellness tools to decrease symptoms and promote recovery through meaningful activity.  Revision Date: 07/02/2024; 07/12/2024        The person(s) responsible for carrying out the plan is BALDEMAR Linares, BALDEMAR Hutson and FRANCY Chaudhari     CASE MANAGEMENT:   Date Initiated: 06/21/24      1.0 This  will meet with Marco Antonio Oro  3-4 times weekly to assess treatment progress, discharge planning, connection to community    supports and UR as indicated.  Revision Date: 07/02/2024; 07/12/2024    The person(s) responsible for carrying out the plan is BONNY Chau      TREATMENT REVIEW/COMMENTS:      07/02/2024 -  and Marco Antonio Oro reviewed and updated tx plan. Marco Antonio Oro relates understanding to their overall diagnoses, tx plan goal and objectives. They are agreeable.       07/12/2024 -  and Marco Antonio Oro reviewed and updated tx plan. Marco Antonio Oro relates understanding to their overall diagnoses, tx plan goal and objectives. They are agreeable.       DISCHARGE CRITERIA: Identify 3 signs of progress and complete relapse prevention plan.    DISCHARGE PLAN: Connect with identified outpatient providers.   Estimated Length of Stay: 10 treatment days       CLIENT COMMENTS / Please share your thoughts, feelings, need and/or experiences regarding your treatment plan with Staff.  Please see follow up note with comments.

## 2024-07-18 NOTE — BH CRISIS PLAN
Client Name: Marco Antonio Oro       Client YOB: 1980    JeremiStephon Safety Plan      Creation Date: 24 Update Date: 25   Created By: Angelika Lopez MD Last Updated By: Lissette Lara      Step 1: Warning Signs:   Warning Signs   I can't think straight, it is hard to focus or make decisions or function. I get panic attacks   Isolating   Not eating   Not taking care of myself   Not callingfamily/friends - not communicating            Step 2: Internal Coping Strategies:   Internal Coping Strategies   Meditation   Prayer   Listening to music   watch a movie   drawing or read   going for a car ride   breathing techniques   challenging my thoughts            Step 3: People and social settings that provide distraction:   Name Contact Information   Getachew In cell phone   Janes In cell phone   Deannecourtney Linares - Mother In cell phone   Osorio In cell phone    Places   my parent's home   my brother's houses   my friend Janice' house   my friend Del's house           Step 4: People whom I can ask for help during a crisis:      Name Contact Information    Janes Flores [phone]    Deanne Linares [phone]      Step 5: Professionals or agencies I can contact during a crisis:      Clinican/Agency Name Phone Emergency Contact    [facility] [phone] my doctor or the nurse    PCP - Dr. Shari -[phone]       Local Emergency Department Emergency Department Phone Emergency Department Address    [address]AM[address] Lopez          Crisis Phone Numbers:   Suicide Prevention Lifeline: Call or Text  921 Crisis Text Line: Text HOME to 191-320   Please note: Some [facility] do not have a separate number for Child/Adolescent specific crisis. If your county is not listed under Child/Adolescent, please call the adult number for your county      Adult Crisis Numbers: Child/Adolescent Crisis Numbers   [facility]: [phone] [facility]: [phone]   [facility]: [phone] [facility]:  [phone]   [facility]: [phone] [place]: [phone]   [facility]: [phone] [facility]: [phone]   [facility]: [phone]   [facility]: [phone]   [facility]: [phone]   [facility]: [phone] (daytime) [phone] (after hours, weekends, holidays)      Step 6: Making the environment safer (plan for lethal means safety):   Patient did not identify any lethal methods: Yes     Optional: What is most important to me and worth living for?   My family      Gracie Safety Plan. Karlene Blood and Prashanth Szymanski. Used with permission of the authors.

## 2024-07-18 NOTE — PSYCH
Subjective:      Patient ID:Marco Antonio Oro 43 y.o. male     [facility] Clinical Progress Notes       Specialized Services Documentation  Therapist must complete separate progress note for each specific clinical activity in which the individual participated during the day.      Education Group     [phone] - Marco Antonio Oro attended the psychoeducation group on Medication Management . Group members were educated by this writer on the medication management, medication tips, strategies to help them remember to take their medications and developed ideas to make it easier in the future. Information was given on how to prepare for a doctor appointment and how to advocate for self and work as a team with their doctor to promote positive outcomes and better understanding of medications and uses.  Provided education on classes of  psychotropic medications, mechanisms of action, common side effects, and expectations of treatment with psychotropic medications.  Group was encouraged to ask questions in an open forum.    Marco Antonio Oro displayed understanding through engagement in the topic with the group . For Marco Antonio Oro,  good progress toward goals was noted, through their engagement in group. Continue psychotherapy to encourage self-awareness and home practice of skills to support wellness.    Treatment Plan Objective 1.1, 1.2, 1.3, 1.4  led by JAMES Painting

## 2024-07-18 NOTE — PSYCH
Subjective:     Patient ID: Marco Antonio Oro is a 43 y.o. male.    [facility] Clinical Progress Notes      Specialized Services Documentation  Therapist must complete separate progress note for each specific clinical activity in which the individual participated during the day.     EDUCATION THERAPY    [id number]    Marco Antonio Oro actively shared in morning assessment and goal review. Presented as Receptive related to readiness to learn. Marco Antonio Oro  did complete goal from last treatment day identifying gaining responsibility and support. Marco Antonio Oro did not present with any barriers to learning. Throughout morning group, Marco Antonio Oro participated in gratitude practice and movement experience. Marco Antonio Oro made excellent progress towards treatment goals. Continue education group to assess willingness to engage, assess transfer of knowledge, and participate in skill development.    Tx Plan Objective: 1.1, 1.2, 1.4, 1.5, 1.6 Therapist: Jc Jose MS    GROUP PSYCHOTHERAPY    [id number]    Marco Antonio Oro participated actively in psychotherapy group.  This was observed Consistent throughout the treatment day. They were engaged in learning related to Illness, Medication, Aftercare, and Wellness Tools. Staff utilized Verbal, Written, A/V, and Demonstration teaching methods.  Marco Antonio Oro shared area of learning and set a goal for outside of program to relax in the jacuzzi.  Marco Antonio Oro was able to share items explored during the day and shared in adding to their WRAP.  Ended session with peer  led exercise of box breathing.  Marco Antonio Oro demonstrated significant progress toward goal.  Continue with successful discharge today.    Tx Plan Objective: 1.1, 1.2, 1.3, 1.4, 1.5, 1.6, Therapist: Jc Jose MS

## 2024-07-18 NOTE — PSYCH
Visit Time    Start Time: 0930  End Time: 1030  Total Duration: 60 minutes  Date: 7.18.24    Subjective:    Patient ID: Marco Antonio Oro is a 43 y.o. male    [facility] Clinical Progress Notes      Specialized Services Documentation  Therapist must complete separate progress note for each specific clinical activity in which the individual participated during the day.     Group Psychotherapy     Marco Antonio Oro participated actively in a psychotherapy group focused on Anxiety- the meaning, symptoms and different types. The group engaged in a discussion about the role anxiety plays in their lives and how they feel in those moments.  explained the difference between several types of anxiety- Generalized Anxiety Disorder, Panic Disorder, Social Anxiety, Phobia, Post-Traumatic Stress Disorder, and Obsessive-Compulsive Disorder. Group shared their thoughts and experiences with each type. Marco Antonio shared that he battles Agoraphobia and being in enclosed spaces is a trigger. He did state that he was stuck in an elevator for ten minutes because it would not move. Marco Antonio stated that he was alone and scared but was able to make it through.  used “Negative Thinking Traps” worksheet as a guide to discuss the different types of thinking one engages in when feelings of anxiety arise and that it was normal to identify with more than one or use different ones depending on the situation. Marco Antonio shared they tend to engage in Catastrophizing  thinking.  ended the session by explaining that there will be a follow up to this session which discusses coping skills that could be used to tackle anxiety. Marco Antonio Oro made great efforts towards progress goals which were displayed through note taking, participation in discussion, and engagement in topic. Continue with planned discharge at the end of program day.      Tx Plan Objective: 1.1,1.2,1.4, Therapist:  KHARI Navarrete

## 2024-07-22 ENCOUNTER — TELEPHONE (OUTPATIENT)
Dept: OTHER | Facility: HOSPITAL | Age: 44
End: 2024-07-22

## 2024-07-22 ENCOUNTER — SOCIAL WORK (OUTPATIENT)
Dept: BEHAVIORAL/MENTAL HEALTH CLINIC | Facility: CLINIC | Age: 44
End: 2024-07-22
Payer: MEDICARE

## 2024-07-22 DIAGNOSIS — F40.01 PANIC DISORDER WITH AGORAPHOBIA: ICD-10-CM

## 2024-07-22 DIAGNOSIS — F31.32 BIPOLAR 1 DISORDER, DEPRESSED, MODERATE (HCC): Primary | ICD-10-CM

## 2024-07-22 DIAGNOSIS — F43.10 POSTTRAUMATIC STRESS DISORDER: ICD-10-CM

## 2024-07-22 PROCEDURE — 90837 PSYTX W PT 60 MINUTES: CPT | Performed by: SOCIAL WORKER

## 2024-07-22 NOTE — PSYCH
"Behavioral Health Psychotherapy Progress Note    Psychotherapy Provided: Individual Psychotherapy     1. Bipolar 1 disorder, depressed, moderate (HCC)        2. Posttraumatic stress disorder        3. Panic disorder with agoraphobia            Goals addressed in session: Goal 1     DATA: Marco Antonio completed partial. He learned a lot but he feels he got to the point they could not do any more for him. He still feels panic and anxiety in big box stores. We discussed exposure strategies to start with smaller venues until he can go into bigger venues. He is quite anxious about his health issues and he went into great detail about the issues he is dealing with concerning his health and the contradictory medical info he feels he is getting.   During this session, this clinician used the following therapeutic modalities: Client-centered Therapy, Cognitive Behavioral Therapy, Mindfulness-based Strategies, and Supportive Psychotherapy    Substance Abuse was not addressed during this session. If the client is diagnosed with a co-occurring substance use disorder, please indicate any changes in the frequency or amount of use: He remains in sobriety.. Stage of change for addressing substance use diagnoses: No substance use/Not applicable    ASSESSMENT:  Marco Antonio Oro presents with a Anxious mood.     his affect is anxious which is congruent, with his mood and the content of the session. The client has made progress on their goals.However he still is quite anxious.      Marco Antonio Oro presents with a none risk of suicide, none risk of self-harm, and none risk of harm to others.    For any risk assessment that surpasses a \"low\" rating, a safety plan must be developed.    A safety plan was indicated: no  If yes, describe in detail n/a    PLAN: Between sessions, Marco Antonio Oro will use mindfulness and CBT. At the next session, the therapist will use Client-centered Therapy, Cognitive Behavioral Therapy, " Mindfulness-based Strategies, and Supportive Psychotherapy to address issues and symptoms as they may arise. .    Behavioral Health Treatment Plan and Discharge Planning: Marco Antonio Oro is aware of and agrees to continue to work on their treatment plan. They have identified and are working toward their discharge goals. yes    Visit start and stop times:    07/22/24  Start Time: 1600  Stop Time: 1700  Total Visit Time: 60 minutes

## 2024-07-22 NOTE — TELEPHONE ENCOUNTER
Patient called for refill on Zyprexa confirmed that medication was sent on 7/17/24 to correct pharmacy.

## 2024-07-25 ENCOUNTER — DOCUMENTATION (OUTPATIENT)
Dept: PSYCHOLOGY | Facility: CLINIC | Age: 44
End: 2024-07-25

## 2024-07-25 NOTE — PROGRESS NOTES
[facility] Suicide Follow Up Action    This writer attempted to speak with Marco Antonio Oro today - 7 days post discharge from [facility].   Reviewed crisis number on message and requested return call.    Peyton Omalley

## 2024-08-06 ENCOUNTER — OFFICE VISIT (OUTPATIENT)
Dept: PSYCHIATRY | Facility: CLINIC | Age: 44
End: 2024-08-06
Payer: MEDICARE

## 2024-08-06 ENCOUNTER — DOCUMENTATION (OUTPATIENT)
Dept: PSYCHOLOGY | Facility: CLINIC | Age: 44
End: 2024-08-06

## 2024-08-06 DIAGNOSIS — F43.10 POSTTRAUMATIC STRESS DISORDER: ICD-10-CM

## 2024-08-06 DIAGNOSIS — F40.01 PANIC DISORDER WITH AGORAPHOBIA: ICD-10-CM

## 2024-08-06 DIAGNOSIS — F31.32 BIPOLAR 1 DISORDER, DEPRESSED, MODERATE (HCC): Primary | ICD-10-CM

## 2024-08-06 DIAGNOSIS — F19.21 COMBINED DRUG DEPENDENCE, EXCLUDING OPIOID, IN REMISSION (HCC): ICD-10-CM

## 2024-08-06 PROBLEM — K80.20 GALL BLADDER STONES: Status: ACTIVE | Noted: 2024-08-05

## 2024-08-06 PROBLEM — J45.998 ASTHMA IN REMISSION: Status: ACTIVE | Noted: 2024-08-02

## 2024-08-06 PROBLEM — I10 ESSENTIAL HYPERTENSION: Status: ACTIVE | Noted: 2024-08-05

## 2024-08-06 PROBLEM — Z87.438 HISTORY OF BPH: Status: ACTIVE | Noted: 2024-08-05

## 2024-08-06 PROBLEM — N20.0 NEPHROLITHIASIS: Status: ACTIVE | Noted: 2024-08-05

## 2024-08-06 PROBLEM — E78.2 MIXED HYPERLIPIDEMIA: Status: ACTIVE | Noted: 2024-08-05

## 2024-08-06 PROBLEM — Z90.49 HISTORY OF HEMICOLECTOMY: Status: ACTIVE | Noted: 2024-08-02

## 2024-08-06 PROBLEM — Z72.0 TOBACCO USE: Status: ACTIVE | Noted: 2024-08-05

## 2024-08-06 PROCEDURE — 99214 OFFICE O/P EST MOD 30 MIN: CPT | Performed by: PSYCHIATRY & NEUROLOGY

## 2024-08-06 PROCEDURE — 90833 PSYTX W PT W E/M 30 MIN: CPT | Performed by: PSYCHIATRY & NEUROLOGY

## 2024-08-06 RX ORDER — BUSPIRONE HYDROCHLORIDE 5 MG/1
5 TABLET ORAL 3 TIMES DAILY
Qty: 270 TABLET | Refills: 0 | Status: SHIPPED | OUTPATIENT
Start: 2024-08-06

## 2024-08-06 RX ORDER — ONDANSETRON 4 MG/1
TABLET, FILM COATED ORAL
COMMUNITY
Start: 2024-07-08

## 2024-08-06 NOTE — PROGRESS NOTES
Zero Suicide Follow Up Action    This writer spoke with Marco Antonio Oro today - 19 days post discharge from [facility].   Reviewed crisis information.  Marco Antonio Oro reports taking medication.   Marco Antonio Oro reports awareness of aftercare appointments.    Peyton Omalley

## 2024-08-06 NOTE — PSYCH
Visit Time    Visit Start Time: 2:30  Visit Stop Time: 3:00  Total Visit Duration:  30 minutes    Subjective: Medication Management      Patient ID: Marco Antonio Oro is a 43 y.o. male with Bipolar do and Panic Do and PTSD    HPI ROS Appetite Changes and Sleep: normal appetite, normal energy level, no weight change, and normal number of sleep hours    He remains compliant with medications and denies side effects.       Since last seen he stated he graduate with Honors and has a bachelors in fine arts   He has been sober for over a year (17 months)and he continues AA meetings.   He stated he has been experiencing frequent panic attacks, triggered outside in open spaces like grocery store or [employer] or [employer].   He is working on exposing to open spaces and avoid taking anxiety medication every time his anxiety is triggered.      He is interested in working part time.  He does not want to lose his SSI benefits.     He stated recently had ED visist due to flank pain and he was found to have kidney stones and compromised kidney function on his right side. His urologist recommended removing right kidney but he prefers to have stone removed and resolved obstruction hoping he can regain kidney function. He is seeking for second opinion and scheduled consultation at [facility].  Almost 2 years sober and had recent PHP admission due to increased anxiety and started Buspar 5 mg po tid which helped. Anxiety was triggered by recent health issues.  Agrees to continue current treatment.  Will schedule follow up in  3 months  or sooner if needed.       Review Of Systems:     Mood Emotional Lability   Behavior Impulsive Behavior   Thought Content Disturbing Thoughts, Feelings   General Emotional Problems and Decreased Functioning   Personality Normal   Other Psych Symptoms Normal   Constitutional Negative   ENT Negative   Cardiovascular Negative   Respiratory Negative   Gastrointestinal Negative    Genitourinary Negative   Musculoskeletal Negative   Integumentary Negative   Neurological Negative   Endocrine Normal    Other Symptoms Normal        Laboratory Results: Recent Labs (last 12 months):   Office Visit on 06/04/2024   Component Date Value    LEUKOCYTE ESTERASE,UA 06/04/2024 neg     NITRITE,UA 06/04/2024 inconclusive     SL AMB POCT UROBILINOGEN 06/04/2024 inconclusive     POCT URINE PROTEIN 06/04/2024 inconclusive      PH,UA 06/04/2024 6.5     BLOOD,UA 06/04/2024 neg     SPECIFIC GRAVITY,UA 06/04/2024 1.005     KETONES,UA 06/04/2024 neg     BILIRUBIN,UA 06/04/2024 small     GLUCOSE, UA 06/04/2024 100      COLOR,UA 06/04/2024 orange     CLARITY,UA 06/04/2024 clear     Urine Culture 06/04/2024 No Growth <1000 cfu/mL          Substance Abuse History:  Social History     Substance and Sexual Activity   Drug Use No       Family Psychiatric History:   Family History   Problem Relation Age of Onset    Schizophrenia Father     Alcohol abuse Father     Psychiatric Illness Sister     Drug abuse Brother     Completed Suicide  Neg Hx        The following portions of the patient's history were reviewed and updated as appropriate: allergies, current medications, past family history, past medical history, past social history, past surgical history, and problem list.    Social History     Socioeconomic History    Marital status: Single     Spouse name: Not on file    Number of children: Not on file    Years of education: Not on file    Highest education level: Bachelor's degree (e.g., BA, AB, BS)   Occupational History    Not on file   Tobacco Use    Smoking status: Former     Types: Cigarettes    Smokeless tobacco: Never    Tobacco comments:     he smoke only 1 cigarette a day, quit 2 months ago   Vaping Use    Vaping status: Never Used   Substance and Sexual Activity    Alcohol use: No     Comment: last use 2012, took 3 of alcohol to help with panic attack    Drug use: No    Sexual activity: Not Currently   Other  Topics Concern    Not on file   Social History Narrative    Not on file     Social Determinants of Health     Financial Resource Strain: Not on file   Food Insecurity: Not on file   Transportation Needs: Not on file   Physical Activity: Not on file   Stress: Not on file   Social Connections: Not on file   Intimate Partner Violence: Not on file   Housing Stability: Not on file     Social History     Social History Narrative    Not on file       Objective:       Mental status:  Appearance calm and cooperative , adequate hygiene and grooming, and good eye contact    Mood dysphoric   Affect affect was constricted   Speech a normal rate and fluent   Thought Processes coherent/organized and normal thought processes   Hallucinations no hallucinations present    Thought Content no delusions   Abnormal Thoughts no suicidal thoughts  and no homicidal thoughts    Orientation  oriented to person and place and time   Remote Memory short term memory intact and long term memory intact   Attention Span concentration intact   Intellect Appears to be of Average Intelligence   Insight Limited insight   Judgement judgment was limited   Muscle Strength Muscle strength and tone were normal and Normal gait    Language no difficulty naming common objects and no difficulty repeating a phrase    Fund of Knowledge displays adequate knowledge of current events, adequate fund of knowledge regarding past history, and adequate fund of knowledge regarding vocabulary                Assessment/Plan:       Diagnoses and all orders for this visit:    Bipolar 1 disorder, depressed, moderate (HCC)    Panic disorder with agoraphobia  -     busPIRone (BUSPAR) 5 mg tablet; Take 1 tablet (5 mg total) by mouth 3 (three) times a day    Posttraumatic stress disorder    Combined drug dependence, excluding opioid, in remission (HCC)    Other orders  -     ondansetron (ZOFRAN) 4 mg tablet; take 1 tablet by mouth every 8 hours as needed for nausea and  vomiting              Treatment Recommendations- Risks Benefits      Immediate Medical/Psychiatric/Psychotherapy Treatments and Any Precautions: continue current treatment     Risks, Benefits And Possible Side Effects Of Medications:  {PSYCH RISK, BENEFITS AND POSSIBLE SIDE EFFECTS (Optional):05223    Controlled Medication Discussion: Discussed with patient Black Box warning on concurrent use of benzodiazepines and opioid medications including sedation, respiratory depression, coma and death. Patient understands the risk of treatment with benzodiazepines in addition to opioids and wants to continue taking those medications. , Discussed with patient the risks of sedation, respiratory depression, impairment of ability to drive and potential for abuse and addiction related to treatment with benzodiazepine medications. The patient understands risk of treatment with benzodiazepine medications, agrees to not drive if feels impaired and agrees to take medications as prescribed., and The patient has been filling controlled prescriptions on time as prescribed to [place] Prescription Drug Monitoring program.      Psychotherapy Provided: Individual psychotherapy provided.       Individual psychotherapy provided: Yes  Counseling was provided during the session today for 16 minutes.  Medications, treatment progress and treatment plan reviewed with Marco Antonio.  Medication education provided to Marco Antonio.  Coping strategies including compliance with medications, deep/slow breathing, eliminating avoidance, engaging in previously avoided activities, exercising, getting into a good routine, increasing energy, increasing interest in usual activities, increasing motivation, increasing social interaction, maintain healthy diet, maintain heathy sleeping hygiene, and maintain positive attitude reviewed with Marco Antonio.   Supportive therapy provided.

## 2024-08-07 ENCOUNTER — SOCIAL WORK (OUTPATIENT)
Dept: BEHAVIORAL/MENTAL HEALTH CLINIC | Facility: CLINIC | Age: 44
End: 2024-08-07
Payer: MEDICARE

## 2024-08-07 DIAGNOSIS — F31.32 BIPOLAR 1 DISORDER, DEPRESSED, MODERATE (HCC): Primary | ICD-10-CM

## 2024-08-07 DIAGNOSIS — F43.10 POSTTRAUMATIC STRESS DISORDER: ICD-10-CM

## 2024-08-07 DIAGNOSIS — F40.01 PANIC DISORDER WITH AGORAPHOBIA: ICD-10-CM

## 2024-08-07 PROCEDURE — 90837 PSYTX W PT 60 MINUTES: CPT | Performed by: SOCIAL WORKER
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
## 2024-08-26 ENCOUNTER — SOCIAL WORK (OUTPATIENT)
Dept: BEHAVIORAL/MENTAL HEALTH CLINIC | Facility: CLINIC | Age: 44
End: 2024-08-26
Payer: MEDICARE

## 2024-08-26 DIAGNOSIS — F31.32 BIPOLAR 1 DISORDER, DEPRESSED, MODERATE (HCC): Primary | ICD-10-CM

## 2024-08-26 DIAGNOSIS — F43.10 POSTTRAUMATIC STRESS DISORDER: ICD-10-CM

## 2024-08-26 DIAGNOSIS — F40.01 PANIC DISORDER WITH AGORAPHOBIA: ICD-10-CM

## 2024-08-26 PROCEDURE — 90837 PSYTX W PT 60 MINUTES: CPT | Performed by: SOCIAL WORKER

## 2024-08-26 NOTE — PSYCH
"Behavioral Health Psychotherapy Progress Note    Psychotherapy Provided: Individual Psychotherapy     1. Bipolar 1 disorder, depressed, moderate (HCC)        2. Posttraumatic stress disorder        3. Panic disorder with agoraphobia            Goals addressed in session: Goal 1     DATA: Marco Antonio discussed his anxiety and agoraphobia is high. He has panic in what he calls big box stores. We discussed strategies to decrease his anxiety and panic. He is perhaps upset about the need for possible kidney removal. He is getting a second opinion at [facility] on the 24th. He shared he can't sleep and he is twitching in his biceps, his forearms, his fingers and hands, and eyes and lips. I reported this to Dr. Gonzalez and nursing. [place] are multiple things he is stressed about like his kidney issue, his twitching , his issue with delayed orgasm and his panic attacks. I provided support and insight along with strategies to decrease his anxiety.   During this session, this clinician used the following therapeutic modalities: Client-centered Therapy, Cognitive Behavioral Therapy, Mindfulness-based Strategies, and Supportive Psychotherapy    Substance Abuse was addressed during this session. If the client is diagnosed with a co-occurring substance use disorder, please indicate any changes in the frequency or amount of use: He remains in recovery.  Stage of change for addressing substance use diagnoses: No substance use/Not applicable    ASSESSMENT:  Marco Antonio Oro presents with an anxious  mood.     his affect is anxious which is congruent, with his mood and the content of the session. The client has made progress on their goals.     Marco Antonio Oro presents with a none risk of suicide, none risk of self-harm, and none risk of harm to others.    For any risk assessment that surpasses a \"low\" rating, a safety plan must be developed.    A safety plan was indicated: no  If yes, describe in detail n/a    PLAN: Between " sessions, Marco Antonio Oro will use mindfulness and CBT. At the next session, the therapist will use Client-centered Therapy, Cognitive Behavioral Therapy, Mindfulness-based Strategies, and Supportive Psychotherapy to address issues and symptoms as they may arise. .    Behavioral Health Treatment Plan and Discharge Planning: Marco Antonio LINK bAelgena is aware of and agrees to continue to work on their treatment plan. They have identified and are working toward their discharge goals. yes    Visit start and stop times:    08/26/24  Start Time: 1100  Stop Time: 1200  Total Visit Time: 60 minutes

## 2024-08-27 ENCOUNTER — TELEPHONE (OUTPATIENT)
Dept: PSYCHIATRY | Facility: CLINIC | Age: 44
End: 2024-08-27

## 2024-08-27 DIAGNOSIS — F31.64 BIPOLAR DISORDER, CURR EPISODE MIXED, SEVERE, WITH PSYCHOTIC FEATURES (HCC): ICD-10-CM

## 2024-08-27 RX ORDER — OLANZAPINE 15 MG/1
15 TABLET ORAL
Qty: 30 TABLET | Refills: 2 | Status: SHIPPED | OUTPATIENT
Start: 2024-08-27

## 2024-08-27 NOTE — TELEPHONE ENCOUNTER
Received a message from Marco Antonio's therapist, Baldo Vasquez:  Marco Antonio reports he's having trouble sleeping and he doesn't see Dr. Gonzalez until October. Asking if Dr. Gonzalez can increase his medication or add something for sleep. Marco Antonio also said he's having involuntary muscle twitching of his mouth, biceps, forearms, and hands. He claims it is getting worse.

## 2024-08-28 ENCOUNTER — TELEPHONE (OUTPATIENT)
Age: 44
End: 2024-08-28

## 2024-08-28 NOTE — TELEPHONE ENCOUNTER
Patient called in requesting a provider call back to discuss that they have been having issues sleeping and it is effecting their daily life. They are exhausted mentally, their mind feels like it is running a marathon all night, there are vivid dreams. They can't accomplish daily life things and are physically exhausted as well. Patient states they have been having trouble sleeping for a while now. They are not sure if medications need to be adjusted or what the next steps are.    The best telephone number to reach out to the patient on is [phone]

## 2024-08-28 NOTE — TELEPHONE ENCOUNTER
Spoke with Marco Antonio and informed him that his zyprexa was increased to 15 mg daily at bedtime and a script was sent to pharmacy.

## 2024-08-29 DIAGNOSIS — F40.01 PANIC DISORDER WITH AGORAPHOBIA: ICD-10-CM

## 2024-08-29 RX ORDER — BUSPIRONE HYDROCHLORIDE 5 MG/1
5 TABLET ORAL 3 TIMES DAILY
Qty: 270 TABLET | Refills: 0 | Status: SHIPPED | OUTPATIENT
Start: 2024-08-29

## 2024-08-29 NOTE — TELEPHONE ENCOUNTER
Reason for call:   [x] Refill   [] Prior Auth  [] Other:     Office:   [] PCP/Provider -   [x] Specialty/Provider - [facility] [place]  Authorized By: Angelika Lopez MD    Medication: busPIRone (BUSPAR) 5 mg tablet     Dose/Frequency: Take 1 tablet (5 mg total) by mouth 3 (three) times a day     Quantity:  270 tablet     Pharmacy: [facility]/pharmacy #[id number] - [place], [place]  [address]     Does the patient have enough for 3 days?   [] Yes   [x] No - Send as HP to POD

## 2024-09-05 DIAGNOSIS — I10 PRIMARY HYPERTENSION: ICD-10-CM

## 2024-09-05 DIAGNOSIS — F31.32 BIPOLAR 1 DISORDER, DEPRESSED, MODERATE (HCC): ICD-10-CM

## 2024-09-05 RX ORDER — AMLODIPINE BESYLATE 10 MG/1
TABLET ORAL
Qty: 90 TABLET | Refills: 0 | Status: SHIPPED | OUTPATIENT
Start: 2024-09-05

## 2024-09-05 RX ORDER — LISINOPRIL 10 MG/1
TABLET ORAL
Qty: 90 TABLET | Refills: 0 | Status: SHIPPED | OUTPATIENT
Start: 2024-09-05

## 2024-09-05 RX ORDER — LAMOTRIGINE 25 MG/1
25 TABLET ORAL 2 TIMES DAILY
Qty: 180 TABLET | Refills: 0 | Status: SHIPPED | OUTPATIENT
Start: 2024-09-05

## 2024-09-10 ENCOUNTER — SOCIAL WORK (OUTPATIENT)
Dept: BEHAVIORAL/MENTAL HEALTH CLINIC | Facility: CLINIC | Age: 44
End: 2024-09-10
Payer: MEDICARE

## 2024-09-10 DIAGNOSIS — F40.01 PANIC DISORDER WITH AGORAPHOBIA: ICD-10-CM

## 2024-09-10 DIAGNOSIS — F43.10 POSTTRAUMATIC STRESS DISORDER: ICD-10-CM

## 2024-09-10 DIAGNOSIS — F31.32 BIPOLAR 1 DISORDER, DEPRESSED, MODERATE (HCC): Primary | ICD-10-CM

## 2024-09-10 PROCEDURE — 90837 PSYTX W PT 60 MINUTES: CPT | Performed by: SOCIAL WORKER

## 2024-09-10 NOTE — PSYCH
"Behavioral Health Psychotherapy Progress Note    Psychotherapy Provided: Individual Psychotherapy     1. Bipolar 1 disorder, depressed, moderate (HCC)        2. Posttraumatic stress disorder        3. Panic disorder with agoraphobia            Goals addressed in session: Goal 1     DATA: Marco Antonio is healthy, sober, financially ok, has a supportive family and overall is doing well but still has anxiety and some days he is depressed. He is getting a second opinion at [facility] about his kidney and shared he may be anxious about this. Marco Antonio still has issues going into what he calls big box stores. We discussed him doing his art, going for walks to relieve stress. I provided support, encouragement, and strategies to cope. I encouraged him to try to get in earlier with Dr. Gonzalez.   During this session, this clinician used the following therapeutic modalities: Client-centered Therapy, Cognitive Behavioral Therapy, Mindfulness-based Strategies, and Supportive Psychotherapy    Substance Abuse was not addressed during this session. If the client is diagnosed with a co-occurring substance use disorder, please indicate any changes in the frequency or amount of use: Because he remains in recovery. . Stage of change for addressing substance use diagnoses: No substance use/Not applicable    ASSESSMENT:  Marco Antonio Oro presents with a Anxious mood.     his affect is anxious which is congruent, with his mood and the content of the session. The client has made progress on their goals.However he admits his anxiety is very elevated.      Marco Antonio Oro presents with a none risk of suicide, none risk of self-harm, and none risk of harm to others.    For any risk assessment that surpasses a \"low\" rating, a safety plan must be developed.    A safety plan was indicated: no  If yes, describe in detail n/a    PLAN: Between sessions, Marco Antonio Oro will use mindfulness and CBT. At the next session, the therapist will use " Client-centered Therapy, Cognitive Behavioral Therapy, Mindfulness-based Strategies, and Supportive Psychotherapy to address issues and symptoms as they may arise. .    Behavioral Health Treatment Plan and Discharge Planning: Marco Antonio Oro is aware of and agrees to continue to work on their treatment plan. They have identified and are working toward their discharge goals. yes    Visit start and stop times:    09/10/24  Start Time: 1600  Stop Time: 1700  Total Visit Time: 60 minutes

## 2024-09-19 DIAGNOSIS — F31.64 BIPOLAR DISORDER, CURR EPISODE MIXED, SEVERE, WITH PSYCHOTIC FEATURES (HCC): ICD-10-CM

## 2024-09-19 RX ORDER — OLANZAPINE 15 MG/1
15 TABLET ORAL
Qty: 90 TABLET | Refills: 1 | Status: SHIPPED | OUTPATIENT
Start: 2024-09-19

## 2024-09-24 ENCOUNTER — TELEPHONE (OUTPATIENT)
Age: 44
End: 2024-09-24
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
## 2024-10-03 NOTE — SOCIAL WORK
Be sure to ask the eye surgeon if you need pre-op clearance for the cataract surgery.       Voicemail left for pt's mother

## 2024-10-04 DIAGNOSIS — F41.1 GAD (GENERALIZED ANXIETY DISORDER): ICD-10-CM

## 2024-10-04 DIAGNOSIS — F40.01 PANIC DISORDER WITH AGORAPHOBIA: ICD-10-CM

## 2024-10-04 RX ORDER — CLONAZEPAM 0.5 MG/1
0.5 TABLET ORAL 3 TIMES DAILY
Qty: 90 TABLET | Refills: 2 | Status: SHIPPED | OUTPATIENT
Start: 2024-10-04

## 2024-10-04 NOTE — TELEPHONE ENCOUNTER
Reason for call:   [x] Refill   [] Prior Auth  [] Other:     Office:   [] PCP/Provider -   [x] Specialty/Provider - [facility] / [facility]    Medication: clonazePAM (KlonoPIN) 0.5 mg tablet     Dose/Frequency: Take 1 tablet (0.5 mg total) by mouth 3 (three) times a day     Quantity: 90    Pharmacy: [facility]/pharmacy #[facility], [place] - [address]     Does the patient have enough for 3 days?   [] Yes   [x] No - Send as HP to POD

## 2024-10-04 NOTE — TELEPHONE ENCOUNTER
09/05/2024 07/05/2024 clonazePAM (Tablet) 90.0 30 0.5 MG [facility] 2 / 2 [facility] [id number] 08/07/2024 07/05/2024 clonazePAM (Tablet) 90.0 30 0.5 MG NA [facility] Medicare 1 / 2 [facility] [id number] 07/05/2024 07/05/2024 clonazePAM (Tablet) 90.0 30 0.5 MG NA [facility]

## 2024-10-22 ENCOUNTER — SOCIAL WORK (OUTPATIENT)
Dept: BEHAVIORAL/MENTAL HEALTH CLINIC | Facility: CLINIC | Age: 44
End: 2024-10-22
Payer: MEDICARE

## 2024-10-22 DIAGNOSIS — F43.10 POSTTRAUMATIC STRESS DISORDER: ICD-10-CM

## 2024-10-22 DIAGNOSIS — F31.32 BIPOLAR 1 DISORDER, DEPRESSED, MODERATE (HCC): Primary | ICD-10-CM

## 2024-10-22 DIAGNOSIS — F40.01 PANIC DISORDER WITH AGORAPHOBIA: ICD-10-CM

## 2024-10-22 PROCEDURE — 90837 PSYTX W PT 60 MINUTES: CPT | Performed by: SOCIAL WORKER

## 2024-10-22 NOTE — PSYCH
"Behavioral Health Psychotherapy Progress Note    Psychotherapy Provided: Individual Psychotherapy     1. Bipolar 1 disorder, depressed, moderate (HCC)        2. Posttraumatic stress disorder        3. Panic disorder with agoraphobia            Goals addressed in session: Goal 1     DATA: Marco Antonio shared he gets body twitches, he can't calm his mind, he still panics, he feels he has agoraphobia. He sweats, his heart beats fast, he gets hives , he breaths hard, chest is tight and his stomach bothers him. Since he does not see Dr Gonzalez till December I suggested he calls nursing. He then discussed a friend whose father may be dying. He discussed the argument he had with this friend. I provided support, encouragement and strategies to cope.   During this session, this clinician used the following therapeutic modalities: Client-centered Therapy, Cognitive Behavioral Therapy, Mindfulness-based Strategies, and Supportive Psychotherapy    Substance Abuse was not addressed during this session. If the client is diagnosed with a co-occurring substance use disorder, please indicate any changes in the frequency or amount of use: n/a. Stage of change for addressing substance use diagnoses: No substance use/Not applicable    ASSESSMENT:  Marco Antonio Oro presents with a Anxious mood.     his affect is anxious, which is congruent, with his mood and the content of the session. The client has made progress on their goals.However he admits he still has anxiety and panic when going into an open air big box store.     Marco Antonio Oro presents with a none risk of suicide, none risk of self-harm, and none risk of harm to others.    For any risk assessment that surpasses a \"low\" rating, a safety plan must be developed.    A safety plan was indicated: no  If yes, describe in detail n/a    PLAN: Between sessions, Marco Antonio Oro will use mindfulness and CBT At the next session, the therapist will use Client-centered Therapy, " Cognitive Behavioral Therapy, Mindfulness-based Strategies, and Supportive Psychotherapy to address issues and symptoms as they may arise. .    Behavioral Health Treatment Plan and Discharge Planning: Marco Antonio Oro is aware of and agrees to continue to work on their treatment plan. They have identified and are working toward their discharge goals. yes    Visit start and stop times:    10/22/24  Start Time: 1300  Stop Time: 1400  Total Visit Time: 60 minutes

## 2024-10-31 DIAGNOSIS — F31.32 BIPOLAR 1 DISORDER, DEPRESSED, MODERATE (HCC): ICD-10-CM

## 2024-10-31 RX ORDER — LAMOTRIGINE 25 MG/1
25 TABLET ORAL 2 TIMES DAILY
Qty: 180 TABLET | Refills: 1 | OUTPATIENT
Start: 2024-10-31

## 2024-10-31 NOTE — TELEPHONE ENCOUNTER
Power went out right after confirming Rx info and pharmacy. Pt appears to have called back and gotten someone else to help.

## 2024-10-31 NOTE — TELEPHONE ENCOUNTER
Reason for call:   [x] Refill   [] Prior Auth  [] Other:     Office:   [] PCP/Provider -   [x] Specialty/Provider - Angelika Lopez MD /[facility] [place]     Medication: lamoTRIgine (LaMICtal) 25 mg tablet     Dose/Frequency: TAKE 1 TABLET BY MOUTH TWICE A DAY     Quantity: 180    Pharmacy: [facility]/pharmacy #[id number]  [place], [place] - [address]      Does the patient have enough for 3 days?   [] Yes   [x] No - Send as HP to POD

## 2024-11-05 ENCOUNTER — TELEPHONE (OUTPATIENT)
Age: 44
End: 2024-11-05

## 2024-11-05 NOTE — TELEPHONE ENCOUNTER
Marco Antonio was verifying his appt for tomorrow 11/6/24. Writer verified appt info for Marco Antonio.

## 2024-11-06 ENCOUNTER — SOCIAL WORK (OUTPATIENT)
Dept: BEHAVIORAL/MENTAL HEALTH CLINIC | Facility: CLINIC | Age: 44
End: 2024-11-06
Payer: MEDICARE

## 2024-11-06 ENCOUNTER — TELEPHONE (OUTPATIENT)
Age: 44
End: 2024-11-06

## 2024-11-06 DIAGNOSIS — F40.01 PANIC DISORDER WITH AGORAPHOBIA: ICD-10-CM

## 2024-11-06 DIAGNOSIS — F43.10 POSTTRAUMATIC STRESS DISORDER: ICD-10-CM

## 2024-11-06 DIAGNOSIS — F31.32 BIPOLAR 1 DISORDER, DEPRESSED, MODERATE (HCC): Primary | ICD-10-CM

## 2024-11-06 PROCEDURE — 90837 PSYTX W PT 60 MINUTES: CPT | Performed by: SOCIAL WORKER

## 2024-11-06 NOTE — PSYCH
"Behavioral Health Psychotherapy Progress Note    Psychotherapy Provided: Individual Psychotherapy     1. Bipolar 1 disorder, depressed, moderate (HCC)        2. Posttraumatic stress disorder        3. Panic disorder with agoraphobia            Goals addressed in session: Goal 1     DATA: Marco Antonio is getting surgery to remove kidney stones on the 19th. He stopped smoking three days ago. Marco Antonio shared he still gets panic and paranoia in stores or in unfamiliar settings. He gets hives, short of breath and palpitations. Marco Antonio talked about his upcoming surgery.   During this session, this clinician used the following therapeutic modalities: Client-centered Therapy, Cognitive Behavioral Therapy, Mindfulness-based Strategies, and Supportive Psychotherapy    Substance Abuse was not addressed during this session. If the client is diagnosed with a co-occurring substance use disorder, please indicate any changes in the frequency or amount of use: n/a. Stage of change for addressing substance use diagnoses: No substance use/Not applicable    ASSESSMENT:  Marco Antonio Oro presents with a Anxious mood.     his affect is anxious which is congruent, with his mood and the content of the session. The client has made progress on their goals.     Marco Antonio Oro presents with a none risk of suicide, none risk of self-harm, and none risk of harm to others.    For any risk assessment that surpasses a \"low\" rating, a safety plan must be developed.    A safety plan was indicated: no  If yes, describe in detail n/a    PLAN: Between sessions, Marco Antonio Oro will use mindfulness and CBT. At the next session, the therapist will use Client-centered Therapy, Cognitive Behavioral Therapy, Mindfulness-based Strategies, and Supportive Psychotherapy to address issues and symptoms as they may arise. .    Behavioral Health Treatment Plan and Discharge Planning: Marco Antonio Oro is aware of and agrees to continue to work on their " treatment plan. They have identified and are working toward their discharge goals. yes    Visit start and stop times:    11/06/24  Start Time: 1400  Stop Time: 1500  Total Visit Time: 60 minutes

## 2024-11-06 NOTE — TELEPHONE ENCOUNTER
Patient called to verify what appts he had scheduled this week. Writer was able to provide that information for the patient.

## 2024-11-08 ENCOUNTER — OFFICE VISIT (OUTPATIENT)
Dept: PSYCHIATRY | Facility: CLINIC | Age: 44
End: 2024-11-08
Payer: MEDICARE

## 2024-11-08 DIAGNOSIS — F31.32 BIPOLAR 1 DISORDER, DEPRESSED, MODERATE (HCC): Primary | ICD-10-CM

## 2024-11-08 DIAGNOSIS — F40.01 PANIC DISORDER WITH AGORAPHOBIA: ICD-10-CM

## 2024-11-08 DIAGNOSIS — F43.10 POSTTRAUMATIC STRESS DISORDER: ICD-10-CM

## 2024-11-08 PROCEDURE — 90833 PSYTX W PT W E/M 30 MIN: CPT | Performed by: PSYCHIATRY & NEUROLOGY

## 2024-11-08 PROCEDURE — 99214 OFFICE O/P EST MOD 30 MIN: CPT | Performed by: PSYCHIATRY & NEUROLOGY

## 2024-11-08 NOTE — PSYCH
Visit Time    Visit Start Time: 3:00  Visit Stop Time: 3:30  Total Visit Duration:  30 minutes    Subjective: Medication Management      Patient ID: Marco Antonio Oro is a 44 y.o. male with Bipolar do and Panic Do and PTSD    HPI ROS Appetite Changes and Sleep: normal appetite, normal energy level, no weight change, and normal number of sleep hours    He remains compliant with medications and denies side effects.          He has remains sober and he continues AA meetings.   He stated he has been experiencing frequent panic attacks, triggered outside in open spaces like grocery store or [employer] or [employer].   He is working on exposing to open spaces and avoid taking anxiety medication every time his anxiety is triggered.      He is interested in working part time.  He does not want to lose his SSI benefits.     He stated recently had ED visist due to flank pain and he was found to have kidney stones and compromised kidney function on his right side. His urologist recommended removing right kidney but he prefers to have stone removed and resolved obstruction hoping he can regain kidney function. He went to [facility] for second opinion and he is scheduled for transurethral resection of the kidney stone and stents will be placed.  He stated contrary to what he was told at local hospital , he will not need kidney resection. He is scheduled for same day surgery on 11/19.  His brother is going to be with him on the day of surgery.    Almost 2 years sober and had recent PHP admission due to increased anxiety and started Buspar 5 mg po tid which helped. Anxiety was triggered by recent health issues.  He stated he quit smoking 6 days ago in preparation for surgery.     Since last seen he stated he continues to struggle with anxiety and panic when he needs to leave his house. He stated that to avoid this from happening he has been taking someone with him.   He will try increasing dose of Buspar to 10  mg po tid using own supplies and if it works I will change the prescription.   Agrees to continue current treatment.  Will schedule follow up in  3 months  or sooner if needed.       Review Of Systems:     Mood Emotional Lability   Behavior Impulsive Behavior   Thought Content Disturbing Thoughts, Feelings   General Emotional Problems and Decreased Functioning   Personality Normal   Other Psych Symptoms Normal   Constitutional Negative   ENT Negative   Cardiovascular Negative   Respiratory Negative   Gastrointestinal Negative   Genitourinary Negative   Musculoskeletal Negative   Integumentary Negative   Neurological Negative   Endocrine Normal    Other Symptoms Normal        Laboratory Results: Recent Labs (last 12 months):   Office Visit on 06/04/2024   Component Date Value    LEUKOCYTE ESTERASE,UA 06/04/2024 neg     NITRITE,UA 06/04/2024 inconclusive     SL AMB POCT UROBILINOGEN 06/04/2024 inconclusive     POCT URINE PROTEIN 06/04/2024 inconclusive      PH,UA 06/04/2024 6.5     BLOOD,UA 06/04/2024 neg     SPECIFIC GRAVITY,UA 06/04/2024 1.005     KETONES,UA 06/04/2024 neg     BILIRUBIN,UA 06/04/2024 small     GLUCOSE, UA 06/04/2024 100      COLOR,UA 06/04/2024 orange     CLARITY,UA 06/04/2024 clear     Urine Culture 06/04/2024 No Growth <1000 cfu/mL          Substance Abuse History:  Social History     Substance and Sexual Activity   Drug Use No       Family Psychiatric History:   Family History   Problem Relation Age of Onset    Schizophrenia Father     Alcohol abuse Father     Psychiatric Illness Sister     Drug abuse Brother     Completed Suicide  Neg Hx        The following portions of the patient's history were reviewed and updated as appropriate: allergies, current medications, past family history, past medical history, past social history, past surgical history, and problem list.    Social History     Socioeconomic History    Marital status: Single     Spouse name: Not on file    Number of children: Not on  file    Years of education: Not on file    Highest education level: Bachelor's degree (e.g., BA, AB, BS)   Occupational History    Not on file   Tobacco Use    Smoking status: Former     Types: Cigarettes    Smokeless tobacco: Never    Tobacco comments:     he smoke only 1 cigarette a day, quit 2 months ago   Vaping Use    Vaping status: Never Used   Substance and Sexual Activity    Alcohol use: No     Comment: last use 2012, took 3 of alcohol to help with panic attack    Drug use: No    Sexual activity: Not Currently   Other Topics Concern    Not on file   Social History Narrative    Not on file     Social Determinants of Health     Financial Resource Strain: Not on file   Food Insecurity: Not on file   Transportation Needs: Not on file   Physical Activity: Not on file   Stress: Not on file   Social Connections: Not on file   Intimate Partner Violence: Not on file   Housing Stability: Not on file     Social History     Social History Narrative    Not on file       Objective:       Mental status:  Appearance calm and cooperative , adequate hygiene and grooming, and good eye contact    Mood dysphoric   Affect affect was constricted   Speech a normal rate and fluent   Thought Processes coherent/organized and normal thought processes   Hallucinations no hallucinations present    Thought Content no delusions   Abnormal Thoughts no suicidal thoughts  and no homicidal thoughts    Orientation  oriented to person and place and time   Remote Memory short term memory intact and long term memory intact   Attention Span concentration intact   Intellect Appears to be of Average Intelligence   Insight Limited insight   Judgement judgment was limited   Muscle Strength Muscle strength and tone were normal and Normal gait    Language no difficulty naming common objects and no difficulty repeating a phrase    Fund of Knowledge displays adequate knowledge of current events, adequate fund of knowledge regarding past history, and  adequate fund of knowledge regarding vocabulary                Assessment/Plan:       Diagnoses and all orders for this visit:    Bipolar 1 disorder, depressed, moderate (HCC)    Panic disorder with agoraphobia    Posttraumatic stress disorder        Assessment & Plan  Bipolar 1 disorder, depressed, moderate (HCC)  Lamictal and Olanzapine        Panic disorder with agoraphobia  Clonazepam        Posttraumatic stress disorder                      Treatment Recommendations- Risks Benefits      Immediate Medical/Psychiatric/Psychotherapy Treatments and Any Precautions: continue current treatment     Risks, Benefits And Possible Side Effects Of Medications:  {PSYCH RISK, BENEFITS AND POSSIBLE SIDE EFFECTS (Optional):62555    Controlled Medication Discussion: Discussed with patient Black Box warning on concurrent use of benzodiazepines and opioid medications including sedation, respiratory depression, coma and death. Patient understands the risk of treatment with benzodiazepines in addition to opioids and wants to continue taking those medications. , Discussed with patient the risks of sedation, respiratory depression, impairment of ability to drive and potential for abuse and addiction related to treatment with benzodiazepine medications. The patient understands risk of treatment with benzodiazepine medications, agrees to not drive if feels impaired and agrees to take medications as prescribed., and The patient has been filling controlled prescriptions on time as prescribed to [place] Prescription Drug Monitoring program.      Psychotherapy Provided: Individual psychotherapy provided.       Individual psychotherapy provided: Yes  Counseling was provided during the session today for 16 minutes.  Medications, treatment progress and treatment plan reviewed with Marco Antonio.  Medication education provided to Marco Antonio.  Coping strategies including compliance with medications, deep/slow breathing, eliminating avoidance, engaging in  previously avoided activities, exercising, getting into a good routine, increasing energy, increasing interest in usual activities, increasing motivation, increasing social interaction, maintain healthy diet, maintain heathy sleeping hygiene, and maintain positive attitude reviewed with Marco Antonio.   Supportive therapy provided.

## 2024-11-25 DIAGNOSIS — F40.01 PANIC DISORDER WITH AGORAPHOBIA: ICD-10-CM

## 2024-11-25 RX ORDER — BUSPIRONE HYDROCHLORIDE 5 MG/1
5 TABLET ORAL 3 TIMES DAILY
Qty: 270 TABLET | Refills: 0 | Status: SHIPPED | OUTPATIENT
Start: 2024-11-25

## 2024-12-04 DIAGNOSIS — I10 PRIMARY HYPERTENSION: ICD-10-CM

## 2024-12-04 DIAGNOSIS — F31.32 BIPOLAR 1 DISORDER, DEPRESSED, MODERATE (HCC): ICD-10-CM

## 2024-12-04 RX ORDER — LAMOTRIGINE 25 MG/1
25 TABLET ORAL 2 TIMES DAILY
Qty: 180 TABLET | Refills: 0 | Status: SHIPPED | OUTPATIENT
Start: 2024-12-04

## 2024-12-04 RX ORDER — AMLODIPINE BESYLATE 10 MG/1
10 TABLET ORAL DAILY
Qty: 90 TABLET | Refills: 0 | Status: SHIPPED | OUTPATIENT
Start: 2024-12-04

## 2024-12-04 RX ORDER — LISINOPRIL 10 MG/1
10 TABLET ORAL EVERY EVENING
Qty: 90 TABLET | Refills: 0 | Status: SHIPPED | OUTPATIENT
Start: 2024-12-04

## 2024-12-19 ENCOUNTER — TELEPHONE (OUTPATIENT)
Age: 44
End: 2024-12-19

## 2024-12-19 NOTE — TELEPHONE ENCOUNTER
Patient is calling regarding cancelling an appointment.    Date/Time: 12/19/2024 3pm     Reason: patient is sick    Patient was rescheduled: YES [] NO [x]  If yes, when was Patient reschedule for: patient will be at next scheduled visit    Patient requesting call back to reschedule: YES [] NO [x]

## 2025-01-02 ENCOUNTER — SOCIAL WORK (OUTPATIENT)
Dept: BEHAVIORAL/MENTAL HEALTH CLINIC | Facility: CLINIC | Age: 45
End: 2025-01-02
Payer: MEDICARE

## 2025-01-02 DIAGNOSIS — F40.01 PANIC DISORDER WITH AGORAPHOBIA: ICD-10-CM

## 2025-01-02 DIAGNOSIS — F31.32 BIPOLAR 1 DISORDER, DEPRESSED, MODERATE (HCC): Primary | ICD-10-CM

## 2025-01-02 DIAGNOSIS — F43.10 POSTTRAUMATIC STRESS DISORDER: ICD-10-CM

## 2025-01-02 PROCEDURE — 90837 PSYTX W PT 60 MINUTES: CPT | Performed by: SOCIAL WORKER

## 2025-01-02 NOTE — PSYCH
"Behavioral Health Psychotherapy Progress Note    Psychotherapy Provided: Individual Psychotherapy     1. Bipolar 1 disorder, depressed, moderate (HCC)        2. Posttraumatic stress disorder        3. Panic disorder with agoraphobia            Goals addressed in session: Goal 1     DATA: Marco Antonio recently had the procedure to remove his kidney stone. They got 80 percent and he will need a repeat procedure. He still gets panic attacks in big box stores. We discussed slowly exposing himself to smaller stores first. I provided support, encouragement and strategies to cope.   During this session, this clinician used the following therapeutic modalities: Client-centered Therapy, Cognitive Behavioral Therapy, Mindfulness-based Strategies, and Supportive Psychotherapy    Substance Abuse was not addressed during this session. If the client is diagnosed with a co-occurring substance use disorder, please indicate any changes in the frequency or amount of use: n/a. Stage of change for addressing substance use diagnoses: No substance use/Not applicable    ASSESSMENT:  Marco Antonio Oro presents with a Euthymic/ normal and Anxious mood.     his affect is anxious which is congruent, with his mood and the content of the session. The client has made progress on their goals.     Marco Antonio Oro presents with a none risk of suicide, none risk of self-harm, and none risk of harm to others.    For any risk assessment that surpasses a \"low\" rating, a safety plan must be developed.    A safety plan was indicated: no  If yes, describe in detail n/a    PLAN: Between sessions, Marco Antonio Oro will use mindfulness and CBT. . At the next session, the therapist will use Client-centered Therapy, Cognitive Behavioral Therapy, Mindfulness-based Strategies, and Supportive Psychotherapy to address issues and symptoms as they may arise. .    Behavioral Health Treatment Plan and Discharge Planning: Marco Antonio Oro is aware of and " agrees to continue to work on their treatment plan. They have identified and are working toward their discharge goals. yes    Depression Follow-up Plan Completed: Not applicable    Visit start and stop times:    01/02/25  Start Time: 1500  Stop Time: 1600  Total Visit Time: 60 minutes

## 2025-01-03 DIAGNOSIS — F41.1 GAD (GENERALIZED ANXIETY DISORDER): ICD-10-CM

## 2025-01-03 DIAGNOSIS — F40.01 PANIC DISORDER WITH AGORAPHOBIA: Primary | ICD-10-CM

## 2025-01-03 RX ORDER — CLONAZEPAM 0.5 MG/1
0.5 TABLET ORAL 3 TIMES DAILY
Qty: 90 TABLET | Refills: 0 | Status: SHIPPED | OUTPATIENT
Start: 2025-01-04 | End: 2025-02-03

## 2025-01-03 NOTE — TELEPHONE ENCOUNTER
Reason for call: Only one pills left  [x] Refill   [] Prior Auth  [] Other:     Office:   [] PCP/Provider -   [x] Specialty/Provider - psyc    Medication: klonopin    Dose/Frequency: 0.5 mg TID     Quantity: 90 w refill    Pharmacy: [facility] [place] on file     Does the patient have enough for 3 days?   [] Yes   [x] No - Send as HP to POD

## 2025-01-06 NOTE — TELEPHONE ENCOUNTER
Patient called requesting refill for (KlonoPIN) 0.5 mg . Patient made aware medication was refilled on 1/3/2025 for 90 with 0 refills to [facility] pharmacy. Patient instructed to contact the pharmacy to obtain refills of medication. Patient verbalized understanding.

## 2025-01-07 ENCOUNTER — OFFICE VISIT (OUTPATIENT)
Dept: PSYCHIATRY | Facility: CLINIC | Age: 45
End: 2025-01-07

## 2025-01-07 ENCOUNTER — TELEPHONE (OUTPATIENT)
Age: 45
End: 2025-01-07

## 2025-01-07 DIAGNOSIS — F31.32 BIPOLAR 1 DISORDER, DEPRESSED, MODERATE (HCC): Primary | ICD-10-CM

## 2025-01-07 NOTE — TELEPHONE ENCOUNTER
Patient is calling regarding cancelling an appointment.    Date/Time: 1/7/25 at 2:30pm    Reason: patient is sick     Patient was rescheduled: YES [x] NO []  If yes, when was Patient reschedule for: 2/21/25 at 2pm    Patient requesting call back to reschedule: YES [] NO [x]

## 2025-01-30 ENCOUNTER — SOCIAL WORK (OUTPATIENT)
Dept: BEHAVIORAL/MENTAL HEALTH CLINIC | Facility: CLINIC | Age: 45
End: 2025-01-30
Payer: MEDICARE

## 2025-01-30 DIAGNOSIS — F43.10 POSTTRAUMATIC STRESS DISORDER: ICD-10-CM

## 2025-01-30 DIAGNOSIS — F31.32 BIPOLAR 1 DISORDER, DEPRESSED, MODERATE (HCC): Primary | ICD-10-CM

## 2025-01-30 DIAGNOSIS — F40.01 PANIC DISORDER WITH AGORAPHOBIA: ICD-10-CM

## 2025-01-30 PROCEDURE — 90837 PSYTX W PT 60 MINUTES: CPT | Performed by: SOCIAL WORKER

## 2025-01-30 NOTE — PSYCH
"Behavioral Health Psychotherapy Progress Note    Psychotherapy Provided: Individual Psychotherapy     1. Bipolar 1 disorder, depressed, moderate (HCC)        2. Posttraumatic stress disorder        3. Panic disorder with agoraphobia            Goals addressed in session: Goal 1 and Goal 2     DATA: Marco Antonio shared he was diagnosed with pneumonia which prevented him from getting his second kidney stone surgery. He discussed all of his fears and things that make him anxious. I provided support, encouragement and strategies to cope.   During this session, this clinician used the following therapeutic modalities: Client-centered Therapy, Cognitive Behavioral Therapy, Mindfulness-based Strategies, and Supportive Psychotherapy    Substance Abuse was not addressed during this session. If the client is diagnosed with a co-occurring substance use disorder, please indicate any changes in the frequency or amount of use: n/a. Stage of change for addressing substance use diagnoses: No substance use/Not applicable    ASSESSMENT:  Marco Antonio Oro presents with a Anxious mood.     his affect is anxious which is congruent, with his mood and the content of the session. The client has made progress on their goals.     Marco Antonio Oro presents with a none risk of suicide, none risk of self-harm, and none risk of harm to others.    For any risk assessment that surpasses a \"low\" rating, a safety plan must be developed.    A safety plan was indicated: yes  If yes, describe in detail n/a    PLAN: Between sessions, Marco Antonio Oro will use mindfulness and CBT. At the next session, the therapist will use Client-centered Therapy, Cognitive Behavioral Therapy, Mindfulness-based Strategies, and Supportive Psychotherapy to address issues and symptoms as they may arise. .    Behavioral Health Treatment Plan and Discharge Planning: Marco Antonio Oro is aware of and agrees to continue to work on their treatment plan. They have " identified and are working toward their discharge goals. yes    Depression Follow-up Plan Completed: Not applicable    Visit start and stop times:    01/30/25  Start Time: 1500  Stop Time: 1600  Total Visit Time: 60 minutes

## 2025-01-30 NOTE — BH TREATMENT PLAN
Outpatient Behavioral Health Psychotherapy Treatment Plan    Marco Antonio Oro  1980     Date of Initial Psychotherapy Assessment: 09/08/2020  Date of Current Treatment Plan: 01/30/25  Treatment Plan Target Date: 07/25/2025  Treatment Plan Expiration Date: 07/25/2025    Diagnosis:   1. Bipolar 1 disorder, depressed, moderate (HCC)        2. Posttraumatic stress disorder        3. Panic disorder with agoraphobia            Area(s) of Need: Please see below    Long Term Goal 1 (in the client's own words): I still need to more  effectively manage my anxiety.     Stage of Change: Action    Target Date for completion: 07/25/2025     Anticipated therapeutic modalities: Mindfulness, CBT and solution focused therapy     People identified to complete this goal: myself with the help of my therapist.       Objective 1: (identify the means of measuring success in meeting the objective): When the symptoms arise I will be able to keep them at a minimum.       Objective 2: (identify the means of measuring success in meeting the objective):       Long Term Goal 2 (in the client's own words): I need coping strategies in dealing with my panic attacks.     Stage of Change: Action    Target Date for completion: 07/25/2025     Anticipated therapeutic modalities: Mindfulness, CBT and solution focused therapy     People identified to complete this goal: myself with the help of my therapist.       Objective 1: (identify the means of measuring success in meeting the objective): I will be able to go into big box stores that I now avoid.       Objective 2: (identify the means of measuring success in meeting the objective):      Long Term Goal 3 (in the client's own words):     Stage of Change:     Target Date for completion:      Anticipated therapeutic modalities:      People identified to complete this goal:       Objective 1: (identify the means of measuring success in meeting the objective):       Objective 2: (identify the means  of measuring success in meeting the objective):      I am currently under the care of a [facility] psychiatric provider: yes    My [facility] psychiatric provider is: Dr Gonzalez    I am currently taking psychiatric medications: Yes, as prescribed    I feel that I will be ready for discharge from mental health care when I reach the following (measurable goal/objective): when I make the progress I am comfortable with.    For children and adults who have a legal guardian:   Has there been any change to custody orders and/or guardianship status? NA. If yes, attach updated documentation.    I have created my Crisis Plan and have been offered a copy of this plan    Behavioral Health Treatment Plan [facility]: Diagnosis and Treatment Plan explained to Marco Antonio Oro acknowledges an understanding of their diagnosis. Marco Antonio Oro agrees to this treatment plan.    I have been offered a copy of this Treatment Plan. yes

## 2025-02-04 DIAGNOSIS — F40.01 PANIC DISORDER WITH AGORAPHOBIA: ICD-10-CM

## 2025-02-04 DIAGNOSIS — F41.1 GAD (GENERALIZED ANXIETY DISORDER): ICD-10-CM

## 2025-02-04 RX ORDER — CLONAZEPAM 0.5 MG/1
0.5 TABLET ORAL 3 TIMES DAILY
Qty: 90 TABLET | Refills: 0 | Status: SHIPPED | OUTPATIENT
Start: 2025-02-04 | End: 2025-03-06

## 2025-02-04 NOTE — TELEPHONE ENCOUNTER
Reason for call:   [x] Refill   [] Prior Auth  [] Other:     Office:   [] PCP/Provider -   [x] Specialty/Provider -       clonazePAM (KlonoPIN) 0.5 mg tablet () 0.5 mg, Oral, 3 times daily       Quantity: 30    [facility]    Does the patient have enough for 3 days?   [] Yes   [x] No - Send as HP to POD

## 2025-02-04 NOTE — TELEPHONE ENCOUNTER
Refill cannot be delegated    1 [id number] 01/07/2025 01/03/2025 clonazePAM (Tablet) 90.0 30 0.5 MG NA CESILIA JONES [facility] [place] 0 / 0 [place]   1 [id number] 12/05/2024 10/04/2024 clonazePAM (Tablet) 90.0 30 0.5 MG NA [facility] [place] 2 / 2 [place]   1 [id number] 11/04/2024 10/04/2024 clonazePAM (Tablet) 90.0 30 0.5 MG NA [facility] [place] 1 / 2 [place]   1 [id number] 10/04/2024 10/04/2024 clonazePAM (Tablet) 90.0 30 0.5 MG NA [facility] [place] 0 / 2 [place]   1 [id number] 09/05/2024 07/05/2024 clonazePAM (Tablet) 90.0 30 0.5 MG NA [facility] [place] 2 / 2 [place]   1 [id number] 08/07/2024 07/05/2024 clonazePAM (Tablet) 90.0 30 0.5 MG NA [facility] [place] 1 / 2 [place]   1 [id number] 07/05/2024 07/05/2024 clonazePAM (Tablet) 90.0 30 0.5 MG NA [facility] [place] 0 / 2 [place]   1 [id number] 06/08/2024 04/09/2024 clonazePAM (Tablet) 90.0 30 0.5 MG [facility] / 2 [place]   1 [id number] 05/08/2024 04/09/2024 clonazePAM (Tablet) 90.0 30 0.5 MG [facility] 1 / 2 [place]   1 [id number] 04/09/2024 04/09/2024 clonazePAM (Tablet) 90.0 30 0.5 MG NA [facility] Medicare 0 / 2 [place]

## 2025-02-12 ENCOUNTER — TELEPHONE (OUTPATIENT)
Age: 45
End: 2025-02-12

## 2025-02-12 NOTE — TELEPHONE ENCOUNTER
Patient is calling regarding cancelling an appointment.    Date/Time: 2/1/25 at 3 pm    Reason: Patient gave no reason    Patient was rescheduled: YES [x] NO []  If yes, when was Patient reschedule for: Pt is already scheduled for next appt is 2/26/25 at 3 pm.

## 2025-02-22 DIAGNOSIS — F40.01 PANIC DISORDER WITH AGORAPHOBIA: ICD-10-CM

## 2025-02-24 DIAGNOSIS — F40.01 PANIC DISORDER WITH AGORAPHOBIA: ICD-10-CM

## 2025-02-24 RX ORDER — BUSPIRONE HYDROCHLORIDE 5 MG/1
5 TABLET ORAL 3 TIMES DAILY
Qty: 270 TABLET | Refills: 0 | OUTPATIENT
Start: 2025-02-24

## 2025-02-24 RX ORDER — BUSPIRONE HYDROCHLORIDE 5 MG/1
5 TABLET ORAL 3 TIMES DAILY
Qty: 270 TABLET | Refills: 0 | Status: SHIPPED | OUTPATIENT
Start: 2025-02-24

## 2025-02-24 NOTE — TELEPHONE ENCOUNTER
Reason for call:   [x] Refill   [] Prior Auth  [] Other:     Office:   [] PCP/Provider -   [x] Specialty/Provider - Angelika Perez    Medication: Buspar    Dose/Frequency: 5 mg     Quantity: #270    Pharmacy: [place] [id number]    Does the patient have enough for 3 days?   [] Yes   [x] No - Send as HP to POD

## 2025-02-26 ENCOUNTER — SOCIAL WORK (OUTPATIENT)
Dept: BEHAVIORAL/MENTAL HEALTH CLINIC | Facility: CLINIC | Age: 45
End: 2025-02-26
Payer: MEDICARE

## 2025-02-26 DIAGNOSIS — F43.10 POSTTRAUMATIC STRESS DISORDER: ICD-10-CM

## 2025-02-26 DIAGNOSIS — F40.01 PANIC DISORDER WITH AGORAPHOBIA: ICD-10-CM

## 2025-02-26 DIAGNOSIS — F31.32 BIPOLAR 1 DISORDER, DEPRESSED, MODERATE (HCC): Primary | ICD-10-CM

## 2025-02-26 PROCEDURE — 90837 PSYTX W PT 60 MINUTES: CPT | Performed by: SOCIAL WORKER

## 2025-02-26 NOTE — PSYCH
"Behavioral Health Psychotherapy Progress Note    Psychotherapy Provided: Individual Psychotherapy     1. Bipolar 1 disorder, depressed, moderate (HCC)        2. Posttraumatic stress disorder        3. Panic disorder with agoraphobia            Goals addressed in session: Goal 1 and Goal 2     DATA: Marco Antonio discussed how his panic and PTSD, depression and mood swings due to being sick for 2 months.  He had a severe respiratory, vision problems and pain during urination. He saw a doctor for his respiratory issue, he needed glasses and he will see a urologist.next week. This raised his psychiatric symptoms and he is now triggerd back to what he called his 3 botched surgeries. I provided support and strategies to cope. Marco Antonio is so happy he does not drink or smoke any longer. He discussed the current world of dating and how things have changed. Marco Antonio has focused on improving himself.   During this session, this clinician used the following therapeutic modalities: Client-centered Therapy, Cognitive Behavioral Therapy, Mindfulness-based Strategies, and Supportive Psychotherapy    Substance Abuse was not addressed during this session. If the client is diagnosed with a co-occurring substance use disorder, please indicate any changes in the frequency or amount of use: He is in recovery. . Stage of change for addressing substance use diagnoses: No substance use/Not applicable    ASSESSMENT:  Marco Antonio Oro presents with a Anxious and Depressed mood.     his affect is anxious and depressed., which is congruent, with his mood and the content of the session. The client has made progress on their goals.     Marco Antonio Oro presents with a none risk of suicide, none risk of self-harm, and none risk of harm to others.    For any risk assessment that surpasses a \"low\" rating, a safety plan must be developed.    A safety plan was indicated: no  If yes, describe in detail n/a    PLAN: Between sessions, Marco Antonio Oro " will use mindfulness and CBT. At the next session, the therapist will use Client-centered Therapy, Cognitive Behavioral Therapy, Mindfulness-based Strategies, and Supportive Psychotherapy to address issues and symptoms as they may arise. .    Behavioral Health Treatment Plan and Discharge Planning: Marco Antonio Oro is aware of and agrees to continue to work on their treatment plan. They have identified and are working toward their discharge goals. yes    Depression Follow-up Plan Completed: Not applicable    Visit start and stop times:    02/26/25  Start Time: 1500  Stop Time: 1600  Total Visit Time: 60 minutes

## 2025-03-06 DIAGNOSIS — F41.1 GAD (GENERALIZED ANXIETY DISORDER): ICD-10-CM

## 2025-03-06 DIAGNOSIS — F40.01 PANIC DISORDER WITH AGORAPHOBIA: ICD-10-CM

## 2025-03-06 RX ORDER — CLONAZEPAM 0.5 MG/1
0.5 TABLET ORAL 3 TIMES DAILY
Qty: 90 TABLET | Refills: 0 | Status: SHIPPED | OUTPATIENT
Start: 2025-03-06 | End: 2025-04-05

## 2025-03-06 NOTE — TELEPHONE ENCOUNTER
02/04/2025 02/04/2025 clonazePAM (Tablet) 90.0 30 0.5 MG NA [facility] Medicare 0 / 0 [place]      1 [id number] 01/07/2025 01/03/2025 clonazePAM (Tablet) 90.0 30 0.5 MG NA CESILIA JONES [facility]n Medicare 0 / 0 [place]    1 [id number] 12/05/2024 10/04/2024 clonazePAM (Tablet) 90.0 30 0.5 MG NA DANY MERA [facility]

## 2025-03-06 NOTE — TELEPHONE ENCOUNTER
Reason for call:   [x] Refill   [] Prior Auth  [] Other:     Office:   [] PCP/Provider -   [x] Specialty/Provider - [facility] [place]     Medication: clonazePAM (KlonoPIN) 0.5 mg tablet     Dose/Frequency: Take 1 tablet (0.5 mg total) by mouth 3 (three) times a day,     Quantity: 90    Pharmacy: [id number]  [place], [place] - [address] [phone]    Local Pharmacy   Does the patient have enough for 3 days?   [] Yes   [x] No - Send as HP to POD

## 2025-03-12 ENCOUNTER — SOCIAL WORK (OUTPATIENT)
Dept: BEHAVIORAL/MENTAL HEALTH CLINIC | Facility: CLINIC | Age: 45
End: 2025-03-12
Payer: MEDICARE

## 2025-03-12 DIAGNOSIS — F31.32 BIPOLAR 1 DISORDER, DEPRESSED, MODERATE (HCC): Primary | ICD-10-CM

## 2025-03-12 DIAGNOSIS — F43.10 POSTTRAUMATIC STRESS DISORDER: ICD-10-CM

## 2025-03-12 DIAGNOSIS — F40.01 PANIC DISORDER WITH AGORAPHOBIA: ICD-10-CM

## 2025-03-12 PROCEDURE — 90837 PSYTX W PT 60 MINUTES: CPT | Performed by: SOCIAL WORKER

## 2025-03-12 NOTE — PSYCH
Behavioral Health Psychotherapy Progress Note    Psychotherapy Provided: Individual Psychotherapy     1. Bipolar 1 disorder, depressed, moderate (HCC)        2. Posttraumatic stress disorder        3. Panic disorder with agoraphobia            Goals addressed in session: Goal 1 and Goal 2     DATA: Marco Antonio discussed a new woman he met and he feels he has made a new friend. He then discussed his ongoing health issues regarding his kidney stones which will be done at [facility]. His next surgery is scheduled for April 17th to remove the stent and to get rid of the remainder kidney stones. He still is getting panic attacks in big box stores. He gets so nervous he has bowel issues. He shared the panic attacks started about 8 years ago and he can't identify any precipitators. He admits he is scared to go places. He shared he is sick of being constantly scared. I offered him some challenges to take his brother into stores with him and to expose himself longer to the discomfort he may experience rather than simply instantly leaving the store. I also discussed with Marco Antonio that he needs to stop writing the negative narratives in his head before things happen. He creates a negative situation or that a crisis is brewing before anything actually happens. We discussed writing positive narratives and also asking himself what is the worse that can happen. I provided support, encouragement, insights and strategies to cope.   During this session, this clinician used the following therapeutic modalities: Client-centered Therapy, Cognitive Behavioral Therapy, Mindfulness-based Strategies, and Supportive Psychotherapy    Substance Abuse was not addressed during this session. If the client is diagnosed with a co-occurring substance use disorder, please indicate any changes in the frequency or amount of use: he is in recovery. . Stage of change for addressing substance use diagnoses: No substance use/Not applicable    ASSESSMENT:  Marco Antonio LINK  "Shorty presents with a Anxious mood.     his affect is anxious which is congruent, with his mood and the content of the session. The client has and has not made progress on their goals.     Marco Antonio Oro presents with a none risk of suicide, none risk of self-harm, and none risk of harm to others.    For any risk assessment that surpasses a \"low\" rating, a safety plan must be developed.    A safety plan was indicated: no  If yes, describe in detail n/a    PLAN: Between sessions, Marco Antonio Oro will use mindfulness and CBT. At the next session, the therapist will use Client-centered Therapy, Cognitive Behavioral Therapy, Mindfulness-based Strategies, and Supportive Psychotherapy to address issues and symptoms as theymay arise. .    Behavioral Health Treatment Plan and Discharge Planning: Marco Antonio Oro is aware of and agrees to continue to work on their treatment plan. They have identified and are working toward their discharge goals. yes    Depression Follow-up Plan Completed: Not applicable    Visit start and stop times:    03/12/25  Start Time: 1500  Stop Time: 1600  Total Visit Time: 60 minutes  "

## 2025-03-21 ENCOUNTER — OFFICE VISIT (OUTPATIENT)
Dept: PSYCHIATRY | Facility: CLINIC | Age: 45
End: 2025-03-21
Payer: MEDICARE

## 2025-03-21 DIAGNOSIS — Z79.899 LONG TERM CURRENT USE OF ANTIPSYCHOTIC MEDICATION: ICD-10-CM

## 2025-03-21 DIAGNOSIS — F31.64 BIPOLAR DISORDER, CURR EPISODE MIXED, SEVERE, WITH PSYCHOTIC FEATURES (HCC): ICD-10-CM

## 2025-03-21 DIAGNOSIS — F40.01 PANIC DISORDER WITH AGORAPHOBIA: ICD-10-CM

## 2025-03-21 DIAGNOSIS — F31.32 BIPOLAR 1 DISORDER, DEPRESSED, MODERATE (HCC): Primary | ICD-10-CM

## 2025-03-21 DIAGNOSIS — F43.10 POSTTRAUMATIC STRESS DISORDER: ICD-10-CM

## 2025-03-21 DIAGNOSIS — F19.21 COMBINED DRUG DEPENDENCE, EXCLUDING OPIOID, IN REMISSION (HCC): ICD-10-CM

## 2025-03-21 PROCEDURE — 99214 OFFICE O/P EST MOD 30 MIN: CPT | Performed by: PSYCHIATRY & NEUROLOGY

## 2025-03-21 PROCEDURE — 90833 PSYTX W PT W E/M 30 MIN: CPT | Performed by: PSYCHIATRY & NEUROLOGY

## 2025-03-21 RX ORDER — OLANZAPINE 15 MG/1
15 TABLET ORAL
Qty: 90 TABLET | Refills: 1 | Status: SHIPPED | OUTPATIENT
Start: 2025-03-21

## 2025-03-21 RX ORDER — LAMOTRIGINE 25 MG/1
25 TABLET ORAL 2 TIMES DAILY
Qty: 180 TABLET | Refills: 0 | Status: SHIPPED | OUTPATIENT
Start: 2025-03-21

## 2025-03-21 RX ORDER — ALBUTEROL SULFATE 90 UG/1
INHALANT RESPIRATORY (INHALATION)
COMMUNITY
Start: 2025-02-10

## 2025-03-21 NOTE — PSYCH
Visit Time    Visit Start Time: 1:30  Visit Stop Time: 2:00  Total Visit Duration:  30 minutes    Subjective: Medication Management      Patient ID: Marco Antonio Oro is a 44 y.o. male with Bipolar do and Panic Do and PTSD    HPI ROS Appetite Changes and Sleep: normal appetite, normal energy level, no weight change, and normal number of sleep hours    He remains compliant with medications and denies side effects.          He has remains sober and he continues AA meetings.   He is interested in working part time.  He does not want to lose his SSI benefits.     Almost 2 years sober and had recent PHP admission due to increased anxiety and started Buspar 5 mg po tid which helped. Anxiety was triggered by recent health issues.     He tried  increasing dose of Buspar to 10 mg po tid using own supplies and if it works I will change the prescription.     Since last seen he stated he stated he got stent placed and part of the kidney stone removed. He stated he was sick the process and that delayed treatment. He had pneumonia.  He stated he will go through second procedure to remove remaining of kidney stone.   He is still reporting panic attacks when having to go out ex [employer] stores.   He continues to work with his individual counselor and he is working on gradual exposure to help his anxiety.  He stated he managed to quit smoking for surgery but by now he has been 5 months without smoking and is quitting for good.   He stated his mood is stable otherwise.      Agrees to continue current treatment.  Will schedule follow up in  3 months  or sooner if needed.       Review Of Systems:     Mood Emotional Lability   Behavior Impulsive Behavior   Thought Content Disturbing Thoughts, Feelings   General Emotional Problems and Decreased Functioning   Personality Normal   Other Psych Symptoms Normal   Constitutional Negative   ENT Negative   Cardiovascular Negative   Respiratory Negative   Gastrointestinal Negative    Genitourinary Negative   Musculoskeletal Negative   Integumentary Negative   Neurological Negative   Endocrine Normal    Other Symptoms Normal        Laboratory Results: Recent Labs (last 12 months):   Office Visit on 2024   Component Date Value    LEUKOCYTE ESTERASE,UA 2024 neg     NITRITE,UA 2024 inconclusive     SL AMB POCT UROBILINOGEN 2024 inconclusive     POCT URINE PROTEIN 2024 inconclusive      PH,UA 2024 6.5     BLOOD,UA 2024 neg     SPECIFIC GRAVITY,UA 2024 1.005     KETONES,UA 2024 neg     BILIRUBIN,UA 2024 small     GLUCOSE, UA 2024 100      COLOR,UA 2024 orange     CLARITY,UA 2024 clear     Urine Culture 2024 No Growth <1000 cfu/mL          Substance Abuse History:  Social History     Substance and Sexual Activity   Drug Use No       Family Psychiatric History:   Family History   Problem Relation Age of Onset    Schizophrenia Father     Alcohol abuse Father     Psychiatric Illness Sister     Drug abuse Brother     Completed Suicide  Neg Hx        The following portions of the patient's history were reviewed and updated as appropriate: allergies, current medications, past family history, past medical history, past social history, past surgical history, and problem list.    Social History     Socioeconomic History    Marital status: Single     Spouse name: Not on file    Number of children: Not on file    Years of education: Not on file    Highest education level: Bachelor's degree (e.g., BA, AB, BS)   Occupational History    Not on file   Tobacco Use    Smoking status: Former     Current packs/day: 0.00     Types: Cigarettes     Quit date:      Years since quittin.2    Smokeless tobacco: Never    Tobacco comments:     he smoke only 1 cigarette a day, quit 2 months ago   Vaping Use    Vaping status: Never Used   Substance and Sexual Activity    Alcohol use: No     Comment: last use , took 3 of alcohol to help  with panic attack    Drug use: No    Sexual activity: Not Currently   Other Topics Concern    Not on file   Social History Narrative    Not on file     Social Drivers of Health     Financial Resource Strain: Not on file   Food Insecurity: Not on file   Transportation Needs: Not on file   Physical Activity: Not on file   Stress: Not on file   Social Connections: Not on file   Intimate Partner Violence: Not on file   Housing Stability: Not on file     Social History     Social History Narrative    Not on file       Objective:       Mental status:  Appearance calm and cooperative , adequate hygiene and grooming, and good eye contact    Mood dysphoric   Affect affect was constricted   Speech a normal rate and fluent   Thought Processes coherent/organized and normal thought processes   Hallucinations no hallucinations present    Thought Content no delusions   Abnormal Thoughts no suicidal thoughts  and no homicidal thoughts    Orientation  oriented to person and place and time   Remote Memory short term memory intact and long term memory intact   Attention Span concentration intact   Intellect Appears to be of Average Intelligence   Insight Limited insight   Judgement judgment was limited   Muscle Strength Muscle strength and tone were normal and Normal gait    Language no difficulty naming common objects and no difficulty repeating a phrase    Fund of Knowledge displays adequate knowledge of current events, adequate fund of knowledge regarding past history, and adequate fund of knowledge regarding vocabulary                Assessment/Plan:       Diagnoses and all orders for this visit:    Bipolar 1 disorder, depressed, moderate (HCC)  -     lamoTRIgine (LaMICtal) 25 mg tablet; Take 1 tablet (25 mg total) by mouth 2 (two) times a day    Panic disorder with agoraphobia    Posttraumatic stress disorder    Combined drug dependence, excluding opioid, in remission (HCC)    Bipolar disorder, curr episode mixed, severe, with  psychotic features (HCC)  -     OLANZapine (ZyPREXA) 15 mg tablet; Take 1 tablet (15 mg total) by mouth daily at bedtime    Long term current use of antipsychotic medication  -     Lipid Panel with Direct LDL reflex; Future  -     Hemoglobin A1C; Future  -     Comprehensive metabolic panel; Future  -     CBC and differential; Future    Other orders  -     albuterol (PROVENTIL HFA,VENTOLIN HFA) 90 mcg/act inhaler; TAKE 2 PUFFS BY MOUTH EVERY 4 TO 6 HOURS AS NEEDED FOR BREATHING          Assessment & Plan  Bipolar 1 disorder, depressed, moderate (HCC)    Orders:    lamoTRIgine (LaMICtal) 25 mg tablet; Take 1 tablet (25 mg total) by mouth 2 (two) times a day    Panic disorder with agoraphobia         Posttraumatic stress disorder         Combined drug dependence, excluding opioid, in remission (HCC)         Bipolar disorder, curr episode mixed, severe, with psychotic features (HCC)    Orders:    OLANZapine (ZyPREXA) 15 mg tablet; Take 1 tablet (15 mg total) by mouth daily at bedtime    Long term current use of antipsychotic medication    Orders:    Lipid Panel with Direct LDL reflex; Future    Hemoglobin A1C; Future    Comprehensive metabolic panel; Future    CBC and differential; Future                 Treatment Recommendations- Risks Benefits      Immediate Medical/Psychiatric/Psychotherapy Treatments and Any Precautions: continue current treatment     Risks, Benefits And Possible Side Effects Of Medications:  {PSYCH RISK, BENEFITS AND POSSIBLE SIDE EFFECTS (Optional):34879    Controlled Medication Discussion: Discussed with patient Black Box warning on concurrent use of benzodiazepines and opioid medications including sedation, respiratory depression, coma and death. Patient understands the risk of treatment with benzodiazepines in addition to opioids and wants to continue taking those medications. , Discussed with patient the risks of sedation, respiratory depression, impairment of ability to drive and potential  for abuse and addiction related to treatment with benzodiazepine medications. The patient understands risk of treatment with benzodiazepine medications, agrees to not drive if feels impaired and agrees to take medications as prescribed., and The patient has been filling controlled prescriptions on time as prescribed to [place] Prescription Drug Monitoring program.      Psychotherapy Provided: Individual psychotherapy provided.       Individual psychotherapy provided: Yes  Counseling was provided during the session today for 16 minutes.  Medications, treatment progress and treatment plan reviewed with Marco Antonio.  Medication education provided to Marco Antonio.  Coping strategies including compliance with medications, deep/slow breathing, eliminating avoidance, engaging in previously avoided activities, exercising, getting into a good routine, increasing energy, increasing interest in usual activities, increasing motivation, increasing social interaction, maintain healthy diet, maintain heathy sleeping hygiene, and maintain positive attitude reviewed with Marco Antonio.   Supportive therapy provided.

## 2025-03-21 NOTE — ASSESSMENT & PLAN NOTE
Orders:    lamoTRIgine (LaMICtal) 25 mg tablet; Take 1 tablet (25 mg total) by mouth 2 (two) times a day

## 2025-04-04 DIAGNOSIS — F41.1 GAD (GENERALIZED ANXIETY DISORDER): ICD-10-CM

## 2025-04-04 DIAGNOSIS — F40.01 PANIC DISORDER WITH AGORAPHOBIA: ICD-10-CM

## 2025-04-04 RX ORDER — CLONAZEPAM 0.5 MG/1
0.5 TABLET ORAL 3 TIMES DAILY
Qty: 90 TABLET | Refills: 0 | Status: SHIPPED | OUTPATIENT
Start: 2025-04-04 | End: 2025-05-04

## 2025-04-04 NOTE — TELEPHONE ENCOUNTER
Reason for call:   [x] Refill   [] Prior Auth  [] Other:     Office:   [] PCP/Provider -   [x] Specialty/Provider - psych be/maximo    Medication: clonazePAM (KlonoPIN) 0.5 mg table      Dose/Frequency: Sig: Take 1 tablet (0.5 mg total) by mouth 3 (three) times a day    Quantity: 90    Pharmacy: [facility]/pharmacy #[id number] [place], [place] - [address] [address]  Phone: [phone]  Fax: [phone]    Local Pharmacy   Does the patient have enough for 3 days?   [] Yes   [x] No - Send as HP to POD    Mail Away Pharmacy   Does the patient have enough for 10 days?   [] Yes   [] No - Send as HP to POD

## 2025-04-04 NOTE — TELEPHONE ENCOUNTER
03/06/2025 03/06/2025 clonazePAM (Tablet) 90.0 30 0.5 [facility] [place] 0 / 0 [place]   1 [id number] 02/04/2025 02/04/2025 clonazePAM (Tablet) 90.0 30 0.5 MG NA [facility] [place] / 0 [place]   1 [id number] 01/07/2025 01/03/2025 clonazePAM (Tablet) 90.0 30 0.5 MG NA CESILIA JONES [facility] [place] / 0 [place]   1 [id number] 12/05/2024 10/04/2024 clonazePAM (Tablet) 90.0 30 0.5 MG NA [facility] [place] 2 / 2 [place]   1 [id number] 11/04/2024 10/04/2024 clonazePAM (Tablet) 90.0 30 0.5 MG NA [facility] [place] 1 / 2 [place]   1 [id number] 10/04/2024 10/04/2024 clonazePAM (Tablet) 90.0 30 0.5 MG NA [facility] [place] 0 / 2 [place]   1 [id number] 09/05/2024 07/05/2024 clonazePAM (Tablet) 90.0 30 0.5 MG NA [facility] [place] 2 / 2 [place]   1 [id number] 08/07/2024 07/05/2024 clonazePAM (Tablet) 90.0 30 0.5 MG NA [facility] [place] 1 / 2 [place]   1 [id number] 07/05/2024 07/05/2024 clonazePAM (Tablet) 90.0 30 0.5 MG

## 2025-04-09 ENCOUNTER — SOCIAL WORK (OUTPATIENT)
Dept: BEHAVIORAL/MENTAL HEALTH CLINIC | Facility: CLINIC | Age: 45
End: 2025-04-09
Payer: MEDICARE

## 2025-04-09 DIAGNOSIS — F31.32 BIPOLAR 1 DISORDER, DEPRESSED, MODERATE (HCC): Primary | ICD-10-CM

## 2025-04-09 DIAGNOSIS — F40.01 PANIC DISORDER WITH AGORAPHOBIA: ICD-10-CM

## 2025-04-09 DIAGNOSIS — F43.10 POSTTRAUMATIC STRESS DISORDER: ICD-10-CM

## 2025-04-09 PROCEDURE — 90837 PSYTX W PT 60 MINUTES: CPT | Performed by: SOCIAL WORKER

## 2025-04-09 NOTE — PSYCH
Behavioral Health Psychotherapy Progress Note    Psychotherapy Provided: Individual Psychotherapy     1. Bipolar 1 disorder, depressed, moderate (HCC)        2. Posttraumatic stress disorder        3. Panic disorder with agoraphobia            Goals addressed in session: Goal 1 and Goal 2     DATA: Marco Antonio went to [employer] and tolerated it for 10 minutes before he broke out in hives and was sweating. We discussed this is progress since there was a point he could not even go into the store. We discussed some exposure therapy strategies and next time he will endure a longer length of time he stays in a store. He then discussed a dysfunctional woman he met and he is ending it before it even starts. He discussed his one alcoholic brother who wont give their father his sister's address. This was upsetting to Marco Antonio. We discussed boundaries and I provided him a worksheet and information on boundaries. He wanted to confront the sister and we discussed his father did not want help and it was not his issue to deal with it. I provided support, encouragement, strategies to cope and insights along with how to draw appropriate boundaries.   During this session, this clinician used the following therapeutic modalities: Client-centered Therapy, Cognitive Behavioral Therapy, Mindfulness-based Strategies, and Supportive Psychotherapy    Substance Abuse was not addressed during this session. If the client is diagnosed with a co-occurring substance use disorder, please indicate any changes in the frequency or amount of use: Patient is in recovery.. Stage of change for addressing substance use diagnoses: No substance use/Not applicable    ASSESSMENT:  Marco Antonio Oro presents with a Anxious mood.     his affect is anxious, which is congruent, with his mood and the content of the session. The client has made progress on their goals.     Marco Antonio Oro presents with a none risk of suicide, none risk of self-harm, and none risk of  "harm to others.    For any risk assessment that surpasses a \"low\" rating, a safety plan must be developed.    A safety plan was indicated: no  If yes, describe in detail n/a    PLAN: Between sessions, Marco Antonio Oro will use mindfulness and CBT. At the next session, the therapist will use Client-centered Therapy, Cognitive Behavioral Therapy, Mindfulness-based Strategies, and Supportive Psychotherapy to address issues and symptoms as they may arise. .    Behavioral Health Treatment Plan and Discharge Planning: Marco Antonio Oro is aware of and agrees to continue to work on their treatment plan. They have identified and are working toward their discharge goals. yes    Depression Follow-up Plan Completed: Not applicable    Visit start and stop times:    04/09/25  Start Time: 1500  Stop Time: 1600  Total Visit Time: 60 minutes  "

## 2025-04-23 ENCOUNTER — SOCIAL WORK (OUTPATIENT)
Dept: BEHAVIORAL/MENTAL HEALTH CLINIC | Facility: CLINIC | Age: 45
End: 2025-04-23
Payer: MEDICARE

## 2025-04-23 DIAGNOSIS — F43.10 POSTTRAUMATIC STRESS DISORDER: ICD-10-CM

## 2025-04-23 DIAGNOSIS — F31.32 BIPOLAR 1 DISORDER, DEPRESSED, MODERATE (HCC): Primary | ICD-10-CM

## 2025-04-23 DIAGNOSIS — F40.01 PANIC DISORDER WITH AGORAPHOBIA: ICD-10-CM

## 2025-04-23 PROCEDURE — 90837 PSYTX W PT 60 MINUTES: CPT | Performed by: SOCIAL WORKER

## 2025-04-23 NOTE — PSYCH
Behavioral Health Psychotherapy Progress Note    Psychotherapy Provided: Individual Psychotherapy     1. Bipolar 1 disorder, depressed, moderate (HCC)        2. Posttraumatic stress disorder        3. Panic disorder with agoraphobia            Goals addressed in session: Goal 1 and Goal 2     DATA: Marco Antonio shared he needed to get a cardiac workup before his next surgery at [place]. He has been cleared. However at times he gets chest pain which is now attributed to his anxiety and his panic. He is also getting his blood work reviewed. He then will be scheduled for his surgery at [place]. He shared things are going well at home. We did discuss his panic attacks in big box stores. However he admits when asked that he isolates at home. We discussed the more he isolates the more powerful his urge not to go anywhere may become. We also discussed not writing negative narratives before something even happens. Between now and the next session he will try to get out more and push his limits. He needs to expose himself to more situations where he panics to confront and deal with the triggers that cause his panic.   During this session, this clinician used the following therapeutic modalities: Client-centered Therapy, Cognitive Behavioral Therapy, Mindfulness-based Strategies, and Supportive Psychotherapy    Substance Abuse was not addressed during this session. If the client is diagnosed with a co-occurring substance use disorder, please indicate any changes in the frequency or amount of use: n/a. Stage of change for addressing substance use diagnoses: No substance use/Not applicable    ASSESSMENT:  Marco Antonio Oro presents with a Anxious mood.     his affect is , which is congruent, with his mood and the content of the session. The client has made progress on their goals.     Marco Antonio Oro presents with a none risk of suicide, none risk of self-harm, and none risk of harm to others.    For any risk assessment that  "surpasses a \"low\" rating, a safety plan must be developed.    A safety plan was indicated: no  If yes, describe in detail n/a    PLAN: Between sessions, Henrique Leeal will use mindfulness and CBT. At the next session, the therapist will use Client-centered Therapy, Cognitive Behavioral Therapy, Mindfulness-based Strategies, and Supportive Psychotherapy to address issues and symptoms as they may arise. .    Behavioral Health Treatment Plan and Discharge Planning: Henrique Shorty is aware of and agrees to continue to work on their treatment plan. They have identified and are working toward their discharge goals. yes    Depression Follow-up Plan Completed: Not applicable    Visit start and stop times:    04/23/25  Start Time: 1600  Stop Time: 1700  Total Visit Time: 60 minutes  "

## 2025-05-05 DIAGNOSIS — F41.1 GAD (GENERALIZED ANXIETY DISORDER): ICD-10-CM

## 2025-05-05 DIAGNOSIS — F40.01 PANIC DISORDER WITH AGORAPHOBIA: ICD-10-CM

## 2025-05-05 RX ORDER — CLONAZEPAM 0.5 MG/1
0.5 TABLET ORAL 3 TIMES DAILY
Qty: 90 TABLET | Refills: 0 | Status: SHIPPED | OUTPATIENT
Start: 2025-05-05 | End: 2025-06-04

## 2025-05-05 NOTE — TELEPHONE ENCOUNTER
Reason for call:   [x] Refill   [] Prior Auth  [] Other:     Office:   [] PCP/Provider -   [x] Specialty/Provider - Angelika Lopez MD  [facility]    Medication: Clonazepam    Dose/Frequency: 0.5mg   One, Three times a day    Quantity: 90    Pharmacy: [id number] [place]    Local Pharmacy   Does the patient have enough for 3 days?   [] Yes   [x] No - Send as HP to POD    Mail Away Pharmacy   Does the patient have enough for 10 days?   [] Yes   [] No - Send as HP to POD

## 2025-05-08 ENCOUNTER — SOCIAL WORK (OUTPATIENT)
Dept: BEHAVIORAL/MENTAL HEALTH CLINIC | Facility: CLINIC | Age: 45
End: 2025-05-08
Payer: MEDICARE

## 2025-05-08 DIAGNOSIS — F40.01 PANIC DISORDER WITH AGORAPHOBIA: ICD-10-CM

## 2025-05-08 DIAGNOSIS — F31.32 BIPOLAR 1 DISORDER, DEPRESSED, MODERATE (HCC): Primary | ICD-10-CM

## 2025-05-08 DIAGNOSIS — F43.10 POSTTRAUMATIC STRESS DISORDER: ICD-10-CM

## 2025-05-08 PROCEDURE — 90837 PSYTX W PT 60 MINUTES: CPT | Performed by: SOCIAL WORKER

## 2025-05-08 NOTE — PSYCH
"Behavioral Health Psychotherapy Progress Note    Psychotherapy Provided: Individual Psychotherapy     1. Bipolar 1 disorder, depressed, moderate (HCC)        2. Posttraumatic stress disorder        3. Panic disorder with agoraphobia            Goals addressed in session: Goal 1 and Goal 2     DATA: Marco Antonio discussed results from his recent bloodwork. He passed his recent stress test and will have surgery on the 22nd down at [facility]. He discussed he is getting flashbacks from his past surgeries that had bad results when he was in his late 20's. He gets anxious, breathes heavy and has panic attacks. Dr. Gonzalez sees him and is aware he claims. I provided support , encouragement,and strategies to cope.   During this session, this clinician used the following therapeutic modalities: Client-centered Therapy, Cognitive Behavioral Therapy, Mindfulness-based Strategies, and Supportive Psychotherapy    Substance Abuse was not addressed during this session. If the client is diagnosed with a co-occurring substance use disorder, please indicate any changes in the frequency or amount of use: n/a. Stage of change for addressing substance use diagnoses: No substance use/Not applicable    ASSESSMENT:  Marco Antonio Oro presents with a Anxious mood.     his affect is anxious which is congruent, with his mood and the content of the session. The client has made progress on their goals.However he is stressing about old surgeries he had that went poorly because he is now triggered by his current need for surgery.      Marco Antonio Oro presents with a none risk of suicide, none risk of self-harm, and none risk of harm to others.    For any risk assessment that surpasses a \"low\" rating, a safety plan must be developed.    A safety plan was indicated: no  If yes, describe in detail n/a    PLAN: Between sessions, Marco Antonio Oro will use mindfulness and CBT. At the next session, the therapist will use Client-centered Therapy, " Cognitive Behavioral Therapy, Mindfulness-based Strategies, and Supportive Psychotherapy to address issues and symptoms as they may arise. .    Behavioral Health Treatment Plan and Discharge Planning: Marco Antonio Oro is aware of and agrees to continue to work on their treatment plan. They have identified and are working toward their discharge goals. yes    Depression Follow-up Plan Completed: Not applicable    Visit start and stop times:    05/08/25  Start Time: 1600  Stop Time: 1700  Total Visit Time: 60 minutes

## 2025-05-21 ENCOUNTER — SOCIAL WORK (OUTPATIENT)
Dept: BEHAVIORAL/MENTAL HEALTH CLINIC | Facility: CLINIC | Age: 45
End: 2025-05-21
Payer: MEDICARE

## 2025-05-21 DIAGNOSIS — F31.32 BIPOLAR 1 DISORDER, DEPRESSED, MODERATE (HCC): Primary | ICD-10-CM

## 2025-05-21 DIAGNOSIS — F40.01 PANIC DISORDER WITH AGORAPHOBIA: ICD-10-CM

## 2025-05-21 DIAGNOSIS — F43.10 POSTTRAUMATIC STRESS DISORDER: ICD-10-CM

## 2025-05-21 PROCEDURE — 90837 PSYTX W PT 60 MINUTES: CPT | Performed by: SOCIAL WORKER

## 2025-05-21 NOTE — PSYCH
"Behavioral Health Psychotherapy Progress Note    Psychotherapy Provided: Individual Psychotherapy     1. Bipolar 1 disorder, depressed, moderate (HCC)        2. Posttraumatic stress disorder        3. Panic disorder with agoraphobia            Goals addressed in session: Goal 1     DATA: Marco Antonio is very nervous about his 2nd surgery tomorrow. We discussed he got thru his first surgery and we did discuss anxiety reducing strategies. Marco Antonio confronted his brother for not sharing their sisters address to their dad. Marco Antonio discussed family of origin issues. Marco Antonio had himself worked up that due to allergies he would not be able to get the surgery. I provided support, encouragement and strategies to cope.   During this session, this clinician used the following therapeutic modalities: Client-centered Therapy, Cognitive Behavioral Therapy, Mindfulness-based Strategies, and Supportive Psychotherapy    Substance Abuse was not addressed during this session. If the client is diagnosed with a co-occurring substance use disorder, please indicate any changes in the frequency or amount of use: in recovery. Stage of change for addressing substance use diagnoses: No substance use/Not applicable    ASSESSMENT:  Marco Antonio Oro presents with a Anxious mood.     his affect is anxious which is congruent, with his mood and the content of the session. The client has made progress on their goals.     Marco Antonio Oro presents with a none risk of suicide, none risk of self-harm, and none risk of harm to others.    For any risk assessment that surpasses a \"low\" rating, a safety plan must be developed.    A safety plan was indicated: no  If yes, describe in detail n/a    PLAN: Between sessions, Marco Antonio Oro will use mindfulness and CBT. At the next session, the therapist will use Client-centered Therapy, Cognitive Behavioral Therapy, Mindfulness-based Strategies, and Supportive Psychotherapy to address issues and symptoms as they " may arise. .    Behavioral Health Treatment Plan and Discharge Planning: Marco Antonio Oro is aware of and agrees to continue to work on their treatment plan. They have identified and are working toward their discharge goals. yes    Depression Follow-up Plan Completed: Not applicable    Visit start and stop times:    05/21/25  Start Time: 1000  Stop Time: 1100  Total Visit Time: 60 minutes

## 2025-05-22 DIAGNOSIS — F40.01 PANIC DISORDER WITH AGORAPHOBIA: ICD-10-CM

## 2025-05-22 RX ORDER — BUSPIRONE HYDROCHLORIDE 5 MG/1
5 TABLET ORAL 3 TIMES DAILY
Qty: 270 TABLET | Refills: 0 | Status: SHIPPED | OUTPATIENT
Start: 2025-05-22

## 2025-06-05 DIAGNOSIS — F41.1 GAD (GENERALIZED ANXIETY DISORDER): ICD-10-CM

## 2025-06-05 DIAGNOSIS — F40.01 PANIC DISORDER WITH AGORAPHOBIA: ICD-10-CM

## 2025-06-05 RX ORDER — CLONAZEPAM 0.5 MG/1
0.5 TABLET ORAL 3 TIMES DAILY
Qty: 90 TABLET | Refills: 0 | Status: SHIPPED | OUTPATIENT
Start: 2025-06-05 | End: 2025-07-05

## 2025-06-05 NOTE — TELEPHONE ENCOUNTER
Reason for call:   [x] Refill   [] Prior Auth  [] Other:     Office:   [] PCP/Provider -   [x] Specialty/Provider - Psych     Medication: Clonazepam 0.5 mg, take 1 tablet by mouth 3 times a day       Pharmacy: [facility] [place]     Local Pharmacy   Does the patient have enough for 3 days?   [] Yes   [x] No - Send as HP to POD    Mail Away Pharmacy   Does the patient have enough for 10 days?   [] Yes   [] No - Send as HP to POD

## 2025-06-05 NOTE — TELEPHONE ENCOUNTER
Refill cannot be delegated    1 [id number] ** 05/05/2025 05/05/2025 clonazePAM (Tablet) 90.0 30 0.5 MG NA [facility], .[facility]. [place] 0 [place]   1 [id number] ** 04/04/2025 04/04/2025 clonazePAM (Tablet) 90.0 30 0.5 MG NA [facility], [facility]. [place] 0 [place]   1 [id number] ** 03/06/2025 03/06/2025 clonazePAM (Tablet) 90.0 30 0.5 MG NA [facility], ... [place] 0 [place]   1 [id number] ** 02/04/2025 02/04/2025 clonazePAM (Tablet) 90.0 30 0.5 MG NA [facility], [facility].. [place] 0 [place]   1 [id number] ** 01/07/2025 01/03/2025 clonazePAM (Tablet) 90.0 30 0.5 MG NA CESILIA JONES [facility], [place]... [place] / 0 [place]   1 [id number] ** 12/05/2024 10/04/2024 clonazePAM (Tablet) 90.0 30 0.5 MG [facility] ... [place] 2 [place] 2 [place]   1 [id number] ** 11/04/2024 10/04/2024 clonazePAM (Tablet) 90.0 30 0.5 MG [facility] [place]   1 [id number] ** 10/04/2024 10/04/2024 clonazePAM (Tablet) 90.0 30 0.5 MG NA [facility], ..[place]. [place] 2 [place]   1 [id number] ** 09/05/2024 07/05/2024 clonazePAM (Tablet) 90.0 30 0.5 MG NA DANY [facility], ..[place]. [place] 2 [place] 2 [place]   1 [id number] ** 08/07/2024 07/05/2024 clonazePAM (Tablet) 90.0 30 0.5 MG NA DANY [facility] [place] 1 / 2 [place]

## 2025-06-09 ENCOUNTER — SOCIAL WORK (OUTPATIENT)
Dept: BEHAVIORAL/MENTAL HEALTH CLINIC | Facility: CLINIC | Age: 45
End: 2025-06-09
Payer: MEDICARE

## 2025-06-09 DIAGNOSIS — F40.01 PANIC DISORDER WITH AGORAPHOBIA: ICD-10-CM

## 2025-06-09 DIAGNOSIS — F43.10 POSTTRAUMATIC STRESS DISORDER: ICD-10-CM

## 2025-06-09 DIAGNOSIS — F31.32 BIPOLAR 1 DISORDER, DEPRESSED, MODERATE (HCC): Primary | ICD-10-CM

## 2025-06-09 PROCEDURE — 90837 PSYTX W PT 60 MINUTES: CPT | Performed by: SOCIAL WORKER

## 2025-06-09 NOTE — PSYCH
"Behavioral Health Psychotherapy Progress Note    Psychotherapy Provided: Individual Psychotherapy     1. Bipolar 1 disorder, depressed, moderate (HCC)        2. Posttraumatic stress disorder        3. Panic disorder with agoraphobia            Goals addressed in session: Goal 1 and Goal 2     DATA: Marco Antonio shared he needs a third kidney procedure at [place]. That is due to an issue with the stent which was in too long. Marco Antonio admits he postponed surgery due to being ill. They are suppose to be in for a month his was in for 5 months. He claims his meds are affecting his ability to drive lately. I suggested he call here and speak to nursing. He claims he can't drive, he can't go on a computer or watch TV. He shared he at times feels like passing out or sitting down. He will call nursing tomorrow. I provided support, encouragement, insights and strategies to cope.   During this session, this clinician used the following therapeutic modalities: Client-centered Therapy, Cognitive Behavioral Therapy, Mindfulness-based Strategies, and Supportive Psychotherapy    Substance Abuse was not addressed during this session. If the client is diagnosed with a co-occurring substance use disorder, please indicate any changes in the frequency or amount of use: He is in recovery. Stage of change for addressing substance use diagnoses: No substance use/Not applicable    ASSESSMENT:  Marco Antonio Oro presents with a Anxious mood.     his affect is anxious which is congruent, with his mood and the content of the session. The client has made progress on their goals.     Marco Antonio Oro presents with a none risk of suicide, none risk of self-harm, and none risk of harm to others.    For any risk assessment that surpasses a \"low\" rating, a safety plan must be developed.    A safety plan was indicated: no  If yes, describe in detail n/a    PLAN: Between sessions, Marco Antonio Oro will use mindfulness and CBT. At the next session, " the therapist will use Client-centered Therapy, Cognitive Behavioral Therapy, Mindfulness-based Strategies, and Supportive Psychotherapy to address issues and symptoms as they may arise. .    Behavioral Health Treatment Plan and Discharge Planning: Marco Antonio Oro is aware of and agrees to continue to work on their treatment plan. They have identified and are working toward their discharge goals. yes    Depression Follow-up Plan Completed: Not applicable    Visit start and stop times:    06/09/25  Start Time: 1600  Stop Time: 1700  Total Visit Time: 60 minutes

## 2025-06-12 DIAGNOSIS — F31.32 BIPOLAR 1 DISORDER, DEPRESSED, MODERATE (HCC): ICD-10-CM

## 2025-06-12 RX ORDER — LISINOPRIL 10 MG/1
10 TABLET ORAL EVERY EVENING
Qty: 90 TABLET | Refills: 0 | Status: SHIPPED | OUTPATIENT
Start: 2025-06-12

## 2025-06-23 DIAGNOSIS — F31.64 BIPOLAR DISORDER, CURR EPISODE MIXED, SEVERE, WITH PSYCHOTIC FEATURES (HCC): ICD-10-CM

## 2025-06-23 RX ORDER — OLANZAPINE 15 MG/1
15 TABLET, FILM COATED ORAL
Qty: 90 TABLET | Refills: 0 | Status: SHIPPED | OUTPATIENT
Start: 2025-06-23

## 2025-06-23 NOTE — TELEPHONE ENCOUNTER
Reason for call:   [x] Refill   [] Prior Auth  [x] Other: Patient informed there are refills remaining on current prescription. Patient states there are no refills at the pharmacy and is requesting prescription to be refilled.      Office:   [] PCP/Provider -   [x] Specialty/Provider - [facility]     Medication:     OLANZapine (ZyPREXA) 15 mg Take 1 tablet (15 mg total) by mouth daily at bedtime        Pharmacy: [facility]/pharmacy #[id number] - [place]     Local Pharmacy   Does the patient have enough for 3 days?   [] Yes   [x] No - Send as HP to POD    Mail Away Pharmacy   Does the patient have enough for 10 days?   [] Yes   [] No - Send as HP to POD

## 2025-06-24 ENCOUNTER — SOCIAL WORK (OUTPATIENT)
Dept: BEHAVIORAL/MENTAL HEALTH CLINIC | Facility: CLINIC | Age: 45
End: 2025-06-24
Payer: MEDICARE

## 2025-06-24 DIAGNOSIS — F31.32 BIPOLAR 1 DISORDER, DEPRESSED, MODERATE (HCC): Primary | ICD-10-CM

## 2025-06-24 DIAGNOSIS — F43.10 POSTTRAUMATIC STRESS DISORDER: ICD-10-CM

## 2025-06-24 DIAGNOSIS — F40.01 PANIC DISORDER WITH AGORAPHOBIA: ICD-10-CM

## 2025-06-24 PROCEDURE — 90837 PSYTX W PT 60 MINUTES: CPT | Performed by: SOCIAL WORKER

## 2025-06-24 NOTE — PSYCH
Behavioral Health Psychotherapy Progress Note    Psychotherapy Provided: Individual Psychotherapy     1. Bipolar 1 disorder, depressed, moderate (HCC)        2. Posttraumatic stress disorder        3. Panic disorder with agoraphobia            Goals addressed in session: Goal 1 and Goal 2     DATA: Marco Antonio discussed concerns he has about recent impotency issues. I suggested he speak with his urologist. He is concerned that his recent surgeries may have contributed and again he needs to speak to his urologist. We also discussed it could be his anxiety . Marco Antonio has his next surgery July 3rd  to remove a stent in his kidney. We worked on anxiety reducing strategies. He shared he is also struggling with financial issues and after the surgery I recommended he goes to the [facility] assistance office He still deals with anxiety and he still gets panic attacks going into stores. He discussed sibling conflict with 2 of his siblings. He gets along well with his two other siblings. We worked on radical acceptance and lowering his expectations especially since his two other brothers experience the same thing with this brother.   During this session, this clinician used the following therapeutic modalities: Client-centered Therapy, Cognitive Behavioral Therapy, Mindfulness-based Strategies, Solution-Focused Therapy, and Supportive Psychotherapy    Substance Abuse was not addressed during this session. If the client is diagnosed with a co-occurring substance use disorder, please indicate any changes in the frequency or amount of use:He is in recovery.  . Stage of change for addressing substance use diagnoses: No substance use/Not applicable    ASSESSMENT:  Marco Antonio Oro presents with a Anxious mood.     his affect is anxious which is congruent, with his mood and the content of the session. The client has made progress on their goals.     Marco Antonio Oro presents with a none risk of suicide, none risk of self-harm, and none  "risk of harm to others.    For any risk assessment that surpasses a \"low\" rating, a safety plan must be developed.    A safety plan was indicated: no  If yes, describe in detail n/a    PLAN: Between sessions, Marco Antonio Oro will use mindfulness and CBT. At the next session, the therapist will use Client-centered Therapy, Cognitive Behavioral Therapy, Mindfulness-based Strategies, Solution-Focused Therapy, and Supportive Psychotherapy to address issues and symptoms as they may arise. .    Behavioral Health Treatment Plan and Discharge Planning: Marco Antonio Oro is aware of and agrees to continue to work on their treatment plan. They have identified and are working toward their discharge goals. yes    Depression Follow-up Plan Completed: Not applicable    Visit start and stop times:    06/24/25  Start Time: 1500  Stop Time: 1600  Total Visit Time: 60 minutes  "

## 2025-06-26 ENCOUNTER — OFFICE VISIT (OUTPATIENT)
Dept: PSYCHIATRY | Facility: CLINIC | Age: 45
End: 2025-06-26
Payer: MEDICARE

## 2025-06-26 DIAGNOSIS — F31.32 BIPOLAR 1 DISORDER, DEPRESSED, MODERATE (HCC): Primary | ICD-10-CM

## 2025-06-26 DIAGNOSIS — F40.01 PANIC DISORDER WITH AGORAPHOBIA: ICD-10-CM

## 2025-06-26 DIAGNOSIS — F41.1 GAD (GENERALIZED ANXIETY DISORDER): ICD-10-CM

## 2025-06-26 PROBLEM — Z87.891 FORMER TOBACCO USE: Status: ACTIVE | Noted: 2024-08-05

## 2025-06-26 PROCEDURE — 99214 OFFICE O/P EST MOD 30 MIN: CPT | Performed by: PSYCHIATRY & NEUROLOGY

## 2025-06-26 PROCEDURE — 90833 PSYTX W PT W E/M 30 MIN: CPT | Performed by: PSYCHIATRY & NEUROLOGY

## 2025-06-26 RX ORDER — LAMOTRIGINE 25 MG/1
25 TABLET ORAL 2 TIMES DAILY
Qty: 180 TABLET | Refills: 0 | Status: SHIPPED | OUTPATIENT
Start: 2025-06-26

## 2025-06-26 RX ORDER — CLONAZEPAM 0.5 MG/1
0.5 TABLET ORAL 3 TIMES DAILY
Qty: 90 TABLET | Refills: 2 | Status: SHIPPED | OUTPATIENT
Start: 2025-06-03 | End: 2025-09-01

## 2025-06-26 NOTE — PSYCH
Visit Time    Visit Start Time: 1:30  Visit Stop Time: 2:00  Total Visit Duration: 30 minutes    Subjective: Medication Management      Patient ID: Marco Antonio Oro is a 44 y.o. male with Bipolar do and Panic Do and PTSD    HPI ROS Appetite Changes and Sleep: normal appetite, normal energy level, no weight change, and normal number of sleep hours    He remains compliant with medications and denies side effects.          He has remains sober and he continues AA meetings.   He is interested in working part time.  He does not want to lose his SSI benefits.      He is still reporting panic attacks when having to go out ex [employer] stores.   He continues to work with his individual counselor and he is working on gradual exposure to help his anxiety.  He stated he managed to quit smoking for surgery but by now he has been 5 months without smoking and is quitting for good.   He stated his mood is stable otherwise.    Since last seen he stated that he had stent placed on left kidney and he needs the stent removed. He had kidney stone and stent for the right side already.  He is going to [facility].  He stated he had been dealing with health issues the past 6-7 months since last seen.   He stated he has concerns about sexual dysfunction due to multiple procedures done through urethra.  Prescription refills sent to pharmacy.  He stated he was recently started on Metformin 500 mg po bid  He stated that due to elevated A1c, he started a low carbohydrate diet.   He plans to start exercise program once he is cleared from his surgeon.  He stated otherwise his mood has been stable.   He stated he will like to continue Olanzapine for now and we can discuss if a medication change is need, depending on his progress managing his blood sugar.       Agrees to continue current treatment.  Will schedule follow up in  3 months  or sooner if needed.       Review Of Systems:     Mood Emotional Lability   Behavior  Impulsive Behavior   Thought Content Disturbing Thoughts, Feelings   General Emotional Problems and Decreased Functioning   Personality Normal   Other Psych Symptoms Normal   Constitutional Negative   ENT Negative   Cardiovascular Negative   Respiratory Negative   Gastrointestinal Negative   Genitourinary Negative   Musculoskeletal Negative   Integumentary Negative   Neurological Negative   Endocrine Normal    Other Symptoms Normal        Laboratory Results: Recent Labs (last 12 months):   No visits with results within 12 Month(s) from this visit.   Latest known visit with results is:   Office Visit on 06/04/2024   Component Date Value    LEUKOCYTE ESTERASE,UA 06/04/2024 neg     NITRITE,UA 06/04/2024 inconclusive     SL AMB POCT UROBILINOGEN 06/04/2024 inconclusive     POCT URINE PROTEIN 06/04/2024 inconclusive      PH,UA 06/04/2024 6.5     BLOOD,UA 06/04/2024 neg     SPECIFIC GRAVITY,UA 06/04/2024 1.005     KETONES,UA 06/04/2024 neg     BILIRUBIN,UA 06/04/2024 small     GLUCOSE, UA 06/04/2024 100      COLOR,UA 06/04/2024 orange     CLARITY,UA 06/04/2024 clear     Urine Culture 06/04/2024 No Growth <1000 cfu/mL          Substance Abuse History:  Social History     Substance and Sexual Activity   Drug Use No       Family Psychiatric History:   Family History   Problem Relation Name Age of Onset    Schizophrenia Father      Alcohol abuse Father      Psychiatric Illness Sister      Drug abuse Brother      Completed Suicide  Neg Hx         The following portions of the patient's history were reviewed and updated as appropriate: allergies, current medications, past family history, past medical history, past social history, past surgical history, and problem list.    Social History     Socioeconomic History    Marital status: Single     Spouse name: Not on file    Number of children: Not on file    Years of education: Not on file    Highest education level: Bachelor's degree (e.g., BA, AB, BS)   Occupational History     Not on file   Tobacco Use    Smoking status: Former     Current packs/day: 0.00     Types: Cigarettes     Quit date:      Years since quittin.4    Smokeless tobacco: Never    Tobacco comments:     he smoke only 1 cigarette a day, quit 2 months ago   Vaping Use    Vaping status: Never Used   Substance and Sexual Activity    Alcohol use: No     Comment: last use , took 3 of alcohol to help with panic attack    Drug use: No    Sexual activity: Not Currently   Other Topics Concern    Not on file   Social History Narrative    Not on file     Social Drivers of Health     Financial Resource Strain: Not At Risk (2025)    Received from [facility]    Financial Insecurity     In the last 12 months did you skip medications to save money?: No     In the last 12 months was there a time when you needed to see a doctor but could not because of cost?: No   Food Insecurity: No Food Insecurity (2025)    Received from [facility]    Food Insecurity     In the last 12 months did you ever eat less than you felt you should because there wasn't enough money for food?: No   Transportation Needs: No Transportation Needs (2025)    Received from [facility]    Transportation Needs     In the last 12 months have you ever had to go without healthcare because you didn't have a way to get there?: No   Physical Activity: Not on file   Stress: Not on file   Social Connections: Socially Integrated (2025)    Received from [facility]    Social Connection     Do you often feel lonely?: No   Intimate Partner Violence: Not on file   Housing Stability: Not At Risk (2025)    Received from [facility]    Housing Stability     Are you worried that in the next 2 months you may not have stable housing?: No     Social History     Social History Narrative    Not on file       Objective:       Mental status:  Appearance calm and cooperative ,  adequate hygiene and grooming, and good eye contact    Mood dysphoric   Affect affect was constricted   Speech a normal rate and fluent   Thought Processes coherent/organized and normal thought processes   Hallucinations no hallucinations present    Thought Content no delusions   Abnormal Thoughts no suicidal thoughts  and no homicidal thoughts    Orientation  oriented to person and place and time   Remote Memory short term memory intact and long term memory intact   Attention Span concentration intact   Intellect Appears to be of Average Intelligence   Insight Limited insight   Judgement judgment was limited   Muscle Strength Muscle strength and tone were normal and Normal gait    Language no difficulty naming common objects and no difficulty repeating a phrase    Fund of Knowledge displays adequate knowledge of current events, adequate fund of knowledge regarding past history, and adequate fund of knowledge regarding vocabulary                Assessment/Plan:       Diagnoses and all orders for this visit:    Bipolar 1 disorder, depressed, moderate (HCC)  -     lamoTRIgine (LaMICtal) 25 mg tablet; Take 1 tablet (25 mg total) by mouth 2 (two) times a day    Panic disorder with agoraphobia  -     clonazePAM (KlonoPIN) 0.5 mg tablet; Take 1 tablet (0.5 mg total) by mouth 3 (three) times a day    RADHA (generalized anxiety disorder)  -     clonazePAM (KlonoPIN) 0.5 mg tablet; Take 1 tablet (0.5 mg total) by mouth 3 (three) times a day            Assessment & Plan  Bipolar 1 disorder, depressed, moderate (HCC)    Orders:    lamoTRIgine (LaMICtal) 25 mg tablet; Take 1 tablet (25 mg total) by mouth 2 (two) times a day    Panic disorder with agoraphobia    Orders:    clonazePAM (KlonoPIN) 0.5 mg tablet; Take 1 tablet (0.5 mg total) by mouth 3 (three) times a day    RADHA (generalized anxiety disorder)    Orders:    clonazePAM (KlonoPIN) 0.5 mg tablet; Take 1 tablet (0.5 mg total) by mouth 3 (three) times a day                  Treatment Recommendations- Risks Benefits      Immediate Medical/Psychiatric/Psychotherapy Treatments and Any Precautions: continue current treatment     Risks, Benefits And Possible Side Effects Of Medications:  {PSYCH RISK, BENEFITS AND POSSIBLE SIDE EFFECTS (Optional):71057    Controlled Medication Discussion: Discussed with patient Black Box warning on concurrent use of benzodiazepines and opioid medications including sedation, respiratory depression, coma and death. Patient understands the risk of treatment with benzodiazepines in addition to opioids and wants to continue taking those medications. , Discussed with patient the risks of sedation, respiratory depression, impairment of ability to drive and potential for abuse and addiction related to treatment with benzodiazepine medications. The patient understands risk of treatment with benzodiazepine medications, agrees to not drive if feels impaired and agrees to take medications as prescribed., and The patient has been filling controlled prescriptions on time as prescribed to [place] Prescription Drug Monitoring program.      Psychotherapy Provided: Individual psychotherapy provided.       Individual psychotherapy provided: Yes  Counseling was provided during the session today for 16 minutes.  Medications, treatment progress and treatment plan reviewed with Marco Antonio.  Medication education provided to Marco Antonio.  Coping strategies including compliance with medications, deep/slow breathing, eliminating avoidance, engaging in previously avoided activities, exercising, getting into a good routine, increasing energy, increasing interest in usual activities, increasing motivation, increasing social interaction, maintain healthy diet, maintain heathy sleeping hygiene, and maintain positive attitude reviewed with Marco Antonio.   Supportive therapy provided.

## 2025-06-26 NOTE — ASSESSMENT & PLAN NOTE
Orders:    clonazePAM (KlonoPIN) 0.5 mg tablet; Take 1 tablet (0.5 mg total) by mouth 3 (three) times a day

## 2025-07-10 ENCOUNTER — SOCIAL WORK (OUTPATIENT)
Dept: BEHAVIORAL/MENTAL HEALTH CLINIC | Facility: CLINIC | Age: 45
End: 2025-07-10
Payer: MEDICARE

## 2025-07-10 DIAGNOSIS — F31.32 BIPOLAR 1 DISORDER, DEPRESSED, MODERATE (HCC): Primary | ICD-10-CM

## 2025-07-10 DIAGNOSIS — F40.01 PANIC DISORDER WITH AGORAPHOBIA: ICD-10-CM

## 2025-07-10 DIAGNOSIS — F43.10 POSTTRAUMATIC STRESS DISORDER: ICD-10-CM

## 2025-07-10 PROCEDURE — 90837 PSYTX W PT 60 MINUTES: CPT | Performed by: SOCIAL WORKER

## 2025-07-10 NOTE — PSYCH
"Behavioral Health Psychotherapy Progress Note    Psychotherapy Provided: Individual Psychotherapy     1. Bipolar 1 disorder, depressed, moderate (HCC)        2. Posttraumatic stress disorder        3. Panic disorder with agoraphobia            Goals addressed in session: Goal 1 and Goal 2     DATA: Marco Antonio arrived for his session. He discussed his concerns he is encountering sexually after his recent urologic surgery. I suggested he speak with his urologist the next time he sees him. He also is encountering blood sugar issues. His PCP is treating him for these issues. He discussed his new dog and he had a panic attack at the pet store. He could not get out of the car to even go into the store. We work on strategies to help him decrease his anxiety and panic. I provide support, encouragement and strategies to cope.   During this session, this clinician used the following therapeutic modalities: Client-centered Therapy, Cognitive Behavioral Therapy, Mindfulness-based Strategies, Solution-Focused Therapy, and Supportive Psychotherapy    Substance Abuse was addressed during this session. If the client is diagnosed with a co-occurring substance use disorder, please indicate any changes in the frequency or amount of use: He continues to remain sober. . Stage of change for addressing substance use diagnoses: No substance use/Not applicable    ASSESSMENT:  Marco Antonio Oro presents with a Anxious mood.     his affect is anxious, which is congruent, with his mood and the content of the session. The client has made progress on their goals.     Marco Antonio Oro presents with a none risk of suicide, none risk of self-harm, and none risk of harm to others.    For any risk assessment that surpasses a \"low\" rating, a safety plan must be developed.    A safety plan was indicated: no  If yes, describe in detail n/a    PLAN: Between sessions, Marco Antonio Oro will use mindfulness and CBT. At the next session, the " therapist will use Client-centered Therapy, Cognitive Behavioral Therapy, Mindfulness-based Strategies, Solution-Focused Therapy, and Supportive Psychotherapy to address issues and symptoms as they may arise. .    Behavioral Health Treatment Plan and Discharge Planning: Marco Antonio Oro is aware of and agrees to continue to work on their treatment plan. They have identified and are working toward their discharge goals. yes    Depression Follow-up Plan Completed: Not applicable    Visit start and stop times:    07/10/25  Start Time: 1600  Stop Time: 1700  Total Visit Time: 60 minutes

## 2025-07-31 DIAGNOSIS — I10 PRIMARY HYPERTENSION: ICD-10-CM

## 2025-07-31 RX ORDER — AMLODIPINE BESYLATE 10 MG/1
10 TABLET ORAL DAILY
Qty: 30 TABLET | Refills: 0 | Status: SHIPPED | OUTPATIENT
Start: 2025-07-31

## 2025-08-01 ENCOUNTER — TELEPHONE (OUTPATIENT)
Age: 45
End: 2025-08-01

## 2025-08-13 ENCOUNTER — TELEPHONE (OUTPATIENT)
Age: 45
End: 2025-08-13